# Patient Record
Sex: MALE | Race: WHITE | NOT HISPANIC OR LATINO
[De-identification: names, ages, dates, MRNs, and addresses within clinical notes are randomized per-mention and may not be internally consistent; named-entity substitution may affect disease eponyms.]

---

## 2017-03-03 ENCOUNTER — APPOINTMENT (OUTPATIENT)
Dept: INFUSION THERAPY | Facility: CLINIC | Age: 67
End: 2017-03-03

## 2017-03-03 ENCOUNTER — APPOINTMENT (OUTPATIENT)
Dept: HEMATOLOGY ONCOLOGY | Facility: CLINIC | Age: 67
End: 2017-03-03

## 2017-03-03 VITALS
TEMPERATURE: 96 F | HEART RATE: 91 BPM | WEIGHT: 210 LBS | HEIGHT: 70 IN | RESPIRATION RATE: 12 BRPM | SYSTOLIC BLOOD PRESSURE: 115 MMHG | BODY MASS INDEX: 30.06 KG/M2 | DIASTOLIC BLOOD PRESSURE: 60 MMHG

## 2017-03-03 DIAGNOSIS — Z85.528 PERSONAL HISTORY OF OTHER MALIGNANT NEOPLASM OF KIDNEY: ICD-10-CM

## 2017-03-03 LAB
BASOPHILS # BLD: 0.03 TH/MM3
BASOPHILS NFR BLD: 0.5 %
EOSINOPHIL # BLD: 0.07 TH/MM3
EOSINOPHIL NFR BLD: 1.2 %
ERYTHROCYTE [DISTWIDTH] IN BLOOD BY AUTOMATED COUNT: 20.2 %
FERRITIN SERPL-MCNC: 10 NG/ML
GRANULOCYTES # BLD: 4.1 TH/MM3
GRANULOCYTES NFR BLD: 73 %
HCT VFR BLD AUTO: 27.9 %
HGB BLD-MCNC: 7.6 G/DL
IMM GRANULOCYTES # BLD: 0.02 TH/MM3
IMM GRANULOCYTES NFR BLD: 0.4 %
IRON SERPL-MCNC: 14 UG/DL
LYMPHOCYTES # BLD: 0.89 TH/MM3
LYMPHOCYTES NFR BLD: 15.8 %
MCH RBC QN AUTO: 21.1 PG
MCHC RBC AUTO-ENTMCNC: 27.2 G/DL
MCV RBC AUTO: 77.5 FL
MONOCYTES # BLD: 0.51 TH/MM3
MONOCYTES NFR BLD: 9.1 %
PLATELET # BLD: 166 TH/MM3
PMV BLD AUTO: 8.9 FL
RBC # BLD AUTO: 3.6 MIL/MM3
TIBC SERPL-MCNC: 481 UG/DL
WBC # BLD: 5.62 TH/MM3

## 2017-03-10 ENCOUNTER — APPOINTMENT (OUTPATIENT)
Dept: INFUSION THERAPY | Facility: CLINIC | Age: 67
End: 2017-03-10

## 2017-03-10 VITALS
SYSTOLIC BLOOD PRESSURE: 104 MMHG | HEART RATE: 85 BPM | DIASTOLIC BLOOD PRESSURE: 58 MMHG | TEMPERATURE: 97.7 F | RESPIRATION RATE: 16 BRPM

## 2017-03-10 LAB
BASOPHILS # BLD: 0.06 TH/MM3
BASOPHILS NFR BLD: 0.9 %
EOSINOPHIL # BLD: 0.09 TH/MM3
EOSINOPHIL NFR BLD: 1.3 %
ERYTHROCYTE [DISTWIDTH] IN BLOOD BY AUTOMATED COUNT: 24.9 %
GRANULOCYTES # BLD: 4.54 TH/MM3
GRANULOCYTES NFR BLD: 66.7 %
HCT VFR BLD AUTO: 31.1 %
HGB BLD-MCNC: 9.1 G/DL
IMM GRANULOCYTES # BLD: 0.1 TH/MM3
IMM GRANULOCYTES NFR BLD: 1.5 %
LYMPHOCYTES # BLD: 1.5 TH/MM3
LYMPHOCYTES NFR BLD: 22.1 %
MCH RBC QN AUTO: 23.6 PG
MCHC RBC AUTO-ENTMCNC: 29.3 G/DL
MCV RBC AUTO: 80.6 FL
MONOCYTES # BLD: 0.51 TH/MM3
MONOCYTES NFR BLD: 7.5 %
PLATELET # BLD: 130 TH/MM3
PMV BLD AUTO: 9.4 FL
RBC # BLD AUTO: 3.86 MIL/MM3
WBC # BLD: 6.8 TH/MM3

## 2017-03-17 ENCOUNTER — APPOINTMENT (OUTPATIENT)
Dept: INFUSION THERAPY | Facility: CLINIC | Age: 67
End: 2017-03-17

## 2017-03-18 LAB
BASOPHILS # BLD: 0.04 TH/MM3
BASOPHILS NFR BLD: 0.8 %
EOSINOPHIL # BLD: 0.09 TH/MM3
EOSINOPHIL NFR BLD: 1.7 %
ERYTHROCYTE [DISTWIDTH] IN BLOOD BY AUTOMATED COUNT: 28.8 %
GRANULOCYTES # BLD: 2.94 TH/MM3
GRANULOCYTES NFR BLD: 56.5 %
HCT VFR BLD AUTO: 34.2 %
HGB BLD-MCNC: 10 G/DL
IMM GRANULOCYTES # BLD: 0.02 TH/MM3
IMM GRANULOCYTES NFR BLD: 0.4 %
LYMPHOCYTES # BLD: 1.57 TH/MM3
LYMPHOCYTES NFR BLD: 30.2 %
MCH RBC QN AUTO: 24.6 PG
MCHC RBC AUTO-ENTMCNC: 29.2 G/DL
MCV RBC AUTO: 84.2 FL
MONOCYTES # BLD: 0.54 TH/MM3
MONOCYTES NFR BLD: 10.4 %
PLATELET # BLD: 135 TH/MM3
RBC # BLD AUTO: 4.06 MIL/MM3
WBC # BLD: 5.2 TH/MM3

## 2017-03-24 ENCOUNTER — APPOINTMENT (OUTPATIENT)
Dept: INFUSION THERAPY | Facility: CLINIC | Age: 67
End: 2017-03-24

## 2017-03-24 LAB
BASOPHILS # BLD: 0.04 TH/MM3
BASOPHILS NFR BLD: 0.8 %
EOSINOPHIL # BLD: 0.08 TH/MM3
EOSINOPHIL NFR BLD: 1.7 %
ERYTHROCYTE [DISTWIDTH] IN BLOOD BY AUTOMATED COUNT: 27.1 %
GRANULOCYTES # BLD: 2.97 TH/MM3
GRANULOCYTES NFR BLD: 62.5 %
HCT VFR BLD AUTO: 35.4 %
HGB BLD-MCNC: 10.6 G/DL
IMM GRANULOCYTES # BLD: 0.01 TH/MM3
IMM GRANULOCYTES NFR BLD: 0.2 %
LYMPHOCYTES # BLD: 1.13 TH/MM3
LYMPHOCYTES NFR BLD: 23.7 %
MCH RBC QN AUTO: 25.1 PG
MCHC RBC AUTO-ENTMCNC: 29.9 G/DL
MCV RBC AUTO: 83.7 FL
MONOCYTES # BLD: 0.53 TH/MM3
MONOCYTES NFR BLD: 11.1 %
PLATELET # BLD: 179 TH/MM3
PMV BLD AUTO: 10 FL
RBC # BLD AUTO: 4.23 MIL/MM3
WBC # BLD: 4.76 TH/MM3

## 2017-03-31 ENCOUNTER — APPOINTMENT (OUTPATIENT)
Dept: INFUSION THERAPY | Facility: CLINIC | Age: 67
End: 2017-03-31

## 2017-03-31 ENCOUNTER — APPOINTMENT (OUTPATIENT)
Dept: HEMATOLOGY ONCOLOGY | Facility: CLINIC | Age: 67
End: 2017-03-31

## 2017-03-31 VITALS
HEIGHT: 70 IN | DIASTOLIC BLOOD PRESSURE: 58 MMHG | WEIGHT: 210 LBS | HEART RATE: 91 BPM | SYSTOLIC BLOOD PRESSURE: 94 MMHG | BODY MASS INDEX: 30.06 KG/M2 | RESPIRATION RATE: 12 BRPM | TEMPERATURE: 97.2 F

## 2017-03-31 LAB
BASOPHILS # BLD: 0.05 TH/MM3
BASOPHILS NFR BLD: 1 %
EOSINOPHIL # BLD: 0.18 TH/MM3
EOSINOPHIL NFR BLD: 3.5 %
ERYTHROCYTE [DISTWIDTH] IN BLOOD BY AUTOMATED COUNT: 26.1 %
GRANULOCYTES # BLD: 3.24 TH/MM3
GRANULOCYTES NFR BLD: 62.4 %
HCT VFR BLD AUTO: 33.6 %
HGB BLD-MCNC: 10 G/DL
IMM GRANULOCYTES # BLD: 0.02 TH/MM3
IMM GRANULOCYTES NFR BLD: 0.4 %
LYMPHOCYTES # BLD: 1.18 TH/MM3
LYMPHOCYTES NFR BLD: 22.7 %
MCH RBC QN AUTO: 24.9 PG
MCHC RBC AUTO-ENTMCNC: 29.8 G/DL
MCV RBC AUTO: 83.8 FL
MONOCYTES # BLD: 0.52 TH/MM3
MONOCYTES NFR BLD: 10 %
PLATELET # BLD: 164 TH/MM3
PMV BLD AUTO: 10.2 FL
RBC # BLD AUTO: 4.01 MIL/MM3
WBC # BLD: 5.19 TH/MM3

## 2017-04-07 ENCOUNTER — RESULT REVIEW (OUTPATIENT)
Age: 67
End: 2017-04-07

## 2017-04-07 ENCOUNTER — APPOINTMENT (OUTPATIENT)
Dept: INFUSION THERAPY | Facility: CLINIC | Age: 67
End: 2017-04-07

## 2017-04-14 ENCOUNTER — APPOINTMENT (OUTPATIENT)
Dept: INFUSION THERAPY | Facility: CLINIC | Age: 67
End: 2017-04-14

## 2017-04-14 LAB
BASOPHILS # BLD: 0.04 TH/MM3
BASOPHILS NFR BLD: 0.8 %
EOSINOPHIL # BLD: 0.23 TH/MM3
EOSINOPHIL NFR BLD: 4.3 %
ERYTHROCYTE [DISTWIDTH] IN BLOOD BY AUTOMATED COUNT: 23.3 %
GRANULOCYTES # BLD: 3.5 TH/MM3
GRANULOCYTES NFR BLD: 65.6 %
HCT VFR BLD AUTO: 30.1 %
HGB BLD-MCNC: 9.2 G/DL
IMM GRANULOCYTES # BLD: 0.02 TH/MM3
IMM GRANULOCYTES NFR BLD: 0.4 %
LYMPHOCYTES # BLD: 0.95 TH/MM3
LYMPHOCYTES NFR BLD: 17.8 %
MCH RBC QN AUTO: 25.4 PG
MCHC RBC AUTO-ENTMCNC: 30.6 G/DL
MCV RBC AUTO: 83.1 FL
MONOCYTES # BLD: 0.59 TH/MM3
MONOCYTES NFR BLD: 11.1 %
PLATELET # BLD: 170 TH/MM3
PMV BLD AUTO: 9.5 FL
RBC # BLD AUTO: 3.62 MIL/MM3
WBC # BLD: 5.33 TH/MM3

## 2017-04-21 ENCOUNTER — APPOINTMENT (OUTPATIENT)
Dept: INFUSION THERAPY | Facility: CLINIC | Age: 67
End: 2017-04-21

## 2017-04-21 LAB
BASOPHILS # BLD: 0.09 TH/MM3
BASOPHILS NFR BLD: 1.7 %
EOSINOPHIL # BLD: 0.19 TH/MM3
EOSINOPHIL NFR BLD: 3.5 %
ERYTHROCYTE [DISTWIDTH] IN BLOOD BY AUTOMATED COUNT: 22.5 %
GRANULOCYTES # BLD: 3.56 TH/MM3
GRANULOCYTES NFR BLD: 65.4 %
HCT VFR BLD AUTO: 30.5 %
HGB BLD-MCNC: 9.3 G/DL
IMM GRANULOCYTES # BLD: 0.14 TH/MM3
IMM GRANULOCYTES NFR BLD: 2.6 %
LYMPHOCYTES # BLD: 0.99 TH/MM3
LYMPHOCYTES NFR BLD: 18.2 %
MCH RBC QN AUTO: 25.1 PG
MCHC RBC AUTO-ENTMCNC: 30.5 G/DL
MCV RBC AUTO: 82.2 FL
MONOCYTES # BLD: 0.47 TH/MM3
MONOCYTES NFR BLD: 8.6 %
PLATELET # BLD: 213 TH/MM3
PMV BLD AUTO: 10 FL
RBC # BLD AUTO: 3.71 MIL/MM3
WBC # BLD: 5.44 TH/MM3

## 2017-04-26 ENCOUNTER — APPOINTMENT (OUTPATIENT)
Dept: HEMATOLOGY ONCOLOGY | Facility: CLINIC | Age: 67
End: 2017-04-26

## 2017-04-26 ENCOUNTER — APPOINTMENT (OUTPATIENT)
Dept: INFUSION THERAPY | Facility: CLINIC | Age: 67
End: 2017-04-26

## 2017-04-26 ENCOUNTER — OUTPATIENT (OUTPATIENT)
Dept: OUTPATIENT SERVICES | Facility: HOSPITAL | Age: 67
LOS: 1 days | Discharge: HOME | End: 2017-04-26

## 2017-04-26 VITALS
WEIGHT: 214 LBS | RESPIRATION RATE: 12 BRPM | BODY MASS INDEX: 30.64 KG/M2 | TEMPERATURE: 95.9 F | HEART RATE: 91 BPM | SYSTOLIC BLOOD PRESSURE: 104 MMHG | DIASTOLIC BLOOD PRESSURE: 58 MMHG | HEIGHT: 70 IN

## 2017-04-26 LAB
BASOPHILS # BLD: 0.03 TH/MM3
BASOPHILS NFR BLD: 0.7 %
EOSINOPHIL # BLD: 0.15 TH/MM3
EOSINOPHIL NFR BLD: 3.3 %
ERYTHROCYTE [DISTWIDTH] IN BLOOD BY AUTOMATED COUNT: 21.8 %
GRANULOCYTES # BLD: 3.12 TH/MM3
GRANULOCYTES NFR BLD: 67.7 %
HCT VFR BLD AUTO: 29.3 %
HGB BLD-MCNC: 8.9 G/DL
IMM GRANULOCYTES # BLD: 0.01 TH/MM3
IMM GRANULOCYTES NFR BLD: 0.2 %
LYMPHOCYTES # BLD: 0.9 TH/MM3
LYMPHOCYTES NFR BLD: 19.6 %
MCH RBC QN AUTO: 25 PG
MCHC RBC AUTO-ENTMCNC: 30.4 G/DL
MCV RBC AUTO: 82.3 FL
MONOCYTES # BLD: 0.39 TH/MM3
MONOCYTES NFR BLD: 8.5 %
PLATELET # BLD: 209 TH/MM3
PMV BLD AUTO: 9.1 FL
RBC # BLD AUTO: 3.56 MIL/MM3
WBC # BLD: 4.6 TH/MM3

## 2017-04-28 LAB
FERRITIN SERPL-MCNC: 20 NG/ML
PERCENT SATURATION (NORTH): 4.4 %
TIBC SERPL-MCNC: 452 UG/DL

## 2017-05-03 ENCOUNTER — APPOINTMENT (OUTPATIENT)
Dept: INFUSION THERAPY | Facility: CLINIC | Age: 67
End: 2017-05-03

## 2017-05-04 LAB
ALBUMIN SERPL-MCNC: 3.5 G/DL
ALBUMIN/GLOB SERPL: 1.21
ALP SERPL-CCNC: 67 IU/L
ALT SERPL-CCNC: 12 IU/L
ANION GAP SERPL CALC-SCNC: 9 MEQ/L
AST SERPL-CCNC: 17 IU/L
BASOPHILS # BLD: 0.02 TH/MM3
BASOPHILS NFR BLD: 0.4 %
BILIRUB SERPL-MCNC: 1.1 MG/DL
BUN SERPL-MCNC: 25 MG/DL
BUN/CREAT SERPL: 17 %
CALCIUM SERPL-MCNC: 9.1 MG/DL
CHLORIDE SERPL-SCNC: 107 MEQ/L
CO2 SERPL-SCNC: 23 MEQ/L
CREAT SERPL-MCNC: 1.47 MG/DL
EOSINOPHIL # BLD: 0.18 TH/MM3
EOSINOPHIL NFR BLD: 3.3 %
ERYTHROCYTE [DISTWIDTH] IN BLOOD BY AUTOMATED COUNT: 21.6 %
GFR SERPL CREATININE-BSD FRML MDRD: 48
GLUCOSE SERPL-MCNC: 96 MG/DL
GRANULOCYTES # BLD: 3.81 TH/MM3
GRANULOCYTES NFR BLD: 68.9 %
HCT VFR BLD AUTO: 30.4 %
HGB BLD-MCNC: 9.1 G/DL
IMM GRANULOCYTES # BLD: 0.01 TH/MM3
IMM GRANULOCYTES NFR BLD: 0.2 %
LYMPHOCYTES # BLD: 1.03 TH/MM3
LYMPHOCYTES NFR BLD: 18.7 %
MCH RBC QN AUTO: 24.2 PG
MCHC RBC AUTO-ENTMCNC: 29.9 G/DL
MCV RBC AUTO: 80.9 FL
MONOCYTES # BLD: 0.47 TH/MM3
MONOCYTES NFR BLD: 8.5 %
PLATELET # BLD: 180 TH/MM3
PMV BLD AUTO: 10.9 FL
POTASSIUM SERPL-SCNC: 4.8 MMOL/L
PROT SERPL-MCNC: 6.4 G/DL
RBC # BLD AUTO: 3.76 MIL/MM3
SODIUM SERPL-SCNC: 139 MEQ/L
WBC # BLD: 5.52 TH/MM3

## 2017-05-05 ENCOUNTER — APPOINTMENT (OUTPATIENT)
Dept: INFUSION THERAPY | Facility: CLINIC | Age: 67
End: 2017-05-05

## 2017-05-10 ENCOUNTER — APPOINTMENT (OUTPATIENT)
Dept: INFUSION THERAPY | Facility: CLINIC | Age: 67
End: 2017-05-10

## 2017-05-12 LAB
BASOPHILS # BLD: 0.08 TH/MM3
BASOPHILS NFR BLD: 1.6 %
EOSINOPHIL # BLD: 0.13 TH/MM3
EOSINOPHIL NFR BLD: 2.6 %
ERYTHROCYTE [DISTWIDTH] IN BLOOD BY AUTOMATED COUNT: 24.7 %
GRANULOCYTES # BLD: 3.3 TH/MM3
GRANULOCYTES NFR BLD: 65.3 %
HCT VFR BLD AUTO: 32.8 %
HGB BLD-MCNC: 9.8 G/DL
IMM GRANULOCYTES # BLD: 0.11 TH/MM3
IMM GRANULOCYTES NFR BLD: 2.2 %
LYMPHOCYTES # BLD: 1.08 TH/MM3
LYMPHOCYTES NFR BLD: 21.4 %
MCH RBC QN AUTO: 25.4 PG
MCHC RBC AUTO-ENTMCNC: 29.9 G/DL
MCV RBC AUTO: 85 FL
MONOCYTES # BLD: 0.35 TH/MM3
MONOCYTES NFR BLD: 6.9 %
PLATELET # BLD: 160 TH/MM3
PMV BLD AUTO: 11 FL
RBC # BLD AUTO: 3.86 MIL/MM3
WBC # BLD: 5.05 TH/MM3

## 2017-05-17 ENCOUNTER — APPOINTMENT (OUTPATIENT)
Dept: INFUSION THERAPY | Facility: CLINIC | Age: 67
End: 2017-05-17

## 2017-05-17 LAB
BASOPHILS # BLD: 0.04 TH/MM3
BASOPHILS NFR BLD: 0.8 %
EOSINOPHIL # BLD: 0.14 TH/MM3
EOSINOPHIL NFR BLD: 2.8 %
ERYTHROCYTE [DISTWIDTH] IN BLOOD BY AUTOMATED COUNT: 26.5 %
GRANULOCYTES # BLD: 3.2 TH/MM3
GRANULOCYTES NFR BLD: 64.9 %
HCT VFR BLD AUTO: 36.9 %
HGB BLD-MCNC: 10.9 G/DL
IMM GRANULOCYTES # BLD: 0.04 TH/MM3
IMM GRANULOCYTES NFR BLD: 0.8 %
LYMPHOCYTES # BLD: 1.24 TH/MM3
LYMPHOCYTES NFR BLD: 25.2 %
MCH RBC QN AUTO: 26.2 PG
MCHC RBC AUTO-ENTMCNC: 29.5 G/DL
MCV RBC AUTO: 88.7 FL
MONOCYTES # BLD: 0.27 TH/MM3
MONOCYTES NFR BLD: 5.5 %
PLATELET # BLD: 147 TH/MM3
RBC # BLD AUTO: 4.16 MIL/MM3
WBC # BLD: 4.93 TH/MM3

## 2017-05-24 ENCOUNTER — APPOINTMENT (OUTPATIENT)
Dept: INFUSION THERAPY | Facility: CLINIC | Age: 67
End: 2017-05-24

## 2017-05-25 LAB
BASOPHILS # BLD: 0.07 TH/MM3
BASOPHILS NFR BLD: 1.4 %
EOSINOPHIL # BLD: 0.15 TH/MM3
EOSINOPHIL NFR BLD: 2.9 %
ERYTHROCYTE [DISTWIDTH] IN BLOOD BY AUTOMATED COUNT: 25 %
GRANULOCYTES # BLD: 3.39 TH/MM3
GRANULOCYTES NFR BLD: 65.8 %
HCT VFR BLD AUTO: 37.8 %
HGB BLD-MCNC: 11.5 G/DL
IMM GRANULOCYTES # BLD: 0.03 TH/MM3
IMM GRANULOCYTES NFR BLD: 0.6 %
LYMPHOCYTES # BLD: 1.13 TH/MM3
LYMPHOCYTES NFR BLD: 21.9 %
MCH RBC QN AUTO: 26.9 PG
MCHC RBC AUTO-ENTMCNC: 30.4 G/DL
MCV RBC AUTO: 88.3 FL
MONOCYTES # BLD: 0.38 TH/MM3
MONOCYTES NFR BLD: 7.4 %
PLATELET # BLD: 170 TH/MM3
PMV BLD AUTO: 11.6 FL
RBC # BLD AUTO: 4.28 MIL/MM3
WBC # BLD: 5.15 TH/MM3

## 2017-05-31 ENCOUNTER — APPOINTMENT (OUTPATIENT)
Dept: INFUSION THERAPY | Facility: CLINIC | Age: 67
End: 2017-05-31

## 2017-06-01 LAB
BASOPHILS # BLD: 0.03 TH/MM3
BASOPHILS NFR BLD: 0.7 %
EOSINOPHIL # BLD: 0.13 TH/MM3
EOSINOPHIL NFR BLD: 3.1 %
ERYTHROCYTE [DISTWIDTH] IN BLOOD BY AUTOMATED COUNT: 23.6 %
FERRITIN SERPL-MCNC: 49 NG/ML
GRANULOCYTES # BLD: 2.88 TH/MM3
GRANULOCYTES NFR BLD: 68.9 %
HCT VFR BLD AUTO: 35.9 %
HGB BLD-MCNC: 10.9 G/DL
IMM GRANULOCYTES # BLD: 0.01 TH/MM3
IMM GRANULOCYTES NFR BLD: 0.2 %
IRON SERPL-MCNC: 30 UG/DL
LYMPHOCYTES # BLD: 0.78 TH/MM3
LYMPHOCYTES NFR BLD: 18.7 %
MCH RBC QN AUTO: 27.2 PG
MCHC RBC AUTO-ENTMCNC: 30.4 G/DL
MCV RBC AUTO: 89.5 FL
MONOCYTES # BLD: 0.35 TH/MM3
MONOCYTES NFR BLD: 8.4 %
PLATELET # BLD: 146 TH/MM3
PMV BLD AUTO: 10.2 FL
RBC # BLD AUTO: 4.01 MIL/MM3
TIBC SERPL-MCNC: 442 UG/DL
WBC # BLD: 4.18 TH/MM3

## 2017-06-07 ENCOUNTER — APPOINTMENT (OUTPATIENT)
Dept: HEMATOLOGY ONCOLOGY | Facility: CLINIC | Age: 67
End: 2017-06-07

## 2017-06-07 ENCOUNTER — APPOINTMENT (OUTPATIENT)
Dept: INFUSION THERAPY | Facility: CLINIC | Age: 67
End: 2017-06-07

## 2017-06-07 VITALS
SYSTOLIC BLOOD PRESSURE: 94 MMHG | BODY MASS INDEX: 31.07 KG/M2 | RESPIRATION RATE: 12 BRPM | HEIGHT: 70 IN | DIASTOLIC BLOOD PRESSURE: 68 MMHG | WEIGHT: 217 LBS | HEART RATE: 93 BPM | TEMPERATURE: 96.6 F

## 2017-06-07 RX ORDER — POLYVINYL ALCOHOL 14 MG/ML
1.4 SOLUTION/ DROPS OPHTHALMIC
Qty: 15 | Refills: 0 | Status: COMPLETED | COMMUNITY
Start: 2016-10-18

## 2017-06-07 RX ORDER — RANITIDINE 150 MG/1
150 TABLET ORAL
Refills: 0 | Status: COMPLETED | COMMUNITY

## 2017-06-07 RX ORDER — LORATADINE 10 MG/1
10 TABLET ORAL
Qty: 30 | Refills: 0 | Status: COMPLETED | COMMUNITY
Start: 2017-02-16

## 2017-06-07 RX ORDER — DIGOXIN 125 UG/1
125 TABLET ORAL
Refills: 0 | Status: COMPLETED | COMMUNITY

## 2017-06-07 RX ORDER — CHLORHEXIDINE GLUCONATE 4 %
325 (65 FE) LIQUID (ML) TOPICAL
Qty: 30 | Refills: 0 | Status: COMPLETED | COMMUNITY
Start: 2017-02-16

## 2017-06-08 LAB
BASOPHILS # BLD: 0.05 TH/MM3
BASOPHILS NFR BLD: 0.9 %
EOSINOPHIL # BLD: 0.17 TH/MM3
EOSINOPHIL NFR BLD: 3.1 %
ERYTHROCYTE [DISTWIDTH] IN BLOOD BY AUTOMATED COUNT: 22.2 %
GRANULOCYTES # BLD: 3.75 TH/MM3
GRANULOCYTES NFR BLD: 67.8 %
HCT VFR BLD AUTO: 36.5 %
HGB BLD-MCNC: 11.4 G/DL
IMM GRANULOCYTES # BLD: 0.05 TH/MM3
IMM GRANULOCYTES NFR BLD: 0.9 %
LYMPHOCYTES # BLD: 0.96 TH/MM3
LYMPHOCYTES NFR BLD: 17.4 %
MCH RBC QN AUTO: 27.1 PG
MCHC RBC AUTO-ENTMCNC: 31.2 G/DL
MCV RBC AUTO: 86.9 FL
MONOCYTES # BLD: 0.55 TH/MM3
MONOCYTES NFR BLD: 9.9 %
PLATELET # BLD: 163 TH/MM3
PMV BLD AUTO: 10.7 FL
RBC # BLD AUTO: 4.2 MIL/MM3
WBC # BLD: 5.53 TH/MM3

## 2017-06-14 ENCOUNTER — APPOINTMENT (OUTPATIENT)
Dept: INFUSION THERAPY | Facility: CLINIC | Age: 67
End: 2017-06-14

## 2017-06-16 LAB
BASOPHILS # BLD: 0.04 TH/MM3
BASOPHILS NFR BLD: 0.7 %
EOSINOPHIL # BLD: 0.15 TH/MM3
EOSINOPHIL NFR BLD: 2.7 %
ERYTHROCYTE [DISTWIDTH] IN BLOOD BY AUTOMATED COUNT: 23 %
GRANULOCYTES # BLD: 3.87 TH/MM3
GRANULOCYTES NFR BLD: 69.2 %
HCT VFR BLD AUTO: 39 %
HGB BLD-MCNC: 12.3 G/DL
IMM GRANULOCYTES # BLD: 0.01 TH/MM3
IMM GRANULOCYTES NFR BLD: 0.2 %
LYMPHOCYTES # BLD: 1.07 TH/MM3
LYMPHOCYTES NFR BLD: 19.1 %
MCH RBC QN AUTO: 27.8 PG
MCHC RBC AUTO-ENTMCNC: 31.5 G/DL
MCV RBC AUTO: 88 FL
MONOCYTES # BLD: 0.45 TH/MM3
MONOCYTES NFR BLD: 8.1 %
PLATELET # BLD: 123 TH/MM3
RBC # BLD AUTO: 4.43 MIL/MM3
WBC # BLD: 5.59 TH/MM3

## 2017-06-19 ENCOUNTER — OUTPATIENT (OUTPATIENT)
Dept: OUTPATIENT SERVICES | Facility: HOSPITAL | Age: 67
LOS: 1 days | Discharge: HOME | End: 2017-06-19

## 2017-06-19 DIAGNOSIS — I10 ESSENTIAL (PRIMARY) HYPERTENSION: ICD-10-CM

## 2017-06-19 DIAGNOSIS — I95.2 HYPOTENSION DUE TO DRUGS: ICD-10-CM

## 2017-06-19 DIAGNOSIS — J96.00 ACUTE RESPIRATORY FAILURE, UNSPECIFIED WHETHER WITH HYPOXIA OR HYPERCAPNIA: ICD-10-CM

## 2017-06-19 DIAGNOSIS — A41.9 SEPSIS, UNSPECIFIED ORGANISM: ICD-10-CM

## 2017-06-19 DIAGNOSIS — I50.23 ACUTE ON CHRONIC SYSTOLIC (CONGESTIVE) HEART FAILURE: ICD-10-CM

## 2017-06-19 DIAGNOSIS — D64.9 ANEMIA, UNSPECIFIED: ICD-10-CM

## 2017-06-19 DIAGNOSIS — I42.9 CARDIOMYOPATHY, UNSPECIFIED: ICD-10-CM

## 2017-06-19 DIAGNOSIS — N10 ACUTE PYELONEPHRITIS: ICD-10-CM

## 2017-06-19 DIAGNOSIS — I48.91 UNSPECIFIED ATRIAL FIBRILLATION: ICD-10-CM

## 2017-06-19 DIAGNOSIS — N20.0 CALCULUS OF KIDNEY: ICD-10-CM

## 2017-06-19 DIAGNOSIS — R55 SYNCOPE AND COLLAPSE: ICD-10-CM

## 2017-06-19 DIAGNOSIS — I78.0 HEREDITARY HEMORRHAGIC TELANGIECTASIA: ICD-10-CM

## 2017-06-19 DIAGNOSIS — M10.9 GOUT, UNSPECIFIED: ICD-10-CM

## 2017-06-21 ENCOUNTER — APPOINTMENT (OUTPATIENT)
Dept: INFUSION THERAPY | Facility: CLINIC | Age: 67
End: 2017-06-21

## 2017-06-21 ENCOUNTER — APPOINTMENT (OUTPATIENT)
Dept: CARDIOLOGY | Facility: CLINIC | Age: 67
End: 2017-06-21

## 2017-06-21 VITALS — HEART RATE: 92 BPM | DIASTOLIC BLOOD PRESSURE: 61 MMHG | SYSTOLIC BLOOD PRESSURE: 94 MMHG | OXYGEN SATURATION: 97 %

## 2017-06-22 LAB
BASOPHILS # BLD: 0.06 TH/MM3
BASOPHILS NFR BLD: 1.2 %
EOSINOPHIL # BLD: 0.18 TH/MM3
EOSINOPHIL NFR BLD: 3.5 %
ERYTHROCYTE [DISTWIDTH] IN BLOOD BY AUTOMATED COUNT: 22.5 %
GRANULOCYTES # BLD: 3.27 TH/MM3
GRANULOCYTES NFR BLD: 64.2 %
HCT VFR BLD AUTO: 39.7 %
HGB BLD-MCNC: 12.6 G/DL
IMM GRANULOCYTES # BLD: 0.02 TH/MM3
IMM GRANULOCYTES NFR BLD: 0.4 %
LYMPHOCYTES # BLD: 1.16 TH/MM3
LYMPHOCYTES NFR BLD: 22.7 %
MCH RBC QN AUTO: 27.9 PG
MCHC RBC AUTO-ENTMCNC: 21.7 G/DL
MCV RBC AUTO: 88 FL
MONOCYTES # BLD: 0.41 TH/MM3
MONOCYTES NFR BLD: 8 %
PLATELET # BLD: 142 TH/MM3
RBC # BLD AUTO: 4.51 MIL/MM3
WBC # BLD: 5.1 TH/MM3

## 2017-06-26 ENCOUNTER — OUTPATIENT (OUTPATIENT)
Dept: OUTPATIENT SERVICES | Facility: HOSPITAL | Age: 67
LOS: 1 days | Discharge: HOME | End: 2017-06-26

## 2017-06-26 DIAGNOSIS — I95.2 HYPOTENSION DUE TO DRUGS: ICD-10-CM

## 2017-06-26 DIAGNOSIS — M10.9 GOUT, UNSPECIFIED: ICD-10-CM

## 2017-06-26 DIAGNOSIS — I78.0 HEREDITARY HEMORRHAGIC TELANGIECTASIA: ICD-10-CM

## 2017-06-26 DIAGNOSIS — J96.00 ACUTE RESPIRATORY FAILURE, UNSPECIFIED WHETHER WITH HYPOXIA OR HYPERCAPNIA: ICD-10-CM

## 2017-06-26 DIAGNOSIS — A41.9 SEPSIS, UNSPECIFIED ORGANISM: ICD-10-CM

## 2017-06-26 DIAGNOSIS — I42.9 CARDIOMYOPATHY, UNSPECIFIED: ICD-10-CM

## 2017-06-26 DIAGNOSIS — I10 ESSENTIAL (PRIMARY) HYPERTENSION: ICD-10-CM

## 2017-06-26 DIAGNOSIS — R55 SYNCOPE AND COLLAPSE: ICD-10-CM

## 2017-06-26 DIAGNOSIS — N20.0 CALCULUS OF KIDNEY: ICD-10-CM

## 2017-06-26 DIAGNOSIS — I48.91 UNSPECIFIED ATRIAL FIBRILLATION: ICD-10-CM

## 2017-06-26 DIAGNOSIS — I50.23 ACUTE ON CHRONIC SYSTOLIC (CONGESTIVE) HEART FAILURE: ICD-10-CM

## 2017-06-26 DIAGNOSIS — N10 ACUTE PYELONEPHRITIS: ICD-10-CM

## 2017-06-26 DIAGNOSIS — D64.9 ANEMIA, UNSPECIFIED: ICD-10-CM

## 2017-06-28 ENCOUNTER — APPOINTMENT (OUTPATIENT)
Dept: INFUSION THERAPY | Facility: CLINIC | Age: 67
End: 2017-06-28

## 2017-06-28 DIAGNOSIS — I48.91 UNSPECIFIED ATRIAL FIBRILLATION: ICD-10-CM

## 2017-07-03 ENCOUNTER — OUTPATIENT (OUTPATIENT)
Dept: OUTPATIENT SERVICES | Facility: HOSPITAL | Age: 67
LOS: 1 days | Discharge: HOME | End: 2017-07-03

## 2017-07-03 DIAGNOSIS — D64.9 ANEMIA, UNSPECIFIED: ICD-10-CM

## 2017-07-03 DIAGNOSIS — J96.00 ACUTE RESPIRATORY FAILURE, UNSPECIFIED WHETHER WITH HYPOXIA OR HYPERCAPNIA: ICD-10-CM

## 2017-07-03 DIAGNOSIS — M10.9 GOUT, UNSPECIFIED: ICD-10-CM

## 2017-07-03 DIAGNOSIS — I50.23 ACUTE ON CHRONIC SYSTOLIC (CONGESTIVE) HEART FAILURE: ICD-10-CM

## 2017-07-03 DIAGNOSIS — N10 ACUTE PYELONEPHRITIS: ICD-10-CM

## 2017-07-03 DIAGNOSIS — R55 SYNCOPE AND COLLAPSE: ICD-10-CM

## 2017-07-03 DIAGNOSIS — I42.9 CARDIOMYOPATHY, UNSPECIFIED: ICD-10-CM

## 2017-07-03 DIAGNOSIS — N20.0 CALCULUS OF KIDNEY: ICD-10-CM

## 2017-07-03 DIAGNOSIS — I48.91 UNSPECIFIED ATRIAL FIBRILLATION: ICD-10-CM

## 2017-07-03 DIAGNOSIS — A41.9 SEPSIS, UNSPECIFIED ORGANISM: ICD-10-CM

## 2017-07-03 DIAGNOSIS — I95.2 HYPOTENSION DUE TO DRUGS: ICD-10-CM

## 2017-07-03 DIAGNOSIS — I10 ESSENTIAL (PRIMARY) HYPERTENSION: ICD-10-CM

## 2017-07-03 DIAGNOSIS — I78.0 HEREDITARY HEMORRHAGIC TELANGIECTASIA: ICD-10-CM

## 2017-07-05 ENCOUNTER — APPOINTMENT (OUTPATIENT)
Dept: INFUSION THERAPY | Facility: CLINIC | Age: 67
End: 2017-07-05

## 2017-07-10 ENCOUNTER — OUTPATIENT (OUTPATIENT)
Dept: OUTPATIENT SERVICES | Facility: HOSPITAL | Age: 67
LOS: 1 days | Discharge: HOME | End: 2017-07-10

## 2017-07-10 DIAGNOSIS — I10 ESSENTIAL (PRIMARY) HYPERTENSION: ICD-10-CM

## 2017-07-10 DIAGNOSIS — I78.0 HEREDITARY HEMORRHAGIC TELANGIECTASIA: ICD-10-CM

## 2017-07-10 DIAGNOSIS — I42.9 CARDIOMYOPATHY, UNSPECIFIED: ICD-10-CM

## 2017-07-10 DIAGNOSIS — M10.9 GOUT, UNSPECIFIED: ICD-10-CM

## 2017-07-10 DIAGNOSIS — J96.00 ACUTE RESPIRATORY FAILURE, UNSPECIFIED WHETHER WITH HYPOXIA OR HYPERCAPNIA: ICD-10-CM

## 2017-07-10 DIAGNOSIS — I50.23 ACUTE ON CHRONIC SYSTOLIC (CONGESTIVE) HEART FAILURE: ICD-10-CM

## 2017-07-10 DIAGNOSIS — I95.2 HYPOTENSION DUE TO DRUGS: ICD-10-CM

## 2017-07-10 DIAGNOSIS — I48.91 UNSPECIFIED ATRIAL FIBRILLATION: ICD-10-CM

## 2017-07-10 DIAGNOSIS — R55 SYNCOPE AND COLLAPSE: ICD-10-CM

## 2017-07-10 DIAGNOSIS — N10 ACUTE PYELONEPHRITIS: ICD-10-CM

## 2017-07-10 DIAGNOSIS — D64.9 ANEMIA, UNSPECIFIED: ICD-10-CM

## 2017-07-10 DIAGNOSIS — A41.9 SEPSIS, UNSPECIFIED ORGANISM: ICD-10-CM

## 2017-07-10 DIAGNOSIS — N20.0 CALCULUS OF KIDNEY: ICD-10-CM

## 2017-07-12 ENCOUNTER — APPOINTMENT (OUTPATIENT)
Dept: INFUSION THERAPY | Facility: CLINIC | Age: 67
End: 2017-07-12

## 2017-07-12 LAB
BASOPHILS # BLD: 0.04 TH/MM3
BASOPHILS # BLD: 0.06 TH/MM3
BASOPHILS NFR BLD: 0.8 %
BASOPHILS NFR BLD: 1.1 %
EOSINOPHIL # BLD: 0.19 TH/MM3
EOSINOPHIL # BLD: 0.2 TH/MM3
EOSINOPHIL NFR BLD: 3.6 %
EOSINOPHIL NFR BLD: 3.8 %
ERYTHROCYTE [DISTWIDTH] IN BLOOD BY AUTOMATED COUNT: 21.6 %
ERYTHROCYTE [DISTWIDTH] IN BLOOD BY AUTOMATED COUNT: 22.4 %
GRANULOCYTES # BLD: 3.43 TH/MM3
GRANULOCYTES # BLD: 3.64 TH/MM3
GRANULOCYTES NFR BLD: 65.1 %
GRANULOCYTES NFR BLD: 69.2 %
HCT VFR BLD AUTO: 40.2 %
HCT VFR BLD AUTO: 40.8 %
HGB BLD-MCNC: 12.8 G/DL
HGB BLD-MCNC: 13.1 G/DL
IMM GRANULOCYTES # BLD: 0.06 TH/MM3
IMM GRANULOCYTES # BLD: 0.09 TH/MM3
IMM GRANULOCYTES NFR BLD: 1.1 %
IMM GRANULOCYTES NFR BLD: 1.8 %
LYMPHOCYTES # BLD: 0.9 TH/MM3
LYMPHOCYTES # BLD: 1.07 TH/MM3
LYMPHOCYTES NFR BLD: 18.1 %
LYMPHOCYTES NFR BLD: 19.2 %
MCH RBC QN AUTO: 28.3 PG
MCH RBC QN AUTO: 28.4 PG
MCHC RBC AUTO-ENTMCNC: 31.8 G/DL
MCHC RBC AUTO-ENTMCNC: 32.1 G/DL
MCV RBC AUTO: 88.1 FL
MCV RBC AUTO: 89.3 FL
MONOCYTES # BLD: 0.31 TH/MM3
MONOCYTES # BLD: 0.55 TH/MM3
MONOCYTES NFR BLD: 6.3 %
MONOCYTES NFR BLD: 9.9 %
PLATELET # BLD: 135 TH/MM3
PLATELET # BLD: 139 TH/MM3
PMV BLD AUTO: 10.5 FL
RBC # BLD AUTO: 4.5 MIL/MM3
RBC # BLD AUTO: 4.63 MIL/MM3
WBC # BLD: 4.96 TH/MM3
WBC # BLD: 5.58 TH/MM3

## 2017-07-17 ENCOUNTER — OUTPATIENT (OUTPATIENT)
Dept: OUTPATIENT SERVICES | Facility: HOSPITAL | Age: 67
LOS: 1 days | Discharge: HOME | End: 2017-07-17

## 2017-07-17 DIAGNOSIS — I48.91 UNSPECIFIED ATRIAL FIBRILLATION: ICD-10-CM

## 2017-07-17 DIAGNOSIS — I50.23 ACUTE ON CHRONIC SYSTOLIC (CONGESTIVE) HEART FAILURE: ICD-10-CM

## 2017-07-17 DIAGNOSIS — R55 SYNCOPE AND COLLAPSE: ICD-10-CM

## 2017-07-17 DIAGNOSIS — I78.0 HEREDITARY HEMORRHAGIC TELANGIECTASIA: ICD-10-CM

## 2017-07-17 DIAGNOSIS — N10 ACUTE PYELONEPHRITIS: ICD-10-CM

## 2017-07-17 DIAGNOSIS — M10.9 GOUT, UNSPECIFIED: ICD-10-CM

## 2017-07-17 DIAGNOSIS — I10 ESSENTIAL (PRIMARY) HYPERTENSION: ICD-10-CM

## 2017-07-17 DIAGNOSIS — N20.0 CALCULUS OF KIDNEY: ICD-10-CM

## 2017-07-17 DIAGNOSIS — A41.9 SEPSIS, UNSPECIFIED ORGANISM: ICD-10-CM

## 2017-07-17 DIAGNOSIS — D64.9 ANEMIA, UNSPECIFIED: ICD-10-CM

## 2017-07-17 DIAGNOSIS — J96.00 ACUTE RESPIRATORY FAILURE, UNSPECIFIED WHETHER WITH HYPOXIA OR HYPERCAPNIA: ICD-10-CM

## 2017-07-17 DIAGNOSIS — I42.9 CARDIOMYOPATHY, UNSPECIFIED: ICD-10-CM

## 2017-07-17 DIAGNOSIS — I95.2 HYPOTENSION DUE TO DRUGS: ICD-10-CM

## 2017-07-19 ENCOUNTER — APPOINTMENT (OUTPATIENT)
Dept: INFUSION THERAPY | Facility: CLINIC | Age: 67
End: 2017-07-19

## 2017-07-19 VITALS
RESPIRATION RATE: 14 BRPM | TEMPERATURE: 97.9 F | HEART RATE: 89 BPM | DIASTOLIC BLOOD PRESSURE: 63 MMHG | SYSTOLIC BLOOD PRESSURE: 100 MMHG

## 2017-07-24 ENCOUNTER — OUTPATIENT (OUTPATIENT)
Dept: OUTPATIENT SERVICES | Facility: HOSPITAL | Age: 67
LOS: 1 days | Discharge: HOME | End: 2017-07-24

## 2017-07-24 DIAGNOSIS — I10 ESSENTIAL (PRIMARY) HYPERTENSION: ICD-10-CM

## 2017-07-24 DIAGNOSIS — R55 SYNCOPE AND COLLAPSE: ICD-10-CM

## 2017-07-24 DIAGNOSIS — I48.91 UNSPECIFIED ATRIAL FIBRILLATION: ICD-10-CM

## 2017-07-24 DIAGNOSIS — A41.9 SEPSIS, UNSPECIFIED ORGANISM: ICD-10-CM

## 2017-07-24 DIAGNOSIS — I42.9 CARDIOMYOPATHY, UNSPECIFIED: ICD-10-CM

## 2017-07-24 DIAGNOSIS — I95.2 HYPOTENSION DUE TO DRUGS: ICD-10-CM

## 2017-07-24 DIAGNOSIS — I50.23 ACUTE ON CHRONIC SYSTOLIC (CONGESTIVE) HEART FAILURE: ICD-10-CM

## 2017-07-24 DIAGNOSIS — D64.9 ANEMIA, UNSPECIFIED: ICD-10-CM

## 2017-07-24 DIAGNOSIS — I78.0 HEREDITARY HEMORRHAGIC TELANGIECTASIA: ICD-10-CM

## 2017-07-24 DIAGNOSIS — M10.9 GOUT, UNSPECIFIED: ICD-10-CM

## 2017-07-24 DIAGNOSIS — N20.0 CALCULUS OF KIDNEY: ICD-10-CM

## 2017-07-24 DIAGNOSIS — N10 ACUTE PYELONEPHRITIS: ICD-10-CM

## 2017-07-24 DIAGNOSIS — J96.00 ACUTE RESPIRATORY FAILURE, UNSPECIFIED WHETHER WITH HYPOXIA OR HYPERCAPNIA: ICD-10-CM

## 2017-07-25 LAB
BASOPHILS # BLD: 0.03 TH/MM3
BASOPHILS NFR BLD: 0.5 %
EOSINOPHIL # BLD: 0.07 TH/MM3
EOSINOPHIL NFR BLD: 1.2 %
ERYTHROCYTE [DISTWIDTH] IN BLOOD BY AUTOMATED COUNT: 20.3 %
GRANULOCYTES # BLD: 4.1 TH/MM3
GRANULOCYTES NFR BLD: 72.2 %
HCT VFR BLD AUTO: 41.6 %
HGB BLD-MCNC: 13.3 G/DL
IMM GRANULOCYTES # BLD: 0.03 TH/MM3
IMM GRANULOCYTES NFR BLD: 0.5 %
LYMPHOCYTES # BLD: 0.94 TH/MM3
LYMPHOCYTES NFR BLD: 16.5 %
MCH RBC QN AUTO: 28.4 PG
MCHC RBC AUTO-ENTMCNC: 32 G/DL
MCV RBC AUTO: 88.7 FL
MONOCYTES # BLD: 0.52 TH/MM3
MONOCYTES NFR BLD: 9.1 %
PLATELET # BLD: 120 TH/MM3
RBC # BLD AUTO: 4.69 MIL/MM3
WBC # BLD: 5.69 TH/MM3

## 2017-07-26 ENCOUNTER — OUTPATIENT (OUTPATIENT)
Dept: OUTPATIENT SERVICES | Facility: HOSPITAL | Age: 67
LOS: 1 days | Discharge: HOME | End: 2017-07-26

## 2017-07-26 ENCOUNTER — APPOINTMENT (OUTPATIENT)
Dept: INFUSION THERAPY | Facility: CLINIC | Age: 67
End: 2017-07-26

## 2017-07-26 DIAGNOSIS — I78.0 HEREDITARY HEMORRHAGIC TELANGIECTASIA: ICD-10-CM

## 2017-07-26 DIAGNOSIS — D50.0 IRON DEFICIENCY ANEMIA SECONDARY TO BLOOD LOSS (CHRONIC): ICD-10-CM

## 2017-07-26 DIAGNOSIS — J96.00 ACUTE RESPIRATORY FAILURE, UNSPECIFIED WHETHER WITH HYPOXIA OR HYPERCAPNIA: ICD-10-CM

## 2017-07-26 DIAGNOSIS — I48.91 UNSPECIFIED ATRIAL FIBRILLATION: ICD-10-CM

## 2017-07-26 DIAGNOSIS — N18.9 CHRONIC KIDNEY DISEASE, UNSPECIFIED: ICD-10-CM

## 2017-07-26 DIAGNOSIS — N20.0 CALCULUS OF KIDNEY: ICD-10-CM

## 2017-07-26 DIAGNOSIS — M10.9 GOUT, UNSPECIFIED: ICD-10-CM

## 2017-07-26 DIAGNOSIS — N10 ACUTE PYELONEPHRITIS: ICD-10-CM

## 2017-07-26 DIAGNOSIS — D64.9 ANEMIA, UNSPECIFIED: ICD-10-CM

## 2017-07-26 DIAGNOSIS — A41.9 SEPSIS, UNSPECIFIED ORGANISM: ICD-10-CM

## 2017-07-26 DIAGNOSIS — I50.23 ACUTE ON CHRONIC SYSTOLIC (CONGESTIVE) HEART FAILURE: ICD-10-CM

## 2017-07-26 DIAGNOSIS — D63.8 ANEMIA IN OTHER CHRONIC DISEASES CLASSIFIED ELSEWHERE: ICD-10-CM

## 2017-07-26 DIAGNOSIS — I10 ESSENTIAL (PRIMARY) HYPERTENSION: ICD-10-CM

## 2017-07-26 DIAGNOSIS — I42.9 CARDIOMYOPATHY, UNSPECIFIED: ICD-10-CM

## 2017-07-26 DIAGNOSIS — I95.2 HYPOTENSION DUE TO DRUGS: ICD-10-CM

## 2017-07-26 DIAGNOSIS — R55 SYNCOPE AND COLLAPSE: ICD-10-CM

## 2017-07-27 LAB
BASOPHILS # BLD: 0.04 TH/MM3
BASOPHILS NFR BLD: 0.7 %
EOSINOPHIL # BLD: 0.1 TH/MM3
EOSINOPHIL NFR BLD: 1.8 %
ERYTHROCYTE [DISTWIDTH] IN BLOOD BY AUTOMATED COUNT: 19.4 %
GRANULOCYTES # BLD: 3.63 TH/MM3
GRANULOCYTES NFR BLD: 65.9 %
HCT VFR BLD AUTO: 42.7 %
HGB BLD-MCNC: 13.8 G/DL
IMM GRANULOCYTES # BLD: 0.06 TH/MM3
IMM GRANULOCYTES NFR BLD: 1.1 %
LYMPHOCYTES # BLD: 1.23 TH/MM3
LYMPHOCYTES NFR BLD: 22.3 %
MCH RBC QN AUTO: 28.1 PG
MCHC RBC AUTO-ENTMCNC: 32.3 G/DL
MCV RBC AUTO: 87 FL
MONOCYTES # BLD: 0.45 TH/MM3
MONOCYTES NFR BLD: 8.2 %
PLATELET # BLD: 143 TH/MM3
RBC # BLD AUTO: 4.91 MIL/MM3
WBC # BLD: 5.51 TH/MM3

## 2017-07-31 ENCOUNTER — OUTPATIENT (OUTPATIENT)
Dept: OUTPATIENT SERVICES | Facility: HOSPITAL | Age: 67
LOS: 1 days | Discharge: HOME | End: 2017-07-31

## 2017-07-31 DIAGNOSIS — I48.91 UNSPECIFIED ATRIAL FIBRILLATION: ICD-10-CM

## 2017-07-31 DIAGNOSIS — I78.0 HEREDITARY HEMORRHAGIC TELANGIECTASIA: ICD-10-CM

## 2017-07-31 DIAGNOSIS — R55 SYNCOPE AND COLLAPSE: ICD-10-CM

## 2017-07-31 DIAGNOSIS — J96.00 ACUTE RESPIRATORY FAILURE, UNSPECIFIED WHETHER WITH HYPOXIA OR HYPERCAPNIA: ICD-10-CM

## 2017-07-31 DIAGNOSIS — N10 ACUTE PYELONEPHRITIS: ICD-10-CM

## 2017-07-31 DIAGNOSIS — A41.9 SEPSIS, UNSPECIFIED ORGANISM: ICD-10-CM

## 2017-07-31 DIAGNOSIS — I10 ESSENTIAL (PRIMARY) HYPERTENSION: ICD-10-CM

## 2017-07-31 DIAGNOSIS — I95.2 HYPOTENSION DUE TO DRUGS: ICD-10-CM

## 2017-07-31 DIAGNOSIS — M10.9 GOUT, UNSPECIFIED: ICD-10-CM

## 2017-07-31 DIAGNOSIS — I50.23 ACUTE ON CHRONIC SYSTOLIC (CONGESTIVE) HEART FAILURE: ICD-10-CM

## 2017-07-31 DIAGNOSIS — I42.9 CARDIOMYOPATHY, UNSPECIFIED: ICD-10-CM

## 2017-07-31 DIAGNOSIS — N20.0 CALCULUS OF KIDNEY: ICD-10-CM

## 2017-07-31 DIAGNOSIS — D64.9 ANEMIA, UNSPECIFIED: ICD-10-CM

## 2017-08-02 ENCOUNTER — APPOINTMENT (OUTPATIENT)
Dept: INFUSION THERAPY | Facility: CLINIC | Age: 67
End: 2017-08-02

## 2017-08-02 ENCOUNTER — APPOINTMENT (OUTPATIENT)
Dept: HEMATOLOGY ONCOLOGY | Facility: CLINIC | Age: 67
End: 2017-08-02

## 2017-08-02 VITALS
SYSTOLIC BLOOD PRESSURE: 82 MMHG | HEART RATE: 89 BPM | WEIGHT: 210 LBS | BODY MASS INDEX: 30.06 KG/M2 | RESPIRATION RATE: 14 BRPM | DIASTOLIC BLOOD PRESSURE: 58 MMHG | HEIGHT: 70 IN | TEMPERATURE: 96.1 F

## 2017-08-02 LAB
BASOPHILS # BLD: 0.05 TH/MM3
BASOPHILS NFR BLD: 1 %
EOSINOPHIL # BLD: 0.09 TH/MM3
EOSINOPHIL NFR BLD: 1.9 %
ERYTHROCYTE [DISTWIDTH] IN BLOOD BY AUTOMATED COUNT: 18.6 %
GRANULOCYTES # BLD: 3.07 TH/MM3
GRANULOCYTES NFR BLD: 63.9 %
HCT VFR BLD AUTO: 41 %
HGB BLD-MCNC: 13.5 G/DL
IMM GRANULOCYTES # BLD: 0.09 TH/MM3
IMM GRANULOCYTES NFR BLD: 1.9 %
LYMPHOCYTES # BLD: 1.18 TH/MM3
LYMPHOCYTES NFR BLD: 24.6 %
MCH RBC QN AUTO: 28.4 PG
MCHC RBC AUTO-ENTMCNC: 32.9 G/DL
MCV RBC AUTO: 86.1 FL
MONOCYTES # BLD: 0.32 TH/MM3
MONOCYTES NFR BLD: 6.7 %
PLATELET # BLD: 117 TH/MM3
PMV BLD AUTO: 10.2 FL
RBC # BLD AUTO: 4.76 MIL/MM3
WBC # BLD: 4.8 TH/MM3

## 2017-08-03 LAB
FERRITIN SERPL-MCNC: 34 NG/ML
IRON SERPL-MCNC: 34 UG/DL
TIBC SERPL-MCNC: 425 UG/DL

## 2017-08-07 ENCOUNTER — OUTPATIENT (OUTPATIENT)
Dept: OUTPATIENT SERVICES | Facility: HOSPITAL | Age: 67
LOS: 1 days | Discharge: HOME | End: 2017-08-07

## 2017-08-07 DIAGNOSIS — N20.0 CALCULUS OF KIDNEY: ICD-10-CM

## 2017-08-07 DIAGNOSIS — N10 ACUTE PYELONEPHRITIS: ICD-10-CM

## 2017-08-07 DIAGNOSIS — J96.00 ACUTE RESPIRATORY FAILURE, UNSPECIFIED WHETHER WITH HYPOXIA OR HYPERCAPNIA: ICD-10-CM

## 2017-08-07 DIAGNOSIS — R55 SYNCOPE AND COLLAPSE: ICD-10-CM

## 2017-08-07 DIAGNOSIS — I10 ESSENTIAL (PRIMARY) HYPERTENSION: ICD-10-CM

## 2017-08-07 DIAGNOSIS — D64.9 ANEMIA, UNSPECIFIED: ICD-10-CM

## 2017-08-07 DIAGNOSIS — A41.9 SEPSIS, UNSPECIFIED ORGANISM: ICD-10-CM

## 2017-08-07 DIAGNOSIS — I48.91 UNSPECIFIED ATRIAL FIBRILLATION: ICD-10-CM

## 2017-08-07 DIAGNOSIS — I78.0 HEREDITARY HEMORRHAGIC TELANGIECTASIA: ICD-10-CM

## 2017-08-07 DIAGNOSIS — I50.23 ACUTE ON CHRONIC SYSTOLIC (CONGESTIVE) HEART FAILURE: ICD-10-CM

## 2017-08-07 DIAGNOSIS — I42.9 CARDIOMYOPATHY, UNSPECIFIED: ICD-10-CM

## 2017-08-07 DIAGNOSIS — I95.2 HYPOTENSION DUE TO DRUGS: ICD-10-CM

## 2017-08-07 DIAGNOSIS — M10.9 GOUT, UNSPECIFIED: ICD-10-CM

## 2017-08-09 DIAGNOSIS — D63.8 ANEMIA IN OTHER CHRONIC DISEASES CLASSIFIED ELSEWHERE: ICD-10-CM

## 2017-08-09 DIAGNOSIS — I48.91 UNSPECIFIED ATRIAL FIBRILLATION: ICD-10-CM

## 2017-08-09 DIAGNOSIS — N18.9 CHRONIC KIDNEY DISEASE, UNSPECIFIED: ICD-10-CM

## 2017-08-14 ENCOUNTER — OUTPATIENT (OUTPATIENT)
Dept: OUTPATIENT SERVICES | Facility: HOSPITAL | Age: 67
LOS: 1 days | Discharge: HOME | End: 2017-08-14

## 2017-08-14 DIAGNOSIS — R55 SYNCOPE AND COLLAPSE: ICD-10-CM

## 2017-08-14 DIAGNOSIS — A41.9 SEPSIS, UNSPECIFIED ORGANISM: ICD-10-CM

## 2017-08-14 DIAGNOSIS — I78.0 HEREDITARY HEMORRHAGIC TELANGIECTASIA: ICD-10-CM

## 2017-08-14 DIAGNOSIS — I42.9 CARDIOMYOPATHY, UNSPECIFIED: ICD-10-CM

## 2017-08-14 DIAGNOSIS — M10.9 GOUT, UNSPECIFIED: ICD-10-CM

## 2017-08-14 DIAGNOSIS — I48.91 UNSPECIFIED ATRIAL FIBRILLATION: ICD-10-CM

## 2017-08-14 DIAGNOSIS — N10 ACUTE PYELONEPHRITIS: ICD-10-CM

## 2017-08-14 DIAGNOSIS — N20.0 CALCULUS OF KIDNEY: ICD-10-CM

## 2017-08-14 DIAGNOSIS — J96.00 ACUTE RESPIRATORY FAILURE, UNSPECIFIED WHETHER WITH HYPOXIA OR HYPERCAPNIA: ICD-10-CM

## 2017-08-14 DIAGNOSIS — I95.2 HYPOTENSION DUE TO DRUGS: ICD-10-CM

## 2017-08-14 DIAGNOSIS — I50.23 ACUTE ON CHRONIC SYSTOLIC (CONGESTIVE) HEART FAILURE: ICD-10-CM

## 2017-08-14 DIAGNOSIS — I10 ESSENTIAL (PRIMARY) HYPERTENSION: ICD-10-CM

## 2017-08-14 DIAGNOSIS — D64.9 ANEMIA, UNSPECIFIED: ICD-10-CM

## 2017-08-21 ENCOUNTER — OUTPATIENT (OUTPATIENT)
Dept: OUTPATIENT SERVICES | Facility: HOSPITAL | Age: 67
LOS: 1 days | Discharge: HOME | End: 2017-08-21

## 2017-08-21 DIAGNOSIS — I10 ESSENTIAL (PRIMARY) HYPERTENSION: ICD-10-CM

## 2017-08-21 DIAGNOSIS — N20.0 CALCULUS OF KIDNEY: ICD-10-CM

## 2017-08-21 DIAGNOSIS — I42.9 CARDIOMYOPATHY, UNSPECIFIED: ICD-10-CM

## 2017-08-21 DIAGNOSIS — D64.9 ANEMIA, UNSPECIFIED: ICD-10-CM

## 2017-08-21 DIAGNOSIS — I50.23 ACUTE ON CHRONIC SYSTOLIC (CONGESTIVE) HEART FAILURE: ICD-10-CM

## 2017-08-21 DIAGNOSIS — J96.00 ACUTE RESPIRATORY FAILURE, UNSPECIFIED WHETHER WITH HYPOXIA OR HYPERCAPNIA: ICD-10-CM

## 2017-08-21 DIAGNOSIS — A41.9 SEPSIS, UNSPECIFIED ORGANISM: ICD-10-CM

## 2017-08-21 DIAGNOSIS — I48.91 UNSPECIFIED ATRIAL FIBRILLATION: ICD-10-CM

## 2017-08-21 DIAGNOSIS — M10.9 GOUT, UNSPECIFIED: ICD-10-CM

## 2017-08-21 DIAGNOSIS — I78.0 HEREDITARY HEMORRHAGIC TELANGIECTASIA: ICD-10-CM

## 2017-08-21 DIAGNOSIS — R55 SYNCOPE AND COLLAPSE: ICD-10-CM

## 2017-08-21 DIAGNOSIS — N10 ACUTE PYELONEPHRITIS: ICD-10-CM

## 2017-08-21 DIAGNOSIS — I95.2 HYPOTENSION DUE TO DRUGS: ICD-10-CM

## 2017-08-28 ENCOUNTER — OUTPATIENT (OUTPATIENT)
Dept: OUTPATIENT SERVICES | Facility: HOSPITAL | Age: 67
LOS: 1 days | Discharge: HOME | End: 2017-08-28

## 2017-08-28 DIAGNOSIS — I48.91 UNSPECIFIED ATRIAL FIBRILLATION: ICD-10-CM

## 2017-08-28 DIAGNOSIS — R55 SYNCOPE AND COLLAPSE: ICD-10-CM

## 2017-08-28 DIAGNOSIS — I10 ESSENTIAL (PRIMARY) HYPERTENSION: ICD-10-CM

## 2017-08-28 DIAGNOSIS — N10 ACUTE PYELONEPHRITIS: ICD-10-CM

## 2017-08-28 DIAGNOSIS — N20.0 CALCULUS OF KIDNEY: ICD-10-CM

## 2017-08-28 DIAGNOSIS — I78.0 HEREDITARY HEMORRHAGIC TELANGIECTASIA: ICD-10-CM

## 2017-08-28 DIAGNOSIS — I95.2 HYPOTENSION DUE TO DRUGS: ICD-10-CM

## 2017-08-28 DIAGNOSIS — D64.9 ANEMIA, UNSPECIFIED: ICD-10-CM

## 2017-08-28 DIAGNOSIS — J96.00 ACUTE RESPIRATORY FAILURE, UNSPECIFIED WHETHER WITH HYPOXIA OR HYPERCAPNIA: ICD-10-CM

## 2017-08-28 DIAGNOSIS — I50.23 ACUTE ON CHRONIC SYSTOLIC (CONGESTIVE) HEART FAILURE: ICD-10-CM

## 2017-08-28 DIAGNOSIS — M10.9 GOUT, UNSPECIFIED: ICD-10-CM

## 2017-08-28 DIAGNOSIS — A41.9 SEPSIS, UNSPECIFIED ORGANISM: ICD-10-CM

## 2017-08-28 DIAGNOSIS — I42.9 CARDIOMYOPATHY, UNSPECIFIED: ICD-10-CM

## 2017-09-05 ENCOUNTER — OUTPATIENT (OUTPATIENT)
Dept: OUTPATIENT SERVICES | Facility: HOSPITAL | Age: 67
LOS: 1 days | Discharge: HOME | End: 2017-09-05

## 2017-09-05 DIAGNOSIS — N20.0 CALCULUS OF KIDNEY: ICD-10-CM

## 2017-09-05 DIAGNOSIS — I10 ESSENTIAL (PRIMARY) HYPERTENSION: ICD-10-CM

## 2017-09-05 DIAGNOSIS — I42.9 CARDIOMYOPATHY, UNSPECIFIED: ICD-10-CM

## 2017-09-05 DIAGNOSIS — I50.23 ACUTE ON CHRONIC SYSTOLIC (CONGESTIVE) HEART FAILURE: ICD-10-CM

## 2017-09-05 DIAGNOSIS — J96.00 ACUTE RESPIRATORY FAILURE, UNSPECIFIED WHETHER WITH HYPOXIA OR HYPERCAPNIA: ICD-10-CM

## 2017-09-05 DIAGNOSIS — N10 ACUTE PYELONEPHRITIS: ICD-10-CM

## 2017-09-05 DIAGNOSIS — I95.2 HYPOTENSION DUE TO DRUGS: ICD-10-CM

## 2017-09-05 DIAGNOSIS — A41.9 SEPSIS, UNSPECIFIED ORGANISM: ICD-10-CM

## 2017-09-05 DIAGNOSIS — R55 SYNCOPE AND COLLAPSE: ICD-10-CM

## 2017-09-05 DIAGNOSIS — D64.9 ANEMIA, UNSPECIFIED: ICD-10-CM

## 2017-09-05 DIAGNOSIS — I48.91 UNSPECIFIED ATRIAL FIBRILLATION: ICD-10-CM

## 2017-09-05 DIAGNOSIS — M10.9 GOUT, UNSPECIFIED: ICD-10-CM

## 2017-09-05 DIAGNOSIS — I78.0 HEREDITARY HEMORRHAGIC TELANGIECTASIA: ICD-10-CM

## 2017-09-05 DIAGNOSIS — Z02.9 ENCOUNTER FOR ADMINISTRATIVE EXAMINATIONS, UNSPECIFIED: ICD-10-CM

## 2017-09-06 ENCOUNTER — OUTPATIENT (OUTPATIENT)
Dept: OUTPATIENT SERVICES | Facility: HOSPITAL | Age: 67
LOS: 1 days | Discharge: HOME | End: 2017-09-06

## 2017-09-06 ENCOUNTER — APPOINTMENT (OUTPATIENT)
Dept: HEMATOLOGY ONCOLOGY | Facility: CLINIC | Age: 67
End: 2017-09-06

## 2017-09-06 DIAGNOSIS — N10 ACUTE PYELONEPHRITIS: ICD-10-CM

## 2017-09-06 DIAGNOSIS — R55 SYNCOPE AND COLLAPSE: ICD-10-CM

## 2017-09-06 DIAGNOSIS — I78.0 HEREDITARY HEMORRHAGIC TELANGIECTASIA: ICD-10-CM

## 2017-09-06 DIAGNOSIS — I48.91 UNSPECIFIED ATRIAL FIBRILLATION: ICD-10-CM

## 2017-09-06 DIAGNOSIS — J96.00 ACUTE RESPIRATORY FAILURE, UNSPECIFIED WHETHER WITH HYPOXIA OR HYPERCAPNIA: ICD-10-CM

## 2017-09-06 DIAGNOSIS — N20.0 CALCULUS OF KIDNEY: ICD-10-CM

## 2017-09-06 DIAGNOSIS — I10 ESSENTIAL (PRIMARY) HYPERTENSION: ICD-10-CM

## 2017-09-06 DIAGNOSIS — I95.2 HYPOTENSION DUE TO DRUGS: ICD-10-CM

## 2017-09-06 DIAGNOSIS — A41.9 SEPSIS, UNSPECIFIED ORGANISM: ICD-10-CM

## 2017-09-06 DIAGNOSIS — D64.9 ANEMIA, UNSPECIFIED: ICD-10-CM

## 2017-09-06 DIAGNOSIS — M10.9 GOUT, UNSPECIFIED: ICD-10-CM

## 2017-09-06 DIAGNOSIS — I50.23 ACUTE ON CHRONIC SYSTOLIC (CONGESTIVE) HEART FAILURE: ICD-10-CM

## 2017-09-06 DIAGNOSIS — I42.9 CARDIOMYOPATHY, UNSPECIFIED: ICD-10-CM

## 2017-09-07 LAB
BASOPHILS # BLD: 0.02 TH/MM3
BASOPHILS NFR BLD: 0.4 %
EOSINOPHIL # BLD: 0.14 TH/MM3
EOSINOPHIL NFR BLD: 2.8 %
ERYTHROCYTE [DISTWIDTH] IN BLOOD BY AUTOMATED COUNT: 18.4 %
FERRITIN SERPL-MCNC: 40 NG/ML
GRANULOCYTES # BLD: 3.39 TH/MM3
GRANULOCYTES NFR BLD: 66.6 %
HCT VFR BLD AUTO: 39.8 %
HGB BLD-MCNC: 12.8 G/DL
IMM GRANULOCYTES # BLD: 0.02 TH/MM3
IMM GRANULOCYTES NFR BLD: 0.4 %
IRON SERPL-MCNC: 54 UG/DL
LYMPHOCYTES # BLD: 1.06 TH/MM3
LYMPHOCYTES NFR BLD: 20.9 %
MCH RBC QN AUTO: 28.3 PG
MCHC RBC AUTO-ENTMCNC: 32.2 G/DL
MCV RBC AUTO: 88.1 FL
MONOCYTES # BLD: 0.45 TH/MM3
MONOCYTES NFR BLD: 8.9 %
PLATELET # BLD: 143 TH/MM3
PMV BLD AUTO: 11 FL
RBC # BLD AUTO: 4.52 MIL/MM3
TIBC SERPL-MCNC: 477 UG/DL
WBC # BLD: 5.08 TH/MM3

## 2017-09-11 ENCOUNTER — OUTPATIENT (OUTPATIENT)
Dept: OUTPATIENT SERVICES | Facility: HOSPITAL | Age: 67
LOS: 1 days | Discharge: HOME | End: 2017-09-11

## 2017-09-11 DIAGNOSIS — I50.23 ACUTE ON CHRONIC SYSTOLIC (CONGESTIVE) HEART FAILURE: ICD-10-CM

## 2017-09-11 DIAGNOSIS — A41.9 SEPSIS, UNSPECIFIED ORGANISM: ICD-10-CM

## 2017-09-11 DIAGNOSIS — J96.00 ACUTE RESPIRATORY FAILURE, UNSPECIFIED WHETHER WITH HYPOXIA OR HYPERCAPNIA: ICD-10-CM

## 2017-09-11 DIAGNOSIS — R55 SYNCOPE AND COLLAPSE: ICD-10-CM

## 2017-09-11 DIAGNOSIS — I48.91 UNSPECIFIED ATRIAL FIBRILLATION: ICD-10-CM

## 2017-09-11 DIAGNOSIS — I10 ESSENTIAL (PRIMARY) HYPERTENSION: ICD-10-CM

## 2017-09-11 DIAGNOSIS — I78.0 HEREDITARY HEMORRHAGIC TELANGIECTASIA: ICD-10-CM

## 2017-09-11 DIAGNOSIS — D64.9 ANEMIA, UNSPECIFIED: ICD-10-CM

## 2017-09-11 DIAGNOSIS — I42.9 CARDIOMYOPATHY, UNSPECIFIED: ICD-10-CM

## 2017-09-11 DIAGNOSIS — N20.0 CALCULUS OF KIDNEY: ICD-10-CM

## 2017-09-11 DIAGNOSIS — I95.2 HYPOTENSION DUE TO DRUGS: ICD-10-CM

## 2017-09-11 DIAGNOSIS — N10 ACUTE PYELONEPHRITIS: ICD-10-CM

## 2017-09-11 DIAGNOSIS — M10.9 GOUT, UNSPECIFIED: ICD-10-CM

## 2017-09-18 ENCOUNTER — OUTPATIENT (OUTPATIENT)
Dept: OUTPATIENT SERVICES | Facility: HOSPITAL | Age: 67
LOS: 1 days | Discharge: HOME | End: 2017-09-18

## 2017-09-18 DIAGNOSIS — A41.9 SEPSIS, UNSPECIFIED ORGANISM: ICD-10-CM

## 2017-09-18 DIAGNOSIS — D64.9 ANEMIA, UNSPECIFIED: ICD-10-CM

## 2017-09-18 DIAGNOSIS — N10 ACUTE PYELONEPHRITIS: ICD-10-CM

## 2017-09-18 DIAGNOSIS — I48.91 UNSPECIFIED ATRIAL FIBRILLATION: ICD-10-CM

## 2017-09-18 DIAGNOSIS — I42.9 CARDIOMYOPATHY, UNSPECIFIED: ICD-10-CM

## 2017-09-18 DIAGNOSIS — I50.23 ACUTE ON CHRONIC SYSTOLIC (CONGESTIVE) HEART FAILURE: ICD-10-CM

## 2017-09-18 DIAGNOSIS — I78.0 HEREDITARY HEMORRHAGIC TELANGIECTASIA: ICD-10-CM

## 2017-09-18 DIAGNOSIS — R55 SYNCOPE AND COLLAPSE: ICD-10-CM

## 2017-09-18 DIAGNOSIS — J96.00 ACUTE RESPIRATORY FAILURE, UNSPECIFIED WHETHER WITH HYPOXIA OR HYPERCAPNIA: ICD-10-CM

## 2017-09-18 DIAGNOSIS — N20.0 CALCULUS OF KIDNEY: ICD-10-CM

## 2017-09-18 DIAGNOSIS — I95.2 HYPOTENSION DUE TO DRUGS: ICD-10-CM

## 2017-09-18 DIAGNOSIS — I10 ESSENTIAL (PRIMARY) HYPERTENSION: ICD-10-CM

## 2017-09-18 DIAGNOSIS — M10.9 GOUT, UNSPECIFIED: ICD-10-CM

## 2017-09-25 ENCOUNTER — OUTPATIENT (OUTPATIENT)
Dept: OUTPATIENT SERVICES | Facility: HOSPITAL | Age: 67
LOS: 1 days | Discharge: HOME | End: 2017-09-25

## 2017-09-25 DIAGNOSIS — I50.23 ACUTE ON CHRONIC SYSTOLIC (CONGESTIVE) HEART FAILURE: ICD-10-CM

## 2017-09-25 DIAGNOSIS — I78.0 HEREDITARY HEMORRHAGIC TELANGIECTASIA: ICD-10-CM

## 2017-09-25 DIAGNOSIS — A41.9 SEPSIS, UNSPECIFIED ORGANISM: ICD-10-CM

## 2017-09-25 DIAGNOSIS — N20.0 CALCULUS OF KIDNEY: ICD-10-CM

## 2017-09-25 DIAGNOSIS — I48.91 UNSPECIFIED ATRIAL FIBRILLATION: ICD-10-CM

## 2017-09-25 DIAGNOSIS — J96.00 ACUTE RESPIRATORY FAILURE, UNSPECIFIED WHETHER WITH HYPOXIA OR HYPERCAPNIA: ICD-10-CM

## 2017-09-25 DIAGNOSIS — I42.9 CARDIOMYOPATHY, UNSPECIFIED: ICD-10-CM

## 2017-09-25 DIAGNOSIS — I95.2 HYPOTENSION DUE TO DRUGS: ICD-10-CM

## 2017-09-25 DIAGNOSIS — M10.9 GOUT, UNSPECIFIED: ICD-10-CM

## 2017-09-25 DIAGNOSIS — R55 SYNCOPE AND COLLAPSE: ICD-10-CM

## 2017-09-25 DIAGNOSIS — I10 ESSENTIAL (PRIMARY) HYPERTENSION: ICD-10-CM

## 2017-09-25 DIAGNOSIS — D64.9 ANEMIA, UNSPECIFIED: ICD-10-CM

## 2017-09-25 DIAGNOSIS — N10 ACUTE PYELONEPHRITIS: ICD-10-CM

## 2017-09-29 ENCOUNTER — APPOINTMENT (OUTPATIENT)
Dept: CARDIOLOGY | Facility: CLINIC | Age: 67
End: 2017-09-29

## 2017-09-29 VITALS
HEART RATE: 94 BPM | DIASTOLIC BLOOD PRESSURE: 65 MMHG | BODY MASS INDEX: 30.56 KG/M2 | OXYGEN SATURATION: 95 % | WEIGHT: 213 LBS | SYSTOLIC BLOOD PRESSURE: 98 MMHG

## 2017-09-29 VITALS — SYSTOLIC BLOOD PRESSURE: 100 MMHG | HEART RATE: 87 BPM | DIASTOLIC BLOOD PRESSURE: 70 MMHG | OXYGEN SATURATION: 95 %

## 2017-10-02 ENCOUNTER — OUTPATIENT (OUTPATIENT)
Dept: OUTPATIENT SERVICES | Facility: HOSPITAL | Age: 67
LOS: 1 days | Discharge: HOME | End: 2017-10-02

## 2017-10-02 DIAGNOSIS — N20.0 CALCULUS OF KIDNEY: ICD-10-CM

## 2017-10-02 DIAGNOSIS — D64.9 ANEMIA, UNSPECIFIED: ICD-10-CM

## 2017-10-02 DIAGNOSIS — R55 SYNCOPE AND COLLAPSE: ICD-10-CM

## 2017-10-02 DIAGNOSIS — M10.9 GOUT, UNSPECIFIED: ICD-10-CM

## 2017-10-02 DIAGNOSIS — N10 ACUTE PYELONEPHRITIS: ICD-10-CM

## 2017-10-02 DIAGNOSIS — I10 ESSENTIAL (PRIMARY) HYPERTENSION: ICD-10-CM

## 2017-10-02 DIAGNOSIS — J96.00 ACUTE RESPIRATORY FAILURE, UNSPECIFIED WHETHER WITH HYPOXIA OR HYPERCAPNIA: ICD-10-CM

## 2017-10-02 DIAGNOSIS — I78.0 HEREDITARY HEMORRHAGIC TELANGIECTASIA: ICD-10-CM

## 2017-10-02 DIAGNOSIS — I48.91 UNSPECIFIED ATRIAL FIBRILLATION: ICD-10-CM

## 2017-10-02 DIAGNOSIS — I42.9 CARDIOMYOPATHY, UNSPECIFIED: ICD-10-CM

## 2017-10-02 DIAGNOSIS — I50.23 ACUTE ON CHRONIC SYSTOLIC (CONGESTIVE) HEART FAILURE: ICD-10-CM

## 2017-10-02 DIAGNOSIS — A41.9 SEPSIS, UNSPECIFIED ORGANISM: ICD-10-CM

## 2017-10-02 DIAGNOSIS — I95.2 HYPOTENSION DUE TO DRUGS: ICD-10-CM

## 2017-10-03 ENCOUNTER — OUTPATIENT (OUTPATIENT)
Dept: OUTPATIENT SERVICES | Facility: HOSPITAL | Age: 67
LOS: 1 days | Discharge: HOME | End: 2017-10-03

## 2017-10-03 DIAGNOSIS — I78.0 HEREDITARY HEMORRHAGIC TELANGIECTASIA: ICD-10-CM

## 2017-10-03 DIAGNOSIS — I42.9 CARDIOMYOPATHY, UNSPECIFIED: ICD-10-CM

## 2017-10-03 DIAGNOSIS — I50.23 ACUTE ON CHRONIC SYSTOLIC (CONGESTIVE) HEART FAILURE: ICD-10-CM

## 2017-10-03 DIAGNOSIS — A41.9 SEPSIS, UNSPECIFIED ORGANISM: ICD-10-CM

## 2017-10-03 DIAGNOSIS — I48.91 UNSPECIFIED ATRIAL FIBRILLATION: ICD-10-CM

## 2017-10-03 DIAGNOSIS — R55 SYNCOPE AND COLLAPSE: ICD-10-CM

## 2017-10-03 DIAGNOSIS — I10 ESSENTIAL (PRIMARY) HYPERTENSION: ICD-10-CM

## 2017-10-03 DIAGNOSIS — D64.9 ANEMIA, UNSPECIFIED: ICD-10-CM

## 2017-10-03 DIAGNOSIS — M10.9 GOUT, UNSPECIFIED: ICD-10-CM

## 2017-10-03 DIAGNOSIS — N10 ACUTE PYELONEPHRITIS: ICD-10-CM

## 2017-10-03 DIAGNOSIS — I95.2 HYPOTENSION DUE TO DRUGS: ICD-10-CM

## 2017-10-03 DIAGNOSIS — J96.00 ACUTE RESPIRATORY FAILURE, UNSPECIFIED WHETHER WITH HYPOXIA OR HYPERCAPNIA: ICD-10-CM

## 2017-10-03 DIAGNOSIS — N20.0 CALCULUS OF KIDNEY: ICD-10-CM

## 2017-10-04 ENCOUNTER — APPOINTMENT (OUTPATIENT)
Dept: HEMATOLOGY ONCOLOGY | Facility: CLINIC | Age: 67
End: 2017-10-04

## 2017-10-04 ENCOUNTER — OUTPATIENT (OUTPATIENT)
Dept: OUTPATIENT SERVICES | Facility: HOSPITAL | Age: 67
LOS: 1 days | Discharge: HOME | End: 2017-10-04

## 2017-10-04 ENCOUNTER — APPOINTMENT (OUTPATIENT)
Dept: HEMATOLOGY ONCOLOGY | Facility: CLINIC | Age: 67
End: 2017-10-04
Payer: MEDICARE

## 2017-10-04 VITALS
HEART RATE: 93 BPM | WEIGHT: 215 LBS | HEIGHT: 70 IN | TEMPERATURE: 96.3 F | SYSTOLIC BLOOD PRESSURE: 93 MMHG | RESPIRATION RATE: 14 BRPM | BODY MASS INDEX: 30.78 KG/M2 | DIASTOLIC BLOOD PRESSURE: 53 MMHG

## 2017-10-04 VITALS
WEIGHT: 215 LBS | TEMPERATURE: 96.7 F | DIASTOLIC BLOOD PRESSURE: 69 MMHG | HEART RATE: 89 BPM | RESPIRATION RATE: 14 BRPM | BODY MASS INDEX: 30.78 KG/M2 | HEIGHT: 70 IN | SYSTOLIC BLOOD PRESSURE: 97 MMHG

## 2017-10-04 DIAGNOSIS — I95.2 HYPOTENSION DUE TO DRUGS: ICD-10-CM

## 2017-10-04 DIAGNOSIS — I50.23 ACUTE ON CHRONIC SYSTOLIC (CONGESTIVE) HEART FAILURE: ICD-10-CM

## 2017-10-04 DIAGNOSIS — N10 ACUTE PYELONEPHRITIS: ICD-10-CM

## 2017-10-04 DIAGNOSIS — D64.9 ANEMIA, UNSPECIFIED: ICD-10-CM

## 2017-10-04 DIAGNOSIS — I78.0 HEREDITARY HEMORRHAGIC TELANGIECTASIA: ICD-10-CM

## 2017-10-04 DIAGNOSIS — I48.91 UNSPECIFIED ATRIAL FIBRILLATION: ICD-10-CM

## 2017-10-04 DIAGNOSIS — R55 SYNCOPE AND COLLAPSE: ICD-10-CM

## 2017-10-04 DIAGNOSIS — A41.9 SEPSIS, UNSPECIFIED ORGANISM: ICD-10-CM

## 2017-10-04 DIAGNOSIS — M10.9 GOUT, UNSPECIFIED: ICD-10-CM

## 2017-10-04 DIAGNOSIS — I42.9 CARDIOMYOPATHY, UNSPECIFIED: ICD-10-CM

## 2017-10-04 DIAGNOSIS — J96.00 ACUTE RESPIRATORY FAILURE, UNSPECIFIED WHETHER WITH HYPOXIA OR HYPERCAPNIA: ICD-10-CM

## 2017-10-04 DIAGNOSIS — N20.0 CALCULUS OF KIDNEY: ICD-10-CM

## 2017-10-04 DIAGNOSIS — I10 ESSENTIAL (PRIMARY) HYPERTENSION: ICD-10-CM

## 2017-10-04 PROCEDURE — 99214 OFFICE O/P EST MOD 30 MIN: CPT

## 2017-10-04 PROCEDURE — 99202 OFFICE O/P NEW SF 15 MIN: CPT

## 2017-10-05 DIAGNOSIS — Z98.890 OTHER SPECIFIED POSTPROCEDURAL STATES: ICD-10-CM

## 2017-10-05 LAB
BASOPHILS # BLD: 0.03 TH/MM3
BASOPHILS NFR BLD: 0.6 %
EOSINOPHIL # BLD: 0.16 TH/MM3
EOSINOPHIL NFR BLD: 3.3 %
ERYTHROCYTE [DISTWIDTH] IN BLOOD BY AUTOMATED COUNT: 17.6 %
GRANULOCYTES # BLD: 3.42 TH/MM3
GRANULOCYTES NFR BLD: 69.5 %
HCT VFR BLD AUTO: 36.3 %
HGB BLD-MCNC: 11.5 G/DL
IMM GRANULOCYTES # BLD: 0 TH/MM3
IMM GRANULOCYTES NFR BLD: 0 %
LYMPHOCYTES # BLD: 0.98 TH/MM3
LYMPHOCYTES NFR BLD: 19.9 %
MCH RBC QN AUTO: 28.1 PG
MCHC RBC AUTO-ENTMCNC: 31.7 G/DL
MCV RBC AUTO: 88.8 FL
MONOCYTES # BLD: 0.33 TH/MM3
MONOCYTES NFR BLD: 6.7 %
PLATELET # BLD: 188 TH/MM3
PMV BLD AUTO: 10.9 FL
RBC # BLD AUTO: 4.09 MIL/MM3
TIBC SERPL-MCNC: 485 UG/DL
WBC # BLD: 4.92 TH/MM3

## 2017-10-06 LAB — FERRITIN SERPL-MCNC: 39 NG/ML

## 2017-10-09 ENCOUNTER — OUTPATIENT (OUTPATIENT)
Dept: OUTPATIENT SERVICES | Facility: HOSPITAL | Age: 67
LOS: 1 days | Discharge: HOME | End: 2017-10-09

## 2017-10-09 DIAGNOSIS — N10 ACUTE PYELONEPHRITIS: ICD-10-CM

## 2017-10-09 DIAGNOSIS — I48.91 UNSPECIFIED ATRIAL FIBRILLATION: ICD-10-CM

## 2017-10-09 DIAGNOSIS — R55 SYNCOPE AND COLLAPSE: ICD-10-CM

## 2017-10-09 DIAGNOSIS — M10.9 GOUT, UNSPECIFIED: ICD-10-CM

## 2017-10-09 DIAGNOSIS — A41.9 SEPSIS, UNSPECIFIED ORGANISM: ICD-10-CM

## 2017-10-09 DIAGNOSIS — I95.2 HYPOTENSION DUE TO DRUGS: ICD-10-CM

## 2017-10-09 DIAGNOSIS — I50.23 ACUTE ON CHRONIC SYSTOLIC (CONGESTIVE) HEART FAILURE: ICD-10-CM

## 2017-10-09 DIAGNOSIS — I10 ESSENTIAL (PRIMARY) HYPERTENSION: ICD-10-CM

## 2017-10-09 DIAGNOSIS — I78.0 HEREDITARY HEMORRHAGIC TELANGIECTASIA: ICD-10-CM

## 2017-10-09 DIAGNOSIS — J96.00 ACUTE RESPIRATORY FAILURE, UNSPECIFIED WHETHER WITH HYPOXIA OR HYPERCAPNIA: ICD-10-CM

## 2017-10-09 DIAGNOSIS — D64.9 ANEMIA, UNSPECIFIED: ICD-10-CM

## 2017-10-09 DIAGNOSIS — I42.9 CARDIOMYOPATHY, UNSPECIFIED: ICD-10-CM

## 2017-10-09 DIAGNOSIS — N20.0 CALCULUS OF KIDNEY: ICD-10-CM

## 2017-10-11 ENCOUNTER — APPOINTMENT (OUTPATIENT)
Dept: CARDIOLOGY | Facility: CLINIC | Age: 67
End: 2017-10-11

## 2017-10-11 VITALS
WEIGHT: 217 LBS | BODY MASS INDEX: 31.14 KG/M2 | HEART RATE: 95 BPM | SYSTOLIC BLOOD PRESSURE: 94 MMHG | DIASTOLIC BLOOD PRESSURE: 65 MMHG | OXYGEN SATURATION: 97 %

## 2017-10-16 ENCOUNTER — OUTPATIENT (OUTPATIENT)
Dept: OUTPATIENT SERVICES | Facility: HOSPITAL | Age: 67
LOS: 1 days | Discharge: HOME | End: 2017-10-16

## 2017-10-16 DIAGNOSIS — J96.00 ACUTE RESPIRATORY FAILURE, UNSPECIFIED WHETHER WITH HYPOXIA OR HYPERCAPNIA: ICD-10-CM

## 2017-10-16 DIAGNOSIS — M10.9 GOUT, UNSPECIFIED: ICD-10-CM

## 2017-10-16 DIAGNOSIS — I42.9 CARDIOMYOPATHY, UNSPECIFIED: ICD-10-CM

## 2017-10-16 DIAGNOSIS — I48.91 UNSPECIFIED ATRIAL FIBRILLATION: ICD-10-CM

## 2017-10-16 DIAGNOSIS — I78.0 HEREDITARY HEMORRHAGIC TELANGIECTASIA: ICD-10-CM

## 2017-10-16 DIAGNOSIS — I10 ESSENTIAL (PRIMARY) HYPERTENSION: ICD-10-CM

## 2017-10-16 DIAGNOSIS — N20.0 CALCULUS OF KIDNEY: ICD-10-CM

## 2017-10-16 DIAGNOSIS — R55 SYNCOPE AND COLLAPSE: ICD-10-CM

## 2017-10-16 DIAGNOSIS — I50.23 ACUTE ON CHRONIC SYSTOLIC (CONGESTIVE) HEART FAILURE: ICD-10-CM

## 2017-10-16 DIAGNOSIS — N10 ACUTE PYELONEPHRITIS: ICD-10-CM

## 2017-10-16 DIAGNOSIS — A41.9 SEPSIS, UNSPECIFIED ORGANISM: ICD-10-CM

## 2017-10-16 DIAGNOSIS — D64.9 ANEMIA, UNSPECIFIED: ICD-10-CM

## 2017-10-16 DIAGNOSIS — I95.2 HYPOTENSION DUE TO DRUGS: ICD-10-CM

## 2017-10-23 ENCOUNTER — OUTPATIENT (OUTPATIENT)
Dept: OUTPATIENT SERVICES | Facility: HOSPITAL | Age: 67
LOS: 1 days | Discharge: HOME | End: 2017-10-23

## 2017-10-23 DIAGNOSIS — D64.9 ANEMIA, UNSPECIFIED: ICD-10-CM

## 2017-10-23 DIAGNOSIS — I50.23 ACUTE ON CHRONIC SYSTOLIC (CONGESTIVE) HEART FAILURE: ICD-10-CM

## 2017-10-23 DIAGNOSIS — I48.91 UNSPECIFIED ATRIAL FIBRILLATION: ICD-10-CM

## 2017-10-23 DIAGNOSIS — N20.0 CALCULUS OF KIDNEY: ICD-10-CM

## 2017-10-23 DIAGNOSIS — N10 ACUTE PYELONEPHRITIS: ICD-10-CM

## 2017-10-23 DIAGNOSIS — I10 ESSENTIAL (PRIMARY) HYPERTENSION: ICD-10-CM

## 2017-10-23 DIAGNOSIS — I78.0 HEREDITARY HEMORRHAGIC TELANGIECTASIA: ICD-10-CM

## 2017-10-23 DIAGNOSIS — A41.9 SEPSIS, UNSPECIFIED ORGANISM: ICD-10-CM

## 2017-10-23 DIAGNOSIS — J96.00 ACUTE RESPIRATORY FAILURE, UNSPECIFIED WHETHER WITH HYPOXIA OR HYPERCAPNIA: ICD-10-CM

## 2017-10-23 DIAGNOSIS — I42.9 CARDIOMYOPATHY, UNSPECIFIED: ICD-10-CM

## 2017-10-23 DIAGNOSIS — R55 SYNCOPE AND COLLAPSE: ICD-10-CM

## 2017-10-23 DIAGNOSIS — I95.2 HYPOTENSION DUE TO DRUGS: ICD-10-CM

## 2017-10-23 DIAGNOSIS — M10.9 GOUT, UNSPECIFIED: ICD-10-CM

## 2017-10-30 ENCOUNTER — OUTPATIENT (OUTPATIENT)
Dept: OUTPATIENT SERVICES | Facility: HOSPITAL | Age: 67
LOS: 1 days | Discharge: HOME | End: 2017-10-30

## 2017-10-30 DIAGNOSIS — I78.0 HEREDITARY HEMORRHAGIC TELANGIECTASIA: ICD-10-CM

## 2017-10-30 DIAGNOSIS — I50.23 ACUTE ON CHRONIC SYSTOLIC (CONGESTIVE) HEART FAILURE: ICD-10-CM

## 2017-10-30 DIAGNOSIS — D64.9 ANEMIA, UNSPECIFIED: ICD-10-CM

## 2017-10-30 DIAGNOSIS — N10 ACUTE PYELONEPHRITIS: ICD-10-CM

## 2017-10-30 DIAGNOSIS — I48.91 UNSPECIFIED ATRIAL FIBRILLATION: ICD-10-CM

## 2017-10-30 DIAGNOSIS — I95.2 HYPOTENSION DUE TO DRUGS: ICD-10-CM

## 2017-10-30 DIAGNOSIS — I42.9 CARDIOMYOPATHY, UNSPECIFIED: ICD-10-CM

## 2017-10-30 DIAGNOSIS — A41.9 SEPSIS, UNSPECIFIED ORGANISM: ICD-10-CM

## 2017-10-30 DIAGNOSIS — R55 SYNCOPE AND COLLAPSE: ICD-10-CM

## 2017-10-30 DIAGNOSIS — N20.0 CALCULUS OF KIDNEY: ICD-10-CM

## 2017-10-30 DIAGNOSIS — M10.9 GOUT, UNSPECIFIED: ICD-10-CM

## 2017-10-30 DIAGNOSIS — I10 ESSENTIAL (PRIMARY) HYPERTENSION: ICD-10-CM

## 2017-10-30 DIAGNOSIS — J96.00 ACUTE RESPIRATORY FAILURE, UNSPECIFIED WHETHER WITH HYPOXIA OR HYPERCAPNIA: ICD-10-CM

## 2017-10-30 DIAGNOSIS — I47.9 PAROXYSMAL TACHYCARDIA, UNSPECIFIED: ICD-10-CM

## 2017-11-03 ENCOUNTER — APPOINTMENT (OUTPATIENT)
Dept: UROLOGY | Facility: CLINIC | Age: 67
End: 2017-11-03
Payer: MEDICARE

## 2017-11-03 ENCOUNTER — APPOINTMENT (OUTPATIENT)
Dept: CARDIOLOGY | Facility: CLINIC | Age: 67
End: 2017-11-03

## 2017-11-03 VITALS — SYSTOLIC BLOOD PRESSURE: 96 MMHG | DIASTOLIC BLOOD PRESSURE: 60 MMHG

## 2017-11-03 VITALS
HEIGHT: 70 IN | HEART RATE: 94 BPM | DIASTOLIC BLOOD PRESSURE: 57 MMHG | SYSTOLIC BLOOD PRESSURE: 81 MMHG | BODY MASS INDEX: 31.07 KG/M2 | WEIGHT: 217 LBS

## 2017-11-03 VITALS — BODY MASS INDEX: 31.14 KG/M2 | WEIGHT: 217 LBS

## 2017-11-03 DIAGNOSIS — N20.0 CALCULUS OF KIDNEY: ICD-10-CM

## 2017-11-03 PROCEDURE — 99213 OFFICE O/P EST LOW 20 MIN: CPT

## 2017-11-06 ENCOUNTER — OUTPATIENT (OUTPATIENT)
Dept: OUTPATIENT SERVICES | Facility: HOSPITAL | Age: 67
LOS: 1 days | Discharge: HOME | End: 2017-11-06

## 2017-11-06 DIAGNOSIS — I50.23 ACUTE ON CHRONIC SYSTOLIC (CONGESTIVE) HEART FAILURE: ICD-10-CM

## 2017-11-06 DIAGNOSIS — I10 ESSENTIAL (PRIMARY) HYPERTENSION: ICD-10-CM

## 2017-11-06 DIAGNOSIS — I78.0 HEREDITARY HEMORRHAGIC TELANGIECTASIA: ICD-10-CM

## 2017-11-06 DIAGNOSIS — I48.91 UNSPECIFIED ATRIAL FIBRILLATION: ICD-10-CM

## 2017-11-06 DIAGNOSIS — J96.00 ACUTE RESPIRATORY FAILURE, UNSPECIFIED WHETHER WITH HYPOXIA OR HYPERCAPNIA: ICD-10-CM

## 2017-11-06 DIAGNOSIS — M10.9 GOUT, UNSPECIFIED: ICD-10-CM

## 2017-11-06 DIAGNOSIS — R55 SYNCOPE AND COLLAPSE: ICD-10-CM

## 2017-11-06 DIAGNOSIS — I42.9 CARDIOMYOPATHY, UNSPECIFIED: ICD-10-CM

## 2017-11-06 DIAGNOSIS — A41.9 SEPSIS, UNSPECIFIED ORGANISM: ICD-10-CM

## 2017-11-06 DIAGNOSIS — D64.9 ANEMIA, UNSPECIFIED: ICD-10-CM

## 2017-11-06 DIAGNOSIS — N10 ACUTE PYELONEPHRITIS: ICD-10-CM

## 2017-11-06 DIAGNOSIS — N20.0 CALCULUS OF KIDNEY: ICD-10-CM

## 2017-11-06 DIAGNOSIS — I95.2 HYPOTENSION DUE TO DRUGS: ICD-10-CM

## 2017-11-08 ENCOUNTER — APPOINTMENT (OUTPATIENT)
Dept: HEMATOLOGY ONCOLOGY | Facility: CLINIC | Age: 67
End: 2017-11-08

## 2017-11-08 ENCOUNTER — OUTPATIENT (OUTPATIENT)
Dept: OUTPATIENT SERVICES | Facility: HOSPITAL | Age: 67
LOS: 1 days | Discharge: HOME | End: 2017-11-08

## 2017-11-08 DIAGNOSIS — J96.00 ACUTE RESPIRATORY FAILURE, UNSPECIFIED WHETHER WITH HYPOXIA OR HYPERCAPNIA: ICD-10-CM

## 2017-11-08 DIAGNOSIS — I50.23 ACUTE ON CHRONIC SYSTOLIC (CONGESTIVE) HEART FAILURE: ICD-10-CM

## 2017-11-08 DIAGNOSIS — I48.91 UNSPECIFIED ATRIAL FIBRILLATION: ICD-10-CM

## 2017-11-08 DIAGNOSIS — I78.0 HEREDITARY HEMORRHAGIC TELANGIECTASIA: ICD-10-CM

## 2017-11-08 DIAGNOSIS — I10 ESSENTIAL (PRIMARY) HYPERTENSION: ICD-10-CM

## 2017-11-08 DIAGNOSIS — N18.9 CHRONIC KIDNEY DISEASE, UNSPECIFIED: ICD-10-CM

## 2017-11-08 DIAGNOSIS — D50.0 IRON DEFICIENCY ANEMIA SECONDARY TO BLOOD LOSS (CHRONIC): ICD-10-CM

## 2017-11-08 DIAGNOSIS — N20.0 CALCULUS OF KIDNEY: ICD-10-CM

## 2017-11-08 DIAGNOSIS — A41.9 SEPSIS, UNSPECIFIED ORGANISM: ICD-10-CM

## 2017-11-08 DIAGNOSIS — I95.2 HYPOTENSION DUE TO DRUGS: ICD-10-CM

## 2017-11-08 DIAGNOSIS — I42.9 CARDIOMYOPATHY, UNSPECIFIED: ICD-10-CM

## 2017-11-08 DIAGNOSIS — N10 ACUTE PYELONEPHRITIS: ICD-10-CM

## 2017-11-08 DIAGNOSIS — R55 SYNCOPE AND COLLAPSE: ICD-10-CM

## 2017-11-08 DIAGNOSIS — D64.9 ANEMIA, UNSPECIFIED: ICD-10-CM

## 2017-11-08 DIAGNOSIS — M10.9 GOUT, UNSPECIFIED: ICD-10-CM

## 2017-11-10 DIAGNOSIS — N20.0 CALCULUS OF KIDNEY: ICD-10-CM

## 2017-11-10 DIAGNOSIS — Z02.9 ENCOUNTER FOR ADMINISTRATIVE EXAMINATIONS, UNSPECIFIED: ICD-10-CM

## 2017-11-13 ENCOUNTER — OUTPATIENT (OUTPATIENT)
Dept: OUTPATIENT SERVICES | Facility: HOSPITAL | Age: 67
LOS: 1 days | Discharge: HOME | End: 2017-11-13

## 2017-11-13 DIAGNOSIS — N20.0 CALCULUS OF KIDNEY: ICD-10-CM

## 2017-11-13 DIAGNOSIS — N10 ACUTE PYELONEPHRITIS: ICD-10-CM

## 2017-11-13 DIAGNOSIS — I50.23 ACUTE ON CHRONIC SYSTOLIC (CONGESTIVE) HEART FAILURE: ICD-10-CM

## 2017-11-13 DIAGNOSIS — J96.00 ACUTE RESPIRATORY FAILURE, UNSPECIFIED WHETHER WITH HYPOXIA OR HYPERCAPNIA: ICD-10-CM

## 2017-11-13 DIAGNOSIS — M10.9 GOUT, UNSPECIFIED: ICD-10-CM

## 2017-11-13 DIAGNOSIS — R55 SYNCOPE AND COLLAPSE: ICD-10-CM

## 2017-11-13 DIAGNOSIS — I10 ESSENTIAL (PRIMARY) HYPERTENSION: ICD-10-CM

## 2017-11-13 DIAGNOSIS — I48.91 UNSPECIFIED ATRIAL FIBRILLATION: ICD-10-CM

## 2017-11-13 DIAGNOSIS — I42.9 CARDIOMYOPATHY, UNSPECIFIED: ICD-10-CM

## 2017-11-13 DIAGNOSIS — A41.9 SEPSIS, UNSPECIFIED ORGANISM: ICD-10-CM

## 2017-11-13 DIAGNOSIS — I78.0 HEREDITARY HEMORRHAGIC TELANGIECTASIA: ICD-10-CM

## 2017-11-13 DIAGNOSIS — D64.9 ANEMIA, UNSPECIFIED: ICD-10-CM

## 2017-11-13 DIAGNOSIS — I95.2 HYPOTENSION DUE TO DRUGS: ICD-10-CM

## 2017-11-13 LAB
BASOPHILS # BLD: 0.03 TH/MM3
BASOPHILS NFR BLD: 0.7 %
EOSINOPHIL # BLD: 0.13 TH/MM3
EOSINOPHIL NFR BLD: 3 %
ERYTHROCYTE [DISTWIDTH] IN BLOOD BY AUTOMATED COUNT: 16.5 %
FERRITIN SERPL-MCNC: 20 NG/ML
GRANULOCYTES # BLD: 2.92 TH/MM3
GRANULOCYTES NFR BLD: 67.3 %
HCT VFR BLD AUTO: 33.6 %
HGB BLD-MCNC: 10.3 G/DL
IMM GRANULOCYTES # BLD: 0.01 TH/MM3
IMM GRANULOCYTES NFR BLD: 0.2 %
IRON SERPL-MCNC: 31 UG/DL
LYMPHOCYTES # BLD: 0.88 TH/MM3
LYMPHOCYTES NFR BLD: 20.3 %
MCH RBC QN AUTO: 26.9 PG
MCHC RBC AUTO-ENTMCNC: 30.7 G/DL
MCV RBC AUTO: 87.7 FL
MONOCYTES # BLD: 0.37 TH/MM3
MONOCYTES NFR BLD: 8.5 %
PLATELET # BLD: 156 TH/MM3
PMV BLD AUTO: 9.3 FL
RBC # BLD AUTO: 3.83 MIL/MM3
TIBC SERPL-MCNC: 509 UG/DL
WBC # BLD: 4.34 TH/MM3

## 2017-11-14 DIAGNOSIS — Z02.9 ENCOUNTER FOR ADMINISTRATIVE EXAMINATIONS, UNSPECIFIED: ICD-10-CM

## 2017-11-14 DIAGNOSIS — I48.91 UNSPECIFIED ATRIAL FIBRILLATION: ICD-10-CM

## 2017-11-20 ENCOUNTER — OUTPATIENT (OUTPATIENT)
Dept: OUTPATIENT SERVICES | Facility: HOSPITAL | Age: 67
LOS: 1 days | Discharge: HOME | End: 2017-11-20

## 2017-11-20 DIAGNOSIS — A41.9 SEPSIS, UNSPECIFIED ORGANISM: ICD-10-CM

## 2017-11-20 DIAGNOSIS — N10 ACUTE PYELONEPHRITIS: ICD-10-CM

## 2017-11-20 DIAGNOSIS — I48.91 UNSPECIFIED ATRIAL FIBRILLATION: ICD-10-CM

## 2017-11-20 DIAGNOSIS — I10 ESSENTIAL (PRIMARY) HYPERTENSION: ICD-10-CM

## 2017-11-20 DIAGNOSIS — I78.0 HEREDITARY HEMORRHAGIC TELANGIECTASIA: ICD-10-CM

## 2017-11-20 DIAGNOSIS — I50.23 ACUTE ON CHRONIC SYSTOLIC (CONGESTIVE) HEART FAILURE: ICD-10-CM

## 2017-11-20 DIAGNOSIS — N20.0 CALCULUS OF KIDNEY: ICD-10-CM

## 2017-11-20 DIAGNOSIS — J96.00 ACUTE RESPIRATORY FAILURE, UNSPECIFIED WHETHER WITH HYPOXIA OR HYPERCAPNIA: ICD-10-CM

## 2017-11-20 DIAGNOSIS — D64.9 ANEMIA, UNSPECIFIED: ICD-10-CM

## 2017-11-20 DIAGNOSIS — I95.2 HYPOTENSION DUE TO DRUGS: ICD-10-CM

## 2017-11-20 DIAGNOSIS — R55 SYNCOPE AND COLLAPSE: ICD-10-CM

## 2017-11-20 DIAGNOSIS — I42.9 CARDIOMYOPATHY, UNSPECIFIED: ICD-10-CM

## 2017-11-20 DIAGNOSIS — M10.9 GOUT, UNSPECIFIED: ICD-10-CM

## 2017-11-22 ENCOUNTER — OUTPATIENT (OUTPATIENT)
Dept: OUTPATIENT SERVICES | Facility: HOSPITAL | Age: 67
LOS: 1 days | Discharge: HOME | End: 2017-11-22

## 2017-11-22 DIAGNOSIS — J96.00 ACUTE RESPIRATORY FAILURE, UNSPECIFIED WHETHER WITH HYPOXIA OR HYPERCAPNIA: ICD-10-CM

## 2017-11-22 DIAGNOSIS — N10 ACUTE PYELONEPHRITIS: ICD-10-CM

## 2017-11-22 DIAGNOSIS — I50.23 ACUTE ON CHRONIC SYSTOLIC (CONGESTIVE) HEART FAILURE: ICD-10-CM

## 2017-11-22 DIAGNOSIS — I78.0 HEREDITARY HEMORRHAGIC TELANGIECTASIA: ICD-10-CM

## 2017-11-22 DIAGNOSIS — I10 ESSENTIAL (PRIMARY) HYPERTENSION: ICD-10-CM

## 2017-11-22 DIAGNOSIS — Z01.818 ENCOUNTER FOR OTHER PREPROCEDURAL EXAMINATION: ICD-10-CM

## 2017-11-22 DIAGNOSIS — R55 SYNCOPE AND COLLAPSE: ICD-10-CM

## 2017-11-22 DIAGNOSIS — A41.9 SEPSIS, UNSPECIFIED ORGANISM: ICD-10-CM

## 2017-11-22 DIAGNOSIS — M10.9 GOUT, UNSPECIFIED: ICD-10-CM

## 2017-11-22 DIAGNOSIS — I95.2 HYPOTENSION DUE TO DRUGS: ICD-10-CM

## 2017-11-22 DIAGNOSIS — T19.1XXA FOREIGN BODY IN BLADDER, INITIAL ENCOUNTER: ICD-10-CM

## 2017-11-22 DIAGNOSIS — I42.9 CARDIOMYOPATHY, UNSPECIFIED: ICD-10-CM

## 2017-11-22 DIAGNOSIS — D64.9 ANEMIA, UNSPECIFIED: ICD-10-CM

## 2017-11-22 DIAGNOSIS — I48.91 UNSPECIFIED ATRIAL FIBRILLATION: ICD-10-CM

## 2017-11-22 DIAGNOSIS — N20.0 CALCULUS OF KIDNEY: ICD-10-CM

## 2017-11-24 DIAGNOSIS — Z87.891 PERSONAL HISTORY OF NICOTINE DEPENDENCE: ICD-10-CM

## 2017-11-24 DIAGNOSIS — I73.9 PERIPHERAL VASCULAR DISEASE, UNSPECIFIED: ICD-10-CM

## 2017-11-24 DIAGNOSIS — Z95.5 PRESENCE OF CORONARY ANGIOPLASTY IMPLANT AND GRAFT: ICD-10-CM

## 2017-11-24 DIAGNOSIS — N20.0 CALCULUS OF KIDNEY: ICD-10-CM

## 2017-11-24 DIAGNOSIS — R06.02 SHORTNESS OF BREATH: ICD-10-CM

## 2017-11-24 DIAGNOSIS — I49.9 CARDIAC ARRHYTHMIA, UNSPECIFIED: ICD-10-CM

## 2017-11-24 DIAGNOSIS — B30.0 KERATOCONJUNCTIVITIS DUE TO ADENOVIRUS: ICD-10-CM

## 2017-11-24 DIAGNOSIS — Z45.9 ENCOUNTER FOR ADJUSTMENT AND MANAGEMENT OF UNSPECIFIED IMPLANTED DEVICE: ICD-10-CM

## 2017-11-27 ENCOUNTER — OUTPATIENT (OUTPATIENT)
Dept: OUTPATIENT SERVICES | Facility: HOSPITAL | Age: 67
LOS: 1 days | Discharge: HOME | End: 2017-11-27

## 2017-11-27 DIAGNOSIS — J96.00 ACUTE RESPIRATORY FAILURE, UNSPECIFIED WHETHER WITH HYPOXIA OR HYPERCAPNIA: ICD-10-CM

## 2017-11-27 DIAGNOSIS — R55 SYNCOPE AND COLLAPSE: ICD-10-CM

## 2017-11-27 DIAGNOSIS — I48.91 UNSPECIFIED ATRIAL FIBRILLATION: ICD-10-CM

## 2017-11-27 DIAGNOSIS — A41.9 SEPSIS, UNSPECIFIED ORGANISM: ICD-10-CM

## 2017-11-27 DIAGNOSIS — I78.0 HEREDITARY HEMORRHAGIC TELANGIECTASIA: ICD-10-CM

## 2017-11-27 DIAGNOSIS — N10 ACUTE PYELONEPHRITIS: ICD-10-CM

## 2017-11-27 DIAGNOSIS — I42.9 CARDIOMYOPATHY, UNSPECIFIED: ICD-10-CM

## 2017-11-27 DIAGNOSIS — D64.9 ANEMIA, UNSPECIFIED: ICD-10-CM

## 2017-11-27 DIAGNOSIS — I50.23 ACUTE ON CHRONIC SYSTOLIC (CONGESTIVE) HEART FAILURE: ICD-10-CM

## 2017-11-27 DIAGNOSIS — N20.0 CALCULUS OF KIDNEY: ICD-10-CM

## 2017-11-27 DIAGNOSIS — I10 ESSENTIAL (PRIMARY) HYPERTENSION: ICD-10-CM

## 2017-11-27 DIAGNOSIS — M10.9 GOUT, UNSPECIFIED: ICD-10-CM

## 2017-11-27 DIAGNOSIS — I95.2 HYPOTENSION DUE TO DRUGS: ICD-10-CM

## 2017-12-04 ENCOUNTER — OUTPATIENT (OUTPATIENT)
Dept: OUTPATIENT SERVICES | Facility: HOSPITAL | Age: 67
LOS: 1 days | Discharge: HOME | End: 2017-12-04

## 2017-12-04 DIAGNOSIS — N10 ACUTE PYELONEPHRITIS: ICD-10-CM

## 2017-12-04 DIAGNOSIS — I95.2 HYPOTENSION DUE TO DRUGS: ICD-10-CM

## 2017-12-04 DIAGNOSIS — N20.0 CALCULUS OF KIDNEY: ICD-10-CM

## 2017-12-04 DIAGNOSIS — I10 ESSENTIAL (PRIMARY) HYPERTENSION: ICD-10-CM

## 2017-12-04 DIAGNOSIS — I48.91 UNSPECIFIED ATRIAL FIBRILLATION: ICD-10-CM

## 2017-12-04 DIAGNOSIS — I50.23 ACUTE ON CHRONIC SYSTOLIC (CONGESTIVE) HEART FAILURE: ICD-10-CM

## 2017-12-04 DIAGNOSIS — A41.9 SEPSIS, UNSPECIFIED ORGANISM: ICD-10-CM

## 2017-12-04 DIAGNOSIS — I78.0 HEREDITARY HEMORRHAGIC TELANGIECTASIA: ICD-10-CM

## 2017-12-04 DIAGNOSIS — D64.9 ANEMIA, UNSPECIFIED: ICD-10-CM

## 2017-12-04 DIAGNOSIS — J96.00 ACUTE RESPIRATORY FAILURE, UNSPECIFIED WHETHER WITH HYPOXIA OR HYPERCAPNIA: ICD-10-CM

## 2017-12-04 DIAGNOSIS — I42.9 CARDIOMYOPATHY, UNSPECIFIED: ICD-10-CM

## 2017-12-04 DIAGNOSIS — M10.9 GOUT, UNSPECIFIED: ICD-10-CM

## 2017-12-04 DIAGNOSIS — R55 SYNCOPE AND COLLAPSE: ICD-10-CM

## 2017-12-06 ENCOUNTER — APPOINTMENT (OUTPATIENT)
Dept: UROLOGY | Facility: HOSPITAL | Age: 67
End: 2017-12-06
Payer: MEDICARE

## 2017-12-06 ENCOUNTER — OUTPATIENT (OUTPATIENT)
Dept: OUTPATIENT SERVICES | Facility: HOSPITAL | Age: 67
LOS: 1 days | Discharge: HOME | End: 2017-12-06

## 2017-12-06 DIAGNOSIS — I95.2 HYPOTENSION DUE TO DRUGS: ICD-10-CM

## 2017-12-06 DIAGNOSIS — R55 SYNCOPE AND COLLAPSE: ICD-10-CM

## 2017-12-06 DIAGNOSIS — A41.9 SEPSIS, UNSPECIFIED ORGANISM: ICD-10-CM

## 2017-12-06 DIAGNOSIS — I42.9 CARDIOMYOPATHY, UNSPECIFIED: ICD-10-CM

## 2017-12-06 DIAGNOSIS — I48.91 UNSPECIFIED ATRIAL FIBRILLATION: ICD-10-CM

## 2017-12-06 DIAGNOSIS — N20.0 CALCULUS OF KIDNEY: ICD-10-CM

## 2017-12-06 DIAGNOSIS — D64.9 ANEMIA, UNSPECIFIED: ICD-10-CM

## 2017-12-06 DIAGNOSIS — I50.23 ACUTE ON CHRONIC SYSTOLIC (CONGESTIVE) HEART FAILURE: ICD-10-CM

## 2017-12-06 DIAGNOSIS — N10 ACUTE PYELONEPHRITIS: ICD-10-CM

## 2017-12-06 DIAGNOSIS — I78.0 HEREDITARY HEMORRHAGIC TELANGIECTASIA: ICD-10-CM

## 2017-12-06 DIAGNOSIS — J96.00 ACUTE RESPIRATORY FAILURE, UNSPECIFIED WHETHER WITH HYPOXIA OR HYPERCAPNIA: ICD-10-CM

## 2017-12-06 DIAGNOSIS — I10 ESSENTIAL (PRIMARY) HYPERTENSION: ICD-10-CM

## 2017-12-06 DIAGNOSIS — M10.9 GOUT, UNSPECIFIED: ICD-10-CM

## 2017-12-06 PROCEDURE — 52310 CYSTOSCOPY AND TREATMENT: CPT

## 2017-12-08 DIAGNOSIS — T83.192A OTHER MECHANICAL COMPLICATION OF INDWELLING URETERAL STENT, INITIAL ENCOUNTER: ICD-10-CM

## 2017-12-08 DIAGNOSIS — I10 ESSENTIAL (PRIMARY) HYPERTENSION: ICD-10-CM

## 2017-12-08 DIAGNOSIS — I48.91 UNSPECIFIED ATRIAL FIBRILLATION: ICD-10-CM
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
## 2017-12-08 DIAGNOSIS — Z95.810 PRESENCE OF AUTOMATIC (IMPLANTABLE) CARDIAC DEFIBRILLATOR: ICD-10-CM

## 2017-12-08 DIAGNOSIS — I50.9 HEART FAILURE, UNSPECIFIED: ICD-10-CM

## 2017-12-08 DIAGNOSIS — Z79.01 LONG TERM (CURRENT) USE OF ANTICOAGULANTS: ICD-10-CM

## 2017-12-11 ENCOUNTER — OUTPATIENT (OUTPATIENT)
Dept: OUTPATIENT SERVICES | Facility: HOSPITAL | Age: 67
LOS: 1 days | Discharge: HOME | End: 2017-12-11

## 2017-12-11 DIAGNOSIS — N10 ACUTE PYELONEPHRITIS: ICD-10-CM

## 2017-12-11 DIAGNOSIS — A41.9 SEPSIS, UNSPECIFIED ORGANISM: ICD-10-CM

## 2017-12-11 DIAGNOSIS — I50.23 ACUTE ON CHRONIC SYSTOLIC (CONGESTIVE) HEART FAILURE: ICD-10-CM

## 2017-12-11 DIAGNOSIS — M10.9 GOUT, UNSPECIFIED: ICD-10-CM

## 2017-12-11 DIAGNOSIS — I95.2 HYPOTENSION DUE TO DRUGS: ICD-10-CM

## 2017-12-11 DIAGNOSIS — I48.91 UNSPECIFIED ATRIAL FIBRILLATION: ICD-10-CM

## 2017-12-11 DIAGNOSIS — I42.9 CARDIOMYOPATHY, UNSPECIFIED: ICD-10-CM

## 2017-12-11 DIAGNOSIS — D64.9 ANEMIA, UNSPECIFIED: ICD-10-CM

## 2017-12-11 DIAGNOSIS — J96.00 ACUTE RESPIRATORY FAILURE, UNSPECIFIED WHETHER WITH HYPOXIA OR HYPERCAPNIA: ICD-10-CM

## 2017-12-11 DIAGNOSIS — R55 SYNCOPE AND COLLAPSE: ICD-10-CM

## 2017-12-11 DIAGNOSIS — I78.0 HEREDITARY HEMORRHAGIC TELANGIECTASIA: ICD-10-CM

## 2017-12-11 DIAGNOSIS — I10 ESSENTIAL (PRIMARY) HYPERTENSION: ICD-10-CM

## 2017-12-11 DIAGNOSIS — N20.0 CALCULUS OF KIDNEY: ICD-10-CM

## 2017-12-13 ENCOUNTER — APPOINTMENT (OUTPATIENT)
Dept: HEMATOLOGY ONCOLOGY | Facility: CLINIC | Age: 67
End: 2017-12-13

## 2017-12-13 VITALS
WEIGHT: 224 LBS | DIASTOLIC BLOOD PRESSURE: 60 MMHG | TEMPERATURE: 97.2 F | SYSTOLIC BLOOD PRESSURE: 83 MMHG | BODY MASS INDEX: 32.07 KG/M2 | HEIGHT: 70 IN | RESPIRATION RATE: 14 BRPM | HEART RATE: 97 BPM

## 2017-12-14 ENCOUNTER — APPOINTMENT (OUTPATIENT)
Dept: INFUSION THERAPY | Facility: CLINIC | Age: 67
End: 2017-12-14

## 2017-12-14 LAB
BASOPHILS # BLD: 0.03 TH/MM3
BASOPHILS NFR BLD: 0.5 %
EOSINOPHIL # BLD: 0.1 TH/MM3
EOSINOPHIL NFR BLD: 1.5 %
ERYTHROCYTE [DISTWIDTH] IN BLOOD BY AUTOMATED COUNT: 17.2 %
FERRITIN SERPL-MCNC: 20 NG/ML
GRANULOCYTES # BLD: 5.05 TH/MM3
GRANULOCYTES NFR BLD: 77 %
HCT VFR BLD AUTO: 31.6 %
HGB BLD-MCNC: 9.6 G/DL
IMM GRANULOCYTES # BLD: 0.01 TH/MM3
IMM GRANULOCYTES NFR BLD: 0.2 %
IRON SERPL-MCNC: 26 UG/DL
LYMPHOCYTES # BLD: 0.77 TH/MM3
LYMPHOCYTES NFR BLD: 11.7 %
MCH RBC QN AUTO: 25.3 PG
MCHC RBC AUTO-ENTMCNC: 30.4 G/DL
MCV RBC AUTO: 83.4 FL
MONOCYTES # BLD: 0.6 TH/MM3
MONOCYTES NFR BLD: 9.1 %
PLATELET # BLD: 179 TH/MM3
PMV BLD AUTO: 9.5 FL
RBC # BLD AUTO: 3.79 MIL/MM3
TIBC SERPL-MCNC: 473 UG/DL
WBC # BLD: 6.56 TH/MM3

## 2017-12-18 ENCOUNTER — OUTPATIENT (OUTPATIENT)
Dept: OUTPATIENT SERVICES | Facility: HOSPITAL | Age: 67
LOS: 1 days | Discharge: HOME | End: 2017-12-18

## 2017-12-18 DIAGNOSIS — I95.2 HYPOTENSION DUE TO DRUGS: ICD-10-CM

## 2017-12-18 DIAGNOSIS — I78.0 HEREDITARY HEMORRHAGIC TELANGIECTASIA: ICD-10-CM

## 2017-12-18 DIAGNOSIS — I50.23 ACUTE ON CHRONIC SYSTOLIC (CONGESTIVE) HEART FAILURE: ICD-10-CM

## 2017-12-18 DIAGNOSIS — A41.9 SEPSIS, UNSPECIFIED ORGANISM: ICD-10-CM

## 2017-12-18 DIAGNOSIS — R55 SYNCOPE AND COLLAPSE: ICD-10-CM

## 2017-12-18 DIAGNOSIS — N20.0 CALCULUS OF KIDNEY: ICD-10-CM

## 2017-12-18 DIAGNOSIS — M10.9 GOUT, UNSPECIFIED: ICD-10-CM

## 2017-12-18 DIAGNOSIS — I48.91 UNSPECIFIED ATRIAL FIBRILLATION: ICD-10-CM

## 2017-12-18 DIAGNOSIS — I10 ESSENTIAL (PRIMARY) HYPERTENSION: ICD-10-CM

## 2017-12-18 DIAGNOSIS — N10 ACUTE PYELONEPHRITIS: ICD-10-CM

## 2017-12-18 DIAGNOSIS — J96.00 ACUTE RESPIRATORY FAILURE, UNSPECIFIED WHETHER WITH HYPOXIA OR HYPERCAPNIA: ICD-10-CM

## 2017-12-18 DIAGNOSIS — I42.9 CARDIOMYOPATHY, UNSPECIFIED: ICD-10-CM

## 2017-12-18 DIAGNOSIS — D64.9 ANEMIA, UNSPECIFIED: ICD-10-CM

## 2017-12-21 ENCOUNTER — APPOINTMENT (OUTPATIENT)
Dept: INFUSION THERAPY | Facility: CLINIC | Age: 67
End: 2017-12-21

## 2017-12-26 ENCOUNTER — OUTPATIENT (OUTPATIENT)
Dept: OUTPATIENT SERVICES | Facility: HOSPITAL | Age: 67
LOS: 1 days | Discharge: HOME | End: 2017-12-26

## 2017-12-26 DIAGNOSIS — I95.2 HYPOTENSION DUE TO DRUGS: ICD-10-CM

## 2017-12-26 DIAGNOSIS — I78.0 HEREDITARY HEMORRHAGIC TELANGIECTASIA: ICD-10-CM

## 2017-12-26 DIAGNOSIS — I10 ESSENTIAL (PRIMARY) HYPERTENSION: ICD-10-CM

## 2017-12-26 DIAGNOSIS — M10.9 GOUT, UNSPECIFIED: ICD-10-CM

## 2017-12-26 DIAGNOSIS — A41.9 SEPSIS, UNSPECIFIED ORGANISM: ICD-10-CM

## 2017-12-26 DIAGNOSIS — R55 SYNCOPE AND COLLAPSE: ICD-10-CM

## 2017-12-26 DIAGNOSIS — I48.91 UNSPECIFIED ATRIAL FIBRILLATION: ICD-10-CM

## 2017-12-26 DIAGNOSIS — N20.0 CALCULUS OF KIDNEY: ICD-10-CM

## 2017-12-26 DIAGNOSIS — D64.9 ANEMIA, UNSPECIFIED: ICD-10-CM

## 2017-12-26 DIAGNOSIS — N10 ACUTE PYELONEPHRITIS: ICD-10-CM

## 2017-12-26 DIAGNOSIS — J96.00 ACUTE RESPIRATORY FAILURE, UNSPECIFIED WHETHER WITH HYPOXIA OR HYPERCAPNIA: ICD-10-CM

## 2017-12-26 DIAGNOSIS — I50.23 ACUTE ON CHRONIC SYSTOLIC (CONGESTIVE) HEART FAILURE: ICD-10-CM

## 2017-12-26 DIAGNOSIS — I42.9 CARDIOMYOPATHY, UNSPECIFIED: ICD-10-CM

## 2018-01-02 ENCOUNTER — APPOINTMENT (OUTPATIENT)
Dept: UROLOGY | Facility: CLINIC | Age: 68
End: 2018-01-02

## 2018-01-03 ENCOUNTER — OUTPATIENT (OUTPATIENT)
Dept: OUTPATIENT SERVICES | Facility: HOSPITAL | Age: 68
LOS: 1 days | Discharge: HOME | End: 2018-01-03

## 2018-01-03 DIAGNOSIS — I48.91 UNSPECIFIED ATRIAL FIBRILLATION: ICD-10-CM

## 2018-01-03 DIAGNOSIS — I95.2 HYPOTENSION DUE TO DRUGS: ICD-10-CM

## 2018-01-03 DIAGNOSIS — A41.9 SEPSIS, UNSPECIFIED ORGANISM: ICD-10-CM

## 2018-01-03 DIAGNOSIS — I78.0 HEREDITARY HEMORRHAGIC TELANGIECTASIA: ICD-10-CM

## 2018-01-03 DIAGNOSIS — I42.9 CARDIOMYOPATHY, UNSPECIFIED: ICD-10-CM

## 2018-01-03 DIAGNOSIS — I10 ESSENTIAL (PRIMARY) HYPERTENSION: ICD-10-CM

## 2018-01-03 DIAGNOSIS — N20.0 CALCULUS OF KIDNEY: ICD-10-CM

## 2018-01-03 DIAGNOSIS — J96.00 ACUTE RESPIRATORY FAILURE, UNSPECIFIED WHETHER WITH HYPOXIA OR HYPERCAPNIA: ICD-10-CM

## 2018-01-03 DIAGNOSIS — M10.9 GOUT, UNSPECIFIED: ICD-10-CM

## 2018-01-03 DIAGNOSIS — R55 SYNCOPE AND COLLAPSE: ICD-10-CM

## 2018-01-03 DIAGNOSIS — N10 ACUTE PYELONEPHRITIS: ICD-10-CM

## 2018-01-03 DIAGNOSIS — I50.23 ACUTE ON CHRONIC SYSTOLIC (CONGESTIVE) HEART FAILURE: ICD-10-CM

## 2018-01-03 DIAGNOSIS — D64.9 ANEMIA, UNSPECIFIED: ICD-10-CM

## 2018-01-08 ENCOUNTER — OUTPATIENT (OUTPATIENT)
Dept: OUTPATIENT SERVICES | Facility: HOSPITAL | Age: 68
LOS: 1 days | Discharge: HOME | End: 2018-01-08

## 2018-01-08 DIAGNOSIS — I10 ESSENTIAL (PRIMARY) HYPERTENSION: ICD-10-CM

## 2018-01-08 DIAGNOSIS — N20.0 CALCULUS OF KIDNEY: ICD-10-CM

## 2018-01-08 DIAGNOSIS — I48.91 UNSPECIFIED ATRIAL FIBRILLATION: ICD-10-CM

## 2018-01-08 DIAGNOSIS — I95.2 HYPOTENSION DUE TO DRUGS: ICD-10-CM

## 2018-01-08 DIAGNOSIS — I78.0 HEREDITARY HEMORRHAGIC TELANGIECTASIA: ICD-10-CM

## 2018-01-08 DIAGNOSIS — R55 SYNCOPE AND COLLAPSE: ICD-10-CM

## 2018-01-08 DIAGNOSIS — I50.23 ACUTE ON CHRONIC SYSTOLIC (CONGESTIVE) HEART FAILURE: ICD-10-CM

## 2018-01-08 DIAGNOSIS — M10.9 GOUT, UNSPECIFIED: ICD-10-CM

## 2018-01-08 DIAGNOSIS — D64.9 ANEMIA, UNSPECIFIED: ICD-10-CM

## 2018-01-08 DIAGNOSIS — J96.00 ACUTE RESPIRATORY FAILURE, UNSPECIFIED WHETHER WITH HYPOXIA OR HYPERCAPNIA: ICD-10-CM

## 2018-01-08 DIAGNOSIS — A41.9 SEPSIS, UNSPECIFIED ORGANISM: ICD-10-CM

## 2018-01-08 DIAGNOSIS — N10 ACUTE PYELONEPHRITIS: ICD-10-CM

## 2018-01-08 DIAGNOSIS — I42.9 CARDIOMYOPATHY, UNSPECIFIED: ICD-10-CM

## 2018-01-09 ENCOUNTER — APPOINTMENT (OUTPATIENT)
Dept: CARDIOLOGY | Facility: CLINIC | Age: 68
End: 2018-01-09

## 2018-01-09 VITALS
DIASTOLIC BLOOD PRESSURE: 64 MMHG | HEART RATE: 89 BPM | WEIGHT: 220 LBS | OXYGEN SATURATION: 94 % | BODY MASS INDEX: 31.57 KG/M2 | SYSTOLIC BLOOD PRESSURE: 94 MMHG

## 2018-01-09 DIAGNOSIS — D63.1 CHRONIC KIDNEY DISEASE, UNSPECIFIED: ICD-10-CM

## 2018-01-09 DIAGNOSIS — N18.9 CHRONIC KIDNEY DISEASE, UNSPECIFIED: ICD-10-CM

## 2018-01-09 RX ORDER — PANTOPRAZOLE 40 MG/1
40 TABLET, DELAYED RELEASE ORAL DAILY
Qty: 90 | Refills: 2 | Status: ACTIVE | COMMUNITY
Start: 2018-01-09 | End: 1900-01-01

## 2018-01-15 ENCOUNTER — OUTPATIENT (OUTPATIENT)
Dept: OUTPATIENT SERVICES | Facility: HOSPITAL | Age: 68
LOS: 1 days | Discharge: HOME | End: 2018-01-15

## 2018-01-15 DIAGNOSIS — N10 ACUTE PYELONEPHRITIS: ICD-10-CM

## 2018-01-15 DIAGNOSIS — I50.23 ACUTE ON CHRONIC SYSTOLIC (CONGESTIVE) HEART FAILURE: ICD-10-CM

## 2018-01-15 DIAGNOSIS — I42.9 CARDIOMYOPATHY, UNSPECIFIED: ICD-10-CM

## 2018-01-15 DIAGNOSIS — I48.91 UNSPECIFIED ATRIAL FIBRILLATION: ICD-10-CM

## 2018-01-15 DIAGNOSIS — Z02.9 ENCOUNTER FOR ADMINISTRATIVE EXAMINATIONS, UNSPECIFIED: ICD-10-CM

## 2018-01-15 DIAGNOSIS — N20.0 CALCULUS OF KIDNEY: ICD-10-CM

## 2018-01-15 DIAGNOSIS — M10.9 GOUT, UNSPECIFIED: ICD-10-CM

## 2018-01-15 DIAGNOSIS — A41.9 SEPSIS, UNSPECIFIED ORGANISM: ICD-10-CM

## 2018-01-15 DIAGNOSIS — I10 ESSENTIAL (PRIMARY) HYPERTENSION: ICD-10-CM

## 2018-01-15 DIAGNOSIS — I78.0 HEREDITARY HEMORRHAGIC TELANGIECTASIA: ICD-10-CM

## 2018-01-15 DIAGNOSIS — I95.2 HYPOTENSION DUE TO DRUGS: ICD-10-CM

## 2018-01-15 DIAGNOSIS — J96.00 ACUTE RESPIRATORY FAILURE, UNSPECIFIED WHETHER WITH HYPOXIA OR HYPERCAPNIA: ICD-10-CM

## 2018-01-15 DIAGNOSIS — D64.9 ANEMIA, UNSPECIFIED: ICD-10-CM

## 2018-01-15 DIAGNOSIS — R55 SYNCOPE AND COLLAPSE: ICD-10-CM

## 2018-01-22 ENCOUNTER — APPOINTMENT (OUTPATIENT)
Dept: HEMATOLOGY ONCOLOGY | Facility: CLINIC | Age: 68
End: 2018-01-22

## 2018-01-22 ENCOUNTER — OUTPATIENT (OUTPATIENT)
Dept: OUTPATIENT SERVICES | Facility: HOSPITAL | Age: 68
LOS: 1 days | Discharge: HOME | End: 2018-01-22

## 2018-01-22 DIAGNOSIS — I48.91 UNSPECIFIED ATRIAL FIBRILLATION: ICD-10-CM

## 2018-01-22 DIAGNOSIS — Z02.9 ENCOUNTER FOR ADMINISTRATIVE EXAMINATIONS, UNSPECIFIED: ICD-10-CM

## 2018-01-23 LAB
BASOPHILS # BLD: 0.02 TH/MM3
BASOPHILS NFR BLD: 0.4 %
EOSINOPHIL # BLD: 0.18 TH/MM3
EOSINOPHIL NFR BLD: 3.4 %
ERYTHROCYTE [DISTWIDTH] IN BLOOD BY AUTOMATED COUNT: 22.4 %
FERRITIN SERPL-MCNC: 48 NG/ML
GRANULOCYTES # BLD: 3.93 TH/MM3
GRANULOCYTES NFR BLD: 73.3 %
HCT VFR BLD AUTO: 36.9 %
HGB BLD-MCNC: 11.7 G/DL
IMM GRANULOCYTES # BLD: 0.01 TH/MM3
IMM GRANULOCYTES NFR BLD: 0.2 %
IRON SERPL-MCNC: 31 UG/DL
LYMPHOCYTES # BLD: 0.88 TH/MM3
LYMPHOCYTES NFR BLD: 16.4 %
MCH RBC QN AUTO: 27.9 PG
MCHC RBC AUTO-ENTMCNC: 31.7 G/DL
MCV RBC AUTO: 88.1 FL
MONOCYTES # BLD: 0.34 TH/MM3
MONOCYTES NFR BLD: 6.3 %
PLATELET # BLD: 147 TH/MM3
PMV BLD AUTO: 9.7 FL
RBC # BLD AUTO: 4.19 MIL/MM3
TIBC SERPL-MCNC: 422 UG/DL
WBC # BLD: 5.36 TH/MM3

## 2018-01-29 ENCOUNTER — OUTPATIENT (OUTPATIENT)
Dept: OUTPATIENT SERVICES | Facility: HOSPITAL | Age: 68
LOS: 1 days | Discharge: HOME | End: 2018-01-29

## 2018-01-29 DIAGNOSIS — I48.91 UNSPECIFIED ATRIAL FIBRILLATION: ICD-10-CM

## 2018-01-30 DIAGNOSIS — D64.9 ANEMIA, UNSPECIFIED: ICD-10-CM

## 2018-01-30 DIAGNOSIS — N20.0 CALCULUS OF KIDNEY: ICD-10-CM

## 2018-01-30 DIAGNOSIS — I78.0 HEREDITARY HEMORRHAGIC TELANGIECTASIA: ICD-10-CM

## 2018-01-30 DIAGNOSIS — I50.23 ACUTE ON CHRONIC SYSTOLIC (CONGESTIVE) HEART FAILURE: ICD-10-CM

## 2018-01-30 DIAGNOSIS — I48.91 UNSPECIFIED ATRIAL FIBRILLATION: ICD-10-CM

## 2018-01-30 DIAGNOSIS — A41.9 SEPSIS, UNSPECIFIED ORGANISM: ICD-10-CM

## 2018-01-30 DIAGNOSIS — M10.9 GOUT, UNSPECIFIED: ICD-10-CM

## 2018-01-30 DIAGNOSIS — I95.2 HYPOTENSION DUE TO DRUGS: ICD-10-CM

## 2018-01-30 DIAGNOSIS — J96.00 ACUTE RESPIRATORY FAILURE, UNSPECIFIED WHETHER WITH HYPOXIA OR HYPERCAPNIA: ICD-10-CM

## 2018-01-30 DIAGNOSIS — I10 ESSENTIAL (PRIMARY) HYPERTENSION: ICD-10-CM

## 2018-01-30 DIAGNOSIS — N10 ACUTE PYELONEPHRITIS: ICD-10-CM

## 2018-01-30 DIAGNOSIS — I42.9 CARDIOMYOPATHY, UNSPECIFIED: ICD-10-CM

## 2018-01-30 DIAGNOSIS — R55 SYNCOPE AND COLLAPSE: ICD-10-CM

## 2018-02-04 DIAGNOSIS — M10.9 GOUT, UNSPECIFIED: ICD-10-CM

## 2018-02-04 DIAGNOSIS — J96.00 ACUTE RESPIRATORY FAILURE, UNSPECIFIED WHETHER WITH HYPOXIA OR HYPERCAPNIA: ICD-10-CM

## 2018-02-04 DIAGNOSIS — I10 ESSENTIAL (PRIMARY) HYPERTENSION: ICD-10-CM

## 2018-02-04 DIAGNOSIS — I48.91 UNSPECIFIED ATRIAL FIBRILLATION: ICD-10-CM

## 2018-02-04 DIAGNOSIS — N20.0 CALCULUS OF KIDNEY: ICD-10-CM

## 2018-02-04 DIAGNOSIS — A41.9 SEPSIS, UNSPECIFIED ORGANISM: ICD-10-CM

## 2018-02-04 DIAGNOSIS — I95.2 HYPOTENSION DUE TO DRUGS: ICD-10-CM

## 2018-02-04 DIAGNOSIS — I42.9 CARDIOMYOPATHY, UNSPECIFIED: ICD-10-CM

## 2018-02-04 DIAGNOSIS — I78.0 HEREDITARY HEMORRHAGIC TELANGIECTASIA: ICD-10-CM

## 2018-02-04 DIAGNOSIS — D64.9 ANEMIA, UNSPECIFIED: ICD-10-CM

## 2018-02-04 DIAGNOSIS — I50.23 ACUTE ON CHRONIC SYSTOLIC (CONGESTIVE) HEART FAILURE: ICD-10-CM

## 2018-02-04 DIAGNOSIS — N10 ACUTE PYELONEPHRITIS: ICD-10-CM

## 2018-02-04 DIAGNOSIS — R55 SYNCOPE AND COLLAPSE: ICD-10-CM

## 2018-02-05 ENCOUNTER — OUTPATIENT (OUTPATIENT)
Dept: OUTPATIENT SERVICES | Facility: HOSPITAL | Age: 68
LOS: 1 days | Discharge: HOME | End: 2018-02-05

## 2018-02-05 DIAGNOSIS — I48.91 UNSPECIFIED ATRIAL FIBRILLATION: ICD-10-CM

## 2018-02-12 ENCOUNTER — OUTPATIENT (OUTPATIENT)
Dept: OUTPATIENT SERVICES | Facility: HOSPITAL | Age: 68
LOS: 1 days | Discharge: HOME | End: 2018-02-12

## 2018-02-19 ENCOUNTER — OUTPATIENT (OUTPATIENT)
Dept: OUTPATIENT SERVICES | Facility: HOSPITAL | Age: 68
LOS: 1 days | Discharge: HOME | End: 2018-02-19

## 2018-02-19 DIAGNOSIS — I48.91 UNSPECIFIED ATRIAL FIBRILLATION: ICD-10-CM

## 2018-02-21 ENCOUNTER — APPOINTMENT (OUTPATIENT)
Dept: HEMATOLOGY ONCOLOGY | Facility: CLINIC | Age: 68
End: 2018-02-21

## 2018-02-21 ENCOUNTER — LABORATORY RESULT (OUTPATIENT)
Age: 68
End: 2018-02-21

## 2018-02-21 LAB
HCT VFR BLD CALC: 37.9 %
HGB BLD-MCNC: 11.8 G/DL
MCHC RBC-ENTMCNC: 27.7 PG
MCHC RBC-ENTMCNC: 31.1 G/DL
MCV RBC AUTO: 89 FL
PLATELET # BLD AUTO: 175 K/UL
PMV BLD: 11.3 FL
RBC # BLD: 4.26 M/UL
RBC # FLD: 19.5 %
WBC # FLD AUTO: 5.62 K/UL

## 2018-02-26 ENCOUNTER — OUTPATIENT (OUTPATIENT)
Dept: OUTPATIENT SERVICES | Facility: HOSPITAL | Age: 68
LOS: 1 days | Discharge: HOME | End: 2018-02-26

## 2018-02-26 DIAGNOSIS — I48.91 UNSPECIFIED ATRIAL FIBRILLATION: ICD-10-CM

## 2018-02-28 ENCOUNTER — LABORATORY RESULT (OUTPATIENT)
Age: 68
End: 2018-02-28

## 2018-02-28 ENCOUNTER — APPOINTMENT (OUTPATIENT)
Dept: HEMATOLOGY ONCOLOGY | Facility: CLINIC | Age: 68
End: 2018-02-28

## 2018-02-28 VITALS
SYSTOLIC BLOOD PRESSURE: 102 MMHG | TEMPERATURE: 97.6 F | WEIGHT: 220 LBS | HEIGHT: 70 IN | BODY MASS INDEX: 31.5 KG/M2 | HEART RATE: 93 BPM | RESPIRATION RATE: 14 BRPM | DIASTOLIC BLOOD PRESSURE: 55 MMHG

## 2018-02-28 DIAGNOSIS — E61.1 IRON DEFICIENCY: ICD-10-CM

## 2018-02-28 LAB
HCT VFR BLD CALC: 38.2 %
HGB BLD-MCNC: 11.8 G/DL
MCHC RBC-ENTMCNC: 27.9 PG
MCHC RBC-ENTMCNC: 30.9 G/DL
MCV RBC AUTO: 90.3 FL
PLATELET # BLD AUTO: 146 K/UL
PMV BLD: 9 FL
RBC # BLD: 4.23 M/UL
RBC # FLD: 18.9 %
WBC # FLD AUTO: 5.13 K/UL

## 2018-02-28 NOTE — ASSESSMENT
[FreeTextEntry1] :  Anemia of chronic kidney disease and chronic disease plus iron deficiency from chronic nose bleeds due to HHT; status post  IV Feraheme  as needed. His hgB has been stable and is  now near normal. He has mild occasional thrombocytopenia which may be due to consumption from bleeds will monitor. He is on Coumadin for afib and is being considered for Watchman procedure to take him off the Coumadin given bleeding history.\par \par \par Plan:\par -CBC is stable will check iron studies will administer Feraheme  if needed . Will check CBC montly for now with iron studies if bleeds knows to let us know\par -to  see me in 3 months \par -he knows to stop Coumadin if he is bleeding and to go to ER if needed and to see us sooner if bleeding is severe.\par -considering Watchman procedure \par -would not use Tamoxifen or Tranexamic at this time acid since there is risk of thrombosis and since he is on Coumadin  for afib. He has not had any major bleeding if we get into trouble will consider. Topical therapy for epistaxis is preferred

## 2018-02-28 NOTE — REVIEW OF SYSTEMS
[Fever] : no fever [Fatigue] : fatigue [Recent Change In Weight] : ~T no recent weight change [Dysphagia] : no dysphagia [Nosebleeds] : no nosebleeds [Chest Pain] : no chest pain [Lower Ext Edema] : no lower extremity edema [Shortness Of Breath] : no shortness of breath [SOB on Exertion] : shortness of breath during exertion [Abdominal Pain] : no abdominal pain [Diarrhea] : no diarrhea [Joint Pain] : no joint pain [Skin Rash] : no skin rash [Easy Bleeding] : no tendency for easy bleeding [Easy Bruising] : no tendency for easy bruising [Negative] : Allergic/Immunologic [FreeTextEntry8] : hematuria has resolved

## 2018-02-28 NOTE — PHYSICAL EXAM
[Restricted in physically strenuous activity but ambulatory and able to carry out work of a light or sedentary nature] : Status 1- Restricted in physically strenuous activity but ambulatory and able to carry out work of a light or sedentary nature, e.g., light house work, office work [Obese] : obese [Normal] : affect appropriate [de-identified] : Walks with a cane [de-identified] : He has an AICD on left [de-identified] : Has telangiectatic on face

## 2018-02-28 NOTE — HISTORY OF PRESENT ILLNESS
[de-identified] : 66 year old male who follows here periodically for management and treatment of anemia. He is non compliant with visits.   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 He has recurrent epistaxis mainly, on and off at least two or three times a week and episodes of severity and duration vary between 20 minutes to an hour. He also has multiple other comorbidities including cardiomyopathy with arrhythmia, status post pacemaker AICD; and COPD, gout, hypertension and CKD. He requires weekly Procrit and on and off intravenous iron but he is not compliant since whenever his hemoglobin gets better he stops coming to the [facility]. [de-identified] : 4/26/17\par He presents with his wife to establish care Dr. Ira garcía. He states he had a major nose bleed a few days ago. He had IV iron in March. No other complaints. \par \par 6/7/17\par He is doing  well. No bleeding. His HgB today is 11.4 \par \par 8/2/17\par He is doing well. Only had   a mild nose bleed. hgB today was 13.5. No other complaints.\par \par 10/4/17\par He reports a nose bleed one week ago that resolved. His HgB from today is 11.5. Has weakness in legs. \par \par 12/14/17\par He is here for follow up.  He had his  urinary stent removed and had hematuria. He held his Coumadin. He is thinking about a watchman procedure. Feels about the same otherwise. His HgB has been falling and so has his iron stores.\par \par 2/28/18\par Here for follow up. No recent bleeding Hgb is over 11. He decided not to proceed with watchman. He is burping after he eats and is to follow up with GI.

## 2018-02-28 NOTE — REASON FOR VISIT
[Follow-Up Visit] : a follow-up visit for [Spouse] : spouse [FreeTextEntry2] : Iron Deficiency Anemia

## 2018-03-02 LAB
FERRITIN SERPL-MCNC: 33 NG/ML
IRON SATN MFR SERPL: 10 %
IRON SERPL-MCNC: 39 UG/DL
TIBC SERPL-MCNC: 405 UG/DL
TRANSFERRIN SERPL-MCNC: 349 MG/DL
UIBC SERPL-MCNC: 366 UG/DL

## 2018-03-05 ENCOUNTER — OUTPATIENT (OUTPATIENT)
Dept: OUTPATIENT SERVICES | Facility: HOSPITAL | Age: 68
LOS: 1 days | Discharge: HOME | End: 2018-03-05

## 2018-03-05 DIAGNOSIS — I48.91 UNSPECIFIED ATRIAL FIBRILLATION: ICD-10-CM

## 2018-03-12 ENCOUNTER — OUTPATIENT (OUTPATIENT)
Dept: OUTPATIENT SERVICES | Facility: HOSPITAL | Age: 68
LOS: 1 days | Discharge: HOME | End: 2018-03-12

## 2018-03-12 DIAGNOSIS — I48.91 UNSPECIFIED ATRIAL FIBRILLATION: ICD-10-CM

## 2018-03-19 ENCOUNTER — OUTPATIENT (OUTPATIENT)
Dept: OUTPATIENT SERVICES | Facility: HOSPITAL | Age: 68
LOS: 1 days | Discharge: HOME | End: 2018-03-19

## 2018-03-19 DIAGNOSIS — I48.91 UNSPECIFIED ATRIAL FIBRILLATION: ICD-10-CM

## 2018-03-26 ENCOUNTER — OUTPATIENT (OUTPATIENT)
Dept: OUTPATIENT SERVICES | Facility: HOSPITAL | Age: 68
LOS: 1 days | Discharge: HOME | End: 2018-03-26

## 2018-03-26 DIAGNOSIS — I48.91 UNSPECIFIED ATRIAL FIBRILLATION: ICD-10-CM

## 2018-03-28 ENCOUNTER — APPOINTMENT (OUTPATIENT)
Dept: HEMATOLOGY ONCOLOGY | Facility: CLINIC | Age: 68
End: 2018-03-28

## 2018-03-28 ENCOUNTER — OUTPATIENT (OUTPATIENT)
Dept: OUTPATIENT SERVICES | Facility: HOSPITAL | Age: 68
LOS: 1 days | Discharge: HOME | End: 2018-03-28

## 2018-03-28 ENCOUNTER — LABORATORY RESULT (OUTPATIENT)
Age: 68
End: 2018-03-28

## 2018-03-28 DIAGNOSIS — I48.91 UNSPECIFIED ATRIAL FIBRILLATION: ICD-10-CM

## 2018-03-28 DIAGNOSIS — I78.0 HEREDITARY HEMORRHAGIC TELANGIECTASIA: ICD-10-CM

## 2018-03-28 DIAGNOSIS — D50.0 IRON DEFICIENCY ANEMIA SECONDARY TO BLOOD LOSS (CHRONIC): ICD-10-CM

## 2018-03-28 LAB
HCT VFR BLD CALC: 31.6 %
HGB BLD-MCNC: 9.8 G/DL
MCHC RBC-ENTMCNC: 27 PG
MCHC RBC-ENTMCNC: 31 G/DL
MCV RBC AUTO: 87.1 FL
PLATELET # BLD AUTO: 158 K/UL
PMV BLD: 11 FL
RBC # BLD: 3.63 M/UL
RBC # FLD: 17.2 %
WBC # FLD AUTO: 5.14 K/UL

## 2018-03-29 LAB
FERRITIN SERPL-MCNC: 33 NG/ML
IRON SATN MFR SERPL: 9 %
IRON SERPL-MCNC: 37 UG/DL
TIBC SERPL-MCNC: 414 UG/DL
UIBC SERPL-MCNC: 377 UG/DL

## 2018-03-30 ENCOUNTER — APPOINTMENT (OUTPATIENT)
Dept: CARDIOLOGY | Facility: CLINIC | Age: 68
End: 2018-03-30

## 2018-03-30 VITALS
WEIGHT: 220 LBS | OXYGEN SATURATION: 94 % | DIASTOLIC BLOOD PRESSURE: 54 MMHG | SYSTOLIC BLOOD PRESSURE: 96 MMHG | HEART RATE: 77 BPM | BODY MASS INDEX: 31.5 KG/M2 | HEIGHT: 70 IN

## 2018-03-30 DIAGNOSIS — K21.9 GASTRO-ESOPHAGEAL REFLUX DISEASE W/OUT ESOPHAGITIS: ICD-10-CM

## 2018-04-03 ENCOUNTER — OUTPATIENT (OUTPATIENT)
Dept: OUTPATIENT SERVICES | Facility: HOSPITAL | Age: 68
LOS: 1 days | Discharge: HOME | End: 2018-04-03

## 2018-04-03 DIAGNOSIS — I48.91 UNSPECIFIED ATRIAL FIBRILLATION: ICD-10-CM

## 2018-04-04 ENCOUNTER — LABORATORY RESULT (OUTPATIENT)
Age: 68
End: 2018-04-04

## 2018-04-04 ENCOUNTER — APPOINTMENT (OUTPATIENT)
Dept: HEMATOLOGY ONCOLOGY | Facility: CLINIC | Age: 68
End: 2018-04-04

## 2018-04-04 ENCOUNTER — APPOINTMENT (OUTPATIENT)
Dept: INFUSION THERAPY | Facility: CLINIC | Age: 68
End: 2018-04-04

## 2018-04-04 LAB
HCT VFR BLD CALC: 31.2 %
HGB BLD-MCNC: 9.7 G/DL
MCHC RBC-ENTMCNC: 26.6 PG
MCHC RBC-ENTMCNC: 31.1 G/DL
MCV RBC AUTO: 85.7 FL
PLATELET # BLD AUTO: 152 K/UL
PMV BLD: 11 FL
RBC # BLD: 3.64 M/UL
RBC # FLD: 17 %
WBC # FLD AUTO: 5.5 K/UL

## 2018-04-04 RX ORDER — FERUMOXYTOL 510 MG/17ML
510 INJECTION INTRAVENOUS ONCE
Qty: 0 | Refills: 0 | Status: COMPLETED | OUTPATIENT
Start: 2018-04-04 | End: 2018-04-04

## 2018-04-04 RX ADMIN — FERUMOXYTOL 468 MILLIGRAM(S): 510 INJECTION INTRAVENOUS at 11:54

## 2018-04-09 ENCOUNTER — OUTPATIENT (OUTPATIENT)
Dept: OUTPATIENT SERVICES | Facility: HOSPITAL | Age: 68
LOS: 1 days | Discharge: HOME | End: 2018-04-09

## 2018-04-09 DIAGNOSIS — I48.91 UNSPECIFIED ATRIAL FIBRILLATION: ICD-10-CM

## 2018-04-11 ENCOUNTER — APPOINTMENT (OUTPATIENT)
Dept: INFUSION THERAPY | Facility: CLINIC | Age: 68
End: 2018-04-11

## 2018-04-11 RX ORDER — FERUMOXYTOL 510 MG/17ML
510 INJECTION INTRAVENOUS ONCE
Qty: 0 | Refills: 0 | Status: COMPLETED | OUTPATIENT
Start: 2018-04-11 | End: 2018-04-11

## 2018-04-11 RX ADMIN — FERUMOXYTOL 468 MILLIGRAM(S): 510 INJECTION INTRAVENOUS at 10:36

## 2018-04-16 ENCOUNTER — OUTPATIENT (OUTPATIENT)
Dept: OUTPATIENT SERVICES | Facility: HOSPITAL | Age: 68
LOS: 1 days | Discharge: HOME | End: 2018-04-16

## 2018-04-16 DIAGNOSIS — I48.91 UNSPECIFIED ATRIAL FIBRILLATION: ICD-10-CM

## 2018-04-23 ENCOUNTER — OUTPATIENT (OUTPATIENT)
Dept: OUTPATIENT SERVICES | Facility: HOSPITAL | Age: 68
LOS: 1 days | Discharge: HOME | End: 2018-04-23

## 2018-04-23 DIAGNOSIS — I48.91 UNSPECIFIED ATRIAL FIBRILLATION: ICD-10-CM

## 2018-04-25 ENCOUNTER — APPOINTMENT (OUTPATIENT)
Dept: HEMATOLOGY ONCOLOGY | Facility: CLINIC | Age: 68
End: 2018-04-25

## 2018-04-25 ENCOUNTER — LABORATORY RESULT (OUTPATIENT)
Age: 68
End: 2018-04-25

## 2018-04-25 LAB
HCT VFR BLD CALC: 37.6 %
HGB BLD-MCNC: 11.7 G/DL
MCHC RBC-ENTMCNC: 28.7 PG
MCHC RBC-ENTMCNC: 31.1 G/DL
MCV RBC AUTO: 92.4 FL
PLATELET # BLD AUTO: 148 K/UL
PMV BLD: 10.4 FL
RBC # BLD: 4.07 M/UL
RBC # FLD: 22.5 %
WBC # FLD AUTO: 4.83 K/UL

## 2018-04-30 ENCOUNTER — OUTPATIENT (OUTPATIENT)
Dept: OUTPATIENT SERVICES | Facility: HOSPITAL | Age: 68
LOS: 1 days | Discharge: HOME | End: 2018-04-30

## 2018-04-30 DIAGNOSIS — I48.91 UNSPECIFIED ATRIAL FIBRILLATION: ICD-10-CM

## 2018-05-07 ENCOUNTER — OUTPATIENT (OUTPATIENT)
Dept: OUTPATIENT SERVICES | Facility: HOSPITAL | Age: 68
LOS: 1 days | Discharge: HOME | End: 2018-05-07

## 2018-05-07 DIAGNOSIS — Z01.810 ENCOUNTER FOR PREPROCEDURAL CARDIOVASCULAR EXAMINATION: ICD-10-CM

## 2018-05-07 DIAGNOSIS — E78.00 PURE HYPERCHOLESTEROLEMIA, UNSPECIFIED: ICD-10-CM

## 2018-05-07 DIAGNOSIS — E03.9 HYPOTHYROIDISM, UNSPECIFIED: ICD-10-CM

## 2018-05-07 DIAGNOSIS — I25.10 ATHEROSCLEROTIC HEART DISEASE OF NATIVE CORONARY ARTERY WITHOUT ANGINA PECTORIS: ICD-10-CM

## 2018-05-07 DIAGNOSIS — I48.91 UNSPECIFIED ATRIAL FIBRILLATION: ICD-10-CM

## 2018-05-14 ENCOUNTER — OUTPATIENT (OUTPATIENT)
Dept: OUTPATIENT SERVICES | Facility: HOSPITAL | Age: 68
LOS: 1 days | Discharge: HOME | End: 2018-05-14

## 2018-05-14 DIAGNOSIS — I48.91 UNSPECIFIED ATRIAL FIBRILLATION: ICD-10-CM

## 2018-05-14 DIAGNOSIS — I25.10 ATHEROSCLEROTIC HEART DISEASE OF NATIVE CORONARY ARTERY WITHOUT ANGINA PECTORIS: ICD-10-CM

## 2018-05-21 ENCOUNTER — OUTPATIENT (OUTPATIENT)
Dept: OUTPATIENT SERVICES | Facility: HOSPITAL | Age: 68
LOS: 1 days | Discharge: HOME | End: 2018-05-21

## 2018-05-21 DIAGNOSIS — I25.10 ATHEROSCLEROTIC HEART DISEASE OF NATIVE CORONARY ARTERY WITHOUT ANGINA PECTORIS: ICD-10-CM

## 2018-05-21 DIAGNOSIS — I48.91 UNSPECIFIED ATRIAL FIBRILLATION: ICD-10-CM

## 2018-05-23 ENCOUNTER — LABORATORY RESULT (OUTPATIENT)
Age: 68
End: 2018-05-23

## 2018-05-23 ENCOUNTER — APPOINTMENT (OUTPATIENT)
Dept: HEMATOLOGY ONCOLOGY | Facility: CLINIC | Age: 68
End: 2018-05-23

## 2018-05-23 VITALS
DIASTOLIC BLOOD PRESSURE: 58 MMHG | WEIGHT: 233 LBS | SYSTOLIC BLOOD PRESSURE: 70 MMHG | HEIGHT: 70 IN | BODY MASS INDEX: 33.36 KG/M2 | TEMPERATURE: 96.5 F | RESPIRATION RATE: 14 BRPM

## 2018-05-23 LAB
HCT VFR BLD CALC: 36.4 %
HGB BLD-MCNC: 11.5 G/DL
MCHC RBC-ENTMCNC: 29 PG
MCHC RBC-ENTMCNC: 31.6 G/DL
MCV RBC AUTO: 91.7 FL
PLATELET # BLD AUTO: 139 K/UL
PMV BLD: 10.4 FL
RBC # BLD: 3.97 M/UL
RBC # FLD: 19.9 %
WBC # FLD AUTO: 4.92 K/UL

## 2018-05-24 NOTE — REVIEW OF SYSTEMS
[Fever] : no fever [Fatigue] : fatigue [Recent Change In Weight] : ~T no recent weight change [Dysphagia] : no dysphagia [Nosebleeds] : nosebleeds [Chest Pain] : no chest pain [Shortness Of Breath] : no shortness of breath [SOB on Exertion] : shortness of breath during exertion [Abdominal Pain] : no abdominal pain [Diarrhea] : no diarrhea [Joint Pain] : no joint pain [Skin Rash] : no skin rash [Easy Bleeding] : no tendency for easy bleeding [Easy Bruising] : no tendency for easy bruising [Negative] : Allergic/Immunologic [FreeTextEntry8] : hematuria has resolved

## 2018-05-24 NOTE — ASSESSMENT
[FreeTextEntry1] :  Anemia of chronic kidney disease and chronic disease plus iron deficiency from chronic nose bleeds due to HHT; status post  IV Feraheme  as needed. His hgB has been stable and is  now near normal. He has mild occasional thrombocytopenia which may be due to consumption from bleeds will monitor. He is on Coumadin for afib and is being considered for Watchman procedure to take him off the Coumadin given bleeding history.\par \par \par Plan:\par -CBC is stable will monitor  iron studies  and CBC will administer Feraheme  if needed . Will check CBC monthly for now with iron studies if bleeds knows to let us know\par -to  see me in 2- 3 months \par -he knows to stop Coumadin if he is bleeding and to go to ER if needed and to see us sooner if bleeding is severe.\par -considering Watchman procedure \par -would not use Tamoxifen or Tranexamic at this time acid since there is risk of thrombosis and since he is on Coumadin  for afib.  Topical therapy for epistaxis is preferred

## 2018-05-24 NOTE — PHYSICAL EXAM
[Restricted in physically strenuous activity but ambulatory and able to carry out work of a light or sedentary nature] : Status 1- Restricted in physically strenuous activity but ambulatory and able to carry out work of a light or sedentary nature, e.g., light house work, office work [Obese] : obese [Normal] : affect appropriate [de-identified] : Walks with a cane [de-identified] : He has an AICD on left [de-identified] : Has telangiectatic on face

## 2018-05-29 ENCOUNTER — OUTPATIENT (OUTPATIENT)
Dept: OUTPATIENT SERVICES | Facility: HOSPITAL | Age: 68
LOS: 1 days | Discharge: HOME | End: 2018-05-29

## 2018-05-29 DIAGNOSIS — I25.10 ATHEROSCLEROTIC HEART DISEASE OF NATIVE CORONARY ARTERY WITHOUT ANGINA PECTORIS: ICD-10-CM

## 2018-05-29 DIAGNOSIS — I48.91 UNSPECIFIED ATRIAL FIBRILLATION: ICD-10-CM

## 2018-05-30 ENCOUNTER — OUTPATIENT (OUTPATIENT)
Dept: OUTPATIENT SERVICES | Facility: HOSPITAL | Age: 68
LOS: 1 days | Discharge: HOME | End: 2018-05-30

## 2018-05-30 ENCOUNTER — APPOINTMENT (OUTPATIENT)
Dept: HEMATOLOGY ONCOLOGY | Facility: CLINIC | Age: 68
End: 2018-05-30

## 2018-05-30 ENCOUNTER — LABORATORY RESULT (OUTPATIENT)
Age: 68
End: 2018-05-30

## 2018-05-30 DIAGNOSIS — D50.0 IRON DEFICIENCY ANEMIA SECONDARY TO BLOOD LOSS (CHRONIC): ICD-10-CM

## 2018-05-31 LAB
HCT VFR BLD CALC: 36.5 %
HGB BLD-MCNC: 11.7 G/DL
IRON SATN MFR SERPL: 13 %
IRON SERPL-MCNC: 50 UG/DL
MCHC RBC-ENTMCNC: 29.5 PG
MCHC RBC-ENTMCNC: 32.1 G/DL
MCV RBC AUTO: 91.9 FL
PLATELET # BLD AUTO: 135 K/UL
PMV BLD: 10 FL
RBC # BLD: 3.97 M/UL
RBC # FLD: 19.4 %
TIBC SERPL-MCNC: 373 UG/DL
UIBC SERPL-MCNC: 323 UG/DL
WBC # FLD AUTO: 4.77 K/UL

## 2018-06-04 ENCOUNTER — OUTPATIENT (OUTPATIENT)
Dept: OUTPATIENT SERVICES | Facility: HOSPITAL | Age: 68
LOS: 1 days | Discharge: HOME | End: 2018-06-04

## 2018-06-04 DIAGNOSIS — I48.91 UNSPECIFIED ATRIAL FIBRILLATION: ICD-10-CM

## 2018-06-04 LAB — FERRITIN SERPL-MCNC: 45 NG/ML

## 2018-06-07 DIAGNOSIS — I78.0 HEREDITARY HEMORRHAGIC TELANGIECTASIA: ICD-10-CM

## 2018-06-11 ENCOUNTER — OUTPATIENT (OUTPATIENT)
Dept: OUTPATIENT SERVICES | Facility: HOSPITAL | Age: 68
LOS: 1 days | Discharge: HOME | End: 2018-06-11

## 2018-06-11 DIAGNOSIS — I48.91 UNSPECIFIED ATRIAL FIBRILLATION: ICD-10-CM

## 2018-06-18 ENCOUNTER — OUTPATIENT (OUTPATIENT)
Dept: OUTPATIENT SERVICES | Facility: HOSPITAL | Age: 68
LOS: 1 days | Discharge: HOME | End: 2018-06-18

## 2018-06-25 ENCOUNTER — OUTPATIENT (OUTPATIENT)
Dept: OUTPATIENT SERVICES | Facility: HOSPITAL | Age: 68
LOS: 1 days | Discharge: HOME | End: 2018-06-25

## 2018-06-25 DIAGNOSIS — R79.89 OTHER SPECIFIED ABNORMAL FINDINGS OF BLOOD CHEMISTRY: ICD-10-CM

## 2018-07-02 ENCOUNTER — OUTPATIENT (OUTPATIENT)
Dept: OUTPATIENT SERVICES | Facility: HOSPITAL | Age: 68
LOS: 1 days | Discharge: HOME | End: 2018-07-02

## 2018-07-02 DIAGNOSIS — R79.89 OTHER SPECIFIED ABNORMAL FINDINGS OF BLOOD CHEMISTRY: ICD-10-CM

## 2018-07-06 ENCOUNTER — APPOINTMENT (OUTPATIENT)
Dept: CARDIOLOGY | Facility: CLINIC | Age: 68
End: 2018-07-06

## 2018-07-06 VITALS
SYSTOLIC BLOOD PRESSURE: 90 MMHG | WEIGHT: 221 LBS | DIASTOLIC BLOOD PRESSURE: 60 MMHG | OXYGEN SATURATION: 95 % | HEART RATE: 90 BPM | BODY MASS INDEX: 31.64 KG/M2 | HEIGHT: 70 IN

## 2018-07-06 DIAGNOSIS — I42.0 DILATED CARDIOMYOPATHY: ICD-10-CM

## 2018-07-06 RX ORDER — PANCRELIPASE 120000; 24000; 76000 [USP'U]/1; [USP'U]/1; [USP'U]/1
24000-76000 CAPSULE, DELAYED RELEASE PELLETS ORAL
Qty: 250 | Refills: 0 | Status: ACTIVE | COMMUNITY
Start: 2018-03-01

## 2018-07-09 ENCOUNTER — OUTPATIENT (OUTPATIENT)
Dept: OUTPATIENT SERVICES | Facility: HOSPITAL | Age: 68
LOS: 1 days | Discharge: HOME | End: 2018-07-09

## 2018-07-09 DIAGNOSIS — I48.91 UNSPECIFIED ATRIAL FIBRILLATION: ICD-10-CM

## 2018-07-16 ENCOUNTER — OUTPATIENT (OUTPATIENT)
Dept: OUTPATIENT SERVICES | Facility: HOSPITAL | Age: 68
LOS: 1 days | Discharge: HOME | End: 2018-07-16

## 2018-07-16 DIAGNOSIS — I48.91 UNSPECIFIED ATRIAL FIBRILLATION: ICD-10-CM

## 2018-07-18 ENCOUNTER — LABORATORY RESULT (OUTPATIENT)
Age: 68
End: 2018-07-18

## 2018-07-18 ENCOUNTER — APPOINTMENT (OUTPATIENT)
Dept: HEMATOLOGY ONCOLOGY | Facility: CLINIC | Age: 68
End: 2018-07-18

## 2018-07-18 VITALS
TEMPERATURE: 96.2 F | HEART RATE: 87 BPM | WEIGHT: 216 LBS | DIASTOLIC BLOOD PRESSURE: 53 MMHG | BODY MASS INDEX: 30.92 KG/M2 | HEIGHT: 70 IN | SYSTOLIC BLOOD PRESSURE: 85 MMHG | RESPIRATION RATE: 14 BRPM

## 2018-07-18 LAB
HCT VFR BLD CALC: 37.6 %
HGB BLD-MCNC: 11.9 G/DL
MCHC RBC-ENTMCNC: 27.9 PG
MCHC RBC-ENTMCNC: 31.6 G/DL
MCV RBC AUTO: 88.3 FL
PLATELET # BLD AUTO: 186 K/UL
PMV BLD: 10.9 FL
RBC # BLD: 4.26 M/UL
RBC # FLD: 16.7 %
WBC # FLD AUTO: 5.68 K/UL

## 2018-07-18 NOTE — HISTORY OF PRESENT ILLNESS
[de-identified] : 66 year old male who follows here periodically for management and treatment of anemia. He is non compliant with visits.   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 He has recurrent epistaxis mainly, on and off at least two or three times a week and episodes of severity and duration vary between 20 minutes to an hour. He also has multiple other comorbidities including cardiomyopathy with arrhythmia, status post pacemaker AICD; and COPD, gout, hypertension and CKD. He requires weekly Procrit and on and off intravenous iron but he is not compliant since whenever his hemoglobin gets better he stops coming to the [facility]. [de-identified] : 4/26/17\par He presents with his wife to establish care Dr. Ira garcía. He states he had a major nose bleed a few days ago. He had IV iron in March. No other complaints. \par \par 6/7/17\par He is doing  well. No bleeding. His HgB today is 11.4 \par \par 8/2/17\par He is doing well. Only had   a mild nose bleed. hgB today was 13.5. No other complaints.\par \par 10/4/17\par He reports a nose bleed one week ago that resolved. His HgB from today is 11.5. Has weakness in legs. \par \par 12/14/17\par He is here for follow up.  He had his  urinary stent removed and had hematuria. He held his Coumadin. He is thinking about a watchman procedure. Feels about the same otherwise. His HgB has been falling and so has his iron stores.\par \par 2/28/18\par Here for follow up. No recent bleeding Hgb is over 11. He decided not to proceed with watchman. He is burping after he eats and is to follow up with GI.\par \par 5/23/18\par He is here for followup with his wife. He reports a nosebleed yesterday. His hemoglobin has been stable responded well to IV iron and CBC from today shows a hemoglobin of 11.4.\par \par 7/18/18\par Here for follow up. Had heavy epistaxis last 2 days this resolved. On Coumadin. feels a bit week. CBC from today is unchanged from last visit.

## 2018-07-18 NOTE — ASSESSMENT
[FreeTextEntry1] :  Anemia of chronic kidney disease and chronic disease plus iron deficiency from chronic nose bleeds due to HHT; status post  IV Feraheme  as needed. His hgB has been stable and is  now near normal. He has mild occasional thrombocytopenia which may be due to consumption from bleeds will monitor. He is on Coumadin for afib and is being considered for Watchman procedure to take him off the Coumadin given bleeding history, Watchman is on hold for now.\par \par \par Plan:\par -CBC is stable will monitor  iron studies  and CBC will administer Feraheme  if needed . Will check CBC in 2 weeks\par -to  see me in one month\par -he knows to stop Coumadin if he is bleeding and to go to ER if needed and to see us sooner if bleeding is severe.\par -would not use Tamoxifen or Tranexamic at this time acid since there is risk of thrombosis and since he is on Coumadin  for afib.  Topical therapy for epistaxis is preferred and will try afrin, another option is Avastin but that also has risk, if nose bleeds become difficult to manage will consider tranexamic acid first

## 2018-07-18 NOTE — PHYSICAL EXAM
[Restricted in physically strenuous activity but ambulatory and able to carry out work of a light or sedentary nature] : Status 1- Restricted in physically strenuous activity but ambulatory and able to carry out work of a light or sedentary nature, e.g., light house work, office work [Obese] : obese [Ulcers] : no ulcers [Normal] : affect appropriate [de-identified] : Walks with a cane [de-identified] : He has an AICD on left [de-identified] : Has telangiectatic on face

## 2018-07-19 LAB
FERRITIN SERPL-MCNC: 31 NG/ML
IRON SATN MFR SERPL: 12 %
IRON SERPL-MCNC: 50 UG/DL
TIBC SERPL-MCNC: 434 UG/DL
UIBC SERPL-MCNC: 384 UG/DL

## 2018-07-23 ENCOUNTER — OUTPATIENT (OUTPATIENT)
Dept: OUTPATIENT SERVICES | Facility: HOSPITAL | Age: 68
LOS: 1 days | Discharge: HOME | End: 2018-07-23

## 2018-07-23 DIAGNOSIS — I48.91 UNSPECIFIED ATRIAL FIBRILLATION: ICD-10-CM

## 2018-07-25 ENCOUNTER — APPOINTMENT (OUTPATIENT)
Dept: INFUSION THERAPY | Facility: CLINIC | Age: 68
End: 2018-07-25

## 2018-07-25 RX ORDER — FERUMOXYTOL 510 MG/17ML
510 INJECTION INTRAVENOUS ONCE
Qty: 0 | Refills: 0 | Status: COMPLETED | OUTPATIENT
Start: 2018-07-25 | End: 2018-07-25

## 2018-07-25 RX ADMIN — FERUMOXYTOL 468 MILLIGRAM(S): 510 INJECTION INTRAVENOUS at 10:00

## 2018-07-30 ENCOUNTER — OUTPATIENT (OUTPATIENT)
Dept: OUTPATIENT SERVICES | Facility: HOSPITAL | Age: 68
LOS: 1 days | Discharge: HOME | End: 2018-07-30

## 2018-07-30 DIAGNOSIS — I48.91 UNSPECIFIED ATRIAL FIBRILLATION: ICD-10-CM

## 2018-08-01 ENCOUNTER — APPOINTMENT (OUTPATIENT)
Dept: INFUSION THERAPY | Facility: CLINIC | Age: 68
End: 2018-08-01

## 2018-08-01 ENCOUNTER — APPOINTMENT (OUTPATIENT)
Dept: HEMATOLOGY ONCOLOGY | Facility: CLINIC | Age: 68
End: 2018-08-01

## 2018-08-01 ENCOUNTER — LABORATORY RESULT (OUTPATIENT)
Age: 68
End: 2018-08-01

## 2018-08-01 RX ORDER — FERUMOXYTOL 510 MG/17ML
510 INJECTION INTRAVENOUS ONCE
Qty: 0 | Refills: 0 | Status: COMPLETED | OUTPATIENT
Start: 2018-08-01 | End: 2018-08-01

## 2018-08-01 RX ORDER — ERYTHROPOIETIN 10000 [IU]/ML
20000 INJECTION, SOLUTION INTRAVENOUS; SUBCUTANEOUS ONCE
Qty: 0 | Refills: 0 | Status: COMPLETED | OUTPATIENT
Start: 2018-08-01 | End: 2018-08-01

## 2018-08-01 RX ADMIN — FERUMOXYTOL 468 MILLIGRAM(S): 510 INJECTION INTRAVENOUS at 10:50

## 2018-08-01 RX ADMIN — ERYTHROPOIETIN 20000 UNIT(S): 10000 INJECTION, SOLUTION INTRAVENOUS; SUBCUTANEOUS at 10:50

## 2018-08-06 ENCOUNTER — OUTPATIENT (OUTPATIENT)
Dept: OUTPATIENT SERVICES | Facility: HOSPITAL | Age: 68
LOS: 1 days | Discharge: HOME | End: 2018-08-06

## 2018-08-06 DIAGNOSIS — I48.91 UNSPECIFIED ATRIAL FIBRILLATION: ICD-10-CM

## 2018-08-07 LAB
HCT VFR BLD CALC: 34 %
HGB BLD-MCNC: 10.6 G/DL
MCHC RBC-ENTMCNC: 28.2 PG
MCHC RBC-ENTMCNC: 31.2 G/DL
MCV RBC AUTO: 90.4 FL
PLATELET # BLD AUTO: 134 K/UL
PMV BLD: 10.5 FL
RBC # BLD: 3.76 M/UL
RBC # FLD: 18.6 %
WBC # FLD AUTO: 5.81 K/UL

## 2018-08-08 ENCOUNTER — APPOINTMENT (OUTPATIENT)
Dept: INFUSION THERAPY | Facility: CLINIC | Age: 68
End: 2018-08-08

## 2018-08-08 ENCOUNTER — LABORATORY RESULT (OUTPATIENT)
Age: 68
End: 2018-08-08

## 2018-08-10 LAB
HCT VFR BLD CALC: 35.4 %
HGB BLD-MCNC: 11 G/DL
MCHC RBC-ENTMCNC: 29.1 PG
MCHC RBC-ENTMCNC: 31.1 G/DL
MCV RBC AUTO: 93.7 FL
PLATELET # BLD AUTO: 123 K/UL
PMV BLD: 12.1 FL
RBC # BLD: 3.78 M/UL
RBC # FLD: 21.6 %
WBC # FLD AUTO: 4.48 K/UL

## 2018-08-13 ENCOUNTER — OUTPATIENT (OUTPATIENT)
Dept: OUTPATIENT SERVICES | Facility: HOSPITAL | Age: 68
LOS: 1 days | Discharge: HOME | End: 2018-08-13

## 2018-08-13 DIAGNOSIS — I48.91 UNSPECIFIED ATRIAL FIBRILLATION: ICD-10-CM

## 2018-08-15 ENCOUNTER — APPOINTMENT (OUTPATIENT)
Dept: HEMATOLOGY ONCOLOGY | Facility: CLINIC | Age: 68
End: 2018-08-15

## 2018-08-20 ENCOUNTER — OUTPATIENT (OUTPATIENT)
Dept: OUTPATIENT SERVICES | Facility: HOSPITAL | Age: 68
LOS: 1 days | Discharge: HOME | End: 2018-08-20

## 2018-08-20 DIAGNOSIS — I48.91 UNSPECIFIED ATRIAL FIBRILLATION: ICD-10-CM

## 2018-08-22 ENCOUNTER — APPOINTMENT (OUTPATIENT)
Dept: INFUSION THERAPY | Facility: CLINIC | Age: 68
End: 2018-08-22

## 2018-08-22 ENCOUNTER — APPOINTMENT (OUTPATIENT)
Dept: HEMATOLOGY ONCOLOGY | Facility: CLINIC | Age: 68
End: 2018-08-22

## 2018-08-22 ENCOUNTER — LABORATORY RESULT (OUTPATIENT)
Age: 68
End: 2018-08-22

## 2018-08-22 VITALS
HEART RATE: 89 BPM | DIASTOLIC BLOOD PRESSURE: 55 MMHG | TEMPERATURE: 96.1 F | BODY MASS INDEX: 31.78 KG/M2 | HEIGHT: 70 IN | RESPIRATION RATE: 16 BRPM | SYSTOLIC BLOOD PRESSURE: 96 MMHG | WEIGHT: 222 LBS

## 2018-08-23 LAB
HCT VFR BLD CALC: 36.4 %
HGB BLD-MCNC: 11.7 G/DL
MCHC RBC-ENTMCNC: 30.3 PG
MCHC RBC-ENTMCNC: 32.1 G/DL
MCV RBC AUTO: 94.3 FL
PLATELET # BLD AUTO: 139 K/UL
PMV BLD: 10.6 FL
RBC # BLD: 3.86 M/UL
RBC # FLD: 21.5 %
WBC # FLD AUTO: 5.2 K/UL

## 2018-08-23 NOTE — HISTORY OF PRESENT ILLNESS
[de-identified] : 66 year old male who follows here periodically for management and treatment of anemia. He is non compliant with visits.   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 He has recurrent epistaxis mainly, on and off at least two or three times a week and episodes of severity and duration vary between 20 minutes to an hour. He also has multiple other comorbidities including cardiomyopathy with arrhythmia, status post pacemaker AICD; and COPD, gout, hypertension and CKD. He requires weekly Procrit and on and off intravenous iron but he is not compliant since whenever his hemoglobin gets better he stops coming to the [facility]. [de-identified] : 4/26/17\par He presents with his wife to establish care Dr. Ira garcía. He states he had a major nose bleed a few days ago. He had IV iron in March. No other complaints. \par \par 6/7/17\par He is doing  well. No bleeding. His HgB today is 11.4 \par \par 8/2/17\par He is doing well. Only had   a mild nose bleed. hgB today was 13.5. No other complaints.\par \par 10/4/17\par He reports a nose bleed one week ago that resolved. His HgB from today is 11.5. Has weakness in legs. \par \par 12/14/17\par He is here for follow up.  He had his  urinary stent removed and had hematuria. He held his Coumadin. He is thinking about a watchman procedure. Feels about the same otherwise. His HgB has been falling and so has his iron stores.\par \par 2/28/18\par Here for follow up. No recent bleeding Hgb is over 11. He decided not to proceed with watchman. He is burping after he eats and is to follow up with GI.\par \par 5/23/18\par He is here for followup with his wife. He reports a nosebleed yesterday. His hemoglobin has been stable responded well to IV iron and CBC from today shows a hemoglobin of 11.4.\par \par 7/18/18\par Here for follow up. Had heavy epistaxis last 2 days this resolved. On Coumadin. feels a bit week. CBC from today is unchanged from last visit.\par \par 8/22/18\par He is here for follow up. Has epistaxis. He had IV Feraheme. His Hgb is greater than 11 gm/dl continently. Taking Coumadin.

## 2018-08-23 NOTE — ASSESSMENT
[FreeTextEntry1] :  Anemia of chronic kidney disease and chronic disease plus iron deficiency from chronic nose bleeds due to HHT; status post  IV Feraheme  as needed. His hgB has been stable and is  now near normal. He has mild occasional thrombocytopenia which may be due to consumption from bleeds will monitor. He is on Coumadin for afib and is being considered for Watchman procedure to take him off the Coumadin given bleeding history, Watchman is on hold for now.\par \par \par Plan:\par -CBC is stable will monitor  iron studies  and CBC will administer Feraheme  if needed . Will Monitor  CBC every 3 weeks for now more often if has severe bleeding. No Procrit at this point given hgb is over 11 gm/dl \par -he knows to stop Coumadin if he is bleeding and to go to ER if needed and to see us sooner if bleeding is severe.\par -would not use Tamoxifen or Tranexamic at this time acid since there is risk of thrombosis and since he is on Coumadin  for afib.  Topical therapy for epistaxis is preferred and will try afrin, another option is Avastin but that also has risk, if nose bleeds become difficult to manage will consider tranexamic acid first

## 2018-08-23 NOTE — PHYSICAL EXAM
[Restricted in physically strenuous activity but ambulatory and able to carry out work of a light or sedentary nature] : Status 1- Restricted in physically strenuous activity but ambulatory and able to carry out work of a light or sedentary nature, e.g., light house work, office work [Obese] : obese [Ulcers] : no ulcers [Normal] : affect appropriate [de-identified] : Walks with a cane [de-identified] : He has an AICD on left [de-identified] : Has telangiectatic on face

## 2018-08-27 ENCOUNTER — OUTPATIENT (OUTPATIENT)
Dept: OUTPATIENT SERVICES | Facility: HOSPITAL | Age: 68
LOS: 1 days | Discharge: HOME | End: 2018-08-27

## 2018-08-27 DIAGNOSIS — I48.91 UNSPECIFIED ATRIAL FIBRILLATION: ICD-10-CM

## 2018-08-30 ENCOUNTER — RX RENEWAL (OUTPATIENT)
Age: 68
End: 2018-08-30

## 2018-09-04 ENCOUNTER — OUTPATIENT (OUTPATIENT)
Dept: OUTPATIENT SERVICES | Facility: HOSPITAL | Age: 68
LOS: 1 days | Discharge: HOME | End: 2018-09-04

## 2018-09-04 DIAGNOSIS — I48.91 UNSPECIFIED ATRIAL FIBRILLATION: ICD-10-CM

## 2018-09-05 ENCOUNTER — APPOINTMENT (OUTPATIENT)
Dept: INFUSION THERAPY | Facility: CLINIC | Age: 68
End: 2018-09-05

## 2018-09-05 ENCOUNTER — LABORATORY RESULT (OUTPATIENT)
Age: 68
End: 2018-09-05

## 2018-09-05 ENCOUNTER — APPOINTMENT (OUTPATIENT)
Dept: HEMATOLOGY ONCOLOGY | Facility: CLINIC | Age: 68
End: 2018-09-05

## 2018-09-05 RX ORDER — ERYTHROPOIETIN 10000 [IU]/ML
20000 INJECTION, SOLUTION INTRAVENOUS; SUBCUTANEOUS ONCE
Qty: 0 | Refills: 0 | Status: DISCONTINUED | OUTPATIENT
Start: 2018-09-05 | End: 2018-09-05

## 2018-09-06 LAB
FERRITIN SERPL-MCNC: 49 NG/ML
HCT VFR BLD CALC: 35.3 %
HGB BLD-MCNC: 11.2 G/DL
IRON SATN MFR SERPL: 15 %
IRON SERPL-MCNC: 54 UG/DL
MCHC RBC-ENTMCNC: 30 PG
MCHC RBC-ENTMCNC: 31.7 G/DL
MCV RBC AUTO: 94.6 FL
PLATELET # BLD AUTO: 148 K/UL
PMV BLD: 11.3 FL
RBC # BLD: 3.73 M/UL
RBC # FLD: 19.5 %
TIBC SERPL-MCNC: 371 UG/DL
UIBC SERPL-MCNC: 317 UG/DL
WBC # FLD AUTO: 5.26 K/UL

## 2018-09-10 ENCOUNTER — OUTPATIENT (OUTPATIENT)
Dept: OUTPATIENT SERVICES | Facility: HOSPITAL | Age: 68
LOS: 1 days | Discharge: HOME | End: 2018-09-10

## 2018-09-10 DIAGNOSIS — I48.91 UNSPECIFIED ATRIAL FIBRILLATION: ICD-10-CM

## 2018-09-19 ENCOUNTER — LABORATORY RESULT (OUTPATIENT)
Age: 68
End: 2018-09-19

## 2018-09-19 ENCOUNTER — APPOINTMENT (OUTPATIENT)
Dept: INFUSION THERAPY | Facility: CLINIC | Age: 68
End: 2018-09-19

## 2018-09-19 ENCOUNTER — OUTPATIENT (OUTPATIENT)
Dept: OUTPATIENT SERVICES | Facility: HOSPITAL | Age: 68
LOS: 1 days | Discharge: HOME | End: 2018-09-19

## 2018-09-19 ENCOUNTER — APPOINTMENT (OUTPATIENT)
Dept: HEMATOLOGY ONCOLOGY | Facility: CLINIC | Age: 68
End: 2018-09-19

## 2018-09-19 DIAGNOSIS — D50.9 IRON DEFICIENCY ANEMIA, UNSPECIFIED: ICD-10-CM

## 2018-09-19 DIAGNOSIS — E61.1 IRON DEFICIENCY: ICD-10-CM

## 2018-09-19 DIAGNOSIS — I48.91 UNSPECIFIED ATRIAL FIBRILLATION: ICD-10-CM

## 2018-09-19 DIAGNOSIS — R79.89 OTHER SPECIFIED ABNORMAL FINDINGS OF BLOOD CHEMISTRY: ICD-10-CM

## 2018-09-19 LAB
HCT VFR BLD CALC: 31.8 %
HGB BLD-MCNC: 9.8 G/DL
IRON SATN MFR SERPL: 7 %
IRON SERPL-MCNC: 27 UG/DL
MCHC RBC-ENTMCNC: 29.2 PG
MCHC RBC-ENTMCNC: 30.8 G/DL
MCV RBC AUTO: 94.6 FL
PLATELET # BLD AUTO: 144 K/UL
PMV BLD: 10.6 FL
RBC # BLD: 3.36 M/UL
RBC # FLD: 18.6 %
TIBC SERPL-MCNC: 405 UG/DL
UIBC SERPL-MCNC: 378 UG/DL
WBC # FLD AUTO: 4.57 K/UL

## 2018-09-19 RX ORDER — ERYTHROPOIETIN 10000 [IU]/ML
20000 INJECTION, SOLUTION INTRAVENOUS; SUBCUTANEOUS ONCE
Qty: 0 | Refills: 0 | Status: COMPLETED | OUTPATIENT
Start: 2018-09-19 | End: 2018-09-19

## 2018-09-19 RX ADMIN — ERYTHROPOIETIN 20000 UNIT(S): 10000 INJECTION, SOLUTION INTRAVENOUS; SUBCUTANEOUS at 10:13

## 2018-09-20 LAB — FERRITIN SERPL-MCNC: 36 NG/ML

## 2018-09-21 ENCOUNTER — APPOINTMENT (OUTPATIENT)
Dept: INFUSION THERAPY | Facility: CLINIC | Age: 68
End: 2018-09-21

## 2018-09-21 VITALS
DIASTOLIC BLOOD PRESSURE: 52 MMHG | SYSTOLIC BLOOD PRESSURE: 91 MMHG | RESPIRATION RATE: 18 BRPM | TEMPERATURE: 98.4 F | HEART RATE: 86 BPM

## 2018-09-21 RX ORDER — FERUMOXYTOL 510 MG/17ML
510 INJECTION INTRAVENOUS ONCE
Qty: 0 | Refills: 0 | Status: COMPLETED | OUTPATIENT
Start: 2018-09-21 | End: 2018-09-21

## 2018-09-21 RX ADMIN — FERUMOXYTOL 468 MILLIGRAM(S): 510 INJECTION INTRAVENOUS at 09:32

## 2018-09-24 ENCOUNTER — OUTPATIENT (OUTPATIENT)
Dept: OUTPATIENT SERVICES | Facility: HOSPITAL | Age: 68
LOS: 1 days | Discharge: HOME | End: 2018-09-24

## 2018-09-24 DIAGNOSIS — I48.91 UNSPECIFIED ATRIAL FIBRILLATION: ICD-10-CM

## 2018-09-28 ENCOUNTER — APPOINTMENT (OUTPATIENT)
Dept: INFUSION THERAPY | Facility: CLINIC | Age: 68
End: 2018-09-28

## 2018-09-28 RX ORDER — FERUMOXYTOL 510 MG/17ML
510 INJECTION INTRAVENOUS ONCE
Qty: 0 | Refills: 0 | Status: COMPLETED | OUTPATIENT
Start: 2018-09-28 | End: 2018-09-28

## 2018-09-28 RX ADMIN — FERUMOXYTOL 468 MILLIGRAM(S): 510 INJECTION INTRAVENOUS at 12:28

## 2018-10-01 ENCOUNTER — OUTPATIENT (OUTPATIENT)
Dept: OUTPATIENT SERVICES | Facility: HOSPITAL | Age: 68
LOS: 1 days | Discharge: HOME | End: 2018-10-01

## 2018-10-01 DIAGNOSIS — I48.91 UNSPECIFIED ATRIAL FIBRILLATION: ICD-10-CM

## 2018-10-03 ENCOUNTER — LABORATORY RESULT (OUTPATIENT)
Age: 68
End: 2018-10-03

## 2018-10-03 ENCOUNTER — APPOINTMENT (OUTPATIENT)
Dept: HEMATOLOGY ONCOLOGY | Facility: CLINIC | Age: 68
End: 2018-10-03

## 2018-10-04 LAB
FERRITIN SERPL-MCNC: 160 NG/ML
HCT VFR BLD CALC: 38.5 %
HGB BLD-MCNC: 11.8 G/DL
IRON SATN MFR SERPL: 28 %
IRON SERPL-MCNC: 111 UG/DL
MCHC RBC-ENTMCNC: 29.6 PG
MCHC RBC-ENTMCNC: 30.6 G/DL
MCV RBC AUTO: 96.5 FL
PLATELET # BLD AUTO: 106 K/UL
PMV BLD: 9.9 FL
RBC # BLD: 3.99 M/UL
RBC # FLD: 21.2 %
TIBC SERPL-MCNC: 391 UG/DL
UIBC SERPL-MCNC: 280 UG/DL
WBC # FLD AUTO: 4.5 K/UL

## 2018-10-05 ENCOUNTER — EMERGENCY (EMERGENCY)
Facility: HOSPITAL | Age: 68
LOS: 0 days | Discharge: LEFT BEFORE DISCHARGE | End: 2018-10-05
Attending: EMERGENCY MEDICINE | Admitting: EMERGENCY MEDICINE

## 2018-10-05 VITALS
SYSTOLIC BLOOD PRESSURE: 100 MMHG | HEART RATE: 94 BPM | TEMPERATURE: 97 F | DIASTOLIC BLOOD PRESSURE: 56 MMHG | RESPIRATION RATE: 18 BRPM | OXYGEN SATURATION: 95 %

## 2018-10-05 DIAGNOSIS — R53.1 WEAKNESS: ICD-10-CM

## 2018-10-05 DIAGNOSIS — K72.90 HEPATIC FAILURE, UNSPECIFIED WITHOUT COMA: ICD-10-CM

## 2018-10-05 DIAGNOSIS — R60.0 LOCALIZED EDEMA: ICD-10-CM

## 2018-10-05 DIAGNOSIS — Z95.810 PRESENCE OF AUTOMATIC (IMPLANTABLE) CARDIAC DEFIBRILLATOR: ICD-10-CM

## 2018-10-05 DIAGNOSIS — Z79.01 LONG TERM (CURRENT) USE OF ANTICOAGULANTS: ICD-10-CM

## 2018-10-05 DIAGNOSIS — I50.9 HEART FAILURE, UNSPECIFIED: ICD-10-CM

## 2018-10-05 LAB
ALBUMIN SERPL ELPH-MCNC: 4 G/DL — SIGNIFICANT CHANGE UP (ref 3.5–5.2)
ALP SERPL-CCNC: 87 U/L — SIGNIFICANT CHANGE UP (ref 30–115)
ALT FLD-CCNC: 13 U/L — SIGNIFICANT CHANGE UP (ref 0–41)
AMMONIA BLD-MCNC: 86 UMOL/L — HIGH (ref 11–55)
ANION GAP SERPL CALC-SCNC: 17 MMOL/L — HIGH (ref 7–14)
APPEARANCE UR: CLEAR — SIGNIFICANT CHANGE UP
APTT BLD: 34.3 SEC — SIGNIFICANT CHANGE UP (ref 27–39.2)
AST SERPL-CCNC: 48 U/L — HIGH (ref 0–41)
BILIRUB SERPL-MCNC: 1.8 MG/DL — HIGH (ref 0.2–1.2)
BILIRUB UR-MCNC: NEGATIVE — SIGNIFICANT CHANGE UP
BUN SERPL-MCNC: 26 MG/DL — HIGH (ref 10–20)
CALCIUM SERPL-MCNC: 10.3 MG/DL — HIGH (ref 8.5–10.1)
CHLORIDE SERPL-SCNC: 94 MMOL/L — LOW (ref 98–110)
CO2 SERPL-SCNC: 24 MMOL/L — SIGNIFICANT CHANGE UP (ref 17–32)
COLOR SPEC: YELLOW — SIGNIFICANT CHANGE UP
CREAT SERPL-MCNC: 1.7 MG/DL — HIGH (ref 0.7–1.5)
DIFF PNL FLD: NEGATIVE — SIGNIFICANT CHANGE UP
DIGOXIN SERPL-MCNC: 0.8 NG/ML — SIGNIFICANT CHANGE UP (ref 0.8–2)
GAS PNL BLDV: SIGNIFICANT CHANGE UP
GLUCOSE SERPL-MCNC: 112 MG/DL — HIGH (ref 70–99)
GLUCOSE UR QL: NEGATIVE MG/DL — SIGNIFICANT CHANGE UP
HCT VFR BLD CALC: 39.3 % — LOW (ref 42–52)
HGB BLD-MCNC: 12.3 G/DL — LOW (ref 14–18)
INR BLD: 1.95 RATIO — HIGH (ref 0.65–1.3)
KETONES UR-MCNC: NEGATIVE — SIGNIFICANT CHANGE UP
LACTATE SERPL-SCNC: 1.5 MMOL/L — SIGNIFICANT CHANGE UP (ref 0.5–2.2)
LEUKOCYTE ESTERASE UR-ACNC: NEGATIVE — SIGNIFICANT CHANGE UP
LIDOCAIN IGE QN: 62 U/L — HIGH (ref 7–60)
MAGNESIUM SERPL-MCNC: 1.6 MG/DL — LOW (ref 1.8–2.4)
MCHC RBC-ENTMCNC: 29.7 PG — SIGNIFICANT CHANGE UP (ref 27–31)
MCHC RBC-ENTMCNC: 31.3 G/DL — LOW (ref 32–37)
MCV RBC AUTO: 94.9 FL — HIGH (ref 80–94)
NITRITE UR-MCNC: NEGATIVE — SIGNIFICANT CHANGE UP
NRBC # BLD: 0 /100 WBCS — SIGNIFICANT CHANGE UP (ref 0–0)
PH UR: 6.5 — SIGNIFICANT CHANGE UP (ref 5–8)
PLATELET # BLD AUTO: 131 K/UL — SIGNIFICANT CHANGE UP (ref 130–400)
POTASSIUM SERPL-MCNC: 5.4 MMOL/L — HIGH (ref 3.5–5)
POTASSIUM SERPL-SCNC: 5.4 MMOL/L — HIGH (ref 3.5–5)
PROT SERPL-MCNC: 7.3 G/DL — SIGNIFICANT CHANGE UP (ref 6–8)
PROT UR-MCNC: NEGATIVE MG/DL — SIGNIFICANT CHANGE UP
PROTHROM AB SERPL-ACNC: 20.9 SEC — HIGH (ref 9.95–12.87)
RBC # BLD: 4.14 M/UL — LOW (ref 4.7–6.1)
RBC # FLD: 21.5 % — HIGH (ref 11.5–14.5)
SODIUM SERPL-SCNC: 135 MMOL/L — SIGNIFICANT CHANGE UP (ref 135–146)
SP GR SPEC: 1.01 — SIGNIFICANT CHANGE UP (ref 1.01–1.03)
TROPONIN T SERPL-MCNC: <0.01 NG/ML — SIGNIFICANT CHANGE UP
UROBILINOGEN FLD QL: 0.2 MG/DL — SIGNIFICANT CHANGE UP (ref 0.2–0.2)
WBC # BLD: 5.61 K/UL — SIGNIFICANT CHANGE UP (ref 4.8–10.8)
WBC # FLD AUTO: 5.61 K/UL — SIGNIFICANT CHANGE UP (ref 4.8–10.8)

## 2018-10-05 RX ORDER — LACTULOSE 10 G/15ML
20 SOLUTION ORAL ONCE
Qty: 0 | Refills: 0 | Status: COMPLETED | OUTPATIENT
Start: 2018-10-05 | End: 2018-10-05

## 2018-10-05 RX ORDER — MAGNESIUM SULFATE 500 MG/ML
2 VIAL (ML) INJECTION ONCE
Qty: 0 | Refills: 0 | Status: DISCONTINUED | OUTPATIENT
Start: 2018-10-05 | End: 2018-10-05

## 2018-10-05 RX ORDER — SODIUM CHLORIDE 9 MG/ML
1000 INJECTION, SOLUTION INTRAVENOUS ONCE
Qty: 0 | Refills: 0 | Status: COMPLETED | OUTPATIENT
Start: 2018-10-05 | End: 2018-10-05

## 2018-10-05 RX ORDER — MAGNESIUM SULFATE 500 MG/ML
2 VIAL (ML) INJECTION ONCE
Qty: 0 | Refills: 0 | Status: COMPLETED | OUTPATIENT
Start: 2018-10-05 | End: 2018-10-05

## 2018-10-05 RX ORDER — SODIUM CHLORIDE 9 MG/ML
1000 INJECTION, SOLUTION INTRAVENOUS ONCE
Qty: 0 | Refills: 0 | Status: DISCONTINUED | OUTPATIENT
Start: 2018-10-05 | End: 2018-10-05

## 2018-10-05 RX ORDER — LACTULOSE 10 G/15ML
20 SOLUTION ORAL ONCE
Qty: 0 | Refills: 0 | Status: DISCONTINUED | OUTPATIENT
Start: 2018-10-05 | End: 2018-10-05

## 2018-10-05 RX ADMIN — Medication 50 GRAM(S): at 13:25

## 2018-10-05 RX ADMIN — SODIUM CHLORIDE 600 MILLILITER(S): 9 INJECTION, SOLUTION INTRAVENOUS at 13:25

## 2018-10-05 RX ADMIN — LACTULOSE 20 GRAM(S): 10 SOLUTION ORAL at 14:59

## 2018-10-05 NOTE — ED ADULT NURSE NOTE - NSIMPLEMENTINTERV_GEN_ALL_ED
Implemented All Fall Risk Interventions:  [place] to call system. Call bell, personal items and telephone within reach. Instruct patient to call for assistance. Room bathroom lighting operational. Non-slip footwear when patient is off stretcher. Physically safe environment: no spills, clutter or unnecessary equipment. Stretcher in lowest position, wheels locked, appropriate side rails in place. Provide visual cue, wrist band, yellow gown, etc. Monitor gait and stability. Monitor for mental status changes and reorient to person, place, and time. Review medications for side effects contributing to fall risk. Reinforce activity limits and safety measures with patient and family.

## 2018-10-05 NOTE — ED PROVIDER NOTE - MEDICAL DECISION MAKING DETAILS
69 Y/O M PMHX AS DOCUMENTED WITH ONE MONTH OF PROGRESSIVELY WORSENING LETHARGY. ALL DIAGNOSTIC TESTING REVIEWED. PT WITH HYPERAMMONEMIA AND GIVEN LACTULOSE. PT AND FAMILY REFUSING HOSPITAL ADMISSION AND SIGNING OUT AMA.

## 2018-10-05 NOTE — ED ADULT NURSE NOTE - PMH
Anemia, unspecified type    Congestive heart failure, unspecified HF chronicity, unspecified heart failure type    Gout, unspecified cause, unspecified chronicity, unspecified site    Hepatic cirrhosis, unspecified hepatic cirrhosis type, unspecified whether ascites present

## 2018-10-05 NOTE — ED PROVIDER NOTE - OBJECTIVE STATEMENT
69 y/o male with pmhx of iron deficiency anemia, CHF s/p AICD, liver cirrhosis s/p CHF, afib on coumadin presents with generalized weakness. Patient's son states he has been more fatigued for the past 1 month however last night, he became more somnolent; patient is still able to ambulate with cane however states he feels generalized weakness. Patient has a hx of nose bleeds, states he goes to check his hemoglobin and iron levels biweekly, gets iron transfusion if necessary. Patient denies fevers/chills, chest pain, worsening sob, n/v/d/abdominal pain, increasing lower extremity swelling. Cardiologist Dr. Barrios, PMD Dr. Charles.

## 2018-10-05 NOTE — ED PROVIDER NOTE - PHYSICAL EXAMINATION
CONSTITUTIONAL: Well-developed; well-nourished; elderly male, awake, alert  SKIN: warm, dry  HEAD: Normocephalic; atraumatic.  EYES: no conj injection  ENT: No nasal discharge; airway clear.  CARD: S1, S2 normal; no murmurs, gallops, or rubs. Regular rate and rhythm.   RESP: No wheezes, rales or rhonchi.  ABD: soft ntnd  EXT: Normal ROM. b/l 1+ pitting edema  NEURO: Alert, oriented, grossly unremarkable  PSYCH: Cooperative, appropriate.

## 2018-10-05 NOTE — ED PROVIDER NOTE - NS ED ROS FT
Constitutional: See HPI.  Eyes: No visual changes, eye pain or discharge.  ENMT: No hearing changes, pain, discharge or infections. No neck pain or stiffness.  Cardiac: No chest pain, SOB or edema. No chest pain with exertion.  Respiratory: No cough or respiratory distress.   GI: No nausea, vomiting, diarrhea or abdominal pain.  : No dysuria, frequency or burning.  MS: No myalgia, muscle weakness, joint pain or back pain.  Neuro: +weakness; No headache; No LOC.  Skin: No skin rash.  Endo: No hx of DM, thyroid disease  Except as documented in HPI, all other review of systems is negative

## 2018-10-05 NOTE — ED PROVIDER NOTE - PROGRESS NOTE DETAILS
I personally evaluated the patient. I reviewed the Resident’s or Physician Assistant’s note (as assigned above), and agree with the findings and plan except as documented in my note.  67 Y/O M OSLER-WEBER-RENDU SYNDROME, ANEMIA (RECEIVED IRON INFUSIONS), AFIB (ON COUMADIN), LIVER CIRRHOSIS, CHF, S/P AICD, C/O ONE MONTH OF PROGRESSIVELY WORSENING FATIGUE AND 2 DAYS OF DECREASED RESPONSIVENESS. PT SLEEPING SINCE YESTERDAY. NO FEVER, CHILLS. NO N/V/D. NORMAL URINATION. PT WITH H/O HYPERAMMONEMIA. NO HA. NO RECENT EPISTAXIS OR NOSE BLEEDS. VITALS NOTED. SOMNOLENT. AROUSABLE TO VERBAL STIMULI. OP NORMAL. DRY MUCOUS MEMBRANES. LUNGS CLEAR B/L. NORMAL RESP RATE. RRR S1S2. + AICD. ABD- SOFT FLAT NONTENDER. + B/L LEG EDEMA. NEURO EXAM NONFOCAL. NO ASTERIXIS. FAMILY REFUSING ADDITIONAL IVFS. Family aware of results, aware of decision to admit however family including patient is refusing admission - fully aware of risks involved in leaving against medical advice. PT AND FAMILY REFUSING HOSPITAL ADMISSION. AWARE OF ELEVATED AMMONIA LEVELS AND NEED FOR FURTHER TREATMENT. PT AND FAMILY UNDERSTAND THE RISKS OF SIGNING OUT AMA INCLUDING WORSENING HEPATIC ENCEPHALOPATHY, DISABILITY AND DEATH. PT AND FAMILY VERBALIZED UNDERSTANDING. WILL SIGN OUT AMA. WIFE AND SON AT BEDSIDE. Had extensive discussion with family and patient regarding AMA - aware of all concerns with signing out AMA. Patient received 1 dose of lactulose in ER, states will follow up with PMD tomorrow AM and make appt with GI.

## 2018-10-06 LAB
CULTURE RESULTS: NO GROWTH — SIGNIFICANT CHANGE UP
SPECIMEN SOURCE: SIGNIFICANT CHANGE UP

## 2018-10-08 ENCOUNTER — OUTPATIENT (OUTPATIENT)
Dept: OUTPATIENT SERVICES | Facility: HOSPITAL | Age: 68
LOS: 1 days | Discharge: HOME | End: 2018-10-08

## 2018-10-08 DIAGNOSIS — E03.9 HYPOTHYROIDISM, UNSPECIFIED: ICD-10-CM

## 2018-10-08 DIAGNOSIS — I25.10 ATHEROSCLEROTIC HEART DISEASE OF NATIVE CORONARY ARTERY WITHOUT ANGINA PECTORIS: ICD-10-CM

## 2018-10-08 DIAGNOSIS — I50.22 CHRONIC SYSTOLIC (CONGESTIVE) HEART FAILURE: ICD-10-CM

## 2018-10-08 DIAGNOSIS — Z01.810 ENCOUNTER FOR PREPROCEDURAL CARDIOVASCULAR EXAMINATION: ICD-10-CM

## 2018-10-08 DIAGNOSIS — E78.00 PURE HYPERCHOLESTEROLEMIA, UNSPECIFIED: ICD-10-CM

## 2018-10-08 DIAGNOSIS — I48.91 UNSPECIFIED ATRIAL FIBRILLATION: ICD-10-CM

## 2018-10-08 PROBLEM — K74.60 UNSPECIFIED CIRRHOSIS OF LIVER: Chronic | Status: ACTIVE | Noted: 2018-10-05

## 2018-10-08 PROBLEM — I50.9 HEART FAILURE, UNSPECIFIED: Chronic | Status: ACTIVE | Noted: 2018-10-05

## 2018-10-08 PROBLEM — D64.9 ANEMIA, UNSPECIFIED: Chronic | Status: ACTIVE | Noted: 2018-10-05

## 2018-10-08 PROBLEM — M10.9 GOUT, UNSPECIFIED: Chronic | Status: ACTIVE | Noted: 2018-10-05

## 2018-10-15 ENCOUNTER — OUTPATIENT (OUTPATIENT)
Dept: OUTPATIENT SERVICES | Facility: HOSPITAL | Age: 68
LOS: 1 days | Discharge: HOME | End: 2018-10-15

## 2018-10-15 DIAGNOSIS — I25.10 ATHEROSCLEROTIC HEART DISEASE OF NATIVE CORONARY ARTERY WITHOUT ANGINA PECTORIS: ICD-10-CM

## 2018-10-15 DIAGNOSIS — I48.91 UNSPECIFIED ATRIAL FIBRILLATION: ICD-10-CM

## 2018-10-17 ENCOUNTER — LABORATORY RESULT (OUTPATIENT)
Age: 68
End: 2018-10-17

## 2018-10-17 ENCOUNTER — APPOINTMENT (OUTPATIENT)
Dept: HEMATOLOGY ONCOLOGY | Facility: CLINIC | Age: 68
End: 2018-10-17

## 2018-10-17 ENCOUNTER — RESULT REVIEW (OUTPATIENT)
Age: 68
End: 2018-10-17

## 2018-10-17 LAB
HCT VFR BLD CALC: 40.9 %
HGB BLD-MCNC: 12.8 G/DL
MCHC RBC-ENTMCNC: 30.6 PG
MCHC RBC-ENTMCNC: 31.3 G/DL
MCV RBC AUTO: 97.8 FL
PLATELET # BLD AUTO: 126 K/UL
PMV BLD: 9.7 FL
RBC # BLD: 4.18 M/UL
RBC # FLD: 19.9 %
WBC # FLD AUTO: 3.64 K/UL

## 2018-10-18 LAB
FERRITIN SERPL-MCNC: 66 NG/ML
IRON SATN MFR SERPL: 14 %
IRON SERPL-MCNC: 50 UG/DL
TIBC SERPL-MCNC: 354 UG/DL
UIBC SERPL-MCNC: 304 UG/DL

## 2018-10-22 ENCOUNTER — OUTPATIENT (OUTPATIENT)
Dept: OUTPATIENT SERVICES | Facility: HOSPITAL | Age: 68
LOS: 1 days | Discharge: HOME | End: 2018-10-22

## 2018-10-22 DIAGNOSIS — I48.91 UNSPECIFIED ATRIAL FIBRILLATION: ICD-10-CM

## 2018-10-22 DIAGNOSIS — I25.10 ATHEROSCLEROTIC HEART DISEASE OF NATIVE CORONARY ARTERY WITHOUT ANGINA PECTORIS: ICD-10-CM

## 2018-10-29 ENCOUNTER — OUTPATIENT (OUTPATIENT)
Dept: OUTPATIENT SERVICES | Facility: HOSPITAL | Age: 68
LOS: 1 days | Discharge: HOME | End: 2018-10-29

## 2018-10-29 DIAGNOSIS — I25.10 ATHEROSCLEROTIC HEART DISEASE OF NATIVE CORONARY ARTERY WITHOUT ANGINA PECTORIS: ICD-10-CM

## 2018-10-29 DIAGNOSIS — I48.91 UNSPECIFIED ATRIAL FIBRILLATION: ICD-10-CM

## 2018-10-31 ENCOUNTER — LABORATORY RESULT (OUTPATIENT)
Age: 68
End: 2018-10-31

## 2018-10-31 ENCOUNTER — RESULT REVIEW (OUTPATIENT)
Age: 68
End: 2018-10-31

## 2018-10-31 ENCOUNTER — OUTPATIENT (OUTPATIENT)
Dept: OUTPATIENT SERVICES | Facility: HOSPITAL | Age: 68
LOS: 1 days | Discharge: HOME | End: 2018-10-31

## 2018-10-31 ENCOUNTER — APPOINTMENT (OUTPATIENT)
Dept: HEMATOLOGY ONCOLOGY | Facility: CLINIC | Age: 68
End: 2018-10-31

## 2018-10-31 DIAGNOSIS — I78.0 HEREDITARY HEMORRHAGIC TELANGIECTASIA: ICD-10-CM

## 2018-10-31 DIAGNOSIS — D50.0 IRON DEFICIENCY ANEMIA SECONDARY TO BLOOD LOSS (CHRONIC): ICD-10-CM

## 2018-10-31 LAB
HCT VFR BLD CALC: 36.2 %
HGB BLD-MCNC: 11.5 G/DL
IRON SATN MFR SERPL: 13 %
IRON SERPL-MCNC: 47 UG/DL
MCHC RBC-ENTMCNC: 30 PG
MCHC RBC-ENTMCNC: 31.8 G/DL
MCV RBC AUTO: 94.5 FL
PLATELET # BLD AUTO: 120 K/UL
PMV BLD: 10.3 FL
RBC # BLD: 3.83 M/UL
RBC # FLD: 18.6 %
TIBC SERPL-MCNC: 356 UG/DL
UIBC SERPL-MCNC: 309 UG/DL
WBC # FLD AUTO: 4.36 K/UL

## 2018-11-05 ENCOUNTER — OUTPATIENT (OUTPATIENT)
Dept: OUTPATIENT SERVICES | Facility: HOSPITAL | Age: 68
LOS: 1 days | Discharge: HOME | End: 2018-11-05

## 2018-11-05 DIAGNOSIS — I48.91 UNSPECIFIED ATRIAL FIBRILLATION: ICD-10-CM

## 2018-11-05 DIAGNOSIS — I25.10 ATHEROSCLEROTIC HEART DISEASE OF NATIVE CORONARY ARTERY WITHOUT ANGINA PECTORIS: ICD-10-CM

## 2018-11-06 LAB — FERRITIN SERPL-MCNC: 53 NG/ML

## 2018-11-07 ENCOUNTER — APPOINTMENT (OUTPATIENT)
Dept: CARDIOLOGY | Facility: CLINIC | Age: 68
End: 2018-11-07

## 2018-11-07 VITALS — DIASTOLIC BLOOD PRESSURE: 76 MMHG | SYSTOLIC BLOOD PRESSURE: 120 MMHG | BODY MASS INDEX: 33.72 KG/M2 | WEIGHT: 235 LBS

## 2018-11-07 VITALS — DIASTOLIC BLOOD PRESSURE: 63 MMHG | SYSTOLIC BLOOD PRESSURE: 96 MMHG

## 2018-11-07 RX ORDER — CARBOXYMETHYLCELLULOSE SODIUM 5 MG/ML
0.5 SOLUTION/ DROPS OPHTHALMIC
Qty: 50 | Refills: 0 | Status: COMPLETED | COMMUNITY
Start: 2018-03-26 | End: 2018-11-07

## 2018-11-07 RX ORDER — CHLORHEXIDINE GLUCONATE, 0.12% ORAL RINSE 1.2 MG/ML
0.12 SOLUTION DENTAL
Qty: 473 | Refills: 0 | Status: COMPLETED | COMMUNITY
Start: 2017-10-06 | End: 2018-11-07

## 2018-11-07 RX ORDER — GUAIFENESIN 600 MG/1
600 TABLET, EXTENDED RELEASE ORAL
Qty: 20 | Refills: 0 | Status: COMPLETED | COMMUNITY
Start: 2018-06-08 | End: 2018-11-07

## 2018-11-07 RX ORDER — LACTULOSE 10 G/15ML
10 SOLUTION ORAL TWICE DAILY
Refills: 0 | Status: ACTIVE | COMMUNITY

## 2018-11-07 RX ORDER — AMMONIUM LACTATE 12 %
12 CREAM (GRAM) TOPICAL
Qty: 280 | Refills: 0 | Status: COMPLETED | COMMUNITY
Start: 2017-11-17 | End: 2018-11-07

## 2018-11-07 RX ORDER — VITS A,C,E/LUTEIN/MINERALS 300MCG-200
TABLET ORAL
Qty: 60 | Refills: 0 | Status: COMPLETED | COMMUNITY
Start: 2018-03-26 | End: 2018-11-07

## 2018-11-07 NOTE — ASSESSMENT
[FreeTextEntry1] : AICD interrogation today- Normal CRT-D function, BiV paced 100 %, All parameters stable . Stable Thoracic impedance . 84 episodes of NSVT - lasting 1-4 seconds / pt is asymptomatic , no ATPs or shocks . Optivolt index- no pulmonary congestion. \par \par On Warfarin therapy  , intermittent nose bleeds d/t  Osler-Weber-Rendu syndrome . \par Patient has routine labs / iron study  with hem-onc . \par \par  Plan \par -Brief NSVT episodes - pt is asymptomatic , no shocks - Continue Sotalol 40 mg BID \par -Continue Metoprolol 25 mg BID \par -Continue Warfarin for CVA prevention \par -Remote monitoring\par -F/U with DR. Browning in 3-4  months \par -Repeat labs BMP, Mg\par -F/U with DR. Ortiz as scheduled \par -F/U with Hemonc as scheduled \par

## 2018-11-07 NOTE — REVIEW OF SYSTEMS
[Dyspnea on exertion] : dyspnea during exertion [Change In Color Of Skin] : change in skin color [Dizziness] : dizziness [Tremor] : a tremor was seen [Negative] : Heme/Lymph [Lower Ext Edema] : no extremity edema

## 2018-11-07 NOTE — DISCUSSION/SUMMARY
[AICD Function Normal] : normal AICD function [Patient] : the patient [Family] : the patient's family

## 2018-11-07 NOTE — HISTORY OF PRESENT ILLNESS
[Palpitations] : no palpitations [SOB] : no dyspnea [Syncope] : no syncope [ICD Shocks] : no recent ICD shocks [Erythema at Site] : no erythema at device site [Swelling at Site] : no swelling at device site [de-identified] : 68 years old male with pmh of CKD, COPD , HTN, NICM S/P CRT-D, Chronic AFib, CHADVASC 3 CHF/NYHA class III, multiple HF admissions in the past, VT , S/P multiple AICD shocks , recurrent anemias d/t nose bleed, hx of Osler-Weber-Rendu \par Recurrent VT with ATPs started on Sotalol  80 mg BID 9/2017, subsequently dose decreased to 40 mg BID d/t fatigue , hypotension .  \par He presents today for a routine device interrogation. \par Reports recent ER visit for generalized  weakness and hands tremors , found to have elevated Ammonia level , left  ER AMA . Seen by GI  on Lactulose , hx of liver cirrhosis. \par He denies CP/ SOB/ palpitations. Occasional dizziness, no syncope . hands tremor improved  \par Stable COSTELLO .  \par ECG ( 11/7/2018) -Afib , BiV paced at 86 bpm \par

## 2018-11-07 NOTE — PROCEDURE
[No] : not [Atrial Fibrillation] : atrial fibrillation [CRT-D] : Cardiac resynchronization therapy defibrillator [VVIR] : VVIR [Voltage: ___ volts] : Voltage was [unfilled] volts [Charge Time: ___ sec] : charge time was [unfilled] seconds [Longevity: ___ months] : The estimated remaining battery life is [unfilled] months [Threshold Testing Performed] : Threshold testing was performed [Ventricular] : Ventricular [Sensing Amplitude ___mv] : sensing amplitude was [unfilled] mv [Lead Imp:  ___ohms] : lead impedance was [unfilled] ohms [___V @] : [unfilled] V [___ ms] : [unfilled] ms [None] : none [Programmed for Longevity] : output reprogrammed for improved battery longevity [Sense ___ %] : Sense [unfilled]% [Pace ___ %] : Pace [unfilled]% [de-identified] : 14-20 [de-identified] : THERESA [de-identified] : Viva XT CRT-D [de-identified] : [id number] [de-identified] : 2/17/2015 [de-identified] :  [de-identified] : 84 NSVT episodes.  Instructed patient to plug in remote monitor, transmit and do not unplug\par No AICD shocks, no therapy

## 2018-11-07 NOTE — PHYSICAL EXAM
[General Appearance - Well Nourished] : well nourished [General Appearance - In No Acute Distress] : no acute distress [Heart Sounds] : normal S1 and S2 [Arterial Pulses Normal] : the arterial pulses were normal [Respiration, Rhythm And Depth] : normal respiratory rhythm and effort [Auscultation Breath Sounds / Voice Sounds] : lungs were clear to auscultation bilaterally [Left Infraclavicular] : left infraclavicular area [Clean] : clean [Dry] : dry [Well-Healed] : well-healed [Bowel Sounds] : normal bowel sounds [Abdomen Soft] : soft [] : no hepato-splenomegaly [Nail Clubbing] : no clubbing of the fingernails [Cyanosis, Localized] : no localized cyanosis [FreeTextEntry1] : irregular

## 2018-11-12 ENCOUNTER — OUTPATIENT (OUTPATIENT)
Dept: OUTPATIENT SERVICES | Facility: HOSPITAL | Age: 68
LOS: 1 days | Discharge: HOME | End: 2018-11-12

## 2018-11-12 DIAGNOSIS — I25.10 ATHEROSCLEROTIC HEART DISEASE OF NATIVE CORONARY ARTERY WITHOUT ANGINA PECTORIS: ICD-10-CM

## 2018-11-12 DIAGNOSIS — I48.91 UNSPECIFIED ATRIAL FIBRILLATION: ICD-10-CM

## 2018-11-12 DIAGNOSIS — E05.90 THYROTOXICOSIS, UNSPECIFIED WITHOUT THYROTOXIC CRISIS OR STORM: ICD-10-CM

## 2018-11-14 ENCOUNTER — APPOINTMENT (OUTPATIENT)
Dept: INFUSION THERAPY | Facility: CLINIC | Age: 68
End: 2018-11-14

## 2018-11-14 ENCOUNTER — LABORATORY RESULT (OUTPATIENT)
Age: 68
End: 2018-11-14

## 2018-11-14 ENCOUNTER — APPOINTMENT (OUTPATIENT)
Dept: HEMATOLOGY ONCOLOGY | Facility: CLINIC | Age: 68
End: 2018-11-14

## 2018-11-14 VITALS
HEART RATE: 92 BPM | TEMPERATURE: 96.9 F | HEIGHT: 70 IN | SYSTOLIC BLOOD PRESSURE: 95 MMHG | BODY MASS INDEX: 32.93 KG/M2 | WEIGHT: 230 LBS | DIASTOLIC BLOOD PRESSURE: 54 MMHG

## 2018-11-14 LAB
ALBUMIN SERPL ELPH-MCNC: 3.3 G/DL
ALP BLD-CCNC: 130 U/L
ALT SERPL-CCNC: 13 U/L
AMMONIA PLAS-MCNC: 88 UMOL/L
ANION GAP SERPL CALC-SCNC: 11 MMOL/L
AST SERPL-CCNC: 20 U/L
BILIRUB SERPL-MCNC: 1.2 MG/DL
BUN SERPL-MCNC: 28 MG/DL
CALCIUM SERPL-MCNC: 9.4 MG/DL
CHLORIDE SERPL-SCNC: 99 MMOL/L
CO2 SERPL-SCNC: 27 MMOL/L
CREAT SERPL-MCNC: 1.8 MG/DL
GLUCOSE SERPL-MCNC: 159 MG/DL
HCT VFR BLD CALC: 33.4 %
HGB BLD-MCNC: 10.4 G/DL
IRON SATN MFR SERPL: 11 %
IRON SERPL-MCNC: 42 UG/DL
MCHC RBC-ENTMCNC: 29.5 PG
MCHC RBC-ENTMCNC: 31.1 G/DL
MCV RBC AUTO: 94.9 FL
PLATELET # BLD AUTO: 127 K/UL
PMV BLD: 9.7 FL
POTASSIUM SERPL-SCNC: 4.6 MMOL/L
PROT SERPL-MCNC: 6.8 G/DL
RBC # BLD: 3.52 M/UL
RBC # FLD: 17.8 %
SODIUM SERPL-SCNC: 137 MMOL/L
TIBC SERPL-MCNC: 383 UG/DL
UIBC SERPL-MCNC: 341 UG/DL
WBC # FLD AUTO: 4.12 K/UL

## 2018-11-14 RX ORDER — FERUMOXYTOL 510 MG/17ML
510 INJECTION INTRAVENOUS ONCE
Qty: 0 | Refills: 0 | Status: COMPLETED | OUTPATIENT
Start: 2018-11-14 | End: 2018-11-14

## 2018-11-14 RX ORDER — ACETAMINOPHEN 500 MG
650 TABLET ORAL ONCE
Qty: 0 | Refills: 0 | Status: COMPLETED | OUTPATIENT
Start: 2018-11-14 | End: 2018-11-14

## 2018-11-14 RX ORDER — HYDROCORTISONE 20 MG
100 TABLET ORAL ONCE
Qty: 0 | Refills: 0 | Status: COMPLETED | OUTPATIENT
Start: 2018-11-14 | End: 2018-11-14

## 2018-11-14 RX ADMIN — Medication 650 MILLIGRAM(S): at 12:18

## 2018-11-14 RX ADMIN — Medication 156 MILLIGRAM(S): at 12:19

## 2018-11-14 RX ADMIN — FERUMOXYTOL 468 MILLIGRAM(S): 510 INJECTION INTRAVENOUS at 12:18

## 2018-11-14 RX ADMIN — Medication 650 MILLIGRAM(S): at 12:19

## 2018-11-15 NOTE — REVIEW OF SYSTEMS
[Fatigue] : fatigue [Nosebleeds] : nosebleeds [SOB on Exertion] : shortness of breath during exertion [Negative] : Allergic/Immunologic [Fever] : no fever [Recent Change In Weight] : ~T no recent weight change [Dysphagia] : no dysphagia [Chest Pain] : no chest pain [Shortness Of Breath] : no shortness of breath [Abdominal Pain] : no abdominal pain [Diarrhea] : no diarrhea [Joint Pain] : no joint pain [Skin Rash] : no skin rash [Easy Bleeding] : no tendency for easy bleeding [Easy Bruising] : no tendency for easy bruising [FreeTextEntry8] : hematuria has resolved [de-identified] : Was cosnfused

## 2018-11-15 NOTE — PHYSICAL EXAM
[Restricted in physically strenuous activity but ambulatory and able to carry out work of a light or sedentary nature] : Status 1- Restricted in physically strenuous activity but ambulatory and able to carry out work of a light or sedentary nature, e.g., light house work, office work [Obese] : obese [Normal] : affect appropriate [Ulcers] : no ulcers [de-identified] : Walks with a cane [de-identified] : He has an AICD on left [de-identified] : Has telangiectatic on face

## 2018-11-15 NOTE — HISTORY OF PRESENT ILLNESS
[de-identified] : 66 year old male who follows here periodically for management and treatment of anemia. He is non compliant with visits.   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 He has recurrent epistaxis mainly, on and off at least two or three times a week and episodes of severity and duration vary between 20 minutes to an hour. He also has multiple other comorbidities including cardiomyopathy with arrhythmia, status post pacemaker AICD; and COPD, gout, hypertension and CKD. He requires weekly Procrit and on and off intravenous iron but he is not compliant since whenever his hemoglobin gets better he stops coming to the [facility]. [de-identified] : 4/26/17\par He presents with his wife to establish care Dr. Ira garcía. He states he had a major nose bleed a few days ago. He had IV iron in March. No other complaints. \par \par 6/7/17\par He is doing  well. No bleeding. His HgB today is 11.4 \par \par 8/2/17\par He is doing well. Only had   a mild nose bleed. hgB today was 13.5. No other complaints.\par \par 10/4/17\par He reports a nose bleed one week ago that resolved. His HgB from today is 11.5. Has weakness in legs. \par \par 12/14/17\par He is here for follow up.  He had his  urinary stent removed and had hematuria. He held his Coumadin. He is thinking about a watchman procedure. Feels about the same otherwise. His HgB has been falling and so has his iron stores.\par \par 2/28/18\par Here for follow up. No recent bleeding Hgb is over 11. He decided not to proceed with watchman. He is burping after he eats and is to follow up with GI.\par \par 5/23/18\par He is here for followup with his wife. He reports a nosebleed yesterday. His hemoglobin has been stable responded well to IV iron and CBC from today shows a hemoglobin of 11.4.\par \par 7/18/18\par Here for follow up. Had heavy epistaxis last 2 days this resolved. On Coumadin. feels a bit week. CBC from today is unchanged from last visit.\par \par 8/22/18\par He is here for follow up. Has epistaxis. He had IV Feraheme. His Hgb is greater than 11 gm/dl continently. Taking Coumadin. \par \par 11/14/18\par Patient is here for follow-up.  He remains on coumadin.  Hgb is 10.4 today.  His Iron sat from 10/2018 is low at 13% with ferritin trending down at 53.  Approximately 5 days ago the patient reports heavy epistaxis.  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  He was experienced tremors, so he reported to the ED, his ammonia was high, so they prescribed him lactulose.  We discussed that there may be a multitude of reasons, including but not limited to liver failure or bleeding.  He is not having symptoms of encephalopathy such as confusion.  He reports mild itching, likely due to the cirrhosis.

## 2018-11-15 NOTE — ASSESSMENT
[FreeTextEntry1] :  Anemia of chronic kidney disease and chronic disease plus iron deficiency from chronic nose bleeds due to HHT; status post  IV Feraheme  as needed. His hgB has been stable and is  now near normal. He has mild occasional thrombocytopenia which may be due to consumption from bleeds will monitor. He is on Coumadin for afib and is being considered for Watchman procedure to take him off the Coumadin given bleeding history, Watchman is on hold for now.\par \par \par Plan:\par -CBC is stable will monitor iron studies  and CBC will administer one dose Feraheme today. Will Monitor  CBC every 2 weeks for now, since he has had bleeding. Holding Procrit at this point given hgb is over 10 gm/dl \par -he knows to stop Coumadin if he is bleeding and to go to ER if needed and to see us sooner if bleeding is severe.\par -would not use Tamoxifen or Tranexamic at this time acid since there is risk of thrombosis and since he is on Coumadin  for afib.  Topical therapy for epistaxis is preferred and will try afrin, another option is Avastin but that also has risk, if nose bleeds become difficult to manage will consider tranexamic acid first \par -We discussed that the patient can use any OTC antihistamine such as Allegra for the pruritus\par -on lactulose for his hepatic insufficiency from right sided heart failure and protal congestion \par -he has 11 doctors that he follows with \par \par RTC in 2 months

## 2018-11-16 LAB — FERRITIN SERPL-MCNC: 47 NG/ML

## 2018-11-19 ENCOUNTER — OUTPATIENT (OUTPATIENT)
Dept: OUTPATIENT SERVICES | Facility: HOSPITAL | Age: 68
LOS: 1 days | Discharge: HOME | End: 2018-11-19

## 2018-11-19 DIAGNOSIS — I25.10 ATHEROSCLEROTIC HEART DISEASE OF NATIVE CORONARY ARTERY WITHOUT ANGINA PECTORIS: ICD-10-CM

## 2018-11-19 DIAGNOSIS — I48.91 UNSPECIFIED ATRIAL FIBRILLATION: ICD-10-CM

## 2018-11-26 ENCOUNTER — OUTPATIENT (OUTPATIENT)
Dept: OUTPATIENT SERVICES | Facility: HOSPITAL | Age: 68
LOS: 1 days | Discharge: HOME | End: 2018-11-26

## 2018-11-26 DIAGNOSIS — I25.10 ATHEROSCLEROTIC HEART DISEASE OF NATIVE CORONARY ARTERY WITHOUT ANGINA PECTORIS: ICD-10-CM

## 2018-11-26 DIAGNOSIS — I48.91 UNSPECIFIED ATRIAL FIBRILLATION: ICD-10-CM

## 2018-11-28 ENCOUNTER — APPOINTMENT (OUTPATIENT)
Dept: HEMATOLOGY ONCOLOGY | Facility: CLINIC | Age: 68
End: 2018-11-28

## 2018-11-28 ENCOUNTER — LABORATORY RESULT (OUTPATIENT)
Age: 68
End: 2018-11-28

## 2018-11-29 LAB
FERRITIN SERPL-MCNC: 82 NG/ML
HCT VFR BLD CALC: 37.8 %
HGB BLD-MCNC: 11.9 G/DL
IRON SATN MFR SERPL: 20 %
IRON SERPL-MCNC: 69 UG/DL
MCHC RBC-ENTMCNC: 30.1 PG
MCHC RBC-ENTMCNC: 31.5 G/DL
MCV RBC AUTO: 95.7 FL
PLATELET # BLD AUTO: 128 K/UL
PMV BLD: 11.5 FL
RBC # BLD: 3.95 M/UL
RBC # FLD: 19.8 %
TIBC SERPL-MCNC: 345 UG/DL
UIBC SERPL-MCNC: 276 UG/DL
WBC # FLD AUTO: 4.32 K/UL

## 2018-12-01 ENCOUNTER — OUTPATIENT (OUTPATIENT)
Dept: OUTPATIENT SERVICES | Facility: HOSPITAL | Age: 68
LOS: 1 days | End: 2018-12-01

## 2018-12-03 ENCOUNTER — OUTPATIENT (OUTPATIENT)
Dept: OUTPATIENT SERVICES | Facility: HOSPITAL | Age: 68
LOS: 1 days | Discharge: HOME | End: 2018-12-03

## 2018-12-03 DIAGNOSIS — I25.10 ATHEROSCLEROTIC HEART DISEASE OF NATIVE CORONARY ARTERY WITHOUT ANGINA PECTORIS: ICD-10-CM

## 2018-12-03 DIAGNOSIS — I48.91 UNSPECIFIED ATRIAL FIBRILLATION: ICD-10-CM

## 2018-12-10 ENCOUNTER — OUTPATIENT (OUTPATIENT)
Dept: OUTPATIENT SERVICES | Facility: HOSPITAL | Age: 68
LOS: 1 days | Discharge: HOME | End: 2018-12-10

## 2018-12-10 ENCOUNTER — INPATIENT (INPATIENT)
Facility: HOSPITAL | Age: 68
LOS: 2 days | Discharge: HOME HEALTH | End: 2018-12-13
Attending: INTERNAL MEDICINE | Admitting: INTERNAL MEDICINE

## 2018-12-10 VITALS
SYSTOLIC BLOOD PRESSURE: 139 MMHG | DIASTOLIC BLOOD PRESSURE: 90 MMHG | OXYGEN SATURATION: 90 % | RESPIRATION RATE: 26 BRPM | TEMPERATURE: 97 F | HEART RATE: 96 BPM

## 2018-12-10 DIAGNOSIS — Z95.810 PRESENCE OF AUTOMATIC (IMPLANTABLE) CARDIAC DEFIBRILLATOR: Chronic | ICD-10-CM

## 2018-12-10 DIAGNOSIS — I25.10 ATHEROSCLEROTIC HEART DISEASE OF NATIVE CORONARY ARTERY WITHOUT ANGINA PECTORIS: ICD-10-CM

## 2018-12-10 DIAGNOSIS — I48.91 UNSPECIFIED ATRIAL FIBRILLATION: ICD-10-CM

## 2018-12-10 LAB
ALBUMIN SERPL ELPH-MCNC: 3.8 G/DL — SIGNIFICANT CHANGE UP (ref 3.5–5.2)
ALP SERPL-CCNC: 135 U/L — HIGH (ref 30–115)
ALT FLD-CCNC: 14 U/L — SIGNIFICANT CHANGE UP (ref 0–41)
AMMONIA BLD-MCNC: 61 UMOL/L — HIGH (ref 11–55)
ANION GAP SERPL CALC-SCNC: 16 MMOL/L — HIGH (ref 7–14)
APTT BLD: 41.8 SEC — HIGH (ref 27–39.2)
AST SERPL-CCNC: 25 U/L — SIGNIFICANT CHANGE UP (ref 0–41)
BASE EXCESS BLDV CALC-SCNC: 2.7 MMOL/L — HIGH (ref -2–2)
BASOPHILS # BLD AUTO: 0.03 K/UL — SIGNIFICANT CHANGE UP (ref 0–0.2)
BASOPHILS NFR BLD AUTO: 0.4 % — SIGNIFICANT CHANGE UP (ref 0–1)
BILIRUB SERPL-MCNC: 2.5 MG/DL — HIGH (ref 0.2–1.2)
BUN SERPL-MCNC: 24 MG/DL — HIGH (ref 10–20)
CA-I SERPL-SCNC: 1.17 MMOL/L — SIGNIFICANT CHANGE UP (ref 1.12–1.3)
CALCIUM SERPL-MCNC: 9.5 MG/DL — SIGNIFICANT CHANGE UP (ref 8.5–10.1)
CHLORIDE SERPL-SCNC: 97 MMOL/L — LOW (ref 98–110)
CO2 SERPL-SCNC: 25 MMOL/L — SIGNIFICANT CHANGE UP (ref 17–32)
CREAT SERPL-MCNC: 1.7 MG/DL — HIGH (ref 0.7–1.5)
EOSINOPHIL # BLD AUTO: 0.04 K/UL — SIGNIFICANT CHANGE UP (ref 0–0.7)
EOSINOPHIL NFR BLD AUTO: 0.6 % — SIGNIFICANT CHANGE UP (ref 0–8)
GAS PNL BLDV: 132 MMOL/L — LOW (ref 136–145)
GAS PNL BLDV: SIGNIFICANT CHANGE UP
GLUCOSE SERPL-MCNC: 110 MG/DL — HIGH (ref 70–99)
HCO3 BLDV-SCNC: 28 MMOL/L — SIGNIFICANT CHANGE UP (ref 22–29)
HCT VFR BLD CALC: 39.2 % — LOW (ref 42–52)
HCT VFR BLDA CALC: 39.4 % — SIGNIFICANT CHANGE UP (ref 34–44)
HGB BLD CALC-MCNC: 12.9 G/DL — LOW (ref 14–18)
HGB BLD-MCNC: 12.5 G/DL — LOW (ref 14–18)
IMM GRANULOCYTES NFR BLD AUTO: 0.1 % — SIGNIFICANT CHANGE UP (ref 0.1–0.3)
INR BLD: 1.95 RATIO — HIGH (ref 0.65–1.3)
LACTATE BLDV-MCNC: 2.3 MMOL/L — HIGH (ref 0.5–1.6)
LYMPHOCYTES # BLD AUTO: 0.52 K/UL — LOW (ref 1.2–3.4)
LYMPHOCYTES # BLD AUTO: 7.5 % — LOW (ref 20.5–51.1)
MCHC RBC-ENTMCNC: 29.5 PG — SIGNIFICANT CHANGE UP (ref 27–31)
MCHC RBC-ENTMCNC: 31.9 G/DL — LOW (ref 32–37)
MCV RBC AUTO: 92.5 FL — SIGNIFICANT CHANGE UP (ref 80–94)
MONOCYTES # BLD AUTO: 0.67 K/UL — HIGH (ref 0.1–0.6)
MONOCYTES NFR BLD AUTO: 9.7 % — HIGH (ref 1.7–9.3)
NEUTROPHILS # BLD AUTO: 5.66 K/UL — SIGNIFICANT CHANGE UP (ref 1.4–6.5)
NEUTROPHILS NFR BLD AUTO: 81.7 % — HIGH (ref 42.2–75.2)
NRBC # BLD: 0 /100 WBCS — SIGNIFICANT CHANGE UP (ref 0–0)
PCO2 BLDV: 44 MMHG — SIGNIFICANT CHANGE UP (ref 41–51)
PH BLDV: 7.41 — SIGNIFICANT CHANGE UP (ref 7.26–7.43)
PLATELET # BLD AUTO: 130 K/UL — SIGNIFICANT CHANGE UP (ref 130–400)
PO2 BLDV: 45 MMHG — HIGH (ref 20–40)
POTASSIUM BLDV-SCNC: 4.4 MMOL/L — SIGNIFICANT CHANGE UP (ref 3.3–5.6)
POTASSIUM SERPL-MCNC: 4.8 MMOL/L — SIGNIFICANT CHANGE UP (ref 3.5–5)
POTASSIUM SERPL-SCNC: 4.8 MMOL/L — SIGNIFICANT CHANGE UP (ref 3.5–5)
PROT SERPL-MCNC: 7.4 G/DL — SIGNIFICANT CHANGE UP (ref 6–8)
PROTHROM AB SERPL-ACNC: 22.3 SEC — HIGH (ref 9.95–12.87)
RBC # BLD: 4.24 M/UL — LOW (ref 4.7–6.1)
RBC # FLD: 18.9 % — HIGH (ref 11.5–14.5)
SAO2 % BLDV: 72 % — SIGNIFICANT CHANGE UP
SODIUM SERPL-SCNC: 138 MMOL/L — SIGNIFICANT CHANGE UP (ref 135–146)
WBC # BLD: 6.93 K/UL — SIGNIFICANT CHANGE UP (ref 4.8–10.8)
WBC # FLD AUTO: 6.93 K/UL — SIGNIFICANT CHANGE UP (ref 4.8–10.8)

## 2018-12-10 RX ORDER — ACETAMINOPHEN 500 MG
650 TABLET ORAL EVERY 6 HOURS
Qty: 0 | Refills: 0 | Status: DISCONTINUED | OUTPATIENT
Start: 2018-12-10 | End: 2018-12-11

## 2018-12-10 RX ORDER — VANCOMYCIN HCL 1 G
1000 VIAL (EA) INTRAVENOUS EVERY 24 HOURS
Qty: 0 | Refills: 0 | Status: DISCONTINUED | OUTPATIENT
Start: 2018-12-10 | End: 2018-12-11

## 2018-12-10 RX ORDER — ACETAMINOPHEN 500 MG
650 TABLET ORAL ONCE
Qty: 0 | Refills: 0 | Status: COMPLETED | OUTPATIENT
Start: 2018-12-10 | End: 2018-12-10

## 2018-12-10 RX ORDER — TAMSULOSIN HYDROCHLORIDE 0.4 MG/1
0.4 CAPSULE ORAL AT BEDTIME
Qty: 0 | Refills: 0 | Status: DISCONTINUED | OUTPATIENT
Start: 2018-12-10 | End: 2018-12-13

## 2018-12-10 RX ORDER — WARFARIN SODIUM 2.5 MG/1
2.5 TABLET ORAL ONCE
Qty: 0 | Refills: 0 | Status: COMPLETED | OUTPATIENT
Start: 2018-12-10 | End: 2018-12-10

## 2018-12-10 RX ORDER — COLCHICINE 0.6 MG
0.6 TABLET ORAL DAILY
Qty: 0 | Refills: 0 | Status: DISCONTINUED | OUTPATIENT
Start: 2018-12-10 | End: 2018-12-13

## 2018-12-10 RX ORDER — VANCOMYCIN HCL 1 G
1000 VIAL (EA) INTRAVENOUS ONCE
Qty: 0 | Refills: 0 | Status: COMPLETED | OUTPATIENT
Start: 2018-12-10 | End: 2018-12-10

## 2018-12-10 RX ORDER — DIGOXIN 250 MCG
0.12 TABLET ORAL DAILY
Qty: 0 | Refills: 0 | Status: DISCONTINUED | OUTPATIENT
Start: 2018-12-10 | End: 2018-12-13

## 2018-12-10 RX ORDER — MEROPENEM 1 G/30ML
1000 INJECTION INTRAVENOUS ONCE
Qty: 0 | Refills: 0 | Status: COMPLETED | OUTPATIENT
Start: 2018-12-10 | End: 2018-12-10

## 2018-12-10 RX ORDER — CEFEPIME 1 G/1
2000 INJECTION, POWDER, FOR SOLUTION INTRAMUSCULAR; INTRAVENOUS EVERY 24 HOURS
Qty: 0 | Refills: 0 | Status: DISCONTINUED | OUTPATIENT
Start: 2018-12-10 | End: 2018-12-11

## 2018-12-10 RX ORDER — FINASTERIDE 5 MG/1
5 TABLET, FILM COATED ORAL DAILY
Qty: 0 | Refills: 0 | Status: DISCONTINUED | OUTPATIENT
Start: 2018-12-10 | End: 2018-12-13

## 2018-12-10 RX ORDER — FUROSEMIDE 40 MG
40 TABLET ORAL
Qty: 0 | Refills: 0 | Status: DISCONTINUED | OUTPATIENT
Start: 2018-12-10 | End: 2018-12-10

## 2018-12-10 RX ORDER — FUROSEMIDE 40 MG
40 TABLET ORAL
Qty: 0 | Refills: 0 | Status: DISCONTINUED | OUTPATIENT
Start: 2018-12-10 | End: 2018-12-11

## 2018-12-10 RX ORDER — PANTOPRAZOLE SODIUM 20 MG/1
40 TABLET, DELAYED RELEASE ORAL
Qty: 0 | Refills: 0 | Status: DISCONTINUED | OUTPATIENT
Start: 2018-12-10 | End: 2018-12-13

## 2018-12-10 RX ORDER — LIPASE/PROTEASE/AMYLASE 16-48-48K
2 CAPSULE,DELAYED RELEASE (ENTERIC COATED) ORAL THREE TIMES A DAY
Qty: 0 | Refills: 0 | Status: DISCONTINUED | OUTPATIENT
Start: 2018-12-10 | End: 2018-12-13

## 2018-12-10 RX ORDER — LACTULOSE 10 G/15ML
20 SOLUTION ORAL
Qty: 0 | Refills: 0 | Status: DISCONTINUED | OUTPATIENT
Start: 2018-12-10 | End: 2018-12-13

## 2018-12-10 RX ORDER — METOPROLOL TARTRATE 50 MG
25 TABLET ORAL
Qty: 0 | Refills: 0 | Status: DISCONTINUED | OUTPATIENT
Start: 2018-12-10 | End: 2018-12-13

## 2018-12-10 RX ORDER — ALLOPURINOL 300 MG
100 TABLET ORAL DAILY
Qty: 0 | Refills: 0 | Status: DISCONTINUED | OUTPATIENT
Start: 2018-12-10 | End: 2018-12-13

## 2018-12-10 RX ORDER — SODIUM CHLORIDE 9 MG/ML
1000 INJECTION INTRAMUSCULAR; INTRAVENOUS; SUBCUTANEOUS ONCE
Qty: 0 | Refills: 0 | Status: COMPLETED | OUTPATIENT
Start: 2018-12-10 | End: 2018-12-10

## 2018-12-10 RX ORDER — SPIRONOLACTONE 25 MG/1
50 TABLET, FILM COATED ORAL DAILY
Qty: 0 | Refills: 0 | Status: DISCONTINUED | OUTPATIENT
Start: 2018-12-10 | End: 2018-12-13

## 2018-12-10 RX ORDER — SOTALOL HCL 120 MG
40 TABLET ORAL
Qty: 0 | Refills: 0 | Status: DISCONTINUED | OUTPATIENT
Start: 2018-12-10 | End: 2018-12-13

## 2018-12-10 RX ADMIN — Medication 25 MILLIGRAM(S): at 22:18

## 2018-12-10 RX ADMIN — MEROPENEM 1000 MILLIGRAM(S): 1 INJECTION INTRAVENOUS at 18:20

## 2018-12-10 RX ADMIN — MEROPENEM 200 MILLIGRAM(S): 1 INJECTION INTRAVENOUS at 17:46

## 2018-12-10 RX ADMIN — Medication 650 MILLIGRAM(S): at 23:26

## 2018-12-10 RX ADMIN — SPIRONOLACTONE 50 MILLIGRAM(S): 25 TABLET, FILM COATED ORAL at 22:19

## 2018-12-10 RX ADMIN — Medication 40 MILLIGRAM(S): at 22:19

## 2018-12-10 RX ADMIN — Medication 1000 MILLIGRAM(S): at 17:46

## 2018-12-10 RX ADMIN — WARFARIN SODIUM 2.5 MILLIGRAM(S): 2.5 TABLET ORAL at 21:58

## 2018-12-10 RX ADMIN — TAMSULOSIN HYDROCHLORIDE 0.4 MILLIGRAM(S): 0.4 CAPSULE ORAL at 21:58

## 2018-12-10 RX ADMIN — Medication 250 MILLIGRAM(S): at 16:45

## 2018-12-10 RX ADMIN — SODIUM CHLORIDE 500 MILLILITER(S): 9 INJECTION INTRAMUSCULAR; INTRAVENOUS; SUBCUTANEOUS at 18:20

## 2018-12-10 NOTE — ED ADULT NURSE REASSESSMENT NOTE - NS ED NURSE REASSESS COMMENT FT1
Patient at this time is put on bi-pap, IPAP-12 EPAP-6 RR- 14, O2- 40%, patient is tolerating bi-pap, VSS, will continue to watch and assess patient, safety and comfort measures maintained.

## 2018-12-10 NOTE — ED PROVIDER NOTE - MEDICAL DECISION MAKING DETAILS
69 Y/O M CM, HTN, DYSLIPIDEMIA, CHF, WITH CHILLS AND COUGH. PT DIAGNOSED WITH PNA. IV ABX GIVEN. PT ON NIPPV. PT EVALUATED BY ICU AND NOT A CANDIDATE AT THIS TIME. PT ADMITTED TO MEDICINE SERVICE.

## 2018-12-10 NOTE — H&P ADULT - ASSESSMENT
67 yo M with PMH of A-fib on Coumadin, HFrEF s/p AICD, HTN, gout, CKD, liver cirrhosis, pancreatic insufficiency, Osler-Weber-Rendu syndrome presented to ED complaining of SOB, chills, and cough. Patient was recently treated with [facility] as outpatient after PCP prescribed for URI ~1 week PTP. Symptoms did not improve. Report that PCP stated patient's throat was red in office. Deny any fevers, chest pain, abdominal pain, nausea, vomiting, constipation, diarrhea, dysuria, myalgias, rash, or other complaint or symptom.    #) SOB + chills + cough 2/2 RLL PNA  - CXR = new R basilar opacity/effusion  - symptoms persistent despite outpatient [facility] treatment  - fever=101.6 in ED  - will use Levaquin 750qd for now  - f/u blood Cx  - will check Flu/RVP panel    #) A-fib on Coumadin - rate controlled, c/w Coumadin + Dig + Metoprolol + Sotalol (per last Cardio note), monitor INR daily    #) HFrEF - c/w BB, given worsening LE edema + SOB + effusion will use IV Lasix 40 BID for now, can likely switch to PO in AM    #) HTN - c/w BB + Aldactone + Sotalol    #) Gout - c/w Allopurinol + Colchicine    #) CKD - Cr at baseline 1.7    #) Liver cirrhosis? - patient has been taking Lactulose for elevated ammonia, c/w to titrate to 2-3 BM's per day    #) Pancreatic insufficiency - c/w Creon    #) Osler-Weber-Rendu syndrome - patient has history of repeated nose bleeds in past, stable for now, please monitor    DVT ppx: on Coumadin  Diet: DASH  Activity: AAT  Code status: FULL  Dispo: anticipate home 67 yo M with PMH of A-fib on Coumadin, HFrEF s/p AICD, HTN, gout, CKD, liver cirrhosis, pancreatic insufficiency, Osler-Weber-Rendu syndrome presented to ED complaining of SOB, chills, and cough. Patient was recently treated with [facility] as outpatient after PCP prescribed for URI ~1 week PTP. Symptoms did not improve. Report that PCP stated patient's throat was red in office. Deny any fevers, chest pain, abdominal pain, nausea, vomiting, constipation, diarrhea, dysuria, myalgias, rash, or other complaint or symptom.    #) SOB + chills + cough 2/2 RLL PNA  - CXR = new R basilar opacity/effusion  - symptoms persistent despite outpatient [facility] treatment  - fever=101.6 in ED  - given recent Abx use will Cefepime + Vanc for now  - f/u blood Cx  - will check Flu/RVP panel  - ID eval    #) A-fib on Coumadin - rate controlled, c/w Coumadin + Dig + Metoprolol + Sotalol (per last Cardio note), monitor INR daily    #) HFrEF - c/w BB, given worsening LE edema + SOB + effusion will use IV Lasix 40 BID for now, can likely switch to PO in AM    #) HTN - c/w BB + Aldactone + Sotalol    #) Gout - c/w Allopurinol + Colchicine    #) CKD - Cr at baseline 1.7    #) Liver cirrhosis? - patient has been taking Lactulose for elevated ammonia, c/w to titrate to 2-3 BM's per day    #) Pancreatic insufficiency - c/w Creon    #) Osler-Weber-Rendu syndrome - patient has history of repeated nose bleeds in past, stable for now, please monitor    DVT ppx: on Coumadin  Diet: DASH  Activity: AAT  Code status: FULL  Dispo: anticipate home 69 yo M with PMH of A-fib on Coumadin, HFrEF s/p AICD, HTN, gout, CKD, BPH, liver cirrhosis, pancreatic insufficiency, Osler-Weber-Rendu syndrome presented to ED complaining of SOB, chills, and cough. Patient was recently treated with [facility] as outpatient after PCP prescribed for URI ~1 week PTP. Symptoms did not improve. Report that PCP stated patient's throat was red in office. Deny any fevers, chest pain, abdominal pain, nausea, vomiting, constipation, diarrhea, dysuria, myalgias, rash, or other complaint or symptom.    #) SOB + chills + cough 2/2 RLL PNA  - CXR = new R basilar opacity/effusion  - symptoms persistent despite outpatient [facility] treatment  - fever=101.6 in ED  - given recent Abx use will Cefepime + Vanc for now  - f/u blood Cx  - will check Flu/RVP panel  - ID eval    #) A-fib on Coumadin - rate controlled, c/w Coumadin + Dig + Metoprolol + Sotalol (per last Cardio note), monitor INR daily    #) HFrEF - c/w BB, given worsening LE edema + SOB + effusion will use IV Lasix 40 BID for now, can likely switch to PO in AM    #) HTN - c/w BB + Aldactone + Sotalol    #) Gout - c/w Allopurinol + Colchicine    #) CKD - Cr at baseline 1.7    #) BPH - c/w Flomax + Finasteride    #) Liver cirrhosis - patient has been taking Lactulose for elevated ammonia, c/w to titrate to 2-3 BM's per day    #) Pancreatic insufficiency - c/w Creon    #) Osler-Weber-Rendu syndrome - patient has history of repeated nose bleeds in past, stable for now, please monitor    DVT ppx: on Coumadin  Diet: DASH  Activity: AAT  Code status: FULL  Dispo: anticipate home

## 2018-12-10 NOTE — ED ADULT NURSE NOTE - NSIMPLEMENTINTERV_GEN_ALL_ED
Implemented All Fall Risk Interventions:  [place] to call system. Call bell, personal items and telephone within reach. Instruct patient to call for assistance. Room bathroom lighting operational. Non-slip footwear when patient is off stretcher. Physically safe environment: no spills, clutter or unnecessary equipment. Stretcher in lowest position, wheels locked, appropriate side rails in place. Provide visual cue, wrist band, yellow gown, etc. Monitor gait and stability. Monitor for mental status changes and reorient to person, place, and time. Review medications for side effects contributing to fall risk. Reinforce activity limits and safety measures with patient and family.

## 2018-12-10 NOTE — ED ADULT NURSE NOTE - OBJECTIVE STATEMENT
Patient present to ED with complains of SOB and febrile, states that he had chills and shaking today as per family member, denies chest pain, nausea, vomiting, and diarrhea.

## 2018-12-10 NOTE — H&P ADULT - HISTORY OF PRESENT ILLNESS
69 yo M with PMH of HTN, DLD, HFrEF s/p AICD, liver cirrhosis, CKD, Osler-Weber-Rendu syndrome presented to ED complaining of SOB, fevers, chills, and cough. Patient was recently treated with antibiotics for URI but had persistent symptoms. Denies any chest pain, abdominal pain, nausea, vomiting, dysuria, constipation, diarrhea. 69 yo M with PMH of A-fib on AC, HTN, DLD, HFrEF s/p AICD, liver cirrhosis, CKD, Osler-Weber-Rendu syndrome presented to ED complaining of SOB, fevers, chills, and cough. Patient was recently treated with antibiotics for URI but had persistent symptoms. Denies any chest pain, abdominal pain, nausea, vomiting, dysuria, constipation, diarrhea. 67 yo M with PMH of A-fib on Coumadin, HFrEF s/p AICD, HTN, gout, CKD, liver cirrhosis, pancreatic insufficiency, Osler-Weber-Rendu syndrome presented to ED complaining of SOB, chills, and cough. Patient was recently treated with [facility] as outpatient after PCP prescribed for URI ~1 week PTP. Symptoms did not improve. Report that PCP stated patient's throat was red in office. Deny any fevers, chest pain, abdominal pain, nausea, vomiting, constipation, diarrhea, dysuria, myalgias, rash, or other complaint or symptom.    Patient was initially admitted to ICU because he was unable to be weaned off BIPAP. While still in ED, patient did well of BIPAP and was downgraded to regular floor. S/p Archana + Vanc in ED. 69 yo M with PMH of A-fib on Coumadin, HFrEF s/p AICD, HTN, gout, CKD, BPH, liver cirrhosis, pancreatic insufficiency, Osler-Weber-Rendu syndrome presented to ED complaining of SOB, chills, and cough. Patient was recently treated with [facility] as outpatient after PCP prescribed for URI ~1 week PTP. Symptoms did not improve. Report that PCP stated patient's throat was red in office. Deny any fevers, chest pain, abdominal pain, nausea, vomiting, constipation, diarrhea, dysuria, myalgias, rash, or other complaint or symptom.    Patient was initially admitted to ICU because he was unable to be weaned off BIPAP. While still in ED, patient did well of BIPAP and was downgraded to regular floor. S/p Archana + Vanc in ED.

## 2018-12-10 NOTE — ED PROVIDER NOTE - PROGRESS NOTE DETAILS
I personally evaluated the patient. I reviewed the Resident’s or Physician Assistant’s note (as assigned above), and agree with the findings and plan except as documented in my note.  67 Y/O M HTN, DYSLIPIDEMIA, NONISCHEMIC CARDIOMYOPATHY, AFIB ON COUMADIN, OSLER RAYGOZA RENDU, CIRRHOSIS, CKD, ANEMIA, CHF (EF-25%), S/P AICD C/O SOB TODAY. + SHAKING CHILLS EARLIER TODAY. + COUGH. PT COMPLETED A COURSE OF ABX FOR URI SXS LAST WEEK. NO CP. NO N/V/D. + LEG EDEMA, WORSENING. VITALS NOTED. ALERT MODERATE RESP DISTRESS. + TACHYPNEA. OP WITH MMM. NECK SUPPLE. + JVD. LUNGS WITH B/L RHONCHI. RRR. SIS2. + AICD. ABD- SOFT DISTENDED NONTENDER. +B/L LEG EDEMA. Sepsis suspected at this time.   IVF bolus cannot be given due to: (X) CHF ( ) CRF ( ) Hospice or comfort measures only ( ) Patient refusal ICU resident d/w fellow [place]i that patient is tolerating off of bipap and is stable for the floor.

## 2018-12-10 NOTE — ED PROVIDER NOTE - OBJECTIVE STATEMENT
68yM with PMH osler walsh rendu, chf, liver cirrhosis, defibrillator p/w SOB and fever/chills with cough. 68yM with PMH osler walsh rendu, chf, liver cirrhosis, defibrillator p/w SOB and fever/chills with cough. patient was on abx last week for respiratory infection but had persistent sx. no pleurisy, hemoptysis. +leg swelling

## 2018-12-10 NOTE — ED PROVIDER NOTE - PHYSICAL EXAMINATION
Vital Signs: I have reviewed the initial vital signs.  Constitutional: NAD, well-nourished, appears stated age, moderate resp distress  HEENT: Airway patent, moist MM, no erythema/swelling/deformity of oral structures. EOMI, PERRLA.  CV: regular rate, regular rhythm, well-perfused extremities, 2+ b/l DP and radial pulses equal.  Lungs: tachypneic, increased WOB, BCTA except in R lung base  ABD: NTND, no guarding or rebound, no pulsatile mass, no hernias.   MSK: Neck supple, nontender, nl ROM, no stepoff. Chest nontender. Back nontender in TLS spine or to b/l bony structures or flanks. Ext nontender, nl rom, no deformity.   INTEG: Skin warm, dry, no rash.  NEURO: A&Ox3, moving all extremities, normal speech  PSYCH: Calm, cooperative, normal affect and interaction.

## 2018-12-10 NOTE — H&P ADULT - PMH
Anemia, unspecified type    Congestive heart failure, unspecified HF chronicity, unspecified heart failure type    Gout, unspecified cause, unspecified chronicity, unspecified site    Hepatic cirrhosis, unspecified hepatic cirrhosis type, unspecified whether ascites present Anemia, unspecified type    Atrial fibrillation    CKD (chronic kidney disease)    Congestive heart failure, unspecified HF chronicity, unspecified heart failure type    Gout, unspecified cause, unspecified chronicity, unspecified site    Hepatic cirrhosis, unspecified hepatic cirrhosis type, unspecified whether ascites present    HTN (hypertension)    Osler-Weber-Rendu disease Anemia, unspecified type    Atrial fibrillation    BPH (benign prostatic hyperplasia)    CKD (chronic kidney disease)    Congestive heart failure, unspecified HF chronicity, unspecified heart failure type    Gout, unspecified cause, unspecified chronicity, unspecified site    Hepatic cirrhosis, unspecified hepatic cirrhosis type, unspecified whether ascites present    HTN (hypertension)    Osler-Weber-Rendu disease

## 2018-12-10 NOTE — H&P ADULT - NSHPPHYSICALEXAM_GEN_ALL_CORE
GEN: NAD, comfortable  HEENT: telangectasia noted over nose  CARDIO: RRR, no m/r/g  RESP: CTAB, no w/r/r  ABD: soft, NT/ND, +BS  EXT: +1 pitting edema b/l LE  NEURO: AAOx3, grossly normal

## 2018-12-11 LAB
ALBUMIN SERPL ELPH-MCNC: 3.3 G/DL — LOW (ref 3.5–5.2)
ALP SERPL-CCNC: 115 U/L — SIGNIFICANT CHANGE UP (ref 30–115)
ALT FLD-CCNC: 11 U/L — SIGNIFICANT CHANGE UP (ref 0–41)
ANION GAP SERPL CALC-SCNC: 14 MMOL/L — SIGNIFICANT CHANGE UP (ref 7–14)
APTT BLD: 43.4 SEC — HIGH (ref 27–39.2)
AST SERPL-CCNC: 21 U/L — SIGNIFICANT CHANGE UP (ref 0–41)
BASOPHILS # BLD AUTO: 0.02 K/UL — SIGNIFICANT CHANGE UP (ref 0–0.2)
BASOPHILS NFR BLD AUTO: 0.5 % — SIGNIFICANT CHANGE UP (ref 0–1)
BILIRUB SERPL-MCNC: 2.2 MG/DL — HIGH (ref 0.2–1.2)
BUN SERPL-MCNC: 26 MG/DL — HIGH (ref 10–20)
CALCIUM SERPL-MCNC: 9.1 MG/DL — SIGNIFICANT CHANGE UP (ref 8.5–10.1)
CHLORIDE SERPL-SCNC: 95 MMOL/L — LOW (ref 98–110)
CO2 SERPL-SCNC: 26 MMOL/L — SIGNIFICANT CHANGE UP (ref 17–32)
CREAT SERPL-MCNC: 1.7 MG/DL — HIGH (ref 0.7–1.5)
EOSINOPHIL # BLD AUTO: 0.05 K/UL — SIGNIFICANT CHANGE UP (ref 0–0.7)
EOSINOPHIL NFR BLD AUTO: 1.1 % — SIGNIFICANT CHANGE UP (ref 0–8)
GLUCOSE SERPL-MCNC: 89 MG/DL — SIGNIFICANT CHANGE UP (ref 70–99)
HCT VFR BLD CALC: 34.8 % — LOW (ref 42–52)
HGB BLD-MCNC: 10.9 G/DL — LOW (ref 14–18)
IMM GRANULOCYTES NFR BLD AUTO: 0.2 % — SIGNIFICANT CHANGE UP (ref 0.1–0.3)
INR BLD: 2.03 RATIO — HIGH (ref 0.65–1.3)
LYMPHOCYTES # BLD AUTO: 0.52 K/UL — LOW (ref 1.2–3.4)
LYMPHOCYTES # BLD AUTO: 11.9 % — LOW (ref 20.5–51.1)
MAGNESIUM SERPL-MCNC: 1.6 MG/DL — LOW (ref 1.8–2.4)
MAGNESIUM SERPL-MCNC: 2.4 MG/DL — SIGNIFICANT CHANGE UP (ref 1.8–2.4)
MCHC RBC-ENTMCNC: 29.1 PG — SIGNIFICANT CHANGE UP (ref 27–31)
MCHC RBC-ENTMCNC: 31.3 G/DL — LOW (ref 32–37)
MCV RBC AUTO: 92.8 FL — SIGNIFICANT CHANGE UP (ref 80–94)
MONOCYTES # BLD AUTO: 0.51 K/UL — SIGNIFICANT CHANGE UP (ref 0.1–0.6)
MONOCYTES NFR BLD AUTO: 11.6 % — HIGH (ref 1.7–9.3)
NEUTROPHILS # BLD AUTO: 3.27 K/UL — SIGNIFICANT CHANGE UP (ref 1.4–6.5)
NEUTROPHILS NFR BLD AUTO: 74.7 % — SIGNIFICANT CHANGE UP (ref 42.2–75.2)
NRBC # BLD: 0 /100 WBCS — SIGNIFICANT CHANGE UP (ref 0–0)
NT-PROBNP SERPL-SCNC: 1759 PG/ML — HIGH (ref 0–300)
PHOSPHATE SERPL-MCNC: 4 MG/DL — SIGNIFICANT CHANGE UP (ref 2.1–4.9)
PLATELET # BLD AUTO: 114 K/UL — LOW (ref 130–400)
POTASSIUM SERPL-MCNC: 4.6 MMOL/L — SIGNIFICANT CHANGE UP (ref 3.5–5)
POTASSIUM SERPL-SCNC: 4.6 MMOL/L — SIGNIFICANT CHANGE UP (ref 3.5–5)
PROT SERPL-MCNC: 6.5 G/DL — SIGNIFICANT CHANGE UP (ref 6–8)
PROTHROM AB SERPL-ACNC: 23.2 SEC — HIGH (ref 9.95–12.87)
RAPID RVP RESULT: SIGNIFICANT CHANGE UP
RBC # BLD: 3.75 M/UL — LOW (ref 4.7–6.1)
RBC # FLD: 18.9 % — HIGH (ref 11.5–14.5)
SODIUM SERPL-SCNC: 135 MMOL/L — SIGNIFICANT CHANGE UP (ref 135–146)
WBC # BLD: 4.38 K/UL — LOW (ref 4.8–10.8)
WBC # FLD AUTO: 4.38 K/UL — LOW (ref 4.8–10.8)

## 2018-12-11 RX ORDER — MAGNESIUM SULFATE 500 MG/ML
2 VIAL (ML) INJECTION ONCE
Qty: 0 | Refills: 0 | Status: COMPLETED | OUTPATIENT
Start: 2018-12-11 | End: 2018-12-11

## 2018-12-11 RX ORDER — ACETAMINOPHEN 500 MG
650 TABLET ORAL EVERY 6 HOURS
Qty: 0 | Refills: 0 | Status: DISCONTINUED | OUTPATIENT
Start: 2018-12-11 | End: 2018-12-11

## 2018-12-11 RX ORDER — MAGNESIUM SULFATE 500 MG/ML
2 VIAL (ML) INJECTION ONCE
Qty: 0 | Refills: 0 | Status: DISCONTINUED | OUTPATIENT
Start: 2018-12-11 | End: 2018-12-11

## 2018-12-11 RX ORDER — FUROSEMIDE 40 MG
40 TABLET ORAL
Qty: 0 | Refills: 0 | Status: DISCONTINUED | OUTPATIENT
Start: 2018-12-11 | End: 2018-12-13

## 2018-12-11 RX ORDER — WARFARIN SODIUM 2.5 MG/1
2.5 TABLET ORAL ONCE
Qty: 0 | Refills: 0 | Status: COMPLETED | OUTPATIENT
Start: 2018-12-11 | End: 2018-12-11

## 2018-12-11 RX ORDER — CHLORHEXIDINE GLUCONATE 213 G/1000ML
1 SOLUTION TOPICAL
Qty: 0 | Refills: 0 | Status: DISCONTINUED | OUTPATIENT
Start: 2018-12-11 | End: 2018-12-13

## 2018-12-11 RX ADMIN — Medication 100 MILLIGRAM(S): at 12:44

## 2018-12-11 RX ADMIN — Medication 40 MILLIGRAM(S): at 05:53

## 2018-12-11 RX ADMIN — Medication 25 MILLIGRAM(S): at 05:54

## 2018-12-11 RX ADMIN — TAMSULOSIN HYDROCHLORIDE 0.4 MILLIGRAM(S): 0.4 CAPSULE ORAL at 21:44

## 2018-12-11 RX ADMIN — Medication 2 CAPSULE(S): at 05:54

## 2018-12-11 RX ADMIN — Medication 40 MILLIGRAM(S): at 18:16

## 2018-12-11 RX ADMIN — Medication 166.67 MILLIGRAM(S): at 05:53

## 2018-12-11 RX ADMIN — CEFEPIME 100 MILLIGRAM(S): 1 INJECTION, POWDER, FOR SOLUTION INTRAMUSCULAR; INTRAVENOUS at 05:53

## 2018-12-11 RX ADMIN — Medication 2 CAPSULE(S): at 21:44

## 2018-12-11 RX ADMIN — Medication 2 CAPSULE(S): at 14:55

## 2018-12-11 RX ADMIN — Medication 0.12 MILLIGRAM(S): at 05:54

## 2018-12-11 RX ADMIN — Medication 25 GRAM(S): at 10:39

## 2018-12-11 RX ADMIN — Medication 0.6 MILLIGRAM(S): at 14:55

## 2018-12-11 RX ADMIN — Medication 650 MILLIGRAM(S): at 02:58

## 2018-12-11 RX ADMIN — FINASTERIDE 5 MILLIGRAM(S): 5 TABLET, FILM COATED ORAL at 14:55

## 2018-12-11 RX ADMIN — WARFARIN SODIUM 2.5 MILLIGRAM(S): 2.5 TABLET ORAL at 21:47

## 2018-12-11 RX ADMIN — Medication 50 GRAM(S): at 15:55

## 2018-12-11 RX ADMIN — Medication 25 MILLIGRAM(S): at 18:16

## 2018-12-11 RX ADMIN — PANTOPRAZOLE SODIUM 40 MILLIGRAM(S): 20 TABLET, DELAYED RELEASE ORAL at 05:55

## 2018-12-11 RX ADMIN — SPIRONOLACTONE 50 MILLIGRAM(S): 25 TABLET, FILM COATED ORAL at 05:54

## 2018-12-11 NOTE — CONSULT NOTE ADULT - ASSESSMENT
IMPRESSION:  CHF  No bacterial PNA  WBC 4.3  CXR read as new RLL opacity    RECOMMENDATIONS:  D/c ABx

## 2018-12-11 NOTE — CONSULT NOTE ADULT - SUBJECTIVE AND OBJECTIVE BOX
MORAIMA OROZCO  68y, Male  Allergy: No Known Allergies    HPI:  69 yo M with PMH of A-fib on Coumadin, HFrEF s/p AICD, HTN, Gout, CKD, BPH, liver cirrhosis, pancreatic insufficiency, Osler-Weber-Rendu syndrome, Iron deficiency anemia presented to ED complaining of SOB, chills, and cough. Patient was recently treated with [facility] as outpatient after PCP prescribed for URI ~1 week PTP. Symptoms did not improve. Report that PCP stated patient's throat was red in office. Deny any fevers, chest pain, abdominal pain, nausea, vomiting, constipation, diarrhea, dysuria, myalgias, rash, or other complaint or symptom.    Patient was initially admitted to ICU because he was unable to be weaned off BIPAP. While still in ED, patient did well of BIPAP and was downgraded to regular floor. S/p Archana + Vanc in ED. (10 Dec 2018 19:12)   + LEG EDEMA, WORSENING. VITALS NOTED. ALERT MODERATE RESP DISTRESS. + TACHYPNEA. OP WITH MMM. NECK SUPPLE. + JVD. LUNGS WITH B/L RHONCHI.     FAMILY HISTORY:  No pertinent family history in first degree relatives    PAST MEDICAL & SURGICAL HISTORY:  BPH (benign prostatic hyperplasia)  CKD (chronic kidney disease)  Osler-Weber-Rendu disease  Atrial fibrillation  HTN (hypertension)  Hepatic cirrhosis, unspecified hepatic cirrhosis type, unspecified whether ascites present  Gout, unspecified cause, unspecified chronicity, unspecified site  Anemia, unspecified type  Congestive heart failure, unspecified HF chronicity, unspecified heart failure type  S/P implantation of automatic cardioverter/defibrillator (AICD)    VITALS:  T(F): 96.8, Max: 101.6 (12-10-18 @ 16:06)  HR: 87  BP: 99/55  RR: 18Vital Signs Last 24 Hrs  T(C): 36 (11 Dec 2018 05:44), Max: 38.7 (10 Dec 2018 16:06)  T(F): 96.8 (11 Dec 2018 05:44), Max: 101.6 (10 Dec 2018 16:06)  HR: 87 (11 Dec 2018 05:44) (82 - 96)  BP: 99/55 (11 Dec 2018 05:44) (89/52 - 139/90)  BP(mean): --  RR: 18 (11 Dec 2018 05:44) (18 - 26)  SpO2: 96% (10 Dec 2018 23:57) (90% - 97%)    PHYSICAL EXAM:  Constitutional: NAD  HEENT: anicteric sclera, oropharynx clear, MMM  Neck: No JVD  Respiratory: CTAB, no wheezes, rales or rhonchi  Cardiovascular: S1, S2, RRR  Gastrointestinal: BS+, soft, NT/ND  Extremities: No cyanosis or clubbing. No peripheral edema  Neurological: A/O x 3, no focal deficits  Psychiatric: Normal mood, normal affect  : No CVA tenderness. No lam.   Skin: No rashes  Vascular Access:    TESTS & MEASUREMENTS:                      10.9   4.38  )-----------( 114      ( 11 Dec 2018 05:37 )             34.8   WBC On Admission 6.93  Differentials:  - Neutrophils 81.7% > 74.7%  - Lymphocyte 7.5% > 11.9%    PT: 23.2  INR 2.03  PTT 43.4  BNP 1759    135  |  95<L>  |  26<H>  ----------------------------<  89  4.6   |  26  |  1.7<H>    Ca    9.1      11 Dec 2018 05:37  Phos  4.0     12-11  Mg     1.6     12-11    TPro  6.5  /  Alb  3.8 > 3.3<L>  /  TBili  2.5 > 2.2<H>  /  DBili  x   /  AST  21  /  ALT  11  /  AlkPhos  115  12-11  eGFR 40  Ammonia 61    LIVER FUNCTIONS - ( 11 Dec 2018 05:37 )  Alb: 3.3 g/dL / Pro: 6.5 g/dL / ALK PHOS: 115 U/L / ALT: 11 U/L / AST: 21 U/L / GGT: x           Rapid RVP: Not Detected    RADIOLOGY & ADDITIONAL TESTS:  CXR (12/10): New R Basilar Opacity/Effusion, compatible with pneumonia in appropriate clinical setting. Stable Pulmonary Congestion since 10/05/18.  EKG (12/10): Wide QRS with occasional ventricular-paced complexes and with occasional PVC    ANTIBIOTICS:  cefepime   IVPB 2000 milliGRAM(s) IV Intermittent every 24 hours  vancomycin  IVPB 1000 milliGRAM(s) IV Intermittent every 24 hours    ASSESSMENT AND PLAN:  #) SOB + chills + cough 2/2 RLL PNA  - CXR = new R basilar opacity/effusion  - symptoms persistent despite outpatient Z-[facility] treatment  - fever=101.6 in ED  - given recent Abx use will Cefepime + Vanc for now  - f/u blood Cx    #) Liver cirrhosis - patient has been taking Lactulose for elevated ammonia, c/w to titrate to 2-3 BM's per day    #) Pancreatic insufficiency - c/w Creon    #) Osler-Weber-Rendu syndrome - patient has history of repeated nose bleeds in past, stable for now, please monitor MORAIMA OROZCO  68y, Male  Allergy: No Known Allergies    HPI:  69 yo M with PMH of A-fib on Coumadin, HFrEF s/p AICD, HTN, Gout, CKD, BPH, liver cirrhosis, pancreatic insufficiency, Osler-Weber-Rendu syndrome, Iron deficiency anemia presented to ED complaining of SOB, chills, and cough. Patient was recently treated with Z-[facility] as outpatient after PCP prescribed for URI ~1 week PTP. Symptoms did not improve. Report that PCP stated patient's throat was red in office. Deny any fevers, chest pain, abdominal pain, nausea, vomiting, constipation, diarrhea, dysuria, myalgias, rash, or other complaint or symptom.    Patient was initially admitted to ICU because he was unable to be weaned off BIPAP. While still in ED, patient did well of BIPAP and was downgraded to regular floor. S/p Archana + Vanc in ED. (10 Dec 2018 19:12)   + LEG EDEMA, WORSENING. VITALS NOTED. ALERT MODERATE RESP DISTRESS. + TACHYPNEA. OP WITH MMM. NECK SUPPLE. + JVD. LUNGS WITH B/L RHONCHI.     FAMILY HISTORY:  No pertinent family history in first degree relatives    PAST MEDICAL & SURGICAL HISTORY:  BPH (benign prostatic hyperplasia)  CKD (chronic kidney disease)  Osler-Weber-Rendu disease  Atrial fibrillation  HTN (hypertension)  Hepatic cirrhosis, unspecified hepatic cirrhosis type, unspecified whether ascites present  Gout, unspecified cause, unspecified chronicity, unspecified site  Anemia, unspecified type  Congestive heart failure, unspecified HF chronicity, unspecified heart failure type  S/P implantation of automatic cardioverter/defibrillator (AICD)    VITALS:  T(F): 96.8, Max: 101.6 (12-10-18 @ 16:06)  HR: 87  BP: 99/55  RR: 18Vital Signs Last 24 Hrs  T(C): 36 (11 Dec 2018 05:44), Max: 38.7 (10 Dec 2018 16:06)  T(F): 96.8 (11 Dec 2018 05:44), Max: 101.6 (10 Dec 2018 16:06)  HR: 87 (11 Dec 2018 05:44) (82 - 96)  BP: 99/55 (11 Dec 2018 05:44) (89/52 - 139/90)  BP(mean): --  RR: 18 (11 Dec 2018 05:44) (18 - 26)  SpO2: 96% (10 Dec 2018 23:57) (90% - 97%)    PHYSICAL EXAM:  Constitutional: NAD  HEENT: anicteric sclera, oropharynx clear, MMM  Neck: No JVD  Respiratory: CTAB, no wheezes, rales or rhonchi  Cardiovascular: S1, S2, RRR  Gastrointestinal: BS+, soft, NT/ND  Extremities: No cyanosis or clubbing. No peripheral edema  Neurological: A/O x 3, no focal deficits  Psychiatric: Normal mood, normal affect  : No CVA tenderness. No lam.   Skin: No rashes  Vascular Access:    TESTS & MEASUREMENTS:                      10.9   4.38  )-----------( 114      ( 11 Dec 2018 05:37 )             34.8   WBC On Admission 6.93  Differentials:  - Neutrophils 81.7% > 74.7%  - Lymphocyte 7.5% > 11.9%    PT: 23.2  INR 2.03  PTT 43.4  BNP 1759    135  |  95<L>  |  26<H>  ----------------------------<  89  4.6   |  26  |  1.7<H>    Ca    9.1      11 Dec 2018 05:37  Phos  4.0     12-11  Mg     1.6     12-11    TPro  6.5  /  Alb  3.8 > 3.3<L>  /  TBili  2.5 > 2.2<H>  /  DBili  x   /  AST  21  /  ALT  11  /  AlkPhos  115  12-11  eGFR 40  Ammonia 61    LIVER FUNCTIONS - ( 11 Dec 2018 05:37 )  Alb: 3.3 g/dL / Pro: 6.5 g/dL / ALK PHOS: 115 U/L / ALT: 11 U/L / AST: 21 U/L / GGT: x           Rapid RVP: Not Detected  Blood and Urine Culture Pending  UA Pending    RADIOLOGY & ADDITIONAL TESTS:  CXR (12/10): New R Basilar Opacity/Effusion, compatible with pneumonia in appropriate clinical setting. Stable Pulmonary Congestion since 10/05/18.  EKG (12/10): Wide QRS with occasional ventricular-paced complexes and with occasional PVC    ANTIBIOTICS:  cefepime   IVPB 2000 milliGRAM(s) IV Intermittent every 24 hours  vancomycin  IVPB 1000 milliGRAM(s) IV Intermittent every 24 hours    ASSESSMENT AND PLAN:  #) SOB + chills + cough 2/2 RLL PNA  - CXR = new R basilar opacity/effusion  - symptoms persistent despite outpatient Z-[facility] treatment  - fever=101.6 in ED  - given recent Abx use will Cefepime + Vanc for now  - f/u blood Cx    #) Liver cirrhosis - patient has been taking Lactulose for elevated ammonia, c/w to titrate to 2-3 BM's per day    #) Pancreatic insufficiency - c/w Creon    #) Osler-Weber-Rendu syndrome - patient has history of repeated nose bleeds in past, stable for now, please monitor MORAIMA CATHERINE  68y, Male  Allergy: No Known Allergies    HPI:  67 yo M with PMH of A-fib on Coumadin, HFrEF s/p AICD, HTN, Gout, CKD, BPH, liver cirrhosis, pancreatic insufficiency, Osler-Weber-Rendu syndrome, Iron deficiency anemia presented to ED complaining of SOB and chills since 12/10. ID consulted for pneumonia. Pt seen at bedside, speaks Filipino: Son (Abelino Catherine) was contacted via phone. Patient was seen by PCP for "sore, red throat" on 12/04/18 and was prescribed/compliant with Z-[facility]. Symptoms did not improve. Denies any fevers, cough, hemoptysis, chest pain, abdominal pain, nausea, vomiting, constipation, diarrhea, dysuria, myalgias, rash, AMS. Denies any ill contacts, recent hospitalizations (last on 10/15/18 for hyperammonemia). Endorses worsening edema, PND, orthopnea (sleeps with 2 pillows), and exertional dyspnea. Reports compliance with Furosemide. At home, He uses 2.5L of O2 via NC on occasion when feeling weak.    On presentation, CURB was 2+ (Patient was tachypnic at 26 breaths/min). Patient was initially admitted to ICU because he was unable to be weaned off BIPAP. While still in ED, patient did well of BIPAP and was downgraded to regular floor. S/p Archana + Vanc in ED. Currently on Cefepime and Vancomycin.    FAMILY HISTORY:  No pertinent family history in first degree relatives    PAST MEDICAL & SURGICAL HISTORY:  BPH (benign prostatic hyperplasia)  CKD (chronic kidney disease)  Osler-Weber-Rendu disease  Atrial fibrillation  HTN (hypertension)  Hepatic cirrhosis, unspecified hepatic cirrhosis type, unspecified whether ascites present  Gout, unspecified cause, unspecified chronicity, unspecified site  Anemia, unspecified type  Congestive heart failure, unspecified HF chronicity, unspecified heart failure type  S/P implantation of automatic cardioverter/defibrillator (AICD)    Hospital Medications:   MEDICATIONS  (STANDING):  allopurinol 100 milliGRAM(s) Oral daily  amylase/lipase/protease  (CREON 12,000 Units) 2 Capsule(s) Oral three times a day  cefepime   IVPB 2000 milliGRAM(s) IV Intermittent every 24 hours  chlorhexidine 4% Liquid 1 Application(s) Topical <User Schedule>  colchicine 0.6 milliGRAM(s) Oral daily  digoxin     Tablet 0.125 milliGRAM(s) Oral daily  finasteride 5 milliGRAM(s) Oral daily  furosemide   Injectable 40 milliGRAM(s) IV Push two times a day  metoprolol tartrate 25 milliGRAM(s) Oral two times a day  pantoprazole    Tablet 40 milliGRAM(s) Oral before breakfast  sotalol 40 milliGRAM(s) Oral two times a day  spironolactone 50 milliGRAM(s) Oral daily  tamsulosin 0.4 milliGRAM(s) Oral at bedtime  vancomycin  IVPB 1000 milliGRAM(s) IV Intermittent every 24 hours    VITALS:  T(F): 96.8, Max: 101.6 (12-10-18 @ 16:06)  HR: 87  BP: 99/55  RR: 18Vital Signs Last 24 Hrs  T(C): 36 (11 Dec 2018 05:44), Max: 38.7 (10 Dec 2018 16:06)  T(F): 96.8 (11 Dec 2018 05:44), Max: 101.6 (10 Dec 2018 16:06)  HR: 87 (11 Dec 2018 05:44) (82 - 96)  BP: 99/55 (11 Dec 2018 05:44) (89/52 - 139/90)  BP(mean): --  RR: 18 (11 Dec 2018 05:44) (18 - 26)  SpO2: 96% (10 Dec 2018 23:57) (90% - 97%)    PHYSICAL EXAM:  Constitutional: Facial Telengectasia  HEENT: anicteric sclera, oropharynx clear, MMM  Neck: JVD +  Respiratory: Decreased breath sounds of the R lower lobe. Currently on NC, no wheezes, rales or rhonchi  Cardiovascular: S1, S2, RRR  Gastrointestinal: BS+, soft, NT/ND.  Extremities: No cyanosis or clubbing. PE 1+ BLE  Neurological: A/O x 3, no focal deficits  Psychiatric: Normal mood, normal affect  : No CVA tenderness. No lam.   Skin: No rashes    TESTS & MEASUREMENTS:                      10.9   4.38  )-----------( 114      ( 11 Dec 2018 05:37 )             34.8   WBC On Admission 6.93  Differentials:  - Neutrophils 81.7% > 74.7%  - Lymphocyte 7.5% > 11.9%    PT: 23.2  INR 2.03  PTT 43.4  BNP 1759    135  |  95<L>  |  26<H>  ----------------------------<  89  4.6   |  26  |  1.7<H>    Ca    9.1      11 Dec 2018 05:37  Phos  4.0     12-11  Mg     1.6     12-11    TPro  6.5  /  Alb  3.8 > 3.3<L>  /  TBili  2.5 > 2.2<H>  /  DBili  x   /  AST  21  /  ALT  11  /  AlkPhos  115  12-11  eGFR 40  Ammonia 61    LIVER FUNCTIONS - ( 11 Dec 2018 05:37 )  Alb: 3.3 g/dL / Pro: 6.5 g/dL / ALK PHOS: 115 U/L / ALT: 11 U/L / AST: 21 U/L / GGT: x           Rapid RVP: Not Detected  Blood and Urine Culture Pending  UA Pending    RADIOLOGY & ADDITIONAL TESTS:  CXR (12/10): New R Basilar Opacity/Effusion, compatible with pneumonia in appropriate clinical setting. Stable Pulmonary Congestion since 10/05/18.  EKG (12/10): Wide QRS with occasional ventricular-paced complexes and with occasional PVC    ANTIBIOTICS:  cefepime   IVPB 2000 milliGRAM(s) IV Intermittent every 24 hours  vancomycin  IVPB 1000 milliGRAM(s) IV Intermittent every 24 hours    ASSESSMENT AND PLAN:  #) SOB + chills + cough 2/2 RLL PNA  - CXR = new R basilar opacity/effusion  - symptoms persistent despite outpatient Z-[facility] treatment  - fever=101.6 in ED  - given recent Abx use will Cefepime + Vanc for now  - f/u blood Cx    #) Liver cirrhosis - patient has been taking Lactulose for elevated ammonia, c/w to titrate to 2-3 BM's per day    #) Pancreatic insufficiency - c/w Creon    #) Osler-Weber-Rendu syndrome - patient has history of repeated nose bleeds in past, stable for now, please monitor MORAIMA CATHERINE  68y, Male  Allergy: No Known Allergies    HPI:  69 yo M with PMH of A-fib on Coumadin, HFrEF s/p AICD, HTN, Gout, CKD, BPH, liver cirrhosis, pancreatic insufficiency, Osler-Weber-Rendu syndrome, Iron deficiency anemia presented to ED complaining of SOB and chills since 12/10. ID consulted for pneumonia. Pt seen at bedside, speaks Omani: Son (Abelino Catherine) was contacted via phone. Patient was seen by PCP for "sore, red throat" on 12/04/18 and was prescribed/compliant with Z-[facility]. Symptoms did not improve. Denies any fevers, cough, hemoptysis, chest pain, abdominal pain, nausea, vomiting, constipation, diarrhea, dysuria, myalgias, rash, AMS. Denies any ill contacts, recent hospitalizations (last on 10/15/18 for hyperammonemia). Endorses worsening edema, PND, orthopnea (sleeps with 2 pillows), and exertional dyspnea. Reports compliance with Furosemide. At home, He uses 2.5L of O2 via NC on occasion when feeling weak.    On presentation, CURB was 2+ (Patient was tachypnic at 26 breaths/min). Patient was initially admitted to ICU because he was unable to be weaned off BIPAP. While still in ED, patient did well of BIPAP and was downgraded to regular floor. S/p Archana + Vanc in ED. Currently on Cefepime and Vancomycin.    FAMILY HISTORY:  No pertinent family history in first degree relatives    PAST MEDICAL & SURGICAL HISTORY:  BPH (benign prostatic hyperplasia)  CKD (chronic kidney disease)  Osler-Weber-Rendu disease  Atrial fibrillation  HTN (hypertension)  Hepatic cirrhosis, unspecified hepatic cirrhosis type, unspecified whether ascites present  Gout, unspecified cause, unspecified chronicity, unspecified site  Anemia, unspecified type  Congestive heart failure, unspecified HF chronicity, unspecified heart failure type  S/P implantation of automatic cardioverter/defibrillator (AICD)    Hospital Medications:   MEDICATIONS  (STANDING):  allopurinol 100 milliGRAM(s) Oral daily  amylase/lipase/protease  (CREON 12,000 Units) 2 Capsule(s) Oral three times a day  cefepime   IVPB 2000 milliGRAM(s) IV Intermittent every 24 hours  chlorhexidine 4% Liquid 1 Application(s) Topical <User Schedule>  colchicine 0.6 milliGRAM(s) Oral daily  digoxin     Tablet 0.125 milliGRAM(s) Oral daily  finasteride 5 milliGRAM(s) Oral daily  furosemide   Injectable 40 milliGRAM(s) IV Push two times a day  metoprolol tartrate 25 milliGRAM(s) Oral two times a day  pantoprazole    Tablet 40 milliGRAM(s) Oral before breakfast  sotalol 40 milliGRAM(s) Oral two times a day  spironolactone 50 milliGRAM(s) Oral daily  tamsulosin 0.4 milliGRAM(s) Oral at bedtime  vancomycin  IVPB 1000 milliGRAM(s) IV Intermittent every 24 hours    VITALS:  T(F): 96.8, Max: 101.6 (12-10-18 @ 16:06)  HR: 87  BP: 99/55  RR: 18Vital Signs Last 24 Hrs  T(C): 36 (11 Dec 2018 05:44), Max: 38.7 (10 Dec 2018 16:06)  T(F): 96.8 (11 Dec 2018 05:44), Max: 101.6 (10 Dec 2018 16:06)  HR: 87 (11 Dec 2018 05:44) (82 - 96)  BP: 99/55 (11 Dec 2018 05:44) (89/52 - 139/90)  BP(mean): --  RR: 18 (11 Dec 2018 05:44) (18 - 26)  SpO2: 96% (10 Dec 2018 23:57) (90% - 97%)  Wt: 101.3 Kg    PHYSICAL EXAM:  Constitutional: Facial Telengectasia  HEENT: anicteric sclera, oropharynx clear, MMM  Neck: JVD +  Respiratory: Decreased breath sounds of the R lower lobe. Currently on NC, no wheezes, rales or rhonchi  Cardiovascular: S1, S2, RRR  Gastrointestinal: BS+, soft, NT/ND.  Extremities: No cyanosis or clubbing. PE 1+ BLE  Neurological: A/O x 3, no focal deficits  Psychiatric: Normal mood, normal affect  : No CVA tenderness. No lam.   Skin: No rashes    TESTS & MEASUREMENTS:                      10.9   4.38  )-----------( 114      ( 11 Dec 2018 05:37 )             34.8   WBC On Admission 6.93  Differentials:  - Neutrophils 81.7% > 74.7%  - Lymphocyte 7.5% > 11.9%    PT: 23.2  INR 2.03  PTT 43.4  BNP 1759    135  |  95<L>  |  26<H>  ----------------------------<  89  4.6   |  26  |  1.7<H>    Ca    9.1      11 Dec 2018 05:37  Phos  4.0     12-11  Mg     1.6     12-11    TPro  6.5  /  Alb  3.8 > 3.3<L>  /  TBili  2.5 > 2.2<H>  /  DBili  x   /  AST  21  /  ALT  11  /  AlkPhos  115  12-11  eGFR 40  Ammonia 61    LIVER FUNCTIONS - ( 11 Dec 2018 05:37 )  Alb: 3.3 g/dL / Pro: 6.5 g/dL / ALK PHOS: 115 U/L / ALT: 11 U/L / AST: 21 U/L / GGT: x           Rapid RVP: Not Detected  Blood and Urine Culture Pending  UA Pending    RADIOLOGY & ADDITIONAL TESTS:  CXR (12/10): New R Basilar Opacity/Effusion, compatible with pneumonia in appropriate clinical setting. Stable Pulmonary Congestion since 10/05/18.  EKG (12/10): Wide QRS with occasional ventricular-paced complexes and with occasional PVC. QTc 542    ANTIBIOTICS:  cefepime   IVPB 2000 milliGRAM(s) IV Intermittent every 24 hours  vancomycin  IVPB 1000 milliGRAM(s) IV Intermittent every 24 hours    ASSESSMENT AND PLAN:  #1: Acute Community-Acquired R Lower Lobe Pneumonia with evidence of Sepsis  - On XR, R Lower Lobe Opacity/Effusion  - Fever of 101.6 F, RR 26, WBC 4.38  - CURB >2: 69 yo, RR>26    vs. Acute CHF Exacerbation.   - Worsening SOB  - Worsening Pitting Edema   - Pulmonary Vascular Congestion on XR   - BNP 1759  - Reports PND, Exertional Dyspnea, Orthopnea  - JVD + on presentation    Plan:  - Continue Vancomycin. Increase dose to 1500mg Q24H to account for weight and renal impairment.   - Continue with antipneumococcal beta-lactam: Cefepime 2000mg Q24H  - Start Doxycycline  mg BID for 5 to 7 days.   - Continue Furosemide 40mg IV BID

## 2018-12-11 NOTE — CONSULT NOTE ADULT - SUBJECTIVE AND OBJECTIVE BOX
Date of Admission:    CHIEF COMPLAINT:    HISTORY OF PRESENT ILLNESS: 68yMale with PMH below presented to the hospital for fever cough and RLL pneumonia     PAST MEDICAL & SURGICAL HISTORY:  BPH (benign prostatic hyperplasia)  CKD (chronic kidney disease)  Osler-Weber-Rendu disease  Atrial fibrillation  HTN (hypertension)  Hepatic cirrhosis, unspecified hepatic cirrhosis type, unspecified whether ascites present  Gout, unspecified cause, unspecified chronicity, unspecified site  Anemia, unspecified type  Congestive heart failure, unspecified HF chronicity, unspecified heart failure type  S/P implantation of automatic cardioverter/defibrillator (AICD)    HEALTH ISSUES - PROBLEM Dx:        FAMILY HISTORY:  No pertinent family history in first degree relatives    Allergies    No Known Allergies    Intolerances    	  Home Medications:  allopurinol 100 mg oral tablet: 1 tab(s) orally once a day (10 Dec 2018 20:00)  Colcrys 0.6 mg oral tablet: 1 tab(s) orally once a day (10 Dec 2018 20:02)  Coumadin 2.5 mg oral tablet: 1 tab(s) orally once a day (10 Dec 2018 20:00)  Creon 24,000 units oral delayed release capsule: 1 cap(s) orally 3 times a day (10 Dec 2018 20:02)  digoxin 125 mcg (0.125 mg) oral tablet: 1 tab(s) orally once a day (10 Dec 2018 20:01)  finasteride 5 mg oral tablet: 1 tab(s) orally once a day (10 Dec 2018 20:02)  furosemide 40 mg oral tablet: 1 tab(s) orally 2 times a day (10 Dec 2018 20:04)  lactulose 10 g/15 mL oral solution: 30 milliliter(s) orally 2 times a day (10 Dec 2018 20:05)  Metoprolol Tartrate 25 mg oral tablet: 1 tab(s) orally 2 times a day (10 Dec 2018 20:02)  pantoprazole 40 mg oral delayed release tablet: 1 tab(s) orally once a day (10 Dec 2018 20:04)  sotalol 80 mg oral tablet: 0.5 tab(s) orally 2 times a day (10 Dec 2018 20:03)  spironolactone 50 mg oral tablet: 1 tab(s) orally once a day (10 Dec 2018 20:01)  tamsulosin 0.4 mg oral capsule: 1 cap(s) orally once a day (10 Dec 2018 20:02)    MEDICATIONS  (STANDING):  allopurinol 100 milliGRAM(s) Oral daily  amylase/lipase/protease  (CREON 12,000 Units) 2 Capsule(s) Oral three times a day  chlorhexidine 4% Liquid 1 Application(s) Topical <User Schedule>  colchicine 0.6 milliGRAM(s) Oral daily  digoxin     Tablet 0.125 milliGRAM(s) Oral daily  finasteride 5 milliGRAM(s) Oral daily  furosemide    Tablet 40 milliGRAM(s) Oral two times a day  metoprolol tartrate 25 milliGRAM(s) Oral two times a day  pantoprazole    Tablet 40 milliGRAM(s) Oral before breakfast  sotalol 40 milliGRAM(s) Oral two times a day  spironolactone 50 milliGRAM(s) Oral daily  tamsulosin 0.4 milliGRAM(s) Oral at bedtime  warfarin 2.5 milliGRAM(s) Oral once    MEDICATIONS  (PRN):  lactulose Syrup 20 Gram(s) Oral two times a day PRN 2 BM's per day              SOCIAL HISTORY:    [ ] Non-smoker  [ ] Smoker  [ ] Alcohol          PHYSICAL EXAM:  T(C): 35.6 (12-11-18 @ 14:07), Max: 36.7 (12-10-18 @ 23:57)  HR: 87 (12-11-18 @ 14:07) (82 - 87)  BP: 91/53 (12-11-18 @ 14:07) (89/52 - 130/72)  RR: 18 (12-11-18 @ 14:07) (18 - 20)  SpO2: 96% (12-10-18 @ 23:57) (92% - 96%)  Wt(kg): --  I&O's Summary    11 Dec 2018 07:01  -  11 Dec 2018 16:15  --------------------------------------------------------  IN: 0 mL / OUT: 650 mL / NET: -650 mL      Daily Height in cm: 175.26 (10 Dec 2018 23:57)    Daily     General Appearance: Normal	  Cardiovascular: Normal S1 S2, 1-2/6 systolic murmur   No JVD, No murmurs, No edema  Respiratory: Lungs clear to auscultation	left ICD  Psychiatry: A & O x 3, Mood & affect appropriate  Gastrointestinal:  Soft, Non-tender  Skin: No rashes, No ecchymoses, No cyanosis	  Neurologic: Non-focal          LABS:	 	                          10.9   4.38  )-----------( 114      ( 11 Dec 2018 05:37 )             34.8     12-11    135  |  95<L>  |  26<H>  ----------------------------<  89  4.6   |  26  |  1.7<H>    Ca    9.1      11 Dec 2018 05:37  Phos  4.0     12-11  Mg     1.6     12-11    TPro  6.5  /  Alb  3.3<L>  /  TBili  2.2<H>  /  DBili  x   /  AST  21  /  ALT  11  /  AlkPhos  115  12-11        PT/INR - ( 11 Dec 2018 05:37 )   PT: 23.20 sec;   INR: 2.03 ratio         PTT - ( 11 Dec 2018 05:37 )  PTT:43.4 sec    proBNP: Serum Pro-Brain Natriuretic Peptide: 1759 pg/mL (12-11 @ 05:37)    Lipid Profile:   HgA1c:   TSH:       CARDIAC MARKERS:            TELEMETRY EVENTS: NSVT 	    ECG:  < from: 12 Lead ECG (12.10.18 @ 16:22) >  Diagnosis Line Wide QRS rhythm with occasional ventricular-paced complexes and with  occasional Premature ventricular complexes  Right bundle branch block  Inferior infarct , age undetermined  Anterior infarct , age undetermined  Abnormal ECG    Confirmed by LAXMI COOPER MD ([id number]) on 12/10/2018 11:22:25 PM    < end of copied text >  	  RADIOLOGY:  OTHER: 	    PREVIOUS DIAGNOSTIC TESTING:    [ ] Echocardiogram:  [ ]  Catheterization:  [ ] Stress Test:  	  	  ASSESSMENT/PLAN: 	  HX of HREF ? EF by echo  Severe MR  Dung walsh   BPH  BIv ICD MDT  PAF  Cirrhosis  Creat 1.7  NSVT  stable electrolytes and has ICD no change tx    Rec continue current meds

## 2018-12-11 NOTE — MEDICAL STUDENT ADULT H&P (EDUCATION) - NS MD HP STUD ASPLAN PLAN FT
#) RLL PNA  - CXR = new R basilar opacity/effusion  - cw cefepime and Vanco  - f/u blood Cx  - RVP negative  - f/u MRSA  - f/u ID eval  - will get vanco trough    #) A-fib on Coumadin - rate controlled, c/w Coumadin + Dig + Metoprolol + Sotalol (per last Cardio note), monitor INR daily    #) HFrEF - c/w BB, given worsening LE edema + SOB + effusion , will give po lasix    #) HTN - c/w BB + Aldactone + Sotalol    #) Gout - c/w Allopurinol + Colchicine    #) CKD - Cr at baseline 1.7    #) BPH - c/w Flomax + Finasteride    #) Liver cirrhosis - patient has been taking Lactulose for elevated ammonia, c/w to titrate to 2-3 BM's per day    #) Pancreatic insufficiency - c/w Creon    #) Osler-Weber-Rendu syndrome - patient has history of repeated nose bleeds in past, stable for now, please monitor    DVT ppx: on Coumadin  Diet: DASH  Activity: AAT  Code status: FULL  Dispo: anticipate home

## 2018-12-11 NOTE — PROGRESS NOTE ADULT - SUBJECTIVE AND OBJECTIVE BOX
Patient is a 68y old  Male who presents with a chief complaint of Pneumonia (11 Dec 2018 09:11)      HPI:  69 yo M with PMH of A-fib on Coumadin, HFrEF s/p AICD, HTN, gout, CKD, BPH, liver cirrhosis, pancreatic insufficiency, Osler-Weber-Rendu syndrome presented to ED complaining of SOB, chills, and cough. Patient was recently treated with [facility] as outpatient after PCP prescribed for URI ~1 week PTP. Symptoms did not improve. Report that PCP stated patient's throat was red in office. Deny any fevers, chest pain, abdominal pain, nausea, vomiting, constipation, diarrhea, dysuria, myalgias, rash, or other complaint or symptom.    Patient was initially admitted to ICU because he was unable to be weaned off BIPAP. While still in ED, patient did well of BIPAP and was downgraded to regular floor. S/p Archana + Vanc in ED. (10 Dec 2018 19:12)      PAST MEDICAL & SURGICAL HISTORY:  BPH (benign prostatic hyperplasia)  CKD (chronic kidney disease)  Osler-Weber-Rendu disease  Atrial fibrillation  HTN (hypertension)  Hepatic cirrhosis, unspecified hepatic cirrhosis type, unspecified whether ascites present  Gout, unspecified cause, unspecified chronicity, unspecified site  Anemia, unspecified type  Congestive heart failure, unspecified HF chronicity, unspecified heart failure type  S/P implantation of automatic cardioverter/defibrillator (AICD)      SOCIAL HX:   Smoking                         ETOH                            Other    FAMILY HISTORY:  No pertinent family history in first degree relatives  :  No known cardiovacular family hisotry     ROS:  See HPI     Allergies    No Known Allergies    Intolerances          PHYSICAL EXAM    ICU Vital Signs Last 24 Hrs  T(C): 36 (11 Dec 2018 05:44), Max: 38.7 (10 Dec 2018 16:06)  T(F): 96.8 (11 Dec 2018 05:44), Max: 101.6 (10 Dec 2018 16:06)  HR: 87 (11 Dec 2018 05:44) (82 - 96)  BP: 99/55 (11 Dec 2018 05:44) (89/52 - 139/90)  BP(mean): --  ABP: --  ABP(mean): --  RR: 18 (11 Dec 2018 05:44) (18 - 26)  SpO2: 96% (10 Dec 2018 23:57) (90% - 97%)      General: In NAD   HEENT:  PANFILO              Lymphatic system: No cervical LN   Lungs: Bilateral BS  Cardiovascular: Regular  Gastrointestinal: Soft, Positive BS  Musculoskeletal: No clubbing.  Moves all extremities.  Full range of motion   Skin: Warm.  Intact  Neurological: No motor or sensory deficit         LABS:                          10.9   4.38  )-----------( 114      ( 11 Dec 2018 05:37 )             34.8                                               12-11    135  |  95<L>  |  26<H>  ----------------------------<  89  4.6   |  26  |  1.7<H>    Ca    9.1      11 Dec 2018 05:37  Phos  4.0     12-11  Mg     1.6     12-11    TPro  6.5  /  Alb  3.3<L>  /  TBili  2.2<H>  /  DBili  x   /  AST  21  /  ALT  11  /  AlkPhos  115  12-11      PT/INR - ( 11 Dec 2018 05:37 )   PT: 23.20 sec;   INR: 2.03 ratio         PTT - ( 11 Dec 2018 05:37 )  PTT:43.4 sec                                                                                     LIVER FUNCTIONS - ( 11 Dec 2018 05:37 )  Alb: 3.3 g/dL / Pro: 6.5 g/dL / ALK PHOS: 115 U/L / ALT: 11 U/L / AST: 21 U/L / GGT: x                                                                                             < from: Xray Chest 1 View-PORTABLE IMMEDIATE (12.10.18 @ 15:37) >    INTERPRETATION:  Clinical History / Reason for exam: Sepsis.    Comparison : Chest radiograph 10/5/2018.    Technique/Positioning: AP portable.    Findings:    Support devices: Cardiac pacing device in stable position. Telemetry leads    Cardiac/mediastinum/hilum: Stable cardiomegaly.    Lung parenchyma/Pleura: New right basilar opacity/effusion. Stable   pulmonary vascular congestion. No pneumothorax.    Skeleton/soft tissues: Unchanged.    Impression:      New right basilar opacity/effusion, compatible with pneumonia in   appropriate clinical setting.    < end of copied text >                                          Serum Pro-Brain Natriuretic Peptide: 1759 pg/mL (12.11.18 @ 05:37)    Blood Gas Profile - Venous (12.10.18 @ 15:21)    pH, Venous: 7.41: Dr. Preciado notified @ 15:24. read back.    pCO2, Venous: 44 mmHg    pO2, Venous: 45 mmHg    HCO3, Venous: 28 mmoL/L    Base Excess, Venous: 2.7 mmoL/L    Oxygen Saturation, Venous: 72 %          MEDICATIONS  (STANDING):  allopurinol 100 milliGRAM(s) Oral daily  amylase/lipase/protease  (CREON 12,000 Units) 2 Capsule(s) Oral three times a day  cefepime   IVPB 2000 milliGRAM(s) IV Intermittent every 24 hours  chlorhexidine 4% Liquid 1 Application(s) Topical <User Schedule>  colchicine 0.6 milliGRAM(s) Oral daily  digoxin     Tablet 0.125 milliGRAM(s) Oral daily  finasteride 5 milliGRAM(s) Oral daily  furosemide   Injectable 40 milliGRAM(s) IV Push two times a day  magnesium sulfate  IVPB 2 Gram(s) IV Intermittent once  metoprolol tartrate 25 milliGRAM(s) Oral two times a day  pantoprazole    Tablet 40 milliGRAM(s) Oral before breakfast  sotalol 40 milliGRAM(s) Oral two times a day  spironolactone 50 milliGRAM(s) Oral daily  tamsulosin 0.4 milliGRAM(s) Oral at bedtime  vancomycin  IVPB 1000 milliGRAM(s) IV Intermittent every 24 hours    MEDICATIONS  (PRN):  acetaminophen   Tablet .. 650 milliGRAM(s) Oral every 6 hours PRN Mild Pain (1 - 3)  acetaminophen   Tablet .. 650 milliGRAM(s) Oral every 6 hours PRN Temp greater or equal to 38C (100.4F)  lactulose Syrup 20 Gram(s) Oral two times a day PRN 2 BM's per day Patient is a 68y old  Male who presents with a chief complaint of Pneumonia (11 Dec 2018 09:11)      HPI:    69 yo M with AFUB on coumadin, CKD, HFref s/p AICD, liver cirrhosis, HTN, gout, CKD, BPH, pancreatic insufficiency, Osler-Weber-Rendu syndrome presented to ED complaining of SOB, chills, and cough. Patient was recently treated with [facility] as outpatient after PCP prescribed for URI ~1 week PTP. No response to abx. Deny any fevers, chest pain, abdominal pain, nausea, vomiting, constipation, diarrhea, dysuria, myalgias, rash, or other complaint or symptom.    Patient was in respiratory distress on presentation and palced on BPAP and was weaned down to nasal canula.    He feels better this morning, still has cough less dyspnea.    PAST MEDICAL & SURGICAL HISTORY:  BPH (benign prostatic hyperplasia)  CKD (chronic kidney disease)  Osler-Weber-Rendu disease  Atrial fibrillation  HTN (hypertension)  Hepatic cirrhosis, unspecified hepatic cirrhosis type, unspecified whether ascites present  Gout, unspecified cause, unspecified chronicity, unspecified site  Anemia, unspecified type  Congestive heart failure, unspecified HF chronicity, unspecified heart failure type  S/P implantation of automatic cardioverter/defibrillator (AICD)      SOCIAL HX:   Smoking   no                      ETOH        no                    Other no    FAMILY HISTORY:  No pertinent family history in first degree relatives  :  No known cardiovacular family hisotry     ROS:  See HPI     Allergies    No Known Allergies    Intolerances          PHYSICAL EXAM    ICU Vital Signs Last 24 Hrs  T(C): 36 (11 Dec 2018 05:44), Max: 38.7 (10 Dec 2018 16:06)  T(F): 96.8 (11 Dec 2018 05:44), Max: 101.6 (10 Dec 2018 16:06)  HR: 87 (11 Dec 2018 05:44) (82 - 96)  BP: 99/55 (11 Dec 2018 05:44) (89/52 - 139/90)  BP(mean): --  ABP: --  ABP(mean): --  RR: 18 (11 Dec 2018 05:44) (18 - 26)  SpO2: 96% (10 Dec 2018 23:57) (90% - 97%)      General: In NAD   HEENT:  PANFILO              Lymphatic system: No cervical LN   Lungs: Bilateral BS, right basilar crackles, reduced breath sounds b/l, no wheeze  Cardiovascular: irregular no jvd  Gastrointestinal: Soft, Positive BS obese  Musculoskeletal: No clubbing.  Moves all extremities.  Full range of motion faint pedal edema b/l   Skin: Warm.  Intact  Neurological: No motor or sensory deficit         LABS:                          10.9   4.38  )-----------( 114      ( 11 Dec 2018 05:37 )             34.8                                               12-11    135  |  95<L>  |  26<H>  ----------------------------<  89  4.6   |  26  |  1.7<H>    Ca    9.1      11 Dec 2018 05:37  Phos  4.0     12-11  Mg     1.6     12-11    TPro  6.5  /  Alb  3.3<L>  /  TBili  2.2<H>  /  DBili  x   /  AST  21  /  ALT  11  /  AlkPhos  115  12-11      PT/INR - ( 11 Dec 2018 05:37 )   PT: 23.20 sec;   INR: 2.03 ratio         PTT - ( 11 Dec 2018 05:37 )  PTT:43.4 sec                                                                                     LIVER FUNCTIONS - ( 11 Dec 2018 05:37 )  Alb: 3.3 g/dL / Pro: 6.5 g/dL / ALK PHOS: 115 U/L / ALT: 11 U/L / AST: 21 U/L / GGT: x                                                                                             < from: Xray Chest 1 View-PORTABLE IMMEDIATE (12.10.18 @ 15:37) >    INTERPRETATION:  Clinical History / Reason for exam: Sepsis.    Comparison : Chest radiograph 10/5/2018.    Technique/Positioning: AP portable.    Findings:    Support devices: Cardiac pacing device in stable position. Telemetry leads    Cardiac/mediastinum/hilum: Stable cardiomegaly.    Lung parenchyma/Pleura: New right basilar opacity/effusion. Stable   pulmonary vascular congestion. No pneumothorax.    Skeleton/soft tissues: Unchanged.    Impression:      New right basilar opacity/effusion, compatible with pneumonia in   appropriate clinical setting.    < end of copied text >                                          Serum Pro-Brain Natriuretic Peptide: 1759 pg/mL (12.11.18 @ 05:37)    Blood Gas Profile - Venous (12.10.18 @ 15:21)    pH, Venous: 7.41: Dr. Preciado notified @ 15:24. read back.    pCO2, Venous: 44 mmHg    pO2, Venous: 45 mmHg    HCO3, Venous: 28 mmoL/L    Base Excess, Venous: 2.7 mmoL/L    Oxygen Saturation, Venous: 72 %          MEDICATIONS  (STANDING):  allopurinol 100 milliGRAM(s) Oral daily  amylase/lipase/protease  (CREON 12,000 Units) 2 Capsule(s) Oral three times a day  cefepime   IVPB 2000 milliGRAM(s) IV Intermittent every 24 hours  chlorhexidine 4% Liquid 1 Application(s) Topical <User Schedule>  colchicine 0.6 milliGRAM(s) Oral daily  digoxin     Tablet 0.125 milliGRAM(s) Oral daily  finasteride 5 milliGRAM(s) Oral daily  furosemide   Injectable 40 milliGRAM(s) IV Push two times a day  magnesium sulfate  IVPB 2 Gram(s) IV Intermittent once  metoprolol tartrate 25 milliGRAM(s) Oral two times a day  pantoprazole    Tablet 40 milliGRAM(s) Oral before breakfast  sotalol 40 milliGRAM(s) Oral two times a day  spironolactone 50 milliGRAM(s) Oral daily  tamsulosin 0.4 milliGRAM(s) Oral at bedtime  vancomycin  IVPB 1000 milliGRAM(s) IV Intermittent every 24 hours    MEDICATIONS  (PRN):  acetaminophen   Tablet .. 650 milliGRAM(s) Oral every 6 hours PRN Mild Pain (1 - 3)  acetaminophen   Tablet .. 650 milliGRAM(s) Oral every 6 hours PRN Temp greater or equal to 38C (100.4F)  lactulose Syrup 20 Gram(s) Oral two times a day PRN 2 BM's per day Patient is a 68y old  Male who presents with a chief complaint of Pneumonia (11 Dec 2018 09:11)      HPI:    67 yo M with AFIB on coumadin, CKD, HFref s/p AICD, liver cirrhosis, HTN, gout, CKD, BPH, pancreatic insufficiency, Osler-Weber-Rendu syndrome presented to ED complaining of SOB, chills, and cough. Patient was recently treated with [facility] as outpatient after PCP prescribed for URI ~1 week PTP. No response to abx. Deny any fevers, chest pain, abdominal pain, nausea, vomiting, constipation, diarrhea, dysuria, myalgias, rash, or other complaint or symptom.    Patient was in respiratory distress on presentation and palced on BPAP and was weaned down to nasal canula.    He feels better this morning, still has cough less dyspnea.    PAST MEDICAL & SURGICAL HISTORY:  BPH (benign prostatic hyperplasia)  CKD (chronic kidney disease)  Osler-Weber-Rendu disease  Atrial fibrillation  HTN (hypertension)  Hepatic cirrhosis, unspecified hepatic cirrhosis type, unspecified whether ascites present  Gout, unspecified cause, unspecified chronicity, unspecified site  Anemia, unspecified type  Congestive heart failure, unspecified HF chronicity, unspecified heart failure type  S/P implantation of automatic cardioverter/defibrillator (AICD)      SOCIAL HX:   Smoking   no                      ETOH        no                    Other no    FAMILY HISTORY:  No pertinent family history in first degree relatives  :  No known cardiovacular family hisotry     ROS:  See HPI     Allergies    No Known Allergies    Intolerances          PHYSICAL EXAM    ICU Vital Signs Last 24 Hrs  T(C): 36 (11 Dec 2018 05:44), Max: 38.7 (10 Dec 2018 16:06)  T(F): 96.8 (11 Dec 2018 05:44), Max: 101.6 (10 Dec 2018 16:06)  HR: 87 (11 Dec 2018 05:44) (82 - 96)  BP: 99/55 (11 Dec 2018 05:44) (89/52 - 139/90)  BP(mean): --  ABP: --  ABP(mean): --  RR: 18 (11 Dec 2018 05:44) (18 - 26)  SpO2: 96% (10 Dec 2018 23:57) (90% - 97%)      General: In NAD   HEENT:  PANFILO              Lymphatic system: No cervical LN   Lungs: Bilateral BS, right basilar crackles, reduced breath sounds b/l, no wheeze  Cardiovascular: irregular no jvd  Gastrointestinal: Soft, Positive BS obese  Musculoskeletal: No clubbing.  Moves all extremities.  Full range of motion faint pedal edema b/l   Skin: Warm.  Intact  Neurological: No motor or sensory deficit         LABS:                          10.9   4.38  )-----------( 114      ( 11 Dec 2018 05:37 )             34.8                                               12-11    135  |  95<L>  |  26<H>  ----------------------------<  89  4.6   |  26  |  1.7<H>    Ca    9.1      11 Dec 2018 05:37  Phos  4.0     12-11  Mg     1.6     12-11    TPro  6.5  /  Alb  3.3<L>  /  TBili  2.2<H>  /  DBili  x   /  AST  21  /  ALT  11  /  AlkPhos  115  12-11      PT/INR - ( 11 Dec 2018 05:37 )   PT: 23.20 sec;   INR: 2.03 ratio         PTT - ( 11 Dec 2018 05:37 )  PTT:43.4 sec                                                                                     LIVER FUNCTIONS - ( 11 Dec 2018 05:37 )  Alb: 3.3 g/dL / Pro: 6.5 g/dL / ALK PHOS: 115 U/L / ALT: 11 U/L / AST: 21 U/L / GGT: x                                                                                             < from: Xray Chest 1 View-PORTABLE IMMEDIATE (12.10.18 @ 15:37) >    INTERPRETATION:  Clinical History / Reason for exam: Sepsis.    Comparison : Chest radiograph 10/5/2018.    Technique/Positioning: AP portable.    Findings:    Support devices: Cardiac pacing device in stable position. Telemetry leads    Cardiac/mediastinum/hilum: Stable cardiomegaly.    Lung parenchyma/Pleura: New right basilar opacity/effusion. Stable   pulmonary vascular congestion. No pneumothorax.    Skeleton/soft tissues: Unchanged.    Impression:      New right basilar opacity/effusion, compatible with pneumonia in   appropriate clinical setting.    < end of copied text >                                          Serum Pro-Brain Natriuretic Peptide: 1759 pg/mL (12.11.18 @ 05:37)    Blood Gas Profile - Venous (12.10.18 @ 15:21)    pH, Venous: 7.41: Dr. Preciado notified @ 15:24. read back.    pCO2, Venous: 44 mmHg    pO2, Venous: 45 mmHg    HCO3, Venous: 28 mmoL/L    Base Excess, Venous: 2.7 mmoL/L    Oxygen Saturation, Venous: 72 %          MEDICATIONS  (STANDING):  allopurinol 100 milliGRAM(s) Oral daily  amylase/lipase/protease  (CREON 12,000 Units) 2 Capsule(s) Oral three times a day  cefepime   IVPB 2000 milliGRAM(s) IV Intermittent every 24 hours  chlorhexidine 4% Liquid 1 Application(s) Topical <User Schedule>  colchicine 0.6 milliGRAM(s) Oral daily  digoxin     Tablet 0.125 milliGRAM(s) Oral daily  finasteride 5 milliGRAM(s) Oral daily  furosemide   Injectable 40 milliGRAM(s) IV Push two times a day  magnesium sulfate  IVPB 2 Gram(s) IV Intermittent once  metoprolol tartrate 25 milliGRAM(s) Oral two times a day  pantoprazole    Tablet 40 milliGRAM(s) Oral before breakfast  sotalol 40 milliGRAM(s) Oral two times a day  spironolactone 50 milliGRAM(s) Oral daily  tamsulosin 0.4 milliGRAM(s) Oral at bedtime  vancomycin  IVPB 1000 milliGRAM(s) IV Intermittent every 24 hours    MEDICATIONS  (PRN):  acetaminophen   Tablet .. 650 milliGRAM(s) Oral every 6 hours PRN Mild Pain (1 - 3)  acetaminophen   Tablet .. 650 milliGRAM(s) Oral every 6 hours PRN Temp greater or equal to 38C (100.4F)  lactulose Syrup 20 Gram(s) Oral two times a day PRN 2 BM's per day Patient is a 68y old  Male who presents with a chief complaint of Pneumonia (11 Dec 2018 09:11)      HPI:    67 yo M with AFIB on coumadin, CKD, HFref s/p AICD, liver cirrhosis, HTN, gout, CKD, BPH, pancreatic insufficiency, Osler-Weber-Rendu syndrome presented to ED complaining of SOB, chills, and cough. Patient was recently treated with [facility] as outpatient after PCP prescribed for URI ~1 week PTP. No response to abx. Deny any fevers, chest pain, abdominal pain, nausea, vomiting, constipation, diarrhea, dysuria, myalgias, rash, or other complaint or symptom.    Patient was in respiratory distress on presentation and palced on BPAP and was weaned down to nasal canula.    He feels better this morning, still has cough less dyspnea.    PAST MEDICAL & SURGICAL HISTORY:  BPH (benign prostatic hyperplasia)  CKD (chronic kidney disease)  Osler-Weber-Rendu disease  Atrial fibrillation  HTN (hypertension)  Hepatic cirrhosis, unspecified hepatic cirrhosis type, unspecified whether ascites present  Gout, unspecified cause, unspecified chronicity, unspecified site  Anemia, unspecified type  Congestive heart failure, unspecified HF chronicity, unspecified heart failure type  S/P implantation of automatic cardioverter/defibrillator (AICD)      SOCIAL HX:   Smoking   no                      ETOH        no                    Other no    FAMILY HISTORY:  No pertinent family history in first degree relatives  :  No known cardiovacular family hisotry     ROS:  See HPI     Allergies    No Known Allergies    Intolerances          PHYSICAL EXAM    ICU Vital Signs Last 24 Hrs  T(C): 36 (11 Dec 2018 05:44), Max: 38.7 (10 Dec 2018 16:06)  T(F): 96.8 (11 Dec 2018 05:44), Max: 101.6 (10 Dec 2018 16:06)  HR: 87 (11 Dec 2018 05:44) (82 - 96)  BP: 99/55 (11 Dec 2018 05:44) (89/52 - 139/90)  BP(mean): --  ABP: --  ABP(mean): --  RR: 18 (11 Dec 2018 05:44) (18 - 26)  SpO2: 96% (10 Dec 2018 23:57) (90% - 97%)      General: In NAD   HEENT:  PANFILO              Lymphatic system: No cervical LN   Lungs: Bilateral BS, right basilar crackles, reduced breath sounds b/l, no wheeze  Cardiovascular: irregular no jvd  Gastrointestinal: Soft, Positive BS obese  Musculoskeletal: No clubbing.  Moves all extremities.  Full range of motion faint pedal edema b/l   Skin: Warm.  Intact  Neurological: No motor or sensory deficit         LABS:                          10.9   4.38  )-----------( 114      ( 11 Dec 2018 05:37 )             34.8                                               12-11    135  |  95<L>  |  26<H>  ----------------------------<  89  4.6   |  26  |  1.7<H>    Ca    9.1      11 Dec 2018 05:37  Phos  4.0     12-11  Mg     1.6     12-11    TPro  6.5  /  Alb  3.3<L>  /  TBili  2.2<H>  /  DBili  x   /  AST  21  /  ALT  11  /  AlkPhos  115  12-11      PT/INR - ( 11 Dec 2018 05:37 )   PT: 23.20 sec;   INR: 2.03 ratio         PTT - ( 11 Dec 2018 05:37 )  PTT:43.4 sec                                                                                     LIVER FUNCTIONS - ( 11 Dec 2018 05:37 )  Alb: 3.3 g/dL / Pro: 6.5 g/dL / ALK PHOS: 115 U/L / ALT: 11 U/L / AST: 21 U/L / GGT: x                                                                                             < from: Xray Chest 1 View-PORTABLE IMMEDIATE (12.10.18 @ 15:37) >    INTERPRETATION:  Clinical History / Reason for exam: Sepsis.    Comparison : Chest radiograph 10/5/2018.    Technique/Positioning: AP portable.    Findings:    Support devices: Cardiac pacing device in stable position. Telemetry leads    Cardiac/mediastinum/hilum: Stable cardiomegaly.    Lung parenchyma/Pleura: New right basilar opacity/effusion. Stable   pulmonary vascular congestion. No pneumothorax.    Skeleton/soft tissues: Unchanged.    Impression:      New right basilar opacity/effusion, compatible with pneumonia in   appropriate clinical setting.    < end of copied text >                                          Serum Pro-Brain Natriuretic Peptide: 1759 pg/mL (12.11.18 @ 05:37)    Blood Gas Profile - Venous (12.10.18 @ 15:21)    pH, Venous: 7.41: Dr. Preciado notified @ 15:24. read back.    pCO2, Venous: 44 mmHg    pO2, Venous: 45 mmHg    HCO3, Venous: 28 mmoL/L    Base Excess, Venous: 2.7 mmoL/L    Oxygen Saturation, Venous: 72 %          MEDICATIONS  (STANDING):  allopurinol 100 milliGRAM(s) Oral daily  amylase/lipase/protease  (CREON 12,000 Units) 2 Capsule(s) Oral three times a day  cefepime   IVPB 2000 milliGRAM(s) IV Intermittent every 24 hours  chlorhexidine 4% Liquid 1 Application(s) Topical <User Schedule>  colchicine 0.6 milliGRAM(s) Oral daily  digoxin     Tablet 0.125 milliGRAM(s) Oral daily  finasteride 5 milliGRAM(s) Oral daily  furosemide   Injectable 40 milliGRAM(s) IV Push two times a day  magnesium sulfate  IVPB 2 Gram(s) IV Intermittent once  metoprolol tartrate 25 milliGRAM(s) Oral two times a day  pantoprazole    Tablet 40 milliGRAM(s) Oral before breakfast  sotalol 40 milliGRAM(s) Oral two times a day  spironolactone 50 milliGRAM(s) Oral daily  tamsulosin 0.4 milliGRAM(s) Oral at bedtime  vancomycin  IVPB 1000 milliGRAM(s) IV Intermittent every 24 hours    MEDICATIONS  (PRN):  acetaminophen   Tablet .. 650 milliGRAM(s) Oral every 6 hours PRN Mild Pain (1 - 3)  acetaminophen   Tablet .. 650 milliGRAM(s) Oral every 6 hours PRN Temp greater or equal to 38C (100.4F)  lactulose Syrup 20 Gram(s) Oral two times a day PRN 2 BM's per day        Right chest US.  Very small effusion.  positive infiltrate

## 2018-12-11 NOTE — MEDICAL STUDENT ADULT H&P (EDUCATION) - NS MD HP STUD RESULTS LAB FT
LABS:                        10.9   4.38  )-----------( 114      ( 11 Dec 2018 05:37 )             34.8   12-11  135  |  95<L>  |  26<H>  ----------------------------<  89  4.6   |  26  |  1.7<H>    Ca    9.1      11 Dec 2018 05:37  Phos  4.0     12-11  Mg     1.6     12-11    TPro  6.5  /  Alb  3.3<L>  /  TBili  2.2<H>  /  DBili  x   /  AST  21  /  ALT  11  /  AlkPhos  115  12-11    PT/INR - ( 11 Dec 2018 05:37 )   PT: 23.20 sec;   INR: 2.03 ratio         PTT - ( 11 Dec 2018 05:37 )  PTT:43.4 sec

## 2018-12-11 NOTE — PROGRESS NOTE ADULT - SUBJECTIVE AND OBJECTIVE BOX
SUBJECTIVE:    Patient is a 68y old Male who presents with a chief complaint of Pneumonia (11 Dec 2018 13:27)    Currently admitted to medicine with the primary diagnosis of Pneumonia of right lower lobe.     Today is hospital day 1d. This morning he is resting comfortably in bed and reports no new issues or overnight events.     PAST MEDICAL & SURGICAL HISTORY  BPH (benign prostatic hyperplasia)  CKD (chronic kidney disease)  Osler-Weber-Rendu disease  Atrial fibrillation  HTN (hypertension)  Hepatic cirrhosis, unspecified hepatic cirrhosis type, unspecified whether ascites present  Gout, unspecified cause, unspecified chronicity, unspecified site  Anemia, unspecified type  Congestive heart failure, unspecified HF chronicity, unspecified heart failure type  S/P implantation of automatic cardioverter/defibrillator (AICD)    ALLERGIES:  No Known Allergies    MEDICATIONS:  STANDING MEDICATIONS  allopurinol 100 milliGRAM(s) Oral daily  amylase/lipase/protease  (CREON 12,000 Units) 2 Capsule(s) Oral three times a day  cefepime   IVPB 2000 milliGRAM(s) IV Intermittent every 24 hours  chlorhexidine 4% Liquid 1 Application(s) Topical <User Schedule>  colchicine 0.6 milliGRAM(s) Oral daily  digoxin     Tablet 0.125 milliGRAM(s) Oral daily  finasteride 5 milliGRAM(s) Oral daily  furosemide    Tablet 40 milliGRAM(s) Oral two times a day  magnesium sulfate  IVPB 2 Gram(s) IV Intermittent once  metoprolol tartrate 25 milliGRAM(s) Oral two times a day  pantoprazole    Tablet 40 milliGRAM(s) Oral before breakfast  sotalol 40 milliGRAM(s) Oral two times a day  spironolactone 50 milliGRAM(s) Oral daily  tamsulosin 0.4 milliGRAM(s) Oral at bedtime  vancomycin  IVPB 1000 milliGRAM(s) IV Intermittent every 24 hours    PRN MEDICATIONS  lactulose Syrup 20 Gram(s) Oral two times a day PRN    VITALS:   T(F): 96.8  HR: 87  BP: 99/55  RR: 18  SpO2: 96%    LABS:                        10.9   4.38  )-----------( 114      ( 11 Dec 2018 05:37 )             34.8     12-11    135  |  95<L>  |  26<H>  ----------------------------<  89  4.6   |  26  |  1.7<H>    Ca    9.1      11 Dec 2018 05:37  Phos  4.0     12-11  Mg     1.6     12-11    TPro  6.5  /  Alb  3.3<L>  /  TBili  2.2<H>  /  DBili  x   /  AST  21  /  ALT  11  /  AlkPhos  115  12-11    PT/INR - ( 11 Dec 2018 05:37 )   PT: 23.20 sec;   INR: 2.03 ratio         PTT - ( 11 Dec 2018 05:37 )  PTT:43.4 sec      PHYSICAL EXAM:  GEN: No acute distress  LUNGS: dec breath sound, basal crackles+  HEART: irregular   ABD: Soft, non-tender, non-distended.  EXT:2+PP/CHOWDHURY/Skin Intact.   NEURO: AAOX3 SUBJECTIVE:    Patient is a 68y old Male who presents with a chief complaint of Pneumonia (11 Dec 2018 13:27)    Currently admitted to medicine with the primary diagnosis of Pneumonia of right lower lobe.     Today is hospital day 1d.   sob improving    PAST MEDICAL & SURGICAL HISTORY  BPH (benign prostatic hyperplasia)  CKD (chronic kidney disease)  Osler-Weber-Rendu disease  Atrial fibrillation  HTN (hypertension)  Hepatic cirrhosis, unspecified hepatic cirrhosis type, unspecified whether ascites present  Gout, unspecified cause, unspecified chronicity, unspecified site  Anemia, unspecified type  Congestive heart failure, unspecified HF chronicity, unspecified heart failure type  S/P implantation of automatic cardioverter/defibrillator (AICD)    ALLERGIES:  No Known Allergies    MEDICATIONS:  STANDING MEDICATIONS  allopurinol 100 milliGRAM(s) Oral daily  amylase/lipase/protease  (CREON 12,000 Units) 2 Capsule(s) Oral three times a day  cefepime   IVPB 2000 milliGRAM(s) IV Intermittent every 24 hours  chlorhexidine 4% Liquid 1 Application(s) Topical <User Schedule>  colchicine 0.6 milliGRAM(s) Oral daily  digoxin     Tablet 0.125 milliGRAM(s) Oral daily  finasteride 5 milliGRAM(s) Oral daily  furosemide    Tablet 40 milliGRAM(s) Oral two times a day  magnesium sulfate  IVPB 2 Gram(s) IV Intermittent once  metoprolol tartrate 25 milliGRAM(s) Oral two times a day  pantoprazole    Tablet 40 milliGRAM(s) Oral before breakfast  sotalol 40 milliGRAM(s) Oral two times a day  spironolactone 50 milliGRAM(s) Oral daily  tamsulosin 0.4 milliGRAM(s) Oral at bedtime  vancomycin  IVPB 1000 milliGRAM(s) IV Intermittent every 24 hours    PRN MEDICATIONS  lactulose Syrup 20 Gram(s) Oral two times a day PRN    VITALS:   T(F): 96.8  HR: 87  BP: 99/55  RR: 18  SpO2: 96%    LABS:                        10.9   4.38  )-----------( 114      ( 11 Dec 2018 05:37 )             34.8     12-11    135  |  95<L>  |  26<H>  ----------------------------<  89  4.6   |  26  |  1.7<H>    Ca    9.1      11 Dec 2018 05:37  Phos  4.0     12-11  Mg     1.6     12-11    TPro  6.5  /  Alb  3.3<L>  /  TBili  2.2<H>  /  DBili  x   /  AST  21  /  ALT  11  /  AlkPhos  115  12-11    PT/INR - ( 11 Dec 2018 05:37 )   PT: 23.20 sec;   INR: 2.03 ratio         PTT - ( 11 Dec 2018 05:37 )  PTT:43.4 sec      PHYSICAL EXAM:  GEN: No acute distress  LUNGS: dec breath sound, basal crackles+  HEART: irregular   ABD: Soft, non-tender, non-distended.  EXT:2+PP/CHOWDHURY/Skin Intact.   NEURO: AAOX3

## 2018-12-11 NOTE — MEDICAL STUDENT ADULT H&P (EDUCATION) - NS MD HP STUD PE VITALS FT
VITALS:   T(F): 96.8  HR: 87  BP: 99/55  RR: 18  SpO2: 96% T, P, R, SpO2, BP    Temperature  Temp (F): 96 Degrees F  Temp (C) Temp (C): 35.6 Degrees C  Temp site Temp Site: oral    Heart Rate  Heart Rate Heart Rate (beats/min): 87 /min  Heart Rate Method: noninvasive blood pressure monitor    Noninvasive Blood Pressure  BP Systolic Systolic: 91 mm Hg  BP Diastolic Diastolic (mm Hg): 53 mm Hg  Blood Pressure - Site Site: left upper arm  Blood Pressure - Method Method: electronic    Respiratory/Pulse Oximetry  Respiration Rate (breaths/min) Respiration Rate (breaths/min): 18 /min

## 2018-12-11 NOTE — PROGRESS NOTE ADULT - ASSESSMENT
RLL PNA:   c/w IV vanco and cefepime renal dose   vanc trough   ID eval pending   follow blood cultures   pulm on board     CKD3 stable     Afib on coumadin INR 2.03 on sotalol, digoxin lopressor 25 BID rate controlled     liver cirrhosis : c/w aldactone     ??Chronic CHFref : c/w BB, aldactone. switch to po lasix no need for IV now. aldactone check Echo RLL PNA:   c/w IV vanco and cefepime renal dose   vanc trough   ID eval pending   follow blood cultures   pulm on board     CKD3 stable     Afib on coumadin INR 2.03 on sotalol, digoxin lopressor 25 BID rate controlled     liver cirrhosis : c/w aldactone     Chronic CHFref : c/w BB, aldactone. switch to po lasix no need for IV now. aldactone check Echo. interrogate AICD

## 2018-12-11 NOTE — MEDICAL STUDENT ADULT H&P (EDUCATION) - NS MD HP STUD CHIEF COMPLAINT FT
Patient is a 68y old Male who presents with a chief complaint of Pneumonia (11 Dec 2018 13:27)    Currently admitted to medicine with the primary diagnosis of Pneumonia of right lower lobe.     Today is hospital day 1d.   sob improving

## 2018-12-11 NOTE — PROGRESS NOTE ADULT - SUBJECTIVE AND OBJECTIVE BOX
CC: SOB and cough     HPI : patient feels better SOB improved but still with cough     ROS: 10 point ROS neg except what mentioned in HPI     Vital Signs Last 24 Hrs  T(C): 36 (11 Dec 2018 05:44), Max: 38.7 (10 Dec 2018 16:06)  T(F): 96.8 (11 Dec 2018 05:44), Max: 101.6 (10 Dec 2018 16:06)  HR: 87 (11 Dec 2018 05:44) (82 - 96)  BP: 99/55 (11 Dec 2018 05:44) (89/52 - 139/90)  BP(mean): --  RR: 18 (11 Dec 2018 05:44) (18 - 26)  SpO2: 96% (10 Dec 2018 23:57) (90% - 97%)    PHYSICAL EXAM:  GENERAL: NAD, well-developed  HEAD:  Atraumatic, Normocephalic  EYES: EOMI, PERRLA, conjunctiva and sclera clear  NECK: Supple, No JVD  Pulm: right basilar crackles   CV: IRRegular rate and rhythm;  GI: Soft, Nontender, Nondistended; Bowel sounds present  EXTREMITIES:  2+ Peripheral Pulses, No clubbing, cyanosis, or edema  PSYCH: AAOx3  NEUROLOGY: non-focal  SKIN: No rashes or lesions                          10.9   4.38  )-----------( 114      ( 11 Dec 2018 05:37 )             34.8     12-11    135  |  95<L>  |  26<H>  ----------------------------<  89  4.6   |  26  |  1.7<H>    Ca    9.1      11 Dec 2018 05:37  Phos  4.0     12-11  Mg     1.6     12-11    TPro  6.5  /  Alb  3.3<L>  /  TBili  2.2<H>  /  DBili  x   /  AST  21  /  ALT  11  /  AlkPhos  115  12-11    LIVER FUNCTIONS - ( 11 Dec 2018 05:37 )  Alb: 3.3 g/dL / Pro: 6.5 g/dL / ALK PHOS: 115 U/L / ALT: 11 U/L / AST: 21 U/L / GGT: x           PT/INR - ( 11 Dec 2018 05:37 )   PT: 23.20 sec;   INR: 2.03 ratio         PTT - ( 11 Dec 2018 05:37 )  PTT:43.4 sec    MEDICATIONS  (STANDING):  allopurinol 100 milliGRAM(s) Oral daily  amylase/lipase/protease  (CREON 12,000 Units) 2 Capsule(s) Oral three times a day  cefepime   IVPB 2000 milliGRAM(s) IV Intermittent every 24 hours  chlorhexidine 4% Liquid 1 Application(s) Topical <User Schedule>  colchicine 0.6 milliGRAM(s) Oral daily  digoxin     Tablet 0.125 milliGRAM(s) Oral daily  finasteride 5 milliGRAM(s) Oral daily  furosemide   Injectable 40 milliGRAM(s) IV Push two times a day  metoprolol tartrate 25 milliGRAM(s) Oral two times a day  pantoprazole    Tablet 40 milliGRAM(s) Oral before breakfast  sotalol 40 milliGRAM(s) Oral two times a day  spironolactone 50 milliGRAM(s) Oral daily  tamsulosin 0.4 milliGRAM(s) Oral at bedtime  vancomycin  IVPB 1000 milliGRAM(s) IV Intermittent every 24 hours    MEDICATIONS  (PRN):  acetaminophen   Tablet .. 650 milliGRAM(s) Oral every 6 hours PRN Mild Pain (1 - 3)  acetaminophen   Tablet .. 650 milliGRAM(s) Oral every 6 hours PRN Temp greater or equal to 38C (100.4F)  lactulose Syrup 20 Gram(s) Oral two times a day PRN 2 BM's per day CC: SOB and cough     HPI : patient feels better SOB improved but still with cough     ROS: 10 point ROS neg except what mentioned in HPI     Vital Signs Last 24 Hrs  T(C): 36 (11 Dec 2018 05:44), Max: 38.7 (10 Dec 2018 16:06)  T(F): 96.8 (11 Dec 2018 05:44), Max: 101.6 (10 Dec 2018 16:06)  HR: 87 (11 Dec 2018 05:44) (82 - 96)  BP: 99/55 (11 Dec 2018 05:44) (89/52 - 139/90)  BP(mean): --  RR: 18 (11 Dec 2018 05:44) (18 - 26)  SpO2: 96% (10 Dec 2018 23:57) (90% - 97%)    PHYSICAL EXAM:  GENERAL: NAD, well-developed  HEAD:  Atraumatic, Normocephalic  EYES: EOMI, PERRLA, conjunctiva and sclera clear  NECK: Supple, No JVD  Pulm: right basilar crackles   CV: IRRegular rate and rhythm;  GI: Soft, Nontender, Nondistended; Bowel sounds present  EXTREMITIES:  trace edema B/L LE   PSYCH: AAOx3  NEUROLOGY: non-focal  SKIN: No rashes or lesions                          10.9   4.38  )-----------( 114      ( 11 Dec 2018 05:37 )             34.8     12-11    135  |  95<L>  |  26<H>  ----------------------------<  89  4.6   |  26  |  1.7<H>    Ca    9.1      11 Dec 2018 05:37  Phos  4.0     12-11  Mg     1.6     12-11    TPro  6.5  /  Alb  3.3<L>  /  TBili  2.2<H>  /  DBili  x   /  AST  21  /  ALT  11  /  AlkPhos  115  12-11    LIVER FUNCTIONS - ( 11 Dec 2018 05:37 )  Alb: 3.3 g/dL / Pro: 6.5 g/dL / ALK PHOS: 115 U/L / ALT: 11 U/L / AST: 21 U/L / GGT: x           PT/INR - ( 11 Dec 2018 05:37 )   PT: 23.20 sec;   INR: 2.03 ratio         PTT - ( 11 Dec 2018 05:37 )  PTT:43.4 sec    MEDICATIONS  (STANDING):  allopurinol 100 milliGRAM(s) Oral daily  amylase/lipase/protease  (CREON 12,000 Units) 2 Capsule(s) Oral three times a day  cefepime   IVPB 2000 milliGRAM(s) IV Intermittent every 24 hours  chlorhexidine 4% Liquid 1 Application(s) Topical <User Schedule>  colchicine 0.6 milliGRAM(s) Oral daily  digoxin     Tablet 0.125 milliGRAM(s) Oral daily  finasteride 5 milliGRAM(s) Oral daily  furosemide   Injectable 40 milliGRAM(s) IV Push two times a day  metoprolol tartrate 25 milliGRAM(s) Oral two times a day  pantoprazole    Tablet 40 milliGRAM(s) Oral before breakfast  sotalol 40 milliGRAM(s) Oral two times a day  spironolactone 50 milliGRAM(s) Oral daily  tamsulosin 0.4 milliGRAM(s) Oral at bedtime  vancomycin  IVPB 1000 milliGRAM(s) IV Intermittent every 24 hours    MEDICATIONS  (PRN):  acetaminophen   Tablet .. 650 milliGRAM(s) Oral every 6 hours PRN Mild Pain (1 - 3)  acetaminophen   Tablet .. 650 milliGRAM(s) Oral every 6 hours PRN Temp greater or equal to 38C (100.4F)  lactulose Syrup 20 Gram(s) Oral two times a day PRN 2 BM's per day

## 2018-12-11 NOTE — PROGRESS NOTE ADULT - ASSESSMENT
IMPRESSION:    Acute respiratory failure improved  Right side pneumonia concern for MDR org  H/O Afib on coumadin  H/O HFrEF  H/O Liver cirrhosis    PLAN:    - C/w broad spectrum abx  - F/u cultures, send urine legionella, urine strept, MRSA nare  - Will consider thora if clinically no improvement  - Vanco trough before 4th dose  - Wean off oxygen keep pulse ox>96%, NIV prn  - Maximize cardiac therapy  - DVT ppx IMPRESSION:    Acute respiratory failure improved  Pneumonia concern for MDR pathogens with possible parapneumonic effusion  H/O Afib on coumadin  H/O HFrEF  H/O Liver cirrhosis    PLAN:    - C/w broad spectrum abx  - F/u cultures, send urine legionella, urine strept, MRSA nare  - Will consider thora if clinically no improvement  - Vanco trough before 4th dose  - Wean off oxygen keep pulse ox>96%, NIV prn  - Maximize cardiac therapy  - DVT ppx IMPRESSION:    Sepsis   Doubt Pneumonia   H/O Afib on coumadin  H/O HFrEF  H/O Liver cirrhosis    PLAN:    - C/w broad spectrum abx for now   - F/u cultures, send urine legionella, urine strept, MRSA nare  - Vanco trough before 4th dose  - Wean off oxygen keep pulse ox > 92%, NIV prn  - Maximize cardiac therapy  - ECHO  - Negative balance  - FU Coags IMPRESSION:    Sepsis   RLL Pneumonia   H/O Afib on coumadin  H/O HFrEF  H/O Liver cirrhosis    PLAN:    - C/w broad spectrum abx for now   - F/u cultures, send urine legionella, urine strept, MRSA nare  - Vanco trough before 4th dose  - Wean off oxygen keep pulse ox > 92%, NIV prn  - Maximize cardiac therapy and volume status   - ECHO  - Negative balance  - FU Coags

## 2018-12-12 ENCOUNTER — APPOINTMENT (OUTPATIENT)
Dept: HEMATOLOGY ONCOLOGY | Facility: CLINIC | Age: 68
End: 2018-12-12

## 2018-12-12 DIAGNOSIS — Z71.89 OTHER SPECIFIED COUNSELING: ICD-10-CM

## 2018-12-12 LAB
ALBUMIN SERPL ELPH-MCNC: 3.2 G/DL — LOW (ref 3.5–5.2)
ALP SERPL-CCNC: 124 U/L — HIGH (ref 30–115)
ALT FLD-CCNC: 13 U/L — SIGNIFICANT CHANGE UP (ref 0–41)
ANION GAP SERPL CALC-SCNC: 12 MMOL/L — SIGNIFICANT CHANGE UP (ref 7–14)
AST SERPL-CCNC: 24 U/L — SIGNIFICANT CHANGE UP (ref 0–41)
BILIRUB SERPL-MCNC: 2.1 MG/DL — HIGH (ref 0.2–1.2)
BUN SERPL-MCNC: 29 MG/DL — HIGH (ref 10–20)
CALCIUM SERPL-MCNC: 9.4 MG/DL — SIGNIFICANT CHANGE UP (ref 8.5–10.1)
CHLORIDE SERPL-SCNC: 95 MMOL/L — LOW (ref 98–110)
CO2 SERPL-SCNC: 31 MMOL/L — SIGNIFICANT CHANGE UP (ref 17–32)
CREAT SERPL-MCNC: 2 MG/DL — HIGH (ref 0.7–1.5)
GLUCOSE SERPL-MCNC: 94 MG/DL — SIGNIFICANT CHANGE UP (ref 70–99)
HCT VFR BLD CALC: 37.9 % — LOW (ref 42–52)
HGB BLD-MCNC: 11.6 G/DL — LOW (ref 14–18)
INR BLD: 2.12 RATIO — HIGH (ref 0.65–1.3)
MAGNESIUM SERPL-MCNC: 2.1 MG/DL — SIGNIFICANT CHANGE UP (ref 1.8–2.4)
MCHC RBC-ENTMCNC: 28.5 PG — SIGNIFICANT CHANGE UP (ref 27–31)
MCHC RBC-ENTMCNC: 30.6 G/DL — LOW (ref 32–37)
MCV RBC AUTO: 93.1 FL — SIGNIFICANT CHANGE UP (ref 80–94)
MRSA PCR RESULT.: NEGATIVE — SIGNIFICANT CHANGE UP
NRBC # BLD: 0 /100 WBCS — SIGNIFICANT CHANGE UP (ref 0–0)
PLATELET # BLD AUTO: 122 K/UL — LOW (ref 130–400)
POTASSIUM SERPL-MCNC: 4.5 MMOL/L — SIGNIFICANT CHANGE UP (ref 3.5–5)
POTASSIUM SERPL-SCNC: 4.5 MMOL/L — SIGNIFICANT CHANGE UP (ref 3.5–5)
PROT SERPL-MCNC: 6.8 G/DL — SIGNIFICANT CHANGE UP (ref 6–8)
PROTHROM AB SERPL-ACNC: 24.2 SEC — HIGH (ref 9.95–12.87)
RBC # BLD: 4.07 M/UL — LOW (ref 4.7–6.1)
RBC # FLD: 18.9 % — HIGH (ref 11.5–14.5)
SODIUM SERPL-SCNC: 138 MMOL/L — SIGNIFICANT CHANGE UP (ref 135–146)
WBC # BLD: 4.68 K/UL — LOW (ref 4.8–10.8)
WBC # FLD AUTO: 4.68 K/UL — LOW (ref 4.8–10.8)

## 2018-12-12 RX ORDER — FUROSEMIDE 40 MG
60 TABLET ORAL ONCE
Qty: 0 | Refills: 0 | Status: COMPLETED | OUTPATIENT
Start: 2018-12-12 | End: 2018-12-12

## 2018-12-12 RX ORDER — ACETAMINOPHEN 500 MG
650 TABLET ORAL ONCE
Qty: 0 | Refills: 0 | Status: COMPLETED | OUTPATIENT
Start: 2018-12-12 | End: 2018-12-12

## 2018-12-12 RX ORDER — WARFARIN SODIUM 2.5 MG/1
2.5 TABLET ORAL ONCE
Qty: 0 | Refills: 0 | Status: COMPLETED | OUTPATIENT
Start: 2018-12-12 | End: 2018-12-12

## 2018-12-12 RX ADMIN — Medication 2 CAPSULE(S): at 05:51

## 2018-12-12 RX ADMIN — Medication 40 MILLIGRAM(S): at 05:51

## 2018-12-12 RX ADMIN — Medication 40 MILLIGRAM(S): at 18:45

## 2018-12-12 RX ADMIN — Medication 2 CAPSULE(S): at 22:13

## 2018-12-12 RX ADMIN — TAMSULOSIN HYDROCHLORIDE 0.4 MILLIGRAM(S): 0.4 CAPSULE ORAL at 22:13

## 2018-12-12 RX ADMIN — Medication 2 CAPSULE(S): at 13:46

## 2018-12-12 RX ADMIN — LACTULOSE 20 GRAM(S): 10 SOLUTION ORAL at 08:05

## 2018-12-12 RX ADMIN — PANTOPRAZOLE SODIUM 40 MILLIGRAM(S): 20 TABLET, DELAYED RELEASE ORAL at 05:53

## 2018-12-12 RX ADMIN — Medication 25 MILLIGRAM(S): at 05:51

## 2018-12-12 RX ADMIN — Medication 0.6 MILLIGRAM(S): at 11:44

## 2018-12-12 RX ADMIN — Medication 650 MILLIGRAM(S): at 05:11

## 2018-12-12 RX ADMIN — Medication 650 MILLIGRAM(S): at 05:49

## 2018-12-12 RX ADMIN — FINASTERIDE 5 MILLIGRAM(S): 5 TABLET, FILM COATED ORAL at 11:44

## 2018-12-12 RX ADMIN — Medication 0.12 MILLIGRAM(S): at 05:51

## 2018-12-12 RX ADMIN — Medication 25 MILLIGRAM(S): at 18:45

## 2018-12-12 RX ADMIN — SPIRONOLACTONE 50 MILLIGRAM(S): 25 TABLET, FILM COATED ORAL at 05:51

## 2018-12-12 RX ADMIN — Medication 100 MILLIGRAM(S): at 11:44

## 2018-12-12 RX ADMIN — WARFARIN SODIUM 2.5 MILLIGRAM(S): 2.5 TABLET ORAL at 22:13

## 2018-12-12 NOTE — PROGRESS NOTE ADULT - SUBJECTIVE AND OBJECTIVE BOX
CORBINMORAIMA THOMAS  68y, Male      OVERNIGHT EVENTS:    No fevers, no cough/ SOB/ chest pain    VITALS:  T(F): 96.5, Max: 96.6 (12-11-18 @ 20:56)  HR: 84  BP: 105/64  RR: 18Vital Signs Last 24 Hrs  T(C): 35.8 (12 Dec 2018 05:58), Max: 35.9 (11 Dec 2018 20:56)  T(F): 96.5 (12 Dec 2018 05:58), Max: 96.6 (11 Dec 2018 20:56)  HR: 84 (12 Dec 2018 05:58) (84 - 87)  BP: 105/64 (12 Dec 2018 05:58) (91/53 - 105/64)  BP(mean): --  RR: 18 (12 Dec 2018 05:58) (17 - 18)  SpO2: 96% (12 Dec 2018 05:58) (88% - 96%)    TESTS & MEASUREMENTS:                        11.6   4.68  )-----------( 122      ( 12 Dec 2018 04:44 )             37.9     12-12    138  |  95<L>  |  29<H>  ----------------------------<  94  4.5   |  31  |  2.0<H>    Ca    9.4      12 Dec 2018 04:44  Phos  4.0     12-11  Mg     2.1     12-12    TPro  6.8  /  Alb  3.2<L>  /  TBili  2.1<H>  /  DBili  x   /  AST  24  /  ALT  13  /  AlkPhos  124<H>  12-12    LIVER FUNCTIONS - ( 12 Dec 2018 04:44 )  Alb: 3.2 g/dL / Pro: 6.8 g/dL / ALK PHOS: 124 U/L / ALT: 13 U/L / AST: 24 U/L / GGT: x             Culture - Blood (collected 12-10-18 @ 15:10)  Source: .Blood Blood-Peripheral  Preliminary Report (12-11-18 @ 23:01):    No growth to date.    Culture - Blood (collected 12-10-18 @ 15:10)  Source: .Blood Blood-Peripheral  Preliminary Report (12-11-18 @ 23:01):    No growth to date.            RADIOLOGY & ADDITIONAL TESTS:    ANTIBIOTICS:

## 2018-12-12 NOTE — PROGRESS NOTE ADULT - SUBJECTIVE AND OBJECTIVE BOX
SUBJ:  difficult sleeping  no cp mild orthopnea     MEDICATIONS  (STANDING):  allopurinol 100 milliGRAM(s) Oral daily  amylase/lipase/protease  (CREON 12,000 Units) 2 Capsule(s) Oral three times a day  chlorhexidine 4% Liquid 1 Application(s) Topical <User Schedule>  colchicine 0.6 milliGRAM(s) Oral daily  digoxin     Tablet 0.125 milliGRAM(s) Oral daily  finasteride 5 milliGRAM(s) Oral daily  furosemide    Tablet 40 milliGRAM(s) Oral two times a day  metoprolol tartrate 25 milliGRAM(s) Oral two times a day  pantoprazole    Tablet 40 milliGRAM(s) Oral before breakfast  sotalol 40 milliGRAM(s) Oral two times a day  spironolactone 50 milliGRAM(s) Oral daily  tamsulosin 0.4 milliGRAM(s) Oral at bedtime    MEDICATIONS  (PRN):  lactulose Syrup 20 Gram(s) Oral two times a day PRN 2 BM's per day            Vital Signs Last 24 Hrs  T(C): 35.8 (12 Dec 2018 05:58), Max: 35.9 (11 Dec 2018 20:56)  T(F): 96.5 (12 Dec 2018 05:58), Max: 96.6 (11 Dec 2018 20:56)  HR: 84 (12 Dec 2018 05:58) (84 - 87)  BP: 105/64 (12 Dec 2018 05:58) (91/53 - 105/64)  BP(mean): --  RR: 18 (12 Dec 2018 05:58) (17 - 18)  SpO2: 96% (12 Dec 2018 05:58) (88% - 96%)          PHYSICAL EXAM:  · CONSTITUTIONAL:	Well-developed, well nourished    BMI-  ·RESPIRATORY:   airway patent; breath sounds equal; good air movement; respirations non-labored; clear to auscultation bilaterally; no chest wall tenderness; no intercostal retractions; no rales,rhonchi or wheeze left ICD   · CARDIOVASCULAR	regular rate and rhythm  no rub  no murmur  normal PMI  · EXTREMITIES: No cyanosis, clubbing or edema  · VASCULAR: 	Equal and normal pulses (carotid, femoral, dorsalis pedis)  	  TELEMETRY:    ECG:    TTE:    LABS:                        10.9   4.38  )-----------( 114      ( 11 Dec 2018 05:37 )             34.8     12-11    135  |  95<L>  |  26<H>  ----------------------------<  89  4.6   |  26  |  1.7<H>    Ca    9.1      11 Dec 2018 05:37  Phos  4.0     12-11  Mg     2.4     12-11    TPro  6.5  /  Alb  3.3<L>  /  TBili  2.2<H>  /  DBili  x   /  AST  21  /  ALT  11  /  AlkPhos  115  12-11        PT/INR - ( 11 Dec 2018 05:37 )   PT: 23.20 sec;   INR: 2.03 ratio         PTT - ( 11 Dec 2018 05:37 )  PTT:43.4 sec    I&O's Summary    11 Dec 2018 07:01  -  12 Dec 2018 06:33  --------------------------------------------------------  IN: 450 mL / OUT: 1600 mL / NET: -1150 mL      BNP  RADIOLOGY & ADDITIONAL STUDIES:    IMPRESSION AND PLAN:    admitted Pneumonia  orthopnea ? Mild CHF     Hx of severe MR   MDT ICD BiV   PAF  CKD   Cirrhoisis     Rec   change lasix to IVP for 2 doses   can d/c tele

## 2018-12-12 NOTE — PROGRESS NOTE ADULT - SUBJECTIVE AND OBJECTIVE BOX
SUBJECTIVE:    Patient is a 68y old Male who presents with a chief complaint of Increased SOB (12 Dec 2018 07:26)    Currently admitted to medicine with the primary diagnosis of CHF     Today is hospital day 2d.   This morning he is resting comfortably in bed and reports no new issues or overnight events.     PAST MEDICAL & SURGICAL HISTORY  BPH (benign prostatic hyperplasia)  CKD (chronic kidney disease)  Osler-Weber-Rendu disease  Atrial fibrillation  HTN (hypertension)  Hepatic cirrhosis, unspecified hepatic cirrhosis type, unspecified whether ascites present  Gout, unspecified cause, unspecified chronicity, unspecified site  Anemia, unspecified type  Congestive heart failure, unspecified HF chronicity, unspecified heart failure type  S/P implantation of automatic cardioverter/defibrillator (AICD).     ALLERGIES:  No Known Allergies    MEDICATIONS:  STANDING MEDICATIONS  allopurinol 100 milliGRAM(s) Oral daily  amylase/lipase/protease  (CREON 12,000 Units) 2 Capsule(s) Oral three times a day  chlorhexidine 4% Liquid 1 Application(s) Topical <User Schedule>  colchicine 0.6 milliGRAM(s) Oral daily  digoxin     Tablet 0.125 milliGRAM(s) Oral daily  finasteride 5 milliGRAM(s) Oral daily  furosemide    Tablet 40 milliGRAM(s) Oral two times a day  furosemide   Injectable 60 milliGRAM(s) IV Push once  metoprolol tartrate 25 milliGRAM(s) Oral two times a day  pantoprazole    Tablet 40 milliGRAM(s) Oral before breakfast  sotalol 40 milliGRAM(s) Oral two times a day  spironolactone 50 milliGRAM(s) Oral daily  tamsulosin 0.4 milliGRAM(s) Oral at bedtime    PRN MEDICATIONS  lactulose Syrup 20 Gram(s) Oral two times a day PRN    VITALS:   T(F): 96.5  HR: 84  BP: 105/64  RR: 18  SpO2: 96%    LABS:                        11.6   4.68  )-----------( 122      ( 12 Dec 2018 04:44 )             37.9     12-12    138  |  95<L>  |  29<H>  ----------------------------<  94  4.5   |  31  |  2.0<H>    Ca    9.4      12 Dec 2018 04:44  Phos  4.0     12-11  Mg     2.1     12-12    TPro  6.8  /  Alb  3.2<L>  /  TBili  2.1<H>  /  DBili  x   /  AST  24  /  ALT  13  /  AlkPhos  124<H>  12-12    PT/INR - ( 12 Dec 2018 04:44 )   PT: 24.20 sec;   INR: 2.12 ratio         PTT - ( 11 Dec 2018 05:37 )  PTT:43.4 sec    Culture - Blood (collected 10 Dec 2018 15:10)  Source: .Blood Blood-Peripheral  Preliminary Report (11 Dec 2018 23:01):    No growth to date.    Culture - Blood (collected 10 Dec 2018 15:10)  Source: .Blood Blood-Peripheral  Preliminary Report (11 Dec 2018 23:01):    No growth to date.      PHYSICAL EXAM:  GEN: No acute distress, on 3 l nasal cannula  LUNGS: dec BS b/l bases, crackles+ rt base.  HEART: Regular  ABD: Soft, non-tender, non-distended.  EXT: trace pedal edema, no cyanosis, no clubbing  NEURO: AAOX3

## 2018-12-12 NOTE — MEDICAL STUDENT ADULT H&P (EDUCATION) - NS MD HP STUD PMH FT
BPH (benign prostatic hyperplasia)  CKD (chronic kidney disease)  Osler-Weber-Rendu disease  Atrial fibrillation  HTN (hypertension)  Hepatic cirrhosis, unspecified hepatic cirrhosis type, unspecified whether ascites present  Gout, unspecified cause, unspecified chronicity, unspecified site  Anemia, unspecified type  Congestive heart failure, unspecified HF chronicity, unspecified heart failure type  S/P implantation of automatic cardioverter/defibrillator (AICD).

## 2018-12-12 NOTE — PROGRESS NOTE ADULT - SUBJECTIVE AND OBJECTIVE BOX
CC: SOB     HPI : patient saying SOB better. but still with mild SOB at night when flat in bed     ROS : 10 point ros neg except what mentioned in HPI     Vital Signs Last 24 Hrs  T(C): 35.8 (12 Dec 2018 05:58), Max: 35.9 (11 Dec 2018 20:56)  T(F): 96.5 (12 Dec 2018 05:58), Max: 96.6 (11 Dec 2018 20:56)  HR: 84 (12 Dec 2018 05:58) (84 - 87)  BP: 105/64 (12 Dec 2018 05:58) (91/53 - 105/64)  BP(mean): --  RR: 18 (12 Dec 2018 05:58) (17 - 18)  SpO2: 96% (12 Dec 2018 05:58) (88% - 96%)    PHYSICAL EXAM:  GENERAL: NAD, well-developed  HEAD:  Atraumatic, Normocephalic  EYES: EOMI, PERRLA, conjunctiva and sclera clear  NECK: Supple, No JVD  Pulm: mild right sided lower crackles   CV: Regular rate and rhythm; No murmurs, rubs, or gallops  GI: Soft, Nontender, Nondistended; Bowel sounds present  EXTREMITIES:  trace edema B/L   PSYCH: AAOx3  NEUROLOGY: non-focal  SKIN: No rashes or lesions                          11.6   4.68  )-----------( 122      ( 12 Dec 2018 04:44 )             37.9     12-12    138  |  95<L>  |  29<H>  ----------------------------<  94  4.5   |  31  |  2.0<H>    Ca    9.4      12 Dec 2018 04:44  Phos  4.0     12-11  Mg     2.1     12-12    TPro  6.8  /  Alb  3.2<L>  /  TBili  2.1<H>  /  DBili  x   /  AST  24  /  ALT  13  /  AlkPhos  124<H>  12-12    LIVER FUNCTIONS - ( 12 Dec 2018 04:44 )  Alb: 3.2 g/dL / Pro: 6.8 g/dL / ALK PHOS: 124 U/L / ALT: 13 U/L / AST: 24 U/L / GGT: x           PT/INR - ( 12 Dec 2018 04:44 )   PT: 24.20 sec;   INR: 2.12 ratio         PTT - ( 11 Dec 2018 05:37 )  PTT:43.4 sec        Culture - Blood (collected 10 Dec 2018 15:10)  Source: .Blood Blood-Peripheral  Preliminary Report (11 Dec 2018 23:01):    No growth to date.    Culture - Blood (collected 10 Dec 2018 15:10)  Source: .Blood Blood-Peripheral  Preliminary Report (11 Dec 2018 23:01):    No growth to date.

## 2018-12-12 NOTE — MEDICAL STUDENT ADULT H&P (EDUCATION) - NS MD HP STUD RESULTS OTHER FT
Culture - Blood (collected 10 Dec 2018 15:10)  Source: .Blood Blood-Peripheral  Preliminary Report (11 Dec 2018 23:01):    No growth to date.  Culture - Blood (collected 10 Dec 2018 15:10)  Source: .Blood Blood-Peripheral  Preliminary Report (11 Dec 2018 23:01):    No growth to date.

## 2018-12-12 NOTE — MEDICAL STUDENT ADULT H&P (EDUCATION) - NS MD HP STUD HX OF PRESENT ILLNESS FT
Pt is currently admitted to medicine with the primary diagnosis of systolic HF.     Today is hospital day 2d.   This morning he is resting comfortably in bed and reports no new issues or overnight events other than chest pain while lying down. Currently, pt does not complain of chest pain while lying down.

## 2018-12-12 NOTE — PROGRESS NOTE ADULT - ASSESSMENT
IMPRESSION:  CHF  No bacterial PNA  WBC 4.6  CXR read as new RLL opacity  BCx NTD    RECOMMENDATIONS:  Off  ABx      Recall prn please

## 2018-12-12 NOTE — MEDICAL STUDENT ADULT H&P (EDUCATION) - NS MD HP STUD MEDICATIONS FT
MEDICATIONS:  STANDING MEDICATIONS  allopurinol 100 milliGRAM(s) Oral daily  amylase/lipase/protease  (CREON 12,000 Units) 2 Capsule(s) Oral three times a day  cefepime   IVPB 2000 milliGRAM(s) IV Intermittent every 24 hours  chlorhexidine 4% Liquid 1 Application(s) Topical <User Schedule>  colchicine 0.6 milliGRAM(s) Oral daily  digoxin     Tablet 0.125 milliGRAM(s) Oral daily  finasteride 5 milliGRAM(s) Oral daily  furosemide    Tablet 40 milliGRAM(s) Oral two times a day  magnesium sulfate  IVPB 2 Gram(s) IV Intermittent once  metoprolol tartrate 25 milliGRAM(s) Oral two times a day  pantoprazole    Tablet 40 milliGRAM(s) Oral before breakfast  sotalol 40 milliGRAM(s) Oral two times a day  spironolactone 50 milliGRAM(s) Oral daily  tamsulosin 0.4 milliGRAM(s) Oral at bedtime  vancomycin  IVPB 1000 milliGRAM(s) IV Intermittent every 24 hours    PRN MEDICATIONS  lactulose Syrup 20 Gram(s) Oral two times a day PRN
STANDING MEDICATIONS  allopurinol 100 milliGRAM(s) Oral daily  amylase/lipase/protease  (CREON 12,000 Units) 2 Capsule(s) Oral three times a day  chlorhexidine 4% Liquid 1 Application(s) Topical <User Schedule>  colchicine 0.6 milliGRAM(s) Oral daily  digoxin     Tablet 0.125 milliGRAM(s) Oral daily  finasteride 5 milliGRAM(s) Oral daily  furosemide    Tablet 40 milliGRAM(s) Oral two times a day  furosemide   Injectable 60 milliGRAM(s) IV Push once  metoprolol tartrate 25 milliGRAM(s) Oral two times a day  pantoprazole    Tablet 40 milliGRAM(s) Oral before breakfast  sotalol 40 milliGRAM(s) Oral two times a day  spironolactone 50 milliGRAM(s) Oral daily  tamsulosin 0.4 milliGRAM(s) Oral at bedtime    PRN MEDICATIONS  lactulose Syrup 20 Gram(s) Oral two times a day PRN

## 2018-12-12 NOTE — PROGRESS NOTE ADULT - ASSESSMENT
67 yo M with PMH of A-fib on Coumadin, HFrEF s/p AICD, HTN, gout, CKD, BPH, liver cirrhosis, pancreatic insufficiency, Osler-Weber-Rendu syndrome presented to ED complaining of SOB, chills, and cough. Patient was recently treated with [facility] as outpatient after PCP prescribed for URI ~1 week PTP. Symptoms did not improve. Report that PCP stated patient's throat was red in office. Deny any fevers, chest pain, abdominal pain, nausea, vomiting, constipation, diarrhea, dysuria, myalgias, rash, or other complaint or symptom.    #) CHF Vs RLL PNA  - new R basilar opacity/effusion  - dc cefepime and Vanco  - blood Cx negative  - RVP negative  - WBC 4.6  - moniter off ABx for now    #) A-fib on Coumadin - rate controlled, c/w Coumadin + Dig + Metoprolol + Sotalol (per last Cardio note), monitor INR daily    #) HFrEF - c/w BB, given worsening LE edema + SOB + effusion , stat 60 mg iv lasix f/b PO lasix same as home dose    #) HTN - c/w BB + Aldactone + Sotalol    #) Gout - c/w Allopurinol + Colchicine    #) CKD - Cr at baseline 1.7    #) BPH - c/w Flomax + Finasteride    #) Liver cirrhosis - patient has been taking Lactulose for elevated ammonia, c/w to titrate to 2-3 BM's per day    #) Pancreatic insufficiency - c/w Creon    #) Osler-Weber-Rendu syndrome - patient has history of repeated nose bleeds in past, stable for now, please monitor    DVT ppx: on Coumadin  Diet: DASH  Activity: AAT  Code status: FULL  Dispo: anticipate home 69 yo M with PMH of A-fib on Coumadin, HFrEF s/p AICD, HTN, gout, CKD, BPH, liver cirrhosis, pancreatic insufficiency, Osler-Weber-Rendu syndrome presented to ED complaining of SOB, chills, and cough. Patient was recently treated with [facility] as outpatient after PCP prescribed for URI ~1 week PTP. Symptoms did not improve. Report that PCP stated patient's throat was red in office. Deny any fevers, chest pain, abdominal pain, nausea, vomiting, constipation, diarrhea, dysuria, myalgias, rash, or other complaint or symptom.    #) CHF Vs RLL PNA  - new R basilar opacity/effusion  - dc cefepime and Vanco  - blood Cx negative  - RVP negative  - WBC 4.6  - moniter off ABx for now    #) A-fib on Coumadin - rate controlled, c/w Coumadin + Dig + Metoprolol + Sotalol (per last Cardio note), monitor INR daily    #) HFrEF - c/w BB, given worsening LE edema + SOB + effusion , stat 60 mg iv lasix f/b PO lasix same as home dose    #) HTN - c/w BB + Aldactone + Sotalol    #) Gout - c/w Allopurinol + Colchicine    #) CKD 3 - Cr 2, baseline is 1.7    #) BPH - c/w Flomax + Finasteride    #) Liver cirrhosis - patient has been taking Lactulose for elevated ammonia, c/w to titrate to 2-3 BM's per day    #) Pancreatic insufficiency - c/w Creon    #) Osler-Weber-Rendu syndrome - patient has history of repeated nose bleeds in past, stable for now, please monitor    DVT ppx: on Coumadin  Diet: DASH  Activity: AAT  Code status: FULL  Dispo: anticipate home

## 2018-12-12 NOTE — MEDICAL STUDENT ADULT H&P (EDUCATION) - NS MD HP STUD ASPLAN ASSES FT
• Assessment	  67 yo M with PMH of A-fib on Coumadin, HFrEF s/p AICD, HTN, gout, CKD, BPH, liver cirrhosis, pancreatic insufficiency, Osler-Weber-Rendu syndrome presented to ED complaining of SOB, chills, and cough. Patient was recently treated with [facility] as outpatient after PCP prescribed for URI ~1 week PTP. Symptoms did not improve. Report that PCP stated patient's throat was red in office. Deny any fevers, chest pain, abdominal pain, nausea, vomiting, constipation, diarrhea, dysuria, myalgias, rash, or other complaint or symptom.
• Assessment	  #SOB   Initially the SOB was thought to be PNA. However, after ID consult, it was determined that the SOB was more likely due to acute on chronic systolic CHF exacerbation.   -This is further supported by the pt's improvement with IV furosemide. Pt was switched to po furosemide yesterday. Since pt still has orthopnea, continue IV furosemide times one 60 mg.   -Monitor off abx    #NSVT on tele already has AICD and is on BB. no further intervention by cardiology     #history of liver cirrhosis c/w diuretics and spironolactone    #CKD3 : monitor BMP while on diuretics baseline 1.7 today up to 2     # Afib on warfarin INR 2.12 target INR 2-3     # c/w rest of meds     plan for discharge tomorrow if Renal function stable

## 2018-12-12 NOTE — PROGRESS NOTE ADULT - ASSESSMENT
#SOB after reevaluating this sounds more like Acute on chronic systolic chf excacerbation rather than PNA. ID does not think this is PNA and I agree since patient improved significantly with IV lasix:   patient was switched to po lasix yesterday. since patient still with some orthopnea will give IV lasix times one 60 mg. monitor off abx       #NSVT on tele already has AICD and is on BB. no further intervention by cardiology     #history of liver cirrhosis c/w diuretics and aldactone     #CKD3 : monitor BMP while on diuretics baseline 1.7 today up to 2     # Afib on coumadin INR 2.12 targert INR 2-3     # c/w rest of meds     will plan for discharge tomorrow if Renal function stable

## 2018-12-12 NOTE — MEDICAL STUDENT ADULT H&P (EDUCATION) - NS MD HP STUD RESULTS LAB FT
11.6   4.68  )-----------( 122      ( 12 Dec 2018 04:44 )             37.9   12-12  138  |  95<L>  |  29<H>  ----------------------------<  94  4.5   |  31  |  2.0<H>    Ca    9.4      12 Dec 2018 04:44  Phos  4.0     12-11  Mg     2.1     12-12  TPro  6.8  /  Alb  3.2<L>  /  TBili  2.1<H>  /  DBili  x   /  AST  24  /  ALT  13  /  AlkPhos  124<H>  12-12  PT/INR - ( 12 Dec 2018 04:44 )   PT: 24.20 sec;   INR: 2.12 ratio     PTT - ( 11 Dec 2018 05:37 )  PTT:43.4 sec

## 2018-12-13 ENCOUNTER — TRANSCRIPTION ENCOUNTER (OUTPATIENT)
Age: 68
End: 2018-12-13

## 2018-12-13 VITALS
RESPIRATION RATE: 18 BRPM | SYSTOLIC BLOOD PRESSURE: 136 MMHG | HEART RATE: 76 BPM | TEMPERATURE: 98 F | DIASTOLIC BLOOD PRESSURE: 57 MMHG

## 2018-12-13 LAB
ALBUMIN SERPL ELPH-MCNC: 3.5 G/DL — SIGNIFICANT CHANGE UP (ref 3.5–5.2)
ALP SERPL-CCNC: 126 U/L — HIGH (ref 30–115)
ALT FLD-CCNC: 14 U/L — SIGNIFICANT CHANGE UP (ref 0–41)
ANION GAP SERPL CALC-SCNC: 14 MMOL/L — SIGNIFICANT CHANGE UP (ref 7–14)
AST SERPL-CCNC: 27 U/L — SIGNIFICANT CHANGE UP (ref 0–41)
BILIRUB SERPL-MCNC: 2 MG/DL — HIGH (ref 0.2–1.2)
BUN SERPL-MCNC: 29 MG/DL — HIGH (ref 10–20)
CALCIUM SERPL-MCNC: 9.3 MG/DL — SIGNIFICANT CHANGE UP (ref 8.5–10.1)
CHLORIDE SERPL-SCNC: 92 MMOL/L — LOW (ref 98–110)
CO2 SERPL-SCNC: 28 MMOL/L — SIGNIFICANT CHANGE UP (ref 17–32)
CREAT SERPL-MCNC: 1.8 MG/DL — HIGH (ref 0.7–1.5)
GLUCOSE SERPL-MCNC: 99 MG/DL — SIGNIFICANT CHANGE UP (ref 70–99)
HCT VFR BLD CALC: 36.1 % — LOW (ref 42–52)
HGB BLD-MCNC: 11.3 G/DL — LOW (ref 14–18)
INR BLD: 2.26 RATIO — HIGH (ref 0.65–1.3)
LEGIONELLA AG UR QL: NEGATIVE — SIGNIFICANT CHANGE UP
MCHC RBC-ENTMCNC: 29 PG — SIGNIFICANT CHANGE UP (ref 27–31)
MCHC RBC-ENTMCNC: 31.3 G/DL — LOW (ref 32–37)
MCV RBC AUTO: 92.6 FL — SIGNIFICANT CHANGE UP (ref 80–94)
NRBC # BLD: 0 /100 WBCS — SIGNIFICANT CHANGE UP (ref 0–0)
PLATELET # BLD AUTO: 112 K/UL — LOW (ref 130–400)
POTASSIUM SERPL-MCNC: 4.2 MMOL/L — SIGNIFICANT CHANGE UP (ref 3.5–5)
POTASSIUM SERPL-SCNC: 4.2 MMOL/L — SIGNIFICANT CHANGE UP (ref 3.5–5)
PROT SERPL-MCNC: 7.1 G/DL — SIGNIFICANT CHANGE UP (ref 6–8)
PROTHROM AB SERPL-ACNC: 25.8 SEC — HIGH (ref 9.95–12.87)
RBC # BLD: 3.9 M/UL — LOW (ref 4.7–6.1)
RBC # FLD: 18.5 % — HIGH (ref 11.5–14.5)
SODIUM SERPL-SCNC: 134 MMOL/L — LOW (ref 135–146)
WBC # BLD: 5.53 K/UL — SIGNIFICANT CHANGE UP (ref 4.8–10.8)
WBC # FLD AUTO: 5.53 K/UL — SIGNIFICANT CHANGE UP (ref 4.8–10.8)

## 2018-12-13 RX ORDER — SPIRONOLACTONE 25 MG/1
2 TABLET, FILM COATED ORAL
Qty: 0 | Refills: 0 | DISCHARGE
Start: 2018-12-13

## 2018-12-13 RX ORDER — FINASTERIDE 5 MG/1
1 TABLET, FILM COATED ORAL
Qty: 0 | Refills: 0 | DISCHARGE
Start: 2018-12-13

## 2018-12-13 RX ORDER — SPIRONOLACTONE 25 MG/1
1 TABLET, FILM COATED ORAL
Qty: 0 | Refills: 0 | COMMUNITY

## 2018-12-13 RX ORDER — FINASTERIDE 5 MG/1
1 TABLET, FILM COATED ORAL
Qty: 0 | Refills: 0 | COMMUNITY

## 2018-12-13 RX ORDER — TAMSULOSIN HYDROCHLORIDE 0.4 MG/1
1 CAPSULE ORAL
Qty: 0 | Refills: 0 | COMMUNITY

## 2018-12-13 RX ORDER — TAMSULOSIN HYDROCHLORIDE 0.4 MG/1
1 CAPSULE ORAL
Qty: 0 | Refills: 0 | DISCHARGE
Start: 2018-12-13

## 2018-12-13 RX ADMIN — Medication 2 CAPSULE(S): at 05:51

## 2018-12-13 RX ADMIN — Medication 0.12 MILLIGRAM(S): at 05:51

## 2018-12-13 RX ADMIN — Medication 100 MILLIGRAM(S): at 11:19

## 2018-12-13 RX ADMIN — PANTOPRAZOLE SODIUM 40 MILLIGRAM(S): 20 TABLET, DELAYED RELEASE ORAL at 05:52

## 2018-12-13 RX ADMIN — SPIRONOLACTONE 50 MILLIGRAM(S): 25 TABLET, FILM COATED ORAL at 05:52

## 2018-12-13 RX ADMIN — FINASTERIDE 5 MILLIGRAM(S): 5 TABLET, FILM COATED ORAL at 11:20

## 2018-12-13 RX ADMIN — Medication 25 MILLIGRAM(S): at 05:52

## 2018-12-13 RX ADMIN — Medication 40 MILLIGRAM(S): at 05:51

## 2018-12-13 NOTE — CHART NOTE - NSCHARTNOTEFT_GEN_A_CORE
<<<RESIDENT DISCHARGE NOTE>>>     MORAIMA OROZCO  MRN-[id number]    VITAL SIGNS:  T(F): 97.5 (12-13-18 @ 05:57), Max: 97.8 (12-12-18 @ 19:48)  HR: 84 (12-13-18 @ 05:57)  BP: 108/55 (12-13-18 @ 05:57)  SpO2: 92% (12-13-18 @ 06:19)      PHYSICAL EXAMINATION:  General: comfortable, on NC  Head & Neck:nad  Pulmonary:dec BS  Cardiovascular:s1s2+  Gastrointestinal/Abdomen & Pelvis:soft, BS+  Neurologic/Motor:non focal    TEST RESULTS:                        11.3   5.53  )-----------( 112      ( 13 Dec 2018 04:20 )             36.1       12-13    134<L>  |  92<L>  |  29<H>  ----------------------------<  99  4.2   |  28  |  1.8<H>    Ca    9.3      13 Dec 2018 04:20  Mg     2.1     12-12    TPro  7.1  /  Alb  3.5  /  TBili  2.0<H>  /  DBili  x   /  AST  27  /  ALT  14  /  AlkPhos  126<H>  12-13      FINAL DISCHARGE INTERVIEW:  Resident(s) Present: (Name:____Darci________), RN Present: (Name:  quinton_______)    DISCHARGE MEDICATION RECONCILIATION  reviewed with Attending (Name:___Elsy________)    DISPOSITION:   [  ] Home,    [ * ] Home with Visiting Nursing Services,   [    ]  SNF/ NH,    [   ] Acute Rehab (4A),   [   ] Other (Specify:_________)

## 2018-12-13 NOTE — DISCHARGE NOTE ADULT - SECONDARY DIAGNOSIS.
Hepatic cirrhosis, unspecified hepatic cirrhosis type, unspecified whether ascites present Gout, unspecified cause, unspecified chronicity, unspecified site Atrial fibrillation CKD (chronic kidney disease)

## 2018-12-13 NOTE — DISCHARGE NOTE ADULT - PLAN OF CARE
stabilization Acute exacerbation of CHF  continue with the medication and f/u with Cardiology Dr. Ortiz in 5 days. f/u with PMD continue coumadin 2.5 mg daily.  f/u INR routinely INR goal 2-3, and adjust coumadin accordingly f/u with nephrology

## 2018-12-13 NOTE — PROGRESS NOTE ADULT - ASSESSMENT
IMPRESSION:    Sepsis improved   RLL Pneumonia   H/O Afib on coumadin  H/O HFrEF  H/O Liver cirrhosis    PLAN:    - Favor finish ABX course  - F/u cultures, send urine legionella, urine strept, MRSA nare  - Wean off oxygen keep pulse ox > 92%,  - Maximize cardiac therapy and volume status   - FU ECHO  - Negative balance as tolerated  - OP pulm FU   - FU Coags

## 2018-12-13 NOTE — DISCHARGE NOTE ADULT - PATIENT PORTAL LINK FT
You can access the [facility], offered by [facility], by registering with the following website: [email or web address]

## 2018-12-13 NOTE — DISCHARGE NOTE ADULT - CARE PLAN
Principal Discharge DX:	Congestive heart failure, unspecified HF chronicity, unspecified heart failure type  Goal:	stabilization  Assessment and plan of treatment:	Acute exacerbation of CHF  continue with the medication and f/u with Cardiology Dr. Ortiz in 5 days.  Secondary Diagnosis:	Hepatic cirrhosis, unspecified hepatic cirrhosis type, unspecified whether ascites present  Assessment and plan of treatment:	f/u with PMD  Secondary Diagnosis:	Gout, unspecified cause, unspecified chronicity, unspecified site  Assessment and plan of treatment:	f/u with PMD  Secondary Diagnosis:	Atrial fibrillation  Assessment and plan of treatment:	continue coumadin 2.5 mg daily.  f/u INR routinely INR goal 2-3, and adjust coumadin accordingly  Secondary Diagnosis:	CKD (chronic kidney disease)  Assessment and plan of treatment:	f/u with nephrology

## 2018-12-13 NOTE — PROGRESS NOTE ADULT - ASSESSMENT
#Acute on chronic systolic chf : improved   on po lasix   patient does have home O2 and uses it needed   no clinical evidence of PNA     #NSVT on tele already has AICD and is on BB. no further intervention by cardiology     #history of liver cirrhosis c/w diuretics and aldactone     #CKD3 :stable OPT f/u     # Afib on coumadin INR 2.12 targert INR 2-3     # c/w rest of meds     #discharge today   spent 33 min on discharge

## 2018-12-13 NOTE — PROGRESS NOTE ADULT - SUBJECTIVE AND OBJECTIVE BOX
no chest pain   SOB resolved     Vital Signs Last 24 Hrs  T(C): 36.4 (13 Dec 2018 05:57), Max: 36.6 (12 Dec 2018 19:48)  T(F): 97.5 (13 Dec 2018 05:57), Max: 97.8 (12 Dec 2018 19:48)  HR: 84 (13 Dec 2018 05:57) (84 - 86)  BP: 108/55 (13 Dec 2018 05:57) (102/58 - 108/55)  BP(mean): --  RR: 18 (13 Dec 2018 05:57) (18 - 18)  SpO2: 92% (13 Dec 2018 06:19) (92% - 92%)    PHYSICAL EXAM:  GENERAL: NAD, well-developed  HEAD:  Atraumatic, Normocephalic  EYES: EOMI, PERRLA, conjunctiva and sclera clear  NECK: Supple, No JVD  Pulm mild basilar crackles   CV: Regular rate and rhythm; No murmurs, rubs, or gallops  GI: Soft, Nontender, Nondistended; Bowel sounds present  EXTREMITIES:  2+ Peripheral Pulses, No clubbing, cyanosis, or edema  PSYCH: AAOx3  NEUROLOGY: non-focal  SKIN: No rashes or lesions                          11.3   5.53  )-----------( 112      ( 13 Dec 2018 04:20 )             36.1     12-13    134<L>  |  92<L>  |  29<H>  ----------------------------<  99  4.2   |  28  |  1.8<H>    Ca    9.3      13 Dec 2018 04:20  Mg     2.1     12-12    TPro  7.1  /  Alb  3.5  /  TBili  2.0<H>  /  DBili  x   /  AST  27  /  ALT  14  /  AlkPhos  126<H>  12-13    LIVER FUNCTIONS - ( 13 Dec 2018 04:20 )  Alb: 3.5 g/dL / Pro: 7.1 g/dL / ALK PHOS: 126 U/L / ALT: 14 U/L / AST: 27 U/L / GGT: x           PT/INR - ( 13 Dec 2018 04:20 )   PT: 25.80 sec;   INR: 2.26 ratio                 Culture - Blood (collected 10 Dec 2018 15:10)  Source: .Blood Blood-Peripheral  Preliminary Report (11 Dec 2018 23:01):    No growth to date.    Culture - Blood (collected 10 Dec 2018 15:10)  Source: .Blood Blood-Peripheral  Preliminary Report (11 Dec 2018 23:01):    No growth to date.

## 2018-12-13 NOTE — DISCHARGE NOTE ADULT - CARE PROVIDER_API CALL
Jose A Ortiz), Cardiovascular Disease; Nuclear Cardiology  [address] [place] [address]  [address]  [place] [address]  Phone: [phone]  Fax: [phone]    GASPER GARCIA  Phone: (   )    -  Fax: (   )    -

## 2018-12-13 NOTE — DISCHARGE NOTE ADULT - MEDICATION SUMMARY - MEDICATIONS TO TAKE
I will START or STAY ON the medications listed below when I get home from the hospital:    finasteride 5 mg oral tablet  -- 1 tab(s) by mouth once a day  -- Indication: For BPH (benign prostatic hyperplasia)    spironolactone 25 mg oral tablet  -- 2 tab(s) by mouth once a day  -- Indication: For Chf    tamsulosin 0.4 mg oral capsule  -- 1 cap(s) by mouth once a day (at bedtime)  -- Indication: For BPH (benign prostatic hyperplasia)    digoxin 125 mcg (0.125 mg) oral tablet  -- 1 tab(s) by mouth once a day  -- Indication: For Chf    sotalol 80 mg oral tablet  -- 0.5 tab(s) by mouth 2 times a day  -- Indication: For Chf    Coumadin 2.5 mg oral tablet  -- 1 tab(s) by mouth once a day  -- Indication: For Atrial fibrillation    allopurinol 100 mg oral tablet  -- 1 tab(s) by mouth once a day  -- Indication: For gout    Colcrys 0.6 mg oral tablet  -- 1 tab(s) by mouth once a day  -- Indication: For gout    Metoprolol Tartrate 25 mg oral tablet  -- 1 tab(s) by mouth 2 times a day  -- Indication: For Chf    Creon 24,000 units oral delayed release capsule  -- 1 cap(s) by mouth 3 times a day  -- Indication: For digestive enzymes    furosemide 40 mg oral tablet  -- 1 tab(s) by mouth 2 times a day  -- Indication: For Chf    lactulose 10 g/15 mL oral solution  -- 30 milliliter(s) by mouth 2 times a day  -- Indication: For Constipation    pantoprazole 40 mg oral delayed release tablet  -- 1 tab(s) by mouth once a day  -- Indication: For gerd

## 2018-12-13 NOTE — PROGRESS NOTE ADULT - SUBJECTIVE AND OBJECTIVE BOX
Patient is a 68y old  Male who presents with a chief complaint of CHF exacerbation (13 Dec 2018 09:04)        SUBJECTIVE:  Feels better.  No fevers or chills       REVIEW OF SYSTEMS:  See HPI    PHYSICAL EXAM  Vital Signs Last 24 Hrs  T(C): 36.4 (13 Dec 2018 05:57), Max: 36.6 (12 Dec 2018 19:48)  T(F): 97.5 (13 Dec 2018 05:57), Max: 97.8 (12 Dec 2018 19:48)  HR: 84 (13 Dec 2018 05:57) (84 - 86)  BP: 108/55 (13 Dec 2018 05:57) (102/58 - 108/55)  BP(mean): --  RR: 18 (13 Dec 2018 05:57) (18 - 18)  SpO2: 92% (13 Dec 2018 06:19) (92% - 92%)    General: In NAD  HEENT: PANFILO               Lymphatic system: No Cervical LN    Respiratory: Bon BS  Cardiovascular: Regular  Gastrointestinal: Soft. + BS  Musculoskeletal: No clubbing.  moves all extremities.  Full range of motion    Skin: Warm.  Intact  Neurological: No motor or sensory deficit      12-12-18 @ 07:01  -  12-13-18 @ 07:00  --------------------------------------------------------  IN:    Oral Fluid: 300 mL  Total IN: 300 mL    OUT:    Voided: 670 mL  Total OUT: 670 mL    Total NET: -370 mL          LABS:                          11.3   5.53  )-----------( 112      ( 13 Dec 2018 04:20 )             36.1                                               12-13    134<L>  |  92<L>  |  29<H>  ----------------------------<  99  4.2   |  28  |  1.8<H>    Ca    9.3      13 Dec 2018 04:20  Mg     2.1     12-12    TPro  7.1  /  Alb  3.5  /  TBili  2.0<H>  /  DBili  x   /  AST  27  /  ALT  14  /  AlkPhos  126<H>  12-13      PT/INR - ( 13 Dec 2018 04:20 )   PT: 25.80 sec;   INR: 2.26 ratio                                                                                              LIVER FUNCTIONS - ( 13 Dec 2018 04:20 )  Alb: 3.5 g/dL / Pro: 7.1 g/dL / ALK PHOS: 126 U/L / ALT: 14 U/L / AST: 27 U/L / GGT: x                                                  Culture - Blood (collected 10 Dec 2018 15:10)  Source: .Blood Blood-Peripheral  Preliminary Report (11 Dec 2018 23:01):    No growth to date.    Culture - Blood (collected 10 Dec 2018 15:10)  Source: .Blood Blood-Peripheral  Preliminary Report (11 Dec 2018 23:01):    No growth to date.                                                    MEDICATIONS  (STANDING):  allopurinol 100 milliGRAM(s) Oral daily  amylase/lipase/protease  (CREON 12,000 Units) 2 Capsule(s) Oral three times a day  chlorhexidine 4% Liquid 1 Application(s) Topical <User Schedule>  colchicine 0.6 milliGRAM(s) Oral daily  digoxin     Tablet 0.125 milliGRAM(s) Oral daily  finasteride 5 milliGRAM(s) Oral daily  furosemide    Tablet 40 milliGRAM(s) Oral two times a day  metoprolol tartrate 25 milliGRAM(s) Oral two times a day  pantoprazole    Tablet 40 milliGRAM(s) Oral before breakfast  sotalol 40 milliGRAM(s) Oral two times a day  spironolactone 50 milliGRAM(s) Oral daily  tamsulosin 0.4 milliGRAM(s) Oral at bedtime    MEDICATIONS  (PRN):  lactulose Syrup 20 Gram(s) Oral two times a day PRN 2 BM's per day

## 2018-12-14 LAB — S PNEUM AG UR QL: NEGATIVE — SIGNIFICANT CHANGE UP

## 2018-12-15 LAB
CULTURE RESULTS: SIGNIFICANT CHANGE UP
CULTURE RESULTS: SIGNIFICANT CHANGE UP
SPECIMEN SOURCE: SIGNIFICANT CHANGE UP
SPECIMEN SOURCE: SIGNIFICANT CHANGE UP

## 2018-12-17 ENCOUNTER — OUTPATIENT (OUTPATIENT)
Dept: OUTPATIENT SERVICES | Facility: HOSPITAL | Age: 68
LOS: 1 days | Discharge: HOME | End: 2018-12-17

## 2018-12-17 DIAGNOSIS — I48.91 UNSPECIFIED ATRIAL FIBRILLATION: ICD-10-CM

## 2018-12-17 DIAGNOSIS — Z95.810 PRESENCE OF AUTOMATIC (IMPLANTABLE) CARDIAC DEFIBRILLATOR: Chronic | ICD-10-CM

## 2018-12-17 DIAGNOSIS — I25.10 ATHEROSCLEROTIC HEART DISEASE OF NATIVE CORONARY ARTERY WITHOUT ANGINA PECTORIS: ICD-10-CM

## 2018-12-17 PROBLEM — I10 ESSENTIAL (PRIMARY) HYPERTENSION: Chronic | Status: ACTIVE | Noted: 2018-12-10

## 2018-12-17 PROBLEM — I78.0 HEREDITARY HEMORRHAGIC TELANGIECTASIA: Chronic | Status: ACTIVE | Noted: 2018-12-10

## 2018-12-17 PROBLEM — N18.9 CHRONIC KIDNEY DISEASE, UNSPECIFIED: Chronic | Status: ACTIVE | Noted: 2018-12-10

## 2018-12-17 PROBLEM — N40.0 BENIGN PROSTATIC HYPERPLASIA WITHOUT LOWER URINARY TRACT SYMPTOMS: Chronic | Status: ACTIVE | Noted: 2018-12-10

## 2018-12-18 DIAGNOSIS — I34.0 NONRHEUMATIC MITRAL (VALVE) INSUFFICIENCY: ICD-10-CM

## 2018-12-18 DIAGNOSIS — K86.89 OTHER SPECIFIED DISEASES OF PANCREAS: ICD-10-CM

## 2018-12-18 DIAGNOSIS — I42.9 CARDIOMYOPATHY, UNSPECIFIED: ICD-10-CM

## 2018-12-18 DIAGNOSIS — R06.03 ACUTE RESPIRATORY DISTRESS: ICD-10-CM

## 2018-12-18 DIAGNOSIS — I50.23 ACUTE ON CHRONIC SYSTOLIC (CONGESTIVE) HEART FAILURE: ICD-10-CM

## 2018-12-18 DIAGNOSIS — Z95.810 PRESENCE OF AUTOMATIC (IMPLANTABLE) CARDIAC DEFIBRILLATOR: ICD-10-CM

## 2018-12-18 DIAGNOSIS — J18.9 PNEUMONIA, UNSPECIFIED ORGANISM: ICD-10-CM

## 2018-12-18 DIAGNOSIS — E78.5 HYPERLIPIDEMIA, UNSPECIFIED: ICD-10-CM

## 2018-12-18 DIAGNOSIS — N18.3 CHRONIC KIDNEY DISEASE, STAGE 3 (MODERATE): ICD-10-CM

## 2018-12-18 DIAGNOSIS — I78.0 HEREDITARY HEMORRHAGIC TELANGIECTASIA: ICD-10-CM

## 2018-12-18 DIAGNOSIS — Z79.01 LONG TERM (CURRENT) USE OF ANTICOAGULANTS: ICD-10-CM

## 2018-12-18 DIAGNOSIS — D64.9 ANEMIA, UNSPECIFIED: ICD-10-CM

## 2018-12-18 DIAGNOSIS — I47.2 VENTRICULAR TACHYCARDIA: ICD-10-CM

## 2018-12-18 DIAGNOSIS — I13.0 HYPERTENSIVE HEART AND CHRONIC KIDNEY DISEASE WITH HEART FAILURE AND STAGE 1 THROUGH STAGE 4 CHRONIC KIDNEY DISEASE, OR UNSPECIFIED CHRONIC KIDNEY DISEASE: ICD-10-CM

## 2018-12-18 DIAGNOSIS — I48.91 UNSPECIFIED ATRIAL FIBRILLATION: ICD-10-CM

## 2018-12-24 ENCOUNTER — OUTPATIENT (OUTPATIENT)
Dept: OUTPATIENT SERVICES | Facility: HOSPITAL | Age: 68
LOS: 1 days | Discharge: HOME | End: 2018-12-24

## 2018-12-24 DIAGNOSIS — Z01.810 ENCOUNTER FOR PREPROCEDURAL CARDIOVASCULAR EXAMINATION: ICD-10-CM

## 2018-12-24 DIAGNOSIS — Z95.810 PRESENCE OF AUTOMATIC (IMPLANTABLE) CARDIAC DEFIBRILLATOR: Chronic | ICD-10-CM

## 2018-12-24 DIAGNOSIS — E03.9 HYPOTHYROIDISM, UNSPECIFIED: ICD-10-CM

## 2018-12-24 DIAGNOSIS — I25.10 ATHEROSCLEROTIC HEART DISEASE OF NATIVE CORONARY ARTERY WITHOUT ANGINA PECTORIS: ICD-10-CM

## 2018-12-24 DIAGNOSIS — I48.91 UNSPECIFIED ATRIAL FIBRILLATION: ICD-10-CM

## 2018-12-26 ENCOUNTER — APPOINTMENT (OUTPATIENT)
Dept: HEMATOLOGY ONCOLOGY | Facility: CLINIC | Age: 68
End: 2018-12-26

## 2018-12-31 ENCOUNTER — OUTPATIENT (OUTPATIENT)
Dept: OUTPATIENT SERVICES | Facility: HOSPITAL | Age: 68
LOS: 1 days | Discharge: HOME | End: 2018-12-31

## 2018-12-31 DIAGNOSIS — Z95.810 PRESENCE OF AUTOMATIC (IMPLANTABLE) CARDIAC DEFIBRILLATOR: Chronic | ICD-10-CM

## 2018-12-31 DIAGNOSIS — I48.91 UNSPECIFIED ATRIAL FIBRILLATION: ICD-10-CM

## 2018-12-31 DIAGNOSIS — I25.10 ATHEROSCLEROTIC HEART DISEASE OF NATIVE CORONARY ARTERY WITHOUT ANGINA PECTORIS: ICD-10-CM

## 2018-12-31 DIAGNOSIS — E03.9 HYPOTHYROIDISM, UNSPECIFIED: ICD-10-CM

## 2019-01-07 ENCOUNTER — OUTPATIENT (OUTPATIENT)
Dept: OUTPATIENT SERVICES | Facility: HOSPITAL | Age: 69
LOS: 1 days | Discharge: HOME | End: 2019-01-07

## 2019-01-07 DIAGNOSIS — Z95.810 PRESENCE OF AUTOMATIC (IMPLANTABLE) CARDIAC DEFIBRILLATOR: Chronic | ICD-10-CM

## 2019-01-07 DIAGNOSIS — I25.10 ATHEROSCLEROTIC HEART DISEASE OF NATIVE CORONARY ARTERY WITHOUT ANGINA PECTORIS: ICD-10-CM

## 2019-01-07 DIAGNOSIS — I48.0 PAROXYSMAL ATRIAL FIBRILLATION: ICD-10-CM

## 2019-01-07 DIAGNOSIS — E03.9 HYPOTHYROIDISM, UNSPECIFIED: ICD-10-CM

## 2019-01-14 ENCOUNTER — OUTPATIENT (OUTPATIENT)
Dept: OUTPATIENT SERVICES | Facility: HOSPITAL | Age: 69
LOS: 1 days | Discharge: HOME | End: 2019-01-14

## 2019-01-14 DIAGNOSIS — Z95.810 PRESENCE OF AUTOMATIC (IMPLANTABLE) CARDIAC DEFIBRILLATOR: Chronic | ICD-10-CM

## 2019-01-14 DIAGNOSIS — I25.10 ATHEROSCLEROTIC HEART DISEASE OF NATIVE CORONARY ARTERY WITHOUT ANGINA PECTORIS: ICD-10-CM

## 2019-01-14 DIAGNOSIS — I48.0 PAROXYSMAL ATRIAL FIBRILLATION: ICD-10-CM

## 2019-01-16 ENCOUNTER — LABORATORY RESULT (OUTPATIENT)
Age: 69
End: 2019-01-16

## 2019-01-16 ENCOUNTER — APPOINTMENT (OUTPATIENT)
Dept: HEMATOLOGY ONCOLOGY | Facility: CLINIC | Age: 69
End: 2019-01-16

## 2019-01-16 ENCOUNTER — OUTPATIENT (OUTPATIENT)
Dept: OUTPATIENT SERVICES | Facility: HOSPITAL | Age: 69
LOS: 1 days | Discharge: HOME | End: 2019-01-16

## 2019-01-16 VITALS
TEMPERATURE: 95.6 F | HEART RATE: 86 BPM | WEIGHT: 218 LBS | RESPIRATION RATE: 18 BRPM | DIASTOLIC BLOOD PRESSURE: 59 MMHG | BODY MASS INDEX: 31.21 KG/M2 | SYSTOLIC BLOOD PRESSURE: 94 MMHG | HEIGHT: 70 IN

## 2019-01-16 DIAGNOSIS — K72.90 HEPATIC FAILURE, UNSPECIFIED W/OUT COMA: ICD-10-CM

## 2019-01-16 DIAGNOSIS — Z00.00 ENCOUNTER FOR GENERAL ADULT MEDICAL EXAMINATION W/OUT ABNORMAL FINDINGS: ICD-10-CM

## 2019-01-16 DIAGNOSIS — Z95.810 PRESENCE OF AUTOMATIC (IMPLANTABLE) CARDIAC DEFIBRILLATOR: Chronic | ICD-10-CM

## 2019-01-16 DIAGNOSIS — D50.0 IRON DEFICIENCY ANEMIA SECONDARY TO BLOOD LOSS (CHRONIC): ICD-10-CM

## 2019-01-17 LAB
FERRITIN SERPL-MCNC: 55 NG/ML
HCT VFR BLD CALC: 38.3 %
HGB BLD-MCNC: 12.4 G/DL
IRON SATN MFR SERPL: 10 %
IRON SERPL-MCNC: 36 UG/DL
MCHC RBC-ENTMCNC: 29.6 PG
MCHC RBC-ENTMCNC: 32.4 G/DL
MCV RBC AUTO: 91.4 FL
PLATELET # BLD AUTO: 140 K/UL
PMV BLD: 11.9 FL
RBC # BLD: 4.19 M/UL
RBC # FLD: 18.9 %
TIBC SERPL-MCNC: 359 UG/DL
UIBC SERPL-MCNC: 323 UG/DL
WBC # FLD AUTO: 4.48 K/UL

## 2019-01-21 ENCOUNTER — APPOINTMENT (OUTPATIENT)
Dept: HEMATOLOGY ONCOLOGY | Facility: CLINIC | Age: 69
End: 2019-01-21

## 2019-01-21 ENCOUNTER — OUTPATIENT (OUTPATIENT)
Dept: OUTPATIENT SERVICES | Facility: HOSPITAL | Age: 69
LOS: 1 days | Discharge: HOME | End: 2019-01-21

## 2019-01-21 DIAGNOSIS — I25.10 ATHEROSCLEROTIC HEART DISEASE OF NATIVE CORONARY ARTERY WITHOUT ANGINA PECTORIS: ICD-10-CM

## 2019-01-21 DIAGNOSIS — I48.0 PAROXYSMAL ATRIAL FIBRILLATION: ICD-10-CM

## 2019-01-21 DIAGNOSIS — Z95.810 PRESENCE OF AUTOMATIC (IMPLANTABLE) CARDIAC DEFIBRILLATOR: Chronic | ICD-10-CM

## 2019-01-22 NOTE — ASSESSMENT
[FreeTextEntry1] :  Anemia of chronic kidney disease and chronic disease plus iron deficiency from chronic nose bleeds due to HHT; status post  IV Feraheme  as needed. His hgB has been stable and is  now near normal. He has mild occasional thrombocytopenia which may be due to consumption from bleeds will monitor. He is on Coumadin for afib and is being considered for Watchman procedure to take him off the Coumadin given bleeding history, Watchman is on hold for now. 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 Holding Procrit at this point given hgb is over 10 gm/dl \par -he has blood work done at home, INR, will add on  CBC and iron studies as well \par - he knows to stop Coumadin if he is bleeding and to go to ER if needed and to see us sooner if bleeding is severe.\par - would not use Tamoxifen or Tranexamic at this time acid since there is risk of thrombosis and since he is on Coumadin  for afib.  Topical therapy for epistaxis is preferred and will try afrin, another option is Avastin but that also has risk, if nose bleeds become difficult to manage will consider tranexamic acid first \par - on lactulose for his hepatic insufficiency from right sided heart failure and protal congestion \par - he has 11 doctors that he follows with \par \par RTC in 2 months

## 2019-01-22 NOTE — HISTORY OF PRESENT ILLNESS
[de-identified] : 66 year old male who follows here periodically for management and treatment of anemia. He is non compliant with visits.   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 He has recurrent epistaxis mainly, on and off at least two or three times a week and episodes of severity and duration vary between 20 minutes to an hour. He also has multiple other comorbidities including cardiomyopathy with arrhythmia, status post pacemaker AICD; and COPD, gout, hypertension and CKD. He requires weekly Procrit and on and off intravenous iron but he is not compliant since whenever his hemoglobin gets better he stops coming to the [facility]. [de-identified] : 4/26/17\par He presents with his wife to establish care Dr. Ira garcía. He states he had a major nose bleed a few days ago. He had IV iron in March. No other complaints. \par \par 6/7/17\par He is doing  well. No bleeding. His HgB today is 11.4 \par \par 8/2/17\par He is doing well. Only had   a mild nose bleed. hgB today was 13.5. No other complaints.\par \par 10/4/17\par He reports a nose bleed one week ago that resolved. His HgB from today is 11.5. Has weakness in legs. \par \par 12/14/17\par He is here for follow up.  He had his  urinary stent removed and had hematuria. He held his Coumadin. He is thinking about a watchman procedure. Feels about the same otherwise. His HgB has been falling and so has his iron stores.\par \par 2/28/18\par Here for follow up. No recent bleeding Hgb is over 11. He decided not to proceed with watchman. He is burping after he eats and is to follow up with GI.\par \par 5/23/18\par He is here for followup with his wife. He reports a nosebleed yesterday. His hemoglobin has been stable responded well to IV iron and CBC from today shows a hemoglobin of 11.4.\par \par 7/18/18\par Here for follow up. Had heavy epistaxis last 2 days this resolved. On Coumadin. feels a bit week. CBC from today is unchanged from last visit.\par \par 8/22/18\par He is here for follow up. Has epistaxis. He had IV Feraheme. His Hgb is greater than 11 gm/dl continently. Taking Coumadin. \par \par 11/14/18\par Patient is here for follow-up.  He remains on coumadin.  Hgb is 10.4 today.  His Iron sat from 10/2018 is low at 13% with ferritin trending down at 53.  Approximately 5 days ago the patient reports heavy epistaxis.  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  He was experienced tremors, so he reported to the ED, his ammonia was high, so they prescribed him lactulose.  We discussed that there may be a multitude of reasons, including but not limited to liver failure or bleeding.  He is not having symptoms of encephalopathy such as confusion.  He reports mild itching, likely due to the cirrhosis.  \par \par 1/16/19\par He is here for follow-up with his daughter.  He was admitted for a CHF exacerbation about 3 weeks ago.  They gave him lasix and it resolved.  On Friday the cardiologist said there was no fluid (previous right sided effusion).  The last few days he has been SOB and some weight gain.  The cardiologist told them to take more lasix.  He has seen some improvement in the frequency of his epistaxis.  He has been having nausea, poorly controlled by Reglan PRN.  We discussed this may be due to the numerous medications he takes.

## 2019-01-22 NOTE — REVIEW OF SYSTEMS
[Fatigue] : fatigue [Nosebleeds] : nosebleeds [SOB on Exertion] : shortness of breath during exertion [Negative] : Allergic/Immunologic [Fever] : no fever [Recent Change In Weight] : ~T no recent weight change [Dysphagia] : no dysphagia [Chest Pain] : no chest pain [Shortness Of Breath] : no shortness of breath [Abdominal Pain] : no abdominal pain [Diarrhea] : no diarrhea [Joint Pain] : no joint pain [Skin Rash] : no skin rash [Easy Bleeding] : no tendency for easy bleeding [Easy Bruising] : no tendency for easy bruising [FreeTextEntry8] : hematuria has resolved [de-identified] : Was confused in past due to encephalopathy

## 2019-01-22 NOTE — PHYSICAL EXAM
[Restricted in physically strenuous activity but ambulatory and able to carry out work of a light or sedentary nature] : Status 1- Restricted in physically strenuous activity but ambulatory and able to carry out work of a light or sedentary nature, e.g., light house work, office work [Obese] : obese [Normal] : affect appropriate [Ulcers] : no ulcers [de-identified] : Walks with a cane [de-identified] : He has an AICD on left [de-identified] : Has telangiectasis on face

## 2019-01-28 ENCOUNTER — CLINICAL ADVICE (OUTPATIENT)
Age: 69
End: 2019-01-28

## 2019-01-28 ENCOUNTER — OUTPATIENT (OUTPATIENT)
Dept: OUTPATIENT SERVICES | Facility: HOSPITAL | Age: 69
LOS: 1 days | Discharge: HOME | End: 2019-01-28

## 2019-01-28 DIAGNOSIS — D63.8 ANEMIA IN OTHER CHRONIC DISEASES CLASSIFIED ELSEWHERE: ICD-10-CM

## 2019-01-28 DIAGNOSIS — I28.0 ARTERIOVENOUS FISTULA OF PULMONARY VESSELS: ICD-10-CM

## 2019-01-28 DIAGNOSIS — D50.0 IRON DEFICIENCY ANEMIA SECONDARY TO BLOOD LOSS (CHRONIC): ICD-10-CM

## 2019-01-28 DIAGNOSIS — Z95.810 PRESENCE OF AUTOMATIC (IMPLANTABLE) CARDIAC DEFIBRILLATOR: Chronic | ICD-10-CM

## 2019-01-28 DIAGNOSIS — I78.0 HEREDITARY HEMORRHAGIC TELANGIECTASIA: ICD-10-CM

## 2019-01-28 DIAGNOSIS — I25.10 ATHEROSCLEROTIC HEART DISEASE OF NATIVE CORONARY ARTERY WITHOUT ANGINA PECTORIS: ICD-10-CM

## 2019-01-28 DIAGNOSIS — I48.0 PAROXYSMAL ATRIAL FIBRILLATION: ICD-10-CM

## 2019-01-31 ENCOUNTER — OUTPATIENT (OUTPATIENT)
Dept: OUTPATIENT SERVICES | Facility: HOSPITAL | Age: 69
LOS: 1 days | Discharge: HOME | End: 2019-01-31

## 2019-01-31 DIAGNOSIS — I25.10 ATHEROSCLEROTIC HEART DISEASE OF NATIVE CORONARY ARTERY WITHOUT ANGINA PECTORIS: ICD-10-CM

## 2019-01-31 DIAGNOSIS — Z95.810 PRESENCE OF AUTOMATIC (IMPLANTABLE) CARDIAC DEFIBRILLATOR: Chronic | ICD-10-CM

## 2019-01-31 DIAGNOSIS — Z01.810 ENCOUNTER FOR PREPROCEDURAL CARDIOVASCULAR EXAMINATION: ICD-10-CM

## 2019-02-05 ENCOUNTER — OUTPATIENT (OUTPATIENT)
Dept: OUTPATIENT SERVICES | Facility: HOSPITAL | Age: 69
LOS: 1 days | Discharge: HOME | End: 2019-02-05

## 2019-02-05 DIAGNOSIS — Z95.810 PRESENCE OF AUTOMATIC (IMPLANTABLE) CARDIAC DEFIBRILLATOR: Chronic | ICD-10-CM

## 2019-02-05 DIAGNOSIS — I25.10 ATHEROSCLEROTIC HEART DISEASE OF NATIVE CORONARY ARTERY WITHOUT ANGINA PECTORIS: ICD-10-CM

## 2019-02-05 DIAGNOSIS — I48.0 PAROXYSMAL ATRIAL FIBRILLATION: ICD-10-CM

## 2019-02-07 ENCOUNTER — EMERGENCY (EMERGENCY)
Facility: HOSPITAL | Age: 69
LOS: 0 days | Discharge: HOME | End: 2019-02-07
Attending: EMERGENCY MEDICINE | Admitting: EMERGENCY MEDICINE

## 2019-02-07 VITALS
HEART RATE: 86 BPM | TEMPERATURE: 98 F | DIASTOLIC BLOOD PRESSURE: 53 MMHG | RESPIRATION RATE: 18 BRPM | OXYGEN SATURATION: 98 % | SYSTOLIC BLOOD PRESSURE: 96 MMHG

## 2019-02-07 VITALS
OXYGEN SATURATION: 97 % | DIASTOLIC BLOOD PRESSURE: 54 MMHG | HEART RATE: 84 BPM | RESPIRATION RATE: 18 BRPM | SYSTOLIC BLOOD PRESSURE: 89 MMHG

## 2019-02-07 DIAGNOSIS — I50.9 HEART FAILURE, UNSPECIFIED: ICD-10-CM

## 2019-02-07 DIAGNOSIS — Z79.1 LONG TERM (CURRENT) USE OF NON-STEROIDAL ANTI-INFLAMMATORIES (NSAID): ICD-10-CM

## 2019-02-07 DIAGNOSIS — R10.30 LOWER ABDOMINAL PAIN, UNSPECIFIED: ICD-10-CM

## 2019-02-07 DIAGNOSIS — Z79.01 LONG TERM (CURRENT) USE OF ANTICOAGULANTS: ICD-10-CM

## 2019-02-07 DIAGNOSIS — Z95.810 PRESENCE OF AUTOMATIC (IMPLANTABLE) CARDIAC DEFIBRILLATOR: Chronic | ICD-10-CM

## 2019-02-07 DIAGNOSIS — Z79.891 LONG TERM (CURRENT) USE OF OPIATE ANALGESIC: ICD-10-CM

## 2019-02-07 DIAGNOSIS — Z79.2 LONG TERM (CURRENT) USE OF ANTIBIOTICS: ICD-10-CM

## 2019-02-07 DIAGNOSIS — Z79.899 OTHER LONG TERM (CURRENT) DRUG THERAPY: ICD-10-CM

## 2019-02-07 DIAGNOSIS — R41.0 DISORIENTATION, UNSPECIFIED: ICD-10-CM

## 2019-02-07 DIAGNOSIS — R11.10 VOMITING, UNSPECIFIED: ICD-10-CM

## 2019-02-07 DIAGNOSIS — R10.9 UNSPECIFIED ABDOMINAL PAIN: ICD-10-CM

## 2019-02-07 LAB
ALBUMIN SERPL ELPH-MCNC: 3.4 G/DL — LOW (ref 3.5–5.2)
ALP SERPL-CCNC: 151 U/L — HIGH (ref 30–115)
ALT FLD-CCNC: 18 U/L — SIGNIFICANT CHANGE UP (ref 0–41)
AMMONIA BLD-MCNC: 75 UMOL/L — HIGH (ref 11–55)
ANION GAP SERPL CALC-SCNC: 17 MMOL/L — HIGH (ref 7–14)
ANISOCYTOSIS BLD QL: SIGNIFICANT CHANGE UP
APPEARANCE UR: ABNORMAL
APTT BLD: 48.7 SEC — HIGH (ref 27–39.2)
AST SERPL-CCNC: 28 U/L — SIGNIFICANT CHANGE UP (ref 0–41)
BASE EXCESS BLDV CALC-SCNC: 2.4 MMOL/L — HIGH (ref -2–2)
BASOPHILS # BLD AUTO: 0 K/UL — SIGNIFICANT CHANGE UP (ref 0–0.2)
BASOPHILS NFR BLD AUTO: 0 % — SIGNIFICANT CHANGE UP (ref 0–1)
BILIRUB SERPL-MCNC: 3.1 MG/DL — HIGH (ref 0.2–1.2)
BILIRUB UR-MCNC: NEGATIVE — SIGNIFICANT CHANGE UP
BUN SERPL-MCNC: 45 MG/DL — HIGH (ref 10–20)
CA-I SERPL-SCNC: 1.13 MMOL/L — SIGNIFICANT CHANGE UP (ref 1.12–1.3)
CALCIUM SERPL-MCNC: 9.8 MG/DL — SIGNIFICANT CHANGE UP (ref 8.5–10.1)
CHLORIDE SERPL-SCNC: 90 MMOL/L — LOW (ref 98–110)
CO2 SERPL-SCNC: 25 MMOL/L — SIGNIFICANT CHANGE UP (ref 17–32)
COLOR SPEC: YELLOW — SIGNIFICANT CHANGE UP
CREAT SERPL-MCNC: 2.1 MG/DL — HIGH (ref 0.7–1.5)
DIFF PNL FLD: NEGATIVE — SIGNIFICANT CHANGE UP
ELLIPTOCYTES BLD QL SMEAR: SLIGHT — SIGNIFICANT CHANGE UP
EOSINOPHIL # BLD AUTO: 0 K/UL — SIGNIFICANT CHANGE UP (ref 0–0.7)
EOSINOPHIL NFR BLD AUTO: 0 % — SIGNIFICANT CHANGE UP (ref 0–8)
EPI CELLS # UR: ABNORMAL /HPF
GAS PNL BLDV: 131 MMOL/L — LOW (ref 136–145)
GAS PNL BLDV: SIGNIFICANT CHANGE UP
GIANT PLATELETS BLD QL SMEAR: PRESENT — SIGNIFICANT CHANGE UP
GLUCOSE SERPL-MCNC: 114 MG/DL — HIGH (ref 70–99)
GLUCOSE UR QL: NEGATIVE MG/DL — SIGNIFICANT CHANGE UP
HCO3 BLDV-SCNC: 29 MMOL/L — SIGNIFICANT CHANGE UP (ref 22–29)
HCT VFR BLD CALC: 38.2 % — LOW (ref 42–52)
HGB BLD-MCNC: 12.2 G/DL — LOW (ref 14–18)
HYALINE CASTS # UR AUTO: ABNORMAL /LPF
INR BLD: 2.58 RATIO — HIGH (ref 0.65–1.3)
KETONES UR-MCNC: NEGATIVE — SIGNIFICANT CHANGE UP
LACTATE BLDV-MCNC: 1.5 MMOL/L — SIGNIFICANT CHANGE UP (ref 0.5–1.6)
LACTATE SERPL-SCNC: 1.6 MMOL/L — SIGNIFICANT CHANGE UP (ref 0.5–2.2)
LEUKOCYTE ESTERASE UR-ACNC: NEGATIVE — SIGNIFICANT CHANGE UP
LIDOCAIN IGE QN: 73 U/L — HIGH (ref 7–60)
LYMPHOCYTES # BLD AUTO: 0.45 K/UL — LOW (ref 1.2–3.4)
LYMPHOCYTES # BLD AUTO: 11.5 % — LOW (ref 20.5–51.1)
MACROCYTES BLD QL: SIGNIFICANT CHANGE UP
MAGNESIUM SERPL-MCNC: 1.9 MG/DL — SIGNIFICANT CHANGE UP (ref 1.8–2.4)
MANUAL SMEAR VERIFICATION: SIGNIFICANT CHANGE UP
MCHC RBC-ENTMCNC: 28.4 PG — SIGNIFICANT CHANGE UP (ref 27–31)
MCHC RBC-ENTMCNC: 31.9 G/DL — LOW (ref 32–37)
MCV RBC AUTO: 88.8 FL — SIGNIFICANT CHANGE UP (ref 80–94)
MONOCYTES # BLD AUTO: 0.03 K/UL — LOW (ref 0.1–0.6)
MONOCYTES NFR BLD AUTO: 0.9 % — LOW (ref 1.7–9.3)
NEUTROPHILS # BLD AUTO: 3.26 K/UL — SIGNIFICANT CHANGE UP (ref 1.4–6.5)
NEUTROPHILS NFR BLD AUTO: 83.2 % — HIGH (ref 42.2–75.2)
NEUTS BAND # BLD: 0.9 % — SIGNIFICANT CHANGE UP (ref 0–6)
NITRITE UR-MCNC: NEGATIVE — SIGNIFICANT CHANGE UP
NRBC # BLD: 0 /100 WBCS — SIGNIFICANT CHANGE UP (ref 0–0)
PCO2 BLDV: 51 MMHG — SIGNIFICANT CHANGE UP (ref 41–51)
PH BLDV: 7.36 — SIGNIFICANT CHANGE UP (ref 7.26–7.43)
PH UR: 6 — SIGNIFICANT CHANGE UP (ref 5–8)
PLAT MORPH BLD: ABNORMAL
PLATELET # BLD AUTO: 101 K/UL — LOW (ref 130–400)
PO2 BLDV: 46 MMHG — HIGH (ref 20–40)
POIKILOCYTOSIS BLD QL AUTO: SIGNIFICANT CHANGE UP
POTASSIUM BLDV-SCNC: 4.5 MMOL/L — SIGNIFICANT CHANGE UP (ref 3.3–5.6)
POTASSIUM SERPL-MCNC: 4.9 MMOL/L — SIGNIFICANT CHANGE UP (ref 3.5–5)
POTASSIUM SERPL-SCNC: 4.9 MMOL/L — SIGNIFICANT CHANGE UP (ref 3.5–5)
PROT SERPL-MCNC: 7 G/DL — SIGNIFICANT CHANGE UP (ref 6–8)
PROT UR-MCNC: NEGATIVE MG/DL — SIGNIFICANT CHANGE UP
PROTHROM AB SERPL-ACNC: 29.4 SEC — HIGH (ref 9.95–12.87)
RBC # BLD: 4.3 M/UL — LOW (ref 4.7–6.1)
RBC # FLD: 20.7 % — HIGH (ref 11.5–14.5)
RBC BLD AUTO: ABNORMAL
SAO2 % BLDV: 73 % — SIGNIFICANT CHANGE UP
SCHISTOCYTES BLD QL AUTO: SLIGHT — SIGNIFICANT CHANGE UP
SODIUM SERPL-SCNC: 132 MMOL/L — LOW (ref 135–146)
SP GR SPEC: 1.01 — SIGNIFICANT CHANGE UP (ref 1.01–1.03)
TROPONIN T SERPL-MCNC: <0.01 NG/ML — SIGNIFICANT CHANGE UP
UROBILINOGEN FLD QL: 1 MG/DL (ref 0.2–0.2)
VARIANT LYMPHS # BLD: 3.5 % — SIGNIFICANT CHANGE UP (ref 0–5)
WBC # BLD: 3.88 K/UL — LOW (ref 4.8–10.8)
WBC # FLD AUTO: 3.88 K/UL — LOW (ref 4.8–10.8)

## 2019-02-07 RX ORDER — IOHEXOL 300 MG/ML
30 INJECTION, SOLUTION INTRAVENOUS ONCE
Qty: 0 | Refills: 0 | Status: COMPLETED | OUTPATIENT
Start: 2019-02-07 | End: 2019-02-07

## 2019-02-07 RX ORDER — SODIUM CHLORIDE 9 MG/ML
1000 INJECTION, SOLUTION INTRAVENOUS ONCE
Qty: 0 | Refills: 0 | Status: COMPLETED | OUTPATIENT
Start: 2019-02-07 | End: 2019-02-07

## 2019-02-07 RX ORDER — ACETAMINOPHEN 500 MG
650 TABLET ORAL ONCE
Qty: 0 | Refills: 0 | Status: COMPLETED | OUTPATIENT
Start: 2019-02-07 | End: 2019-02-07

## 2019-02-07 RX ADMIN — SODIUM CHLORIDE 1000 MILLILITER(S): 9 INJECTION, SOLUTION INTRAVENOUS at 05:28

## 2019-02-07 RX ADMIN — Medication 650 MILLIGRAM(S): at 05:28

## 2019-02-07 RX ADMIN — IOHEXOL 30 MILLILITER(S): 300 INJECTION, SOLUTION INTRAVENOUS at 07:14

## 2019-02-07 NOTE — ED ADULT NURSE NOTE - PMH
Anemia, unspecified type    Atrial fibrillation    BPH (benign prostatic hyperplasia)    CKD (chronic kidney disease)    Congestive heart failure, unspecified HF chronicity, unspecified heart failure type    Gout, unspecified cause, unspecified chronicity, unspecified site    Hepatic cirrhosis, unspecified hepatic cirrhosis type, unspecified whether ascites present    HTN (hypertension)    Osler-Weber-Rendu disease

## 2019-02-07 NOTE — ED PROVIDER NOTE - OBJECTIVE STATEMENT
history from son Gonzalez  67 y/o M with h/o hyperammonemia from liver cirrhosis, AICD, h/o frequent nosebleeds, CHF, hypotension (SBP in 90s), gout, sometimes on home 02 (3.5L), on coumadin - here with increased confusion for the past few days and also not sleeping.  Pt was seen by PCP last week and had an ammonia that was in the 100s.  Pt was started yesterday on Xifaxan for the ammonia and two days ago was started on paroxitine for sleep.  Pt also has been c/o yesterday of AP and has been having diarrhea the past few days.  PCP [place], Alfredo Ortiz.

## 2019-02-07 NOTE — ED PROVIDER NOTE - NS ED ROS FT
Review of Systems:  · CONSTITUTIONAL: no fever and no chills.  · EYES: no discharge, no irritation, no pain, no redness, and no visual changes.  · ENMT: Ears: no ear pain and no hearing problems. Nose: no nasal congestion and no nasal drainage. Mouth/Throat: no dysphagia, no hoarseness and no throat pain.  · CARDIOVASCULAR: no chest pain  · RESPIRATORY: no cough, and no shortness of breath.  · GASTROINTESTINAL: + abdominal pain, +diarrhea, no nausea and no vomiting.  · GENITOURINARY: no dysuria  · MUSCULOSKELETAL: no back pain, no musculoskeletal pain, no weakness.  · SKIN: no rashes.  · NEURO: +ams, restlessness, sleeplessness.  · ROS STATEMENT: all other ROS negative except as per HPI

## 2019-02-07 NOTE — ED PROVIDER NOTE - PHYSICAL EXAMINATION
Physical Examination:   VITAL SIGNS: I have reviewed vital signs.  CONSTITUTIONAL: well appearing, NAD.   SKIN: Skin exam is warm and dry.  No rashes  HEAD: Normocephalic; atraumatic.  EYES: PERRL, EOM intact; conjunctiva and sclera clear.  ENT: No nasal discharge; airway clear.  NECK: No nuchal rigidity.  CARD: S1, S2 reg  RESP: CTAB. No wheezes, rales or rhonchi.  ABD: soft; non-distended; mild lower abd ttp. No apparent guarding or rebound.   EXT: Normal ROM. 1+ edema b/l  NEURO: Alert. Grossly unremarkable. No focal deficits. Answering questions appropriately to son in [facility].   PSYCH: Cooperative, appropriate.

## 2019-02-07 NOTE — ED PROVIDER NOTE - PROGRESS NOTE DETAILS
signed out to Dr. Leija for continued care. The patient appears well, smiling, states he is feeling much better, denies any complaints at present,  Abdomen is soft, NT/ND, he is awake and alert.  results of all test were d/w patient in the presence of his wife and son, copies of tests were given. Patient want to go home, tolerating PO in ED, MS at baseline, wife comfortable  to take him home.   Advised to follow up with his doctor, strict return precautions given,  He and wife verbalized understanding and are amenable with the plan.

## 2019-02-07 NOTE — ED PROVIDER NOTE - MEDICAL DECISION MAKING DETAILS
67 yo male with vomiting and abdominal pain since yesterday, recently started on new meds for cirrhosis  and depression,  Nml mental status in ED, feeling much better, tolerating PO, CT scan negative, VS and labs at baseline.  D/c home in a stable condition.

## 2019-02-08 LAB
CULTURE RESULTS: SIGNIFICANT CHANGE UP
SPECIMEN SOURCE: SIGNIFICANT CHANGE UP

## 2019-02-12 ENCOUNTER — LABORATORY RESULT (OUTPATIENT)
Age: 69
End: 2019-02-12

## 2019-02-12 ENCOUNTER — OUTPATIENT (OUTPATIENT)
Dept: OUTPATIENT SERVICES | Facility: HOSPITAL | Age: 69
LOS: 1 days | Discharge: HOME | End: 2019-02-12

## 2019-02-12 DIAGNOSIS — Z95.810 PRESENCE OF AUTOMATIC (IMPLANTABLE) CARDIAC DEFIBRILLATOR: Chronic | ICD-10-CM

## 2019-02-12 NOTE — ED ADULT NURSE NOTE - BREATHING, MLM
reading glasses/Normal vision: sees adequately in most situations; can see medication labels, newsprint
Tachypnea

## 2019-02-13 DIAGNOSIS — I48.0 PAROXYSMAL ATRIAL FIBRILLATION: ICD-10-CM

## 2019-02-13 DIAGNOSIS — I25.10 ATHEROSCLEROTIC HEART DISEASE OF NATIVE CORONARY ARTERY WITHOUT ANGINA PECTORIS: ICD-10-CM

## 2019-02-13 DIAGNOSIS — D50.0 IRON DEFICIENCY ANEMIA SECONDARY TO BLOOD LOSS (CHRONIC): ICD-10-CM

## 2019-02-13 DIAGNOSIS — I78.0 HEREDITARY HEMORRHAGIC TELANGIECTASIA: ICD-10-CM

## 2019-02-19 ENCOUNTER — OUTPATIENT (OUTPATIENT)
Dept: OUTPATIENT SERVICES | Facility: HOSPITAL | Age: 69
LOS: 1 days | Discharge: HOME | End: 2019-02-19

## 2019-02-19 DIAGNOSIS — Z95.810 PRESENCE OF AUTOMATIC (IMPLANTABLE) CARDIAC DEFIBRILLATOR: Chronic | ICD-10-CM

## 2019-02-19 DIAGNOSIS — I25.10 ATHEROSCLEROTIC HEART DISEASE OF NATIVE CORONARY ARTERY WITHOUT ANGINA PECTORIS: ICD-10-CM

## 2019-02-19 DIAGNOSIS — I48.0 PAROXYSMAL ATRIAL FIBRILLATION: ICD-10-CM

## 2019-02-26 ENCOUNTER — LABORATORY RESULT (OUTPATIENT)
Age: 69
End: 2019-02-26

## 2019-02-26 ENCOUNTER — OUTPATIENT (OUTPATIENT)
Dept: OUTPATIENT SERVICES | Facility: HOSPITAL | Age: 69
LOS: 1 days | Discharge: HOME | End: 2019-02-26

## 2019-02-26 DIAGNOSIS — Z95.810 PRESENCE OF AUTOMATIC (IMPLANTABLE) CARDIAC DEFIBRILLATOR: Chronic | ICD-10-CM

## 2019-02-26 DIAGNOSIS — I25.10 ATHEROSCLEROTIC HEART DISEASE OF NATIVE CORONARY ARTERY WITHOUT ANGINA PECTORIS: ICD-10-CM

## 2019-02-26 DIAGNOSIS — D50.0 IRON DEFICIENCY ANEMIA SECONDARY TO BLOOD LOSS (CHRONIC): ICD-10-CM

## 2019-02-26 DIAGNOSIS — I28.0 ARTERIOVENOUS FISTULA OF PULMONARY VESSELS: ICD-10-CM

## 2019-02-26 DIAGNOSIS — I48.0 PAROXYSMAL ATRIAL FIBRILLATION: ICD-10-CM

## 2019-03-04 ENCOUNTER — OUTPATIENT (OUTPATIENT)
Dept: OUTPATIENT SERVICES | Facility: HOSPITAL | Age: 69
LOS: 1 days | Discharge: HOME | End: 2019-03-04

## 2019-03-04 DIAGNOSIS — I48.0 PAROXYSMAL ATRIAL FIBRILLATION: ICD-10-CM

## 2019-03-04 DIAGNOSIS — I25.10 ATHEROSCLEROTIC HEART DISEASE OF NATIVE CORONARY ARTERY WITHOUT ANGINA PECTORIS: ICD-10-CM

## 2019-03-04 DIAGNOSIS — Z95.810 PRESENCE OF AUTOMATIC (IMPLANTABLE) CARDIAC DEFIBRILLATOR: Chronic | ICD-10-CM

## 2019-03-11 ENCOUNTER — LABORATORY RESULT (OUTPATIENT)
Age: 69
End: 2019-03-11

## 2019-03-11 ENCOUNTER — OUTPATIENT (OUTPATIENT)
Dept: OUTPATIENT SERVICES | Facility: HOSPITAL | Age: 69
LOS: 1 days | Discharge: HOME | End: 2019-03-11

## 2019-03-11 DIAGNOSIS — I25.10 ATHEROSCLEROTIC HEART DISEASE OF NATIVE CORONARY ARTERY WITHOUT ANGINA PECTORIS: ICD-10-CM

## 2019-03-11 DIAGNOSIS — Z95.810 PRESENCE OF AUTOMATIC (IMPLANTABLE) CARDIAC DEFIBRILLATOR: Chronic | ICD-10-CM

## 2019-03-11 DIAGNOSIS — I48.0 PAROXYSMAL ATRIAL FIBRILLATION: ICD-10-CM

## 2019-03-11 DIAGNOSIS — D50.0 IRON DEFICIENCY ANEMIA SECONDARY TO BLOOD LOSS (CHRONIC): ICD-10-CM

## 2019-03-11 DIAGNOSIS — I78.0 HEREDITARY HEMORRHAGIC TELANGIECTASIA: ICD-10-CM

## 2019-03-12 DIAGNOSIS — Z02.9 ENCOUNTER FOR ADMINISTRATIVE EXAMINATIONS, UNSPECIFIED: ICD-10-CM

## 2019-03-13 ENCOUNTER — APPOINTMENT (OUTPATIENT)
Dept: HEMATOLOGY ONCOLOGY | Facility: CLINIC | Age: 69
End: 2019-03-13

## 2019-03-13 VITALS
HEART RATE: 84 BPM | DIASTOLIC BLOOD PRESSURE: 60 MMHG | TEMPERATURE: 97.1 F | HEIGHT: 70 IN | RESPIRATION RATE: 18 BRPM | SYSTOLIC BLOOD PRESSURE: 100 MMHG

## 2019-03-13 DIAGNOSIS — I78.0 HEREDITARY HEMORRHAGIC TELANGIECTASIA: ICD-10-CM

## 2019-03-13 LAB — AMMONIA PLAS-MCNC: 102 UMOL/L

## 2019-03-14 DIAGNOSIS — I78.0 HEREDITARY HEMORRHAGIC TELANGIECTASIA: ICD-10-CM

## 2019-03-14 DIAGNOSIS — D50.0 IRON DEFICIENCY ANEMIA SECONDARY TO BLOOD LOSS (CHRONIC): ICD-10-CM

## 2019-03-14 DIAGNOSIS — Z02.9 ENCOUNTER FOR ADMINISTRATIVE EXAMINATIONS, UNSPECIFIED: ICD-10-CM

## 2019-03-18 ENCOUNTER — OUTPATIENT (OUTPATIENT)
Dept: OUTPATIENT SERVICES | Facility: HOSPITAL | Age: 69
LOS: 1 days | Discharge: HOME | End: 2019-03-18

## 2019-03-18 DIAGNOSIS — I48.0 PAROXYSMAL ATRIAL FIBRILLATION: ICD-10-CM

## 2019-03-18 DIAGNOSIS — I25.10 ATHEROSCLEROTIC HEART DISEASE OF NATIVE CORONARY ARTERY WITHOUT ANGINA PECTORIS: ICD-10-CM

## 2019-03-18 DIAGNOSIS — Z95.810 PRESENCE OF AUTOMATIC (IMPLANTABLE) CARDIAC DEFIBRILLATOR: Chronic | ICD-10-CM

## 2019-03-19 PROBLEM — I78.0: Status: ACTIVE | Noted: 2017-03-03

## 2019-03-19 NOTE — PHYSICAL EXAM
[Restricted in physically strenuous activity but ambulatory and able to carry out work of a light or sedentary nature] : Status 1- Restricted in physically strenuous activity but ambulatory and able to carry out work of a light or sedentary nature, e.g., light house work, office work [Obese] : obese [Normal] : affect appropriate [Ulcers] : no ulcers [de-identified] : Walks with a cane, seated in wheelchair today [de-identified] : He has an AICD on left [de-identified] : Has telangiectasis on face

## 2019-03-19 NOTE — HISTORY OF PRESENT ILLNESS
[de-identified] : 66 year old male who follows here periodically for management and treatment of anemia. He is non compliant with visits.   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 He has recurrent epistaxis mainly, on and off at least two or three times a week and episodes of severity and duration vary between 20 minutes to an hour. He also has multiple other comorbidities including cardiomyopathy with arrhythmia, status post pacemaker AICD; and COPD, gout, hypertension and CKD. He requires weekly Procrit and on and off intravenous iron but he is not compliant since whenever his hemoglobin gets better he stops coming to the [facility]. [de-identified] : 4/26/17\par He presents with his wife to establish care Dr. Ira garcía. He states he had a major nose bleed a few days ago. He had IV iron in March. No other complaints. \par \par 6/7/17\par He is doing  well. No bleeding. His HgB today is 11.4 \par \par 8/2/17\par He is doing well. Only had   a mild nose bleed. hgB today was 13.5. No other complaints.\par \par 10/4/17\par He reports a nose bleed one week ago that resolved. His HgB from today is 11.5. Has weakness in legs. \par \par 12/14/17\par He is here for follow up.  He had his  urinary stent removed and had hematuria. He held his Coumadin. He is thinking about a watchman procedure. Feels about the same otherwise. His HgB has been falling and so has his iron stores.\par \par 2/28/18\par Here for follow up. No recent bleeding Hgb is over 11. He decided not to proceed with watchman. He is burping after he eats and is to follow up with GI.\par \par 5/23/18\par He is here for followup with his wife. He reports a nosebleed yesterday. His hemoglobin has been stable responded well to IV iron and CBC from today shows a hemoglobin of 11.4.\par \par 7/18/18\par Here for follow up. Had heavy epistaxis last 2 days this resolved. On Coumadin. feels a bit week. CBC from today is unchanged from last visit.\par \par 8/22/18\par He is here for follow up. Has epistaxis. He had IV Feraheme. His Hgb is greater than 11 gm/dl continently. Taking Coumadin. \par \par 11/14/18\par Patient is here for follow-up.  He remains on coumadin.  Hgb is 10.4 today.  His Iron sat from 10/2018 is low at 13% with ferritin trending down at 53.  Approximately 5 days ago the patient reports heavy epistaxis.  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  He was experienced tremors, so he reported to the ED, his ammonia was high, so they prescribed him lactulose.  We discussed that there may be a multitude of reasons, including but not limited to liver failure or bleeding.  He is not having symptoms of encephalopathy such as confusion.  He reports mild itching, likely due to the cirrhosis.  \par \par 1/16/19\par He is here for follow-up with his daughter.  He was admitted for a CHF exacerbation about 3 weeks ago.  They gave him lasix and it resolved.  On Friday the cardiologist said there was no fluid (previous right sided effusion).  The last few days he has been SOB and some weight gain.  The cardiologist told them to take more lasix.  He has seen some improvement in the frequency of his epistaxis.  He has been having nausea, poorly controlled by Reglan PRN.  We discussed this may be due to the numerous medications he takes.\par \par 3/13/19\par He is here for follow-up with his daughter.  She reports that he is unsteady, cannot stand and weak.  He is tired during the day and cannot sleep at night.  They saw a psychologist who started him on Seroquel, however they report it made him agitated, so he stopped taking it.  They went to [place] to see Cardiology who offered Midodrine  drip for end-stage CHF but he declined.  We had a discussion regarding his worsening heart failure  and mortality/prognosis given his status.  We spoke about code status/wishes and they will have this conversation further in detail at home. Nose bleeds are as before.\par \par

## 2019-03-19 NOTE — REVIEW OF SYSTEMS
[Fatigue] : fatigue [Nosebleeds] : nosebleeds [SOB on Exertion] : shortness of breath during exertion [Negative] : Allergic/Immunologic [Difficulty Walking] : difficulty walking [Fever] : no fever [Recent Change In Weight] : ~T no recent weight change [Dysphagia] : no dysphagia [Chest Pain] : no chest pain [Shortness Of Breath] : no shortness of breath [Abdominal Pain] : no abdominal pain [Diarrhea] : no diarrhea [Joint Pain] : no joint pain [Skin Rash] : no skin rash [Easy Bleeding] : no tendency for easy bleeding [Easy Bruising] : no tendency for easy bruising [FreeTextEntry8] : hematuria has resolved [de-identified] : Was confused in past due to encephalopathy

## 2019-03-19 NOTE — ASSESSMENT
[FreeTextEntry1] :  Anemia of chronic kidney disease and chronic disease plus iron deficiency from chronic nose bleeds due to HHT; status post  IV Feraheme  as needed. His hgB has been stable.  He has mild occasional thrombocytopenia which may be due to consumption from bleeds  and or splenic sequestration from CHF, will monitor. He is on Coumadin for afib. Watchman was not recommended after an evaluation.  He has multiple medical comorbidities including  NYHA calss III-IV, has  liver dysfunction from congestive hepatopathy resulting in encephalopathy, afiv, had VT with multiple AICD shocks\par \par  Plan\par - Will Monitor  CBC every 2 weeks and Iron studies monthly for now x 6 months, since he has had bleeding.\par  - Holding Procrit at this point given hgb is over 10 gm/dl \par - he has blood work done at home, INR, CBC and iron studies as well \par - he knows to stop Coumadin if he is bleeding and to go to ER if needed and to see us sooner if bleeding is severe.\par - would not use Tamoxifen or Tranexamic at this time acid since there is risk of thrombosis and since he is on Coumadin  for afib.  Topical therapy for epistaxis is preferred and will try afrin, another option is Avastin but that also has risk, if nose bleeds become difficult to manage will consider tranexamic acid first \par - on lactulose + Xifaxan for his hepatic insufficiency from right sided heart failure and protal congestion \par - he has 11 doctors that he follows with \par -IV iron as needed\par -will continue with Coumadin unless platelets are less than 50,000\par -he is declining, spoke about prognosis and advance directives today\par \par RTC in 2 months\par \par [phone] Evette

## 2019-03-22 ENCOUNTER — APPOINTMENT (OUTPATIENT)
Dept: CARDIOLOGY | Facility: CLINIC | Age: 69
End: 2019-03-22

## 2019-03-22 VITALS
HEART RATE: 88 BPM | BODY MASS INDEX: 29.85 KG/M2 | WEIGHT: 208 LBS | DIASTOLIC BLOOD PRESSURE: 50 MMHG | SYSTOLIC BLOOD PRESSURE: 75 MMHG

## 2019-03-22 DIAGNOSIS — I47.2 VENTRICULAR TACHYCARDIA: ICD-10-CM

## 2019-03-22 DIAGNOSIS — I50.22 CHRONIC SYSTOLIC (CONGESTIVE) HEART FAILURE: ICD-10-CM

## 2019-03-22 DIAGNOSIS — I48.91 UNSPECIFIED ATRIAL FIBRILLATION: ICD-10-CM

## 2019-03-22 DIAGNOSIS — I10 ESSENTIAL (PRIMARY) HYPERTENSION: ICD-10-CM

## 2019-03-22 RX ORDER — SOTALOL HYDROCHLORIDE 80 MG/1
80 TABLET ORAL
Qty: 90 | Refills: 3 | Status: COMPLETED | COMMUNITY
End: 2019-03-22

## 2019-03-22 RX ORDER — RIFAXIMIN 550 MG/1
550 TABLET ORAL DAILY
Refills: 0 | Status: ACTIVE | COMMUNITY

## 2019-03-22 RX ORDER — ADHESIVE TAPE 3"X 2.3 YD
50 MCG TAPE, NON-MEDICATED TOPICAL
Qty: 90 | Refills: 0 | Status: COMPLETED | COMMUNITY
Start: 2018-05-25 | End: 2019-03-22

## 2019-03-22 RX ORDER — COLCHICINE 0.6 MG/1
0.6 TABLET, FILM COATED ORAL
Refills: 0 | Status: COMPLETED | COMMUNITY
End: 2019-03-22

## 2019-03-22 RX ORDER — PANCRELIPASE 60000; 12000; 38000 [USP'U]/1; [USP'U]/1; [USP'U]/1
12000-38000 CAPSULE, DELAYED RELEASE PELLETS ORAL
Refills: 0 | Status: COMPLETED | COMMUNITY
End: 2019-03-22

## 2019-03-22 RX ORDER — FUROSEMIDE 40 MG/1
40 TABLET ORAL DAILY
Refills: 0 | Status: ACTIVE | COMMUNITY

## 2019-03-22 RX ORDER — METOCLOPRAMIDE HYDROCHLORIDE 5 MG/1
5 TABLET ORAL
Refills: 0 | Status: COMPLETED | COMMUNITY
End: 2019-03-22

## 2019-03-22 RX ORDER — METOPROLOL SUCCINATE 25 MG/1
25 TABLET, EXTENDED RELEASE ORAL TWICE DAILY
Refills: 0 | Status: ACTIVE | COMMUNITY

## 2019-03-22 RX ORDER — ESOMEPRAZOLE MAGNESIUM 40 MG/1
40 CAPSULE, DELAYED RELEASE ORAL DAILY
Qty: 90 | Refills: 3 | Status: COMPLETED | COMMUNITY
Start: 2017-10-28 | End: 2019-03-22

## 2019-03-22 NOTE — ASSESSMENT
[FreeTextEntry1] : AICD interrogation today- Normal CRT-D function, BiV paced 99.1 %, All parameters stable . Stable Thoracic impedance . 60 episodes of NSVT - no shocks \par \par On Warfarin therapy for CVA prevention , intermittent nose bleeds d/t  Osler-Weber-Rendu syndrome . \par  \par

## 2019-03-22 NOTE — REVIEW OF SYSTEMS
[Dyspnea on exertion] : dyspnea during exertion [Lower Ext Edema] : no extremity edema [Change In Color Of Skin] : change in skin color [Dizziness] : dizziness [Negative] : Psychiatric

## 2019-03-22 NOTE — PROCEDURE
[No] : not [Atrial Fibrillation] : atrial fibrillation [CRT-D] : Cardiac resynchronization therapy defibrillator [VVIR] : VVIR [Voltage: ___ volts] : Voltage was [unfilled] volts [Charge Time: ___ sec] : charge time was [unfilled] seconds [Longevity: ___ months] : The estimated remaining battery life is [unfilled] months [Threshold Testing Performed] : Threshold testing was performed [Sensing Amplitude ___mv] : sensing amplitude was [unfilled] mv [Lead Imp:  ___ohms] : lead impedance was [unfilled] ohms [___V @] : [unfilled] V [___ ms] : [unfilled] ms [Programmed for Longevity] : output reprogrammed for improved battery longevity [Pace ___ %] : Pace [unfilled]% [de-identified] : Medtronic [de-identified] : Viva Quad  [de-identified] : [id number] [de-identified] : 2/17/15 [de-identified] : 85 [de-identified] : [employer] crossing on 2/1/17\par

## 2019-03-22 NOTE — PHYSICAL EXAM
[General Appearance - Well Nourished] : well nourished [General Appearance - In No Acute Distress] : no acute distress [Heart Sounds] : normal S1 and S2 [Arterial Pulses Normal] : the arterial pulses were normal [FreeTextEntry1] : irregular  [Respiration, Rhythm And Depth] : normal respiratory rhythm and effort [Auscultation Breath Sounds / Voice Sounds] : lungs were clear to auscultation bilaterally [Left Infraclavicular] : left infraclavicular area [Clean] : clean [Dry] : dry [Well-Healed] : well-healed [Bowel Sounds] : normal bowel sounds [Abdomen Soft] : soft [] : no hepato-splenomegaly [Nail Clubbing] : no clubbing of the fingernails [Cyanosis, Localized] : no localized cyanosis

## 2019-03-22 NOTE — HISTORY OF PRESENT ILLNESS
[Palpitations] : no palpitations [SOB] : dyspnea [Syncope] : no syncope [ICD Shocks] : no recent ICD shocks [Erythema at Site] : no erythema at device site [Swelling at Site] : no swelling at device site [de-identified] : 67 years old male with pmh of CKD, COPD , HTN, NICM S/P CRT-D, Chronic AFib, CHADVASC 3 CHF/NYHA class III, multiple HF admissions in the past, VT , S/P multiple AICD shocks , recurrent anemias d/t nose bleed, hx of Osler-Weber-Rendu \par VT burden  has significantly improved on Sotolol \par \par Patient was offered millinor drip if his symptoms deteriorated\par

## 2019-03-25 ENCOUNTER — LABORATORY RESULT (OUTPATIENT)
Age: 69
End: 2019-03-25

## 2019-03-25 ENCOUNTER — OUTPATIENT (OUTPATIENT)
Dept: OUTPATIENT SERVICES | Facility: HOSPITAL | Age: 69
LOS: 1 days | Discharge: HOME | End: 2019-03-25

## 2019-03-25 DIAGNOSIS — Z95.810 PRESENCE OF AUTOMATIC (IMPLANTABLE) CARDIAC DEFIBRILLATOR: Chronic | ICD-10-CM

## 2019-03-25 DIAGNOSIS — D50.0 IRON DEFICIENCY ANEMIA SECONDARY TO BLOOD LOSS (CHRONIC): ICD-10-CM

## 2019-03-25 DIAGNOSIS — I78.0 HEREDITARY HEMORRHAGIC TELANGIECTASIA: ICD-10-CM

## 2019-03-25 DIAGNOSIS — I25.10 ATHEROSCLEROTIC HEART DISEASE OF NATIVE CORONARY ARTERY WITHOUT ANGINA PECTORIS: ICD-10-CM

## 2019-03-25 DIAGNOSIS — I48.0 PAROXYSMAL ATRIAL FIBRILLATION: ICD-10-CM

## 2019-03-27 DIAGNOSIS — I78.0 HEREDITARY HEMORRHAGIC TELANGIECTASIA: ICD-10-CM

## 2019-03-27 DIAGNOSIS — Z02.9 ENCOUNTER FOR ADMINISTRATIVE EXAMINATIONS, UNSPECIFIED: ICD-10-CM

## 2019-03-27 DIAGNOSIS — D50.0 IRON DEFICIENCY ANEMIA SECONDARY TO BLOOD LOSS (CHRONIC): ICD-10-CM

## 2019-04-01 ENCOUNTER — OUTPATIENT (OUTPATIENT)
Dept: OUTPATIENT SERVICES | Facility: HOSPITAL | Age: 69
LOS: 1 days | Discharge: HOME | End: 2019-04-01

## 2019-04-01 DIAGNOSIS — Z95.810 PRESENCE OF AUTOMATIC (IMPLANTABLE) CARDIAC DEFIBRILLATOR: Chronic | ICD-10-CM

## 2019-04-01 DIAGNOSIS — I48.0 PAROXYSMAL ATRIAL FIBRILLATION: ICD-10-CM

## 2019-04-01 DIAGNOSIS — I25.10 ATHEROSCLEROTIC HEART DISEASE OF NATIVE CORONARY ARTERY WITHOUT ANGINA PECTORIS: ICD-10-CM

## 2019-04-01 DIAGNOSIS — D50.0 IRON DEFICIENCY ANEMIA SECONDARY TO BLOOD LOSS (CHRONIC): ICD-10-CM

## 2019-04-01 DIAGNOSIS — I78.0 HEREDITARY HEMORRHAGIC TELANGIECTASIA: ICD-10-CM

## 2019-04-08 ENCOUNTER — OUTPATIENT (OUTPATIENT)
Dept: OUTPATIENT SERVICES | Facility: HOSPITAL | Age: 69
LOS: 1 days | Discharge: HOME | End: 2019-04-08

## 2019-04-08 ENCOUNTER — LABORATORY RESULT (OUTPATIENT)
Age: 69
End: 2019-04-08

## 2019-04-08 DIAGNOSIS — D50.0 IRON DEFICIENCY ANEMIA SECONDARY TO BLOOD LOSS (CHRONIC): ICD-10-CM

## 2019-04-08 DIAGNOSIS — I48.0 PAROXYSMAL ATRIAL FIBRILLATION: ICD-10-CM

## 2019-04-08 DIAGNOSIS — Z95.810 PRESENCE OF AUTOMATIC (IMPLANTABLE) CARDIAC DEFIBRILLATOR: Chronic | ICD-10-CM

## 2019-04-08 DIAGNOSIS — I25.10 ATHEROSCLEROTIC HEART DISEASE OF NATIVE CORONARY ARTERY WITHOUT ANGINA PECTORIS: ICD-10-CM

## 2019-04-08 DIAGNOSIS — I78.0 HEREDITARY HEMORRHAGIC TELANGIECTASIA: ICD-10-CM

## 2019-04-11 DIAGNOSIS — Z02.9 ENCOUNTER FOR ADMINISTRATIVE EXAMINATIONS, UNSPECIFIED: ICD-10-CM

## 2019-04-15 ENCOUNTER — OUTPATIENT (OUTPATIENT)
Dept: OUTPATIENT SERVICES | Facility: HOSPITAL | Age: 69
LOS: 1 days | Discharge: HOME | End: 2019-04-15

## 2019-04-15 DIAGNOSIS — I25.10 ATHEROSCLEROTIC HEART DISEASE OF NATIVE CORONARY ARTERY WITHOUT ANGINA PECTORIS: ICD-10-CM

## 2019-04-15 DIAGNOSIS — I78.0 HEREDITARY HEMORRHAGIC TELANGIECTASIA: ICD-10-CM

## 2019-04-15 DIAGNOSIS — I48.0 PAROXYSMAL ATRIAL FIBRILLATION: ICD-10-CM

## 2019-04-15 DIAGNOSIS — D50.0 IRON DEFICIENCY ANEMIA SECONDARY TO BLOOD LOSS (CHRONIC): ICD-10-CM

## 2019-04-15 DIAGNOSIS — Z95.810 PRESENCE OF AUTOMATIC (IMPLANTABLE) CARDIAC DEFIBRILLATOR: Chronic | ICD-10-CM

## 2019-04-22 ENCOUNTER — LABORATORY RESULT (OUTPATIENT)
Age: 69
End: 2019-04-22

## 2019-04-22 ENCOUNTER — OUTPATIENT (OUTPATIENT)
Dept: OUTPATIENT SERVICES | Facility: HOSPITAL | Age: 69
LOS: 1 days | Discharge: HOME | End: 2019-04-22

## 2019-04-22 DIAGNOSIS — I48.0 PAROXYSMAL ATRIAL FIBRILLATION: ICD-10-CM

## 2019-04-22 DIAGNOSIS — I25.10 ATHEROSCLEROTIC HEART DISEASE OF NATIVE CORONARY ARTERY WITHOUT ANGINA PECTORIS: ICD-10-CM

## 2019-04-22 DIAGNOSIS — Z95.810 PRESENCE OF AUTOMATIC (IMPLANTABLE) CARDIAC DEFIBRILLATOR: Chronic | ICD-10-CM

## 2019-04-22 DIAGNOSIS — I78.0 HEREDITARY HEMORRHAGIC TELANGIECTASIA: ICD-10-CM

## 2019-04-22 DIAGNOSIS — D50.0 IRON DEFICIENCY ANEMIA SECONDARY TO BLOOD LOSS (CHRONIC): ICD-10-CM

## 2019-04-24 DIAGNOSIS — Z02.9 ENCOUNTER FOR ADMINISTRATIVE EXAMINATIONS, UNSPECIFIED: ICD-10-CM

## 2019-04-29 ENCOUNTER — LABORATORY RESULT (OUTPATIENT)
Age: 69
End: 2019-04-29

## 2019-04-29 ENCOUNTER — OUTPATIENT (OUTPATIENT)
Dept: OUTPATIENT SERVICES | Facility: HOSPITAL | Age: 69
LOS: 1 days | Discharge: HOME | End: 2019-04-29

## 2019-04-29 DIAGNOSIS — D50.0 IRON DEFICIENCY ANEMIA SECONDARY TO BLOOD LOSS (CHRONIC): ICD-10-CM

## 2019-04-29 DIAGNOSIS — Z95.810 PRESENCE OF AUTOMATIC (IMPLANTABLE) CARDIAC DEFIBRILLATOR: Chronic | ICD-10-CM

## 2019-04-29 DIAGNOSIS — I25.10 ATHEROSCLEROTIC HEART DISEASE OF NATIVE CORONARY ARTERY WITHOUT ANGINA PECTORIS: ICD-10-CM

## 2019-04-29 DIAGNOSIS — I78.0 HEREDITARY HEMORRHAGIC TELANGIECTASIA: ICD-10-CM

## 2019-04-29 DIAGNOSIS — I48.0 PAROXYSMAL ATRIAL FIBRILLATION: ICD-10-CM

## 2019-05-03 ENCOUNTER — APPOINTMENT (OUTPATIENT)
Dept: CARDIOLOGY | Facility: CLINIC | Age: 69
End: 2019-05-03
Payer: MEDICARE

## 2019-05-03 PROCEDURE — 93000 ELECTROCARDIOGRAM COMPLETE: CPT

## 2019-05-03 PROCEDURE — 99214 OFFICE O/P EST MOD 30 MIN: CPT

## 2019-05-06 ENCOUNTER — OUTPATIENT (OUTPATIENT)
Dept: OUTPATIENT SERVICES | Facility: HOSPITAL | Age: 69
LOS: 1 days | Discharge: HOME | End: 2019-05-06

## 2019-05-06 ENCOUNTER — LABORATORY RESULT (OUTPATIENT)
Age: 69
End: 2019-05-06

## 2019-05-06 DIAGNOSIS — I48.0 PAROXYSMAL ATRIAL FIBRILLATION: ICD-10-CM

## 2019-05-06 DIAGNOSIS — D50.0 IRON DEFICIENCY ANEMIA SECONDARY TO BLOOD LOSS (CHRONIC): ICD-10-CM

## 2019-05-06 DIAGNOSIS — Z95.810 PRESENCE OF AUTOMATIC (IMPLANTABLE) CARDIAC DEFIBRILLATOR: Chronic | ICD-10-CM

## 2019-05-06 DIAGNOSIS — I78.0 HEREDITARY HEMORRHAGIC TELANGIECTASIA: ICD-10-CM

## 2019-05-06 DIAGNOSIS — I25.10 ATHEROSCLEROTIC HEART DISEASE OF NATIVE CORONARY ARTERY WITHOUT ANGINA PECTORIS: ICD-10-CM

## 2019-05-08 DIAGNOSIS — Z02.9 ENCOUNTER FOR ADMINISTRATIVE EXAMINATIONS, UNSPECIFIED: ICD-10-CM

## 2019-05-13 ENCOUNTER — OUTPATIENT (OUTPATIENT)
Dept: OUTPATIENT SERVICES | Facility: HOSPITAL | Age: 69
LOS: 1 days | Discharge: HOME | End: 2019-05-13

## 2019-05-13 ENCOUNTER — LABORATORY RESULT (OUTPATIENT)
Age: 69
End: 2019-05-13

## 2019-05-13 DIAGNOSIS — D50.0 IRON DEFICIENCY ANEMIA SECONDARY TO BLOOD LOSS (CHRONIC): ICD-10-CM

## 2019-05-13 DIAGNOSIS — I78.0 HEREDITARY HEMORRHAGIC TELANGIECTASIA: ICD-10-CM

## 2019-05-13 DIAGNOSIS — Z95.810 PRESENCE OF AUTOMATIC (IMPLANTABLE) CARDIAC DEFIBRILLATOR: Chronic | ICD-10-CM

## 2019-05-13 DIAGNOSIS — I25.10 ATHEROSCLEROTIC HEART DISEASE OF NATIVE CORONARY ARTERY WITHOUT ANGINA PECTORIS: ICD-10-CM

## 2019-05-13 DIAGNOSIS — I48.0 PAROXYSMAL ATRIAL FIBRILLATION: ICD-10-CM

## 2019-05-15 ENCOUNTER — APPOINTMENT (OUTPATIENT)
Dept: HEMATOLOGY ONCOLOGY | Facility: CLINIC | Age: 69
End: 2019-05-15

## 2019-05-15 ENCOUNTER — LABORATORY RESULT (OUTPATIENT)
Age: 69
End: 2019-05-15

## 2019-05-15 VITALS
BODY MASS INDEX: 29.78 KG/M2 | HEART RATE: 86 BPM | RESPIRATION RATE: 18 BRPM | HEIGHT: 70 IN | WEIGHT: 208 LBS | SYSTOLIC BLOOD PRESSURE: 86 MMHG | TEMPERATURE: 96.9 F | DIASTOLIC BLOOD PRESSURE: 54 MMHG

## 2019-05-15 DIAGNOSIS — D63.1 CHRONIC KIDNEY DISEASE, UNSPECIFIED: ICD-10-CM

## 2019-05-15 DIAGNOSIS — N18.9 CHRONIC KIDNEY DISEASE, UNSPECIFIED: ICD-10-CM

## 2019-05-15 DIAGNOSIS — D50.0 IRON DEFICIENCY ANEMIA SECONDARY TO BLOOD LOSS (CHRONIC): ICD-10-CM

## 2019-05-16 LAB
ALBUMIN SERPL ELPH-MCNC: 3 G/DL
ALP BLD-CCNC: 165 U/L
ALT SERPL-CCNC: 11 U/L
ANION GAP SERPL CALC-SCNC: 11 MMOL/L
AST SERPL-CCNC: 20 U/L
BILIRUB SERPL-MCNC: 2 MG/DL
BUN SERPL-MCNC: 30 MG/DL
CALCIUM SERPL-MCNC: 9.3 MG/DL
CEA SERPL-MCNC: 4.4 NG/ML
CHLORIDE SERPL-SCNC: 98 MMOL/L
CO2 SERPL-SCNC: 27 MMOL/L
CREAT SERPL-MCNC: 1.8 MG/DL
FERRITIN SERPL-MCNC: 38 NG/ML
GLUCOSE SERPL-MCNC: 89 MG/DL
HCT VFR BLD CALC: 31.3 %
HGB BLD-MCNC: 10.1 G/DL
IRON SATN MFR SERPL: 12 %
IRON SERPL-MCNC: 38 UG/DL
MCHC RBC-ENTMCNC: 31.4 PG
MCHC RBC-ENTMCNC: 32.3 G/DL
MCV RBC AUTO: 97.2 FL
PLATELET # BLD AUTO: 146 K/UL
PMV BLD: 11.3 FL
POTASSIUM SERPL-SCNC: 4.8 MMOL/L
PROT SERPL-MCNC: 6.4 G/DL
RBC # BLD: 3.22 M/UL
RBC # FLD: 19.2 %
SODIUM SERPL-SCNC: 136 MMOL/L
TIBC SERPL-MCNC: 326 UG/DL
UIBC SERPL-MCNC: 288 UG/DL
WBC # FLD AUTO: 4.49 K/UL

## 2019-05-17 NOTE — END OF VISIT
[FreeTextEntry3] : Patient seen and examined with Dr. Vieyra, who agrees with the above assessment and plan.\par

## 2019-05-17 NOTE — HISTORY OF PRESENT ILLNESS
[de-identified] : 66 year old male who follows here periodically for management and treatment of anemia. He is non compliant with visits.   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 He has recurrent epistaxis mainly, on and off at least two or three times a week and episodes of severity and duration vary between 20 minutes to an hour. He also has multiple other comorbidities including cardiomyopathy with arrhythmia, status post pacemaker AICD; and COPD, gout, hypertension and CKD. He requires weekly Procrit and on and off intravenous iron but he is not compliant since whenever his hemoglobin gets better he stops coming to the [facility]. [de-identified] : 4/26/17\par He presents with his wife to establish care Dr. Ira garcía. He states he had a major nose bleed a few days ago. He had IV iron in March. No other complaints. \par \par 6/7/17\par He is doing  well. No bleeding. His HgB today is 11.4 \par \par 8/2/17\par He is doing well. Only had   a mild nose bleed. hgB today was 13.5. No other complaints.\par \par 10/4/17\par He reports a nose bleed one week ago that resolved. His HgB from today is 11.5. Has weakness in legs. \par \par 12/14/17\par He is here for follow up.  He had his  urinary stent removed and had hematuria. He held his Coumadin. He is thinking about a watchman procedure. Feels about the same otherwise. His HgB has been falling and so has his iron stores.\par \par 2/28/18\par Here for follow up. No recent bleeding Hgb is over 11. He decided not to proceed with watchman. He is burping after he eats and is to follow up with GI.\par \par 5/23/18\par He is here for followup with his wife. He reports a nosebleed yesterday. His hemoglobin has been stable responded well to IV iron and CBC from today shows a hemoglobin of 11.4.\par \par 7/18/18\par Here for follow up. Had heavy epistaxis last 2 days this resolved. On Coumadin. feels a bit week. CBC from today is unchanged from last visit.\par \par 8/22/18\par He is here for follow up. Has epistaxis. He had IV Feraheme. His Hgb is greater than 11 gm/dl continently. Taking Coumadin. \par \par 11/14/18\par Patient is here for follow-up.  He remains on coumadin.  Hgb is 10.4 today.  His Iron sat from 10/2018 is low at 13% with ferritin trending down at 53.  Approximately 5 days ago the patient reports heavy epistaxis.  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  He was experienced tremors, so he reported to the ED, his ammonia was high, so they prescribed him lactulose.  We discussed that there may be a multitude of reasons, including but not limited to liver failure or bleeding.  He is not having symptoms of encephalopathy such as confusion.  He reports mild itching, likely due to the cirrhosis.  \par \par 1/16/19\par He is here for follow-up with his daughter.  He was admitted for a CHF exacerbation about 3 weeks ago.  They gave him lasix and it resolved.  On Friday the cardiologist said there was no fluid (previous right sided effusion).  The last few days he has been SOB and some weight gain.  The cardiologist told them to take more lasix.  He has seen some improvement in the frequency of his epistaxis.  He has been having nausea, poorly controlled by Reglan PRN.  We discussed this may be due to the numerous medications he takes.\par \par 3/13/19\par He is here for follow-up with his daughter.  She reports that he is unsteady, cannot stand and weak.  He is tired during the day and cannot sleep at night.  They saw a psychologist who started him on Seroquel, however they report it made him agitated, so he stopped taking it.  They went to [place] to see Cardiology who offered Midodrine  drip for end-stage CHF but he declined.  We had a discussion regarding his worsening heart failure  and mortality/prognosis given his status.  We spoke about code status/wishes and they will have this conversation further in detail at home. Nose bleeds are as before.\par \par 5/15/19\par He is here for follow-up with daughter Evette.  He is having lower extremity edema, we discussed that this is likely an effect from the heart failure.   He is feeling much better now than on last visit and looks better. He now has worsening of his anemia. Epistaxis  has not been so bad

## 2019-05-17 NOTE — ASSESSMENT
[FreeTextEntry1] :  Anemia of chronic kidney disease and chronic disease plus iron deficiency from chronic nose bleeds due to HHT; status post  IV Feraheme  as needed. His hgB has been stable.  He has mild occasional thrombocytopenia which may be due to consumption from bleeds  and or splenic sequestration from CHF, will monitor. He is on Coumadin for afib. Watchman was not recommended after an evaluation.  He has multiple medical comorbidities including  NYHA calss III-IV, has  liver dysfunction from congestive hepatopathy resulting in encephalopathy, afiv, had VT with multiple AICD shocks\par \par  Plan\par -  CBC every 2 weeks and Iron studies monthly for now x 6 months, since he has had bleeding.  If low, we will replete with IV iron.\par - Holding Procrit at this point but  hgb is now under 10 gm/dl, if does not  responds to IV iron will restart \par - he has blood work done at home, INR, CBC and iron studies as well \par - he knows to stop Coumadin if he is bleeding or PLT < 50,000.  He knows to go to ER if needed and to see us sooner if bleeding is severe.\par - would not use Tamoxifen or Tranexamic at this time acid since there is risk of thrombosis and since he is on Coumadin  for afib.  Topical therapy for epistaxis is preferred and will try Affrin, another option is Avastin but that also has risk, if nose bleeds become difficult to manage will consider tranexamic acid first \par - on lactulose + Xifaxan for his hepatic insufficiency from right sided heart failure and protal congestion \par - he has 11 doctors that he follows with \par \par RTC in 2 months\par \par [phone] Evette

## 2019-05-17 NOTE — REVIEW OF SYSTEMS
[Fatigue] : fatigue [Nosebleeds] : nosebleeds [SOB on Exertion] : shortness of breath during exertion [Difficulty Walking] : difficulty walking [Negative] : Allergic/Immunologic [Lower Ext Edema] : lower extremity edema [Fever] : no fever [Dysphagia] : no dysphagia [Recent Change In Weight] : ~T no recent weight change [Chest Pain] : no chest pain [Shortness Of Breath] : no shortness of breath [Abdominal Pain] : no abdominal pain [Diarrhea] : no diarrhea [Joint Pain] : no joint pain [Skin Rash] : no skin rash [Easy Bleeding] : no tendency for easy bleeding [Easy Bruising] : no tendency for easy bruising [FreeTextEntry8] : hematuria has resolved [de-identified] : reports that LE are weeping [de-identified] : Was confused in past due to encephalopathy

## 2019-05-17 NOTE — PHYSICAL EXAM
[Restricted in physically strenuous activity but ambulatory and able to carry out work of a light or sedentary nature] : Status 1- Restricted in physically strenuous activity but ambulatory and able to carry out work of a light or sedentary nature, e.g., light house work, office work [Obese] : obese [Normal] : grossly intact [Ulcers] : no ulcers [de-identified] : Walks with a single-point cane [de-identified] : mild rhonchi auscultated in bilateral lower lobes [de-identified] : He has an AICD on left, bilateral LE +2 pitting edema, legs are weepy [de-identified] : Has telangiectasis on face

## 2019-05-20 ENCOUNTER — LABORATORY RESULT (OUTPATIENT)
Age: 69
End: 2019-05-20

## 2019-05-20 ENCOUNTER — OUTPATIENT (OUTPATIENT)
Dept: OUTPATIENT SERVICES | Facility: HOSPITAL | Age: 69
LOS: 1 days | Discharge: HOME | End: 2019-05-20

## 2019-05-20 DIAGNOSIS — D50.0 IRON DEFICIENCY ANEMIA SECONDARY TO BLOOD LOSS (CHRONIC): ICD-10-CM

## 2019-05-20 DIAGNOSIS — Z95.810 PRESENCE OF AUTOMATIC (IMPLANTABLE) CARDIAC DEFIBRILLATOR: Chronic | ICD-10-CM

## 2019-05-20 DIAGNOSIS — I48.0 PAROXYSMAL ATRIAL FIBRILLATION: ICD-10-CM

## 2019-05-20 DIAGNOSIS — I78.0 HEREDITARY HEMORRHAGIC TELANGIECTASIA: ICD-10-CM

## 2019-05-20 DIAGNOSIS — I25.10 ATHEROSCLEROTIC HEART DISEASE OF NATIVE CORONARY ARTERY WITHOUT ANGINA PECTORIS: ICD-10-CM

## 2019-05-22 ENCOUNTER — APPOINTMENT (OUTPATIENT)
Dept: INFUSION THERAPY | Facility: CLINIC | Age: 69
End: 2019-05-22

## 2019-05-22 RX ORDER — FERUMOXYTOL 510 MG/17ML
510 INJECTION INTRAVENOUS ONCE
Refills: 0 | Status: COMPLETED | OUTPATIENT
Start: 2019-05-22 | End: 2019-05-22

## 2019-05-22 RX ORDER — HYDROCORTISONE 20 MG
100 TABLET ORAL ONCE
Refills: 0 | Status: COMPLETED | OUTPATIENT
Start: 2019-05-22 | End: 2019-05-22

## 2019-05-22 RX ORDER — ACETAMINOPHEN 500 MG
650 TABLET ORAL ONCE
Refills: 0 | Status: COMPLETED | OUTPATIENT
Start: 2019-05-22 | End: 2019-05-22

## 2019-05-22 RX ADMIN — Medication 650 MILLIGRAM(S): at 10:36

## 2019-05-22 RX ADMIN — FERUMOXYTOL 234 MILLIGRAM(S): 510 INJECTION INTRAVENOUS at 11:05

## 2019-05-22 RX ADMIN — FERUMOXYTOL 510 MILLIGRAM(S): 510 INJECTION INTRAVENOUS at 11:35

## 2019-05-22 RX ADMIN — Medication 100 MILLIGRAM(S): at 11:00

## 2019-05-22 RX ADMIN — Medication 650 MILLIGRAM(S): at 10:37

## 2019-05-22 RX ADMIN — Medication 150 MILLIGRAM(S): at 10:36

## 2019-05-28 ENCOUNTER — LABORATORY RESULT (OUTPATIENT)
Age: 69
End: 2019-05-28

## 2019-05-28 ENCOUNTER — OUTPATIENT (OUTPATIENT)
Dept: OUTPATIENT SERVICES | Facility: HOSPITAL | Age: 69
LOS: 1 days | Discharge: HOME | End: 2019-05-28

## 2019-05-28 DIAGNOSIS — I25.10 ATHEROSCLEROTIC HEART DISEASE OF NATIVE CORONARY ARTERY WITHOUT ANGINA PECTORIS: ICD-10-CM

## 2019-05-28 DIAGNOSIS — I78.0 HEREDITARY HEMORRHAGIC TELANGIECTASIA: ICD-10-CM

## 2019-05-28 DIAGNOSIS — Z95.810 PRESENCE OF AUTOMATIC (IMPLANTABLE) CARDIAC DEFIBRILLATOR: Chronic | ICD-10-CM

## 2019-05-28 DIAGNOSIS — I48.0 PAROXYSMAL ATRIAL FIBRILLATION: ICD-10-CM

## 2019-05-28 DIAGNOSIS — D50.0 IRON DEFICIENCY ANEMIA SECONDARY TO BLOOD LOSS (CHRONIC): ICD-10-CM

## 2019-05-30 ENCOUNTER — OUTPATIENT (OUTPATIENT)
Dept: OUTPATIENT SERVICES | Facility: HOSPITAL | Age: 69
LOS: 1 days | Discharge: HOME | End: 2019-05-30

## 2019-05-30 ENCOUNTER — APPOINTMENT (OUTPATIENT)
Dept: INFUSION THERAPY | Facility: CLINIC | Age: 69
End: 2019-05-30

## 2019-05-30 ENCOUNTER — APPOINTMENT (OUTPATIENT)
Dept: CARDIOLOGY | Facility: CLINIC | Age: 69
End: 2019-05-30

## 2019-05-30 DIAGNOSIS — Z95.810 PRESENCE OF AUTOMATIC (IMPLANTABLE) CARDIAC DEFIBRILLATOR: Chronic | ICD-10-CM

## 2019-05-30 DIAGNOSIS — I50.22 CHRONIC SYSTOLIC (CONGESTIVE) HEART FAILURE: ICD-10-CM

## 2019-05-30 DIAGNOSIS — D50.0 IRON DEFICIENCY ANEMIA SECONDARY TO BLOOD LOSS (CHRONIC): ICD-10-CM

## 2019-05-30 DIAGNOSIS — I78.0 HEREDITARY HEMORRHAGIC TELANGIECTASIA: ICD-10-CM

## 2019-05-30 DIAGNOSIS — N18.3 CHRONIC KIDNEY DISEASE, STAGE 3 (MODERATE): ICD-10-CM

## 2019-05-30 RX ORDER — FERUMOXYTOL 510 MG/17ML
510 INJECTION INTRAVENOUS ONCE
Refills: 0 | Status: COMPLETED | OUTPATIENT
Start: 2019-05-30 | End: 2019-05-30

## 2019-05-30 RX ORDER — HYDROCORTISONE 20 MG
100 TABLET ORAL ONCE
Refills: 0 | Status: COMPLETED | OUTPATIENT
Start: 2019-05-30 | End: 2019-05-30

## 2019-05-30 RX ORDER — ACETAMINOPHEN 500 MG
650 TABLET ORAL ONCE
Refills: 0 | Status: COMPLETED | OUTPATIENT
Start: 2019-05-30 | End: 2019-05-30

## 2019-05-30 RX ADMIN — Medication 650 MILLIGRAM(S): at 11:25

## 2019-05-30 RX ADMIN — Medication 150 MILLIGRAM(S): at 11:25

## 2019-05-30 RX ADMIN — FERUMOXYTOL 234 MILLIGRAM(S): 510 INJECTION INTRAVENOUS at 11:25

## 2019-05-30 RX ADMIN — FERUMOXYTOL 510 MILLIGRAM(S): 510 INJECTION INTRAVENOUS at 12:26

## 2019-05-30 RX ADMIN — Medication 100 MILLIGRAM(S): at 11:46

## 2019-06-03 ENCOUNTER — LABORATORY RESULT (OUTPATIENT)
Age: 69
End: 2019-06-03

## 2019-06-03 ENCOUNTER — OUTPATIENT (OUTPATIENT)
Dept: OUTPATIENT SERVICES | Facility: HOSPITAL | Age: 69
LOS: 1 days | Discharge: HOME | End: 2019-06-03

## 2019-06-03 DIAGNOSIS — I48.0 PAROXYSMAL ATRIAL FIBRILLATION: ICD-10-CM

## 2019-06-03 DIAGNOSIS — I25.10 ATHEROSCLEROTIC HEART DISEASE OF NATIVE CORONARY ARTERY WITHOUT ANGINA PECTORIS: ICD-10-CM

## 2019-06-03 DIAGNOSIS — I78.0 HEREDITARY HEMORRHAGIC TELANGIECTASIA: ICD-10-CM

## 2019-06-03 DIAGNOSIS — Z95.810 PRESENCE OF AUTOMATIC (IMPLANTABLE) CARDIAC DEFIBRILLATOR: Chronic | ICD-10-CM

## 2019-06-03 DIAGNOSIS — D50.8 OTHER IRON DEFICIENCY ANEMIAS: ICD-10-CM

## 2019-06-10 ENCOUNTER — MEDICATION RENEWAL (OUTPATIENT)
Age: 69
End: 2019-06-10

## 2019-06-10 ENCOUNTER — LABORATORY RESULT (OUTPATIENT)
Age: 69
End: 2019-06-10

## 2019-06-10 ENCOUNTER — OUTPATIENT (OUTPATIENT)
Dept: OUTPATIENT SERVICES | Facility: HOSPITAL | Age: 69
LOS: 1 days | Discharge: HOME | End: 2019-06-10

## 2019-06-10 DIAGNOSIS — Z95.810 PRESENCE OF AUTOMATIC (IMPLANTABLE) CARDIAC DEFIBRILLATOR: Chronic | ICD-10-CM

## 2019-06-10 DIAGNOSIS — I48.0 PAROXYSMAL ATRIAL FIBRILLATION: ICD-10-CM

## 2019-06-10 DIAGNOSIS — I25.10 ATHEROSCLEROTIC HEART DISEASE OF NATIVE CORONARY ARTERY WITHOUT ANGINA PECTORIS: ICD-10-CM

## 2019-06-11 ENCOUNTER — RX RENEWAL (OUTPATIENT)
Age: 69
End: 2019-06-11

## 2019-06-11 RX ORDER — SOTALOL HYDROCHLORIDE 80 MG/1
80 TABLET ORAL
Qty: 180 | Refills: 0 | Status: ACTIVE | COMMUNITY
Start: 2019-06-11 | End: 1900-01-01

## 2019-06-17 ENCOUNTER — OUTPATIENT (OUTPATIENT)
Dept: OUTPATIENT SERVICES | Facility: HOSPITAL | Age: 69
LOS: 1 days | Discharge: HOME | End: 2019-06-17

## 2019-06-17 ENCOUNTER — LABORATORY RESULT (OUTPATIENT)
Age: 69
End: 2019-06-17

## 2019-06-17 DIAGNOSIS — Z95.810 PRESENCE OF AUTOMATIC (IMPLANTABLE) CARDIAC DEFIBRILLATOR: Chronic | ICD-10-CM

## 2019-06-17 DIAGNOSIS — I48.0 PAROXYSMAL ATRIAL FIBRILLATION: ICD-10-CM

## 2019-06-17 DIAGNOSIS — I25.10 ATHEROSCLEROTIC HEART DISEASE OF NATIVE CORONARY ARTERY WITHOUT ANGINA PECTORIS: ICD-10-CM

## 2019-06-24 ENCOUNTER — LABORATORY RESULT (OUTPATIENT)
Age: 69
End: 2019-06-24

## 2019-06-24 ENCOUNTER — OUTPATIENT (OUTPATIENT)
Dept: OUTPATIENT SERVICES | Facility: HOSPITAL | Age: 69
LOS: 1 days | Discharge: HOME | End: 2019-06-24

## 2019-06-24 DIAGNOSIS — Z95.810 PRESENCE OF AUTOMATIC (IMPLANTABLE) CARDIAC DEFIBRILLATOR: Chronic | ICD-10-CM

## 2019-06-24 DIAGNOSIS — I48.0 PAROXYSMAL ATRIAL FIBRILLATION: ICD-10-CM

## 2019-06-24 DIAGNOSIS — I25.10 ATHEROSCLEROTIC HEART DISEASE OF NATIVE CORONARY ARTERY WITHOUT ANGINA PECTORIS: ICD-10-CM

## 2019-07-01 ENCOUNTER — LABORATORY RESULT (OUTPATIENT)
Age: 69
End: 2019-07-01

## 2019-07-01 ENCOUNTER — OUTPATIENT (OUTPATIENT)
Dept: OUTPATIENT SERVICES | Facility: HOSPITAL | Age: 69
LOS: 1 days | Discharge: HOME | End: 2019-07-01

## 2019-07-01 DIAGNOSIS — Z95.810 PRESENCE OF AUTOMATIC (IMPLANTABLE) CARDIAC DEFIBRILLATOR: Chronic | ICD-10-CM

## 2019-07-01 DIAGNOSIS — I48.0 PAROXYSMAL ATRIAL FIBRILLATION: ICD-10-CM

## 2019-07-01 DIAGNOSIS — I25.10 ATHEROSCLEROTIC HEART DISEASE OF NATIVE CORONARY ARTERY WITHOUT ANGINA PECTORIS: ICD-10-CM

## 2019-07-08 ENCOUNTER — LABORATORY RESULT (OUTPATIENT)
Age: 69
End: 2019-07-08

## 2019-07-08 ENCOUNTER — OUTPATIENT (OUTPATIENT)
Dept: OUTPATIENT SERVICES | Facility: HOSPITAL | Age: 69
LOS: 1 days | Discharge: HOME | End: 2019-07-08

## 2019-07-08 DIAGNOSIS — Z95.810 PRESENCE OF AUTOMATIC (IMPLANTABLE) CARDIAC DEFIBRILLATOR: Chronic | ICD-10-CM

## 2019-07-08 DIAGNOSIS — I48.91 UNSPECIFIED ATRIAL FIBRILLATION: ICD-10-CM

## 2019-07-15 ENCOUNTER — LABORATORY RESULT (OUTPATIENT)
Age: 69
End: 2019-07-15

## 2019-07-15 ENCOUNTER — OUTPATIENT (OUTPATIENT)
Dept: OUTPATIENT SERVICES | Facility: HOSPITAL | Age: 69
LOS: 1 days | Discharge: HOME | End: 2019-07-15

## 2019-07-15 DIAGNOSIS — Z95.810 PRESENCE OF AUTOMATIC (IMPLANTABLE) CARDIAC DEFIBRILLATOR: Chronic | ICD-10-CM

## 2019-07-15 DIAGNOSIS — I48.91 UNSPECIFIED ATRIAL FIBRILLATION: ICD-10-CM

## 2019-07-22 ENCOUNTER — LABORATORY RESULT (OUTPATIENT)
Age: 69
End: 2019-07-22

## 2019-07-22 ENCOUNTER — OUTPATIENT (OUTPATIENT)
Dept: OUTPATIENT SERVICES | Facility: HOSPITAL | Age: 69
LOS: 1 days | Discharge: HOME | End: 2019-07-22

## 2019-07-22 DIAGNOSIS — Z95.810 PRESENCE OF AUTOMATIC (IMPLANTABLE) CARDIAC DEFIBRILLATOR: Chronic | ICD-10-CM

## 2019-07-22 DIAGNOSIS — I48.91 UNSPECIFIED ATRIAL FIBRILLATION: ICD-10-CM

## 2019-07-22 DIAGNOSIS — D50.9 IRON DEFICIENCY ANEMIA, UNSPECIFIED: ICD-10-CM

## 2019-07-24 ENCOUNTER — APPOINTMENT (OUTPATIENT)
Dept: CARDIOLOGY | Facility: CLINIC | Age: 69
End: 2019-07-24
Payer: MEDICARE

## 2019-07-24 PROCEDURE — 93000 ELECTROCARDIOGRAM COMPLETE: CPT

## 2019-07-24 PROCEDURE — 99214 OFFICE O/P EST MOD 30 MIN: CPT | Mod: 25

## 2019-07-29 ENCOUNTER — OUTPATIENT (OUTPATIENT)
Dept: OUTPATIENT SERVICES | Facility: HOSPITAL | Age: 69
LOS: 1 days | Discharge: HOME | End: 2019-07-29

## 2019-07-29 ENCOUNTER — LABORATORY RESULT (OUTPATIENT)
Age: 69
End: 2019-07-29

## 2019-07-29 DIAGNOSIS — I48.91 UNSPECIFIED ATRIAL FIBRILLATION: ICD-10-CM

## 2019-07-29 DIAGNOSIS — Z95.810 PRESENCE OF AUTOMATIC (IMPLANTABLE) CARDIAC DEFIBRILLATOR: Chronic | ICD-10-CM

## 2019-07-31 ENCOUNTER — APPOINTMENT (OUTPATIENT)
Dept: HEMATOLOGY ONCOLOGY | Facility: CLINIC | Age: 69
End: 2019-07-31

## 2019-08-05 ENCOUNTER — LABORATORY RESULT (OUTPATIENT)
Age: 69
End: 2019-08-05

## 2019-08-05 ENCOUNTER — OUTPATIENT (OUTPATIENT)
Dept: OUTPATIENT SERVICES | Facility: HOSPITAL | Age: 69
LOS: 1 days | Discharge: HOME | End: 2019-08-05

## 2019-08-05 DIAGNOSIS — Z95.810 PRESENCE OF AUTOMATIC (IMPLANTABLE) CARDIAC DEFIBRILLATOR: Chronic | ICD-10-CM

## 2019-08-05 DIAGNOSIS — I48.91 UNSPECIFIED ATRIAL FIBRILLATION: ICD-10-CM

## 2019-08-12 ENCOUNTER — LABORATORY RESULT (OUTPATIENT)
Age: 69
End: 2019-08-12

## 2019-08-12 ENCOUNTER — OUTPATIENT (OUTPATIENT)
Dept: OUTPATIENT SERVICES | Facility: HOSPITAL | Age: 69
LOS: 1 days | Discharge: HOME | End: 2019-08-12

## 2019-08-12 DIAGNOSIS — D64.9 ANEMIA, UNSPECIFIED: ICD-10-CM

## 2019-08-12 DIAGNOSIS — Z95.810 PRESENCE OF AUTOMATIC (IMPLANTABLE) CARDIAC DEFIBRILLATOR: Chronic | ICD-10-CM

## 2019-08-19 ENCOUNTER — LABORATORY RESULT (OUTPATIENT)
Age: 69
End: 2019-08-19

## 2019-08-19 ENCOUNTER — OUTPATIENT (OUTPATIENT)
Dept: OUTPATIENT SERVICES | Facility: HOSPITAL | Age: 69
LOS: 1 days | Discharge: HOME | End: 2019-08-19

## 2019-08-19 DIAGNOSIS — I48.91 UNSPECIFIED ATRIAL FIBRILLATION: ICD-10-CM

## 2019-08-19 DIAGNOSIS — Z95.810 PRESENCE OF AUTOMATIC (IMPLANTABLE) CARDIAC DEFIBRILLATOR: Chronic | ICD-10-CM

## 2019-08-26 ENCOUNTER — LABORATORY RESULT (OUTPATIENT)
Age: 69
End: 2019-08-26

## 2019-08-26 ENCOUNTER — OUTPATIENT (OUTPATIENT)
Dept: OUTPATIENT SERVICES | Facility: HOSPITAL | Age: 69
LOS: 1 days | Discharge: HOME | End: 2019-08-26

## 2019-08-26 DIAGNOSIS — I48.91 UNSPECIFIED ATRIAL FIBRILLATION: ICD-10-CM

## 2019-08-26 DIAGNOSIS — Z95.810 PRESENCE OF AUTOMATIC (IMPLANTABLE) CARDIAC DEFIBRILLATOR: Chronic | ICD-10-CM

## 2019-08-26 DIAGNOSIS — D64.9 ANEMIA, UNSPECIFIED: ICD-10-CM

## 2019-08-29 ENCOUNTER — APPOINTMENT (OUTPATIENT)
Dept: CARDIOLOGY | Facility: CLINIC | Age: 69
End: 2019-08-29
Payer: MEDICARE

## 2019-08-29 ENCOUNTER — APPOINTMENT (OUTPATIENT)
Dept: VASCULAR SURGERY | Facility: CLINIC | Age: 69
End: 2019-08-29
Payer: MEDICARE

## 2019-08-29 VITALS
BODY MASS INDEX: 29.92 KG/M2 | DIASTOLIC BLOOD PRESSURE: 80 MMHG | HEIGHT: 70 IN | SYSTOLIC BLOOD PRESSURE: 110 MMHG | WEIGHT: 209 LBS

## 2019-08-29 DIAGNOSIS — M79.89 OTHER SPECIFIED SOFT TISSUE DISORDERS: ICD-10-CM

## 2019-08-29 PROCEDURE — 29580 STRAPPING UNNA BOOT: CPT | Mod: 50

## 2019-08-29 PROCEDURE — 93000 ELECTROCARDIOGRAM COMPLETE: CPT

## 2019-08-29 PROCEDURE — 99214 OFFICE O/P EST MOD 30 MIN: CPT

## 2019-08-29 PROCEDURE — 93970 EXTREMITY STUDY: CPT

## 2019-08-29 PROCEDURE — 99203 OFFICE O/P NEW LOW 30 MIN: CPT | Mod: 25

## 2019-08-29 NOTE — ASSESSMENT
[FreeTextEntry1] : The patient is a 69-year-old gentleman with a history of bilateral lower extremity edema and more currently weeping edema with cellulitis of the right lower extremity. The patient has been treated with oral antibiotics. I believe he will benefit from bilateral lower extremity unna boot therapy to treat his edema. I performed a venous duplex there was no evidence of acute or chronic deep vein thrombosis. I will see the patient back in my office in one weeks time.

## 2019-08-29 NOTE — HISTORY OF PRESENT ILLNESS
[FreeTextEntry1] : The patient was referred by Dr. Ortiz for evaluation and treatment for bilateral leg swelling and right leg  weeping edema for the past month. The patient was treated for cellulitis with oral antibiotic for his right leg as well a diuretics.

## 2019-09-01 ENCOUNTER — INPATIENT (INPATIENT)
Facility: HOSPITAL | Age: 69
LOS: 5 days | End: 2019-09-07
Attending: INTERNAL MEDICINE | Admitting: INTERNAL MEDICINE
Payer: MEDICARE

## 2019-09-01 VITALS
DIASTOLIC BLOOD PRESSURE: 39 MMHG | RESPIRATION RATE: 17 BRPM | HEART RATE: 84 BPM | SYSTOLIC BLOOD PRESSURE: 66 MMHG | TEMPERATURE: 98 F | OXYGEN SATURATION: 93 %

## 2019-09-01 DIAGNOSIS — Z95.810 PRESENCE OF AUTOMATIC (IMPLANTABLE) CARDIAC DEFIBRILLATOR: Chronic | ICD-10-CM

## 2019-09-01 LAB
ALBUMIN SERPL ELPH-MCNC: 2.8 G/DL — LOW (ref 3.5–5.2)
ALP SERPL-CCNC: 139 U/L — HIGH (ref 30–115)
ALT FLD-CCNC: 21 U/L — SIGNIFICANT CHANGE UP (ref 0–41)
AMMONIA BLD-MCNC: 130 UMOL/L — HIGH (ref 11–55)
ANION GAP SERPL CALC-SCNC: 12 MMOL/L — SIGNIFICANT CHANGE UP (ref 7–14)
ANISOCYTOSIS BLD QL: SIGNIFICANT CHANGE UP
APPEARANCE UR: CLEAR — SIGNIFICANT CHANGE UP
APTT BLD: 43.4 SEC — HIGH (ref 27–39.2)
AST SERPL-CCNC: 40 U/L — SIGNIFICANT CHANGE UP (ref 0–41)
BASE EXCESS BLDA CALC-SCNC: 1.5 MMOL/L — SIGNIFICANT CHANGE UP (ref -2–2)
BASOPHILS # BLD AUTO: 0 K/UL — SIGNIFICANT CHANGE UP (ref 0–0.2)
BASOPHILS NFR BLD AUTO: 0 % — SIGNIFICANT CHANGE UP (ref 0–1)
BILIRUB SERPL-MCNC: 5 MG/DL — HIGH (ref 0.2–1.2)
BILIRUB UR-MCNC: NEGATIVE — SIGNIFICANT CHANGE UP
BUN SERPL-MCNC: 40 MG/DL — HIGH (ref 10–20)
CALCIUM SERPL-MCNC: 9.1 MG/DL — SIGNIFICANT CHANGE UP (ref 8.5–10.1)
CHLORIDE SERPL-SCNC: 95 MMOL/L — LOW (ref 98–110)
CO2 SERPL-SCNC: 22 MMOL/L — SIGNIFICANT CHANGE UP (ref 17–32)
COLOR SPEC: YELLOW — SIGNIFICANT CHANGE UP
CREAT SERPL-MCNC: 2.4 MG/DL — HIGH (ref 0.7–1.5)
DIFF PNL FLD: NEGATIVE — SIGNIFICANT CHANGE UP
EOSINOPHIL # BLD AUTO: 0 K/UL — SIGNIFICANT CHANGE UP (ref 0–0.7)
EOSINOPHIL NFR BLD AUTO: 0 % — SIGNIFICANT CHANGE UP (ref 0–8)
ETHANOL SERPL-MCNC: <10 MG/DL — SIGNIFICANT CHANGE UP
GIANT PLATELETS BLD QL SMEAR: PRESENT — SIGNIFICANT CHANGE UP
GLUCOSE SERPL-MCNC: 82 MG/DL — SIGNIFICANT CHANGE UP (ref 70–99)
GLUCOSE UR QL: NEGATIVE — SIGNIFICANT CHANGE UP
HCO3 BLDA-SCNC: 25 MMOL/L — SIGNIFICANT CHANGE UP (ref 23–27)
HCT VFR BLD CALC: 38 % — LOW (ref 42–52)
HGB BLD-MCNC: 13.2 G/DL — LOW (ref 14–18)
INR BLD: 2.24 RATIO — HIGH (ref 0.65–1.3)
KETONES UR-MCNC: NEGATIVE — SIGNIFICANT CHANGE UP
LACTATE SERPL-SCNC: 2.7 MMOL/L — HIGH (ref 0.5–2.2)
LEUKOCYTE ESTERASE UR-ACNC: NEGATIVE — SIGNIFICANT CHANGE UP
LIDOCAIN IGE QN: 59 U/L — SIGNIFICANT CHANGE UP (ref 7–60)
LYMPHOCYTES # BLD AUTO: 0.21 K/UL — LOW (ref 1.2–3.4)
LYMPHOCYTES # BLD AUTO: 3.5 % — LOW (ref 20.5–51.1)
MACROCYTES BLD QL: SIGNIFICANT CHANGE UP
MANUAL SMEAR VERIFICATION: SIGNIFICANT CHANGE UP
MCHC RBC-ENTMCNC: 32.1 PG — HIGH (ref 27–31)
MCHC RBC-ENTMCNC: 34.7 G/DL — SIGNIFICANT CHANGE UP (ref 32–37)
MCV RBC AUTO: 92.5 FL — SIGNIFICANT CHANGE UP (ref 80–94)
MONOCYTES # BLD AUTO: 0.1 K/UL — SIGNIFICANT CHANGE UP (ref 0.1–0.6)
MONOCYTES NFR BLD AUTO: 1.7 % — SIGNIFICANT CHANGE UP (ref 1.7–9.3)
NEUTROPHILS # BLD AUTO: 5.51 K/UL — SIGNIFICANT CHANGE UP (ref 1.4–6.5)
NEUTROPHILS NFR BLD AUTO: 93.1 % — HIGH (ref 42.2–75.2)
NITRITE UR-MCNC: NEGATIVE — SIGNIFICANT CHANGE UP
PCO2 BLDA: 37 MMHG — LOW (ref 38–42)
PH BLDA: 7.45 — HIGH (ref 7.38–7.42)
PH UR: 6.5 — SIGNIFICANT CHANGE UP (ref 5–8)
PLAT MORPH BLD: NORMAL — SIGNIFICANT CHANGE UP
PLATELET # BLD AUTO: 60 K/UL — LOW (ref 130–400)
PO2 BLDA: 50 MMHG — LOW (ref 78–95)
POIKILOCYTOSIS BLD QL AUTO: SIGNIFICANT CHANGE UP
POTASSIUM SERPL-MCNC: 4.6 MMOL/L — SIGNIFICANT CHANGE UP (ref 3.5–5)
POTASSIUM SERPL-SCNC: 4.6 MMOL/L — SIGNIFICANT CHANGE UP (ref 3.5–5)
PROT SERPL-MCNC: 6.2 G/DL — SIGNIFICANT CHANGE UP (ref 6–8)
PROT UR-MCNC: SIGNIFICANT CHANGE UP
PROTHROM AB SERPL-ACNC: 25.6 SEC — HIGH (ref 9.95–12.87)
RBC # BLD: 4.11 M/UL — LOW (ref 4.7–6.1)
RBC # FLD: 22.5 % — HIGH (ref 11.5–14.5)
RBC BLD AUTO: SIGNIFICANT CHANGE UP
SAO2 % BLDA: 82 % — LOW (ref 94–98)
SMUDGE CELLS # BLD: PRESENT — SIGNIFICANT CHANGE UP
SODIUM SERPL-SCNC: 129 MMOL/L — LOW (ref 135–146)
SP GR SPEC: 1.02 — SIGNIFICANT CHANGE UP (ref 1.01–1.02)
TROPONIN T SERPL-MCNC: 0.03 NG/ML — CRITICAL HIGH
UROBILINOGEN FLD QL: SIGNIFICANT CHANGE UP
VARIANT LYMPHS # BLD: 1.7 % — SIGNIFICANT CHANGE UP (ref 0–5)
WBC # BLD: 5.92 K/UL — SIGNIFICANT CHANGE UP (ref 4.8–10.8)
WBC # FLD AUTO: 5.92 K/UL — SIGNIFICANT CHANGE UP (ref 4.8–10.8)

## 2019-09-01 PROCEDURE — 71045 X-RAY EXAM CHEST 1 VIEW: CPT | Mod: 26

## 2019-09-01 PROCEDURE — 72125 CT NECK SPINE W/O DYE: CPT | Mod: 26

## 2019-09-01 PROCEDURE — 76705 ECHO EXAM OF ABDOMEN: CPT | Mod: 26

## 2019-09-01 PROCEDURE — 99291 CRITICAL CARE FIRST HOUR: CPT | Mod: 25

## 2019-09-01 PROCEDURE — 74177 CT ABD & PELVIS W/CONTRAST: CPT | Mod: 26

## 2019-09-01 PROCEDURE — 71260 CT THORAX DX C+: CPT | Mod: 26

## 2019-09-01 PROCEDURE — 70450 CT HEAD/BRAIN W/O DYE: CPT | Mod: 26

## 2019-09-01 PROCEDURE — 72170 X-RAY EXAM OF PELVIS: CPT | Mod: 26

## 2019-09-01 PROCEDURE — 36556 INSERT NON-TUNNEL CV CATH: CPT

## 2019-09-01 PROCEDURE — 93010 ELECTROCARDIOGRAM REPORT: CPT

## 2019-09-01 RX ORDER — LIPASE/PROTEASE/AMYLASE 16-48-48K
1 CAPSULE,DELAYED RELEASE (ENTERIC COATED) ORAL
Qty: 0 | Refills: 0 | DISCHARGE

## 2019-09-01 RX ORDER — METOPROLOL TARTRATE 50 MG
1 TABLET ORAL
Qty: 0 | Refills: 0 | DISCHARGE

## 2019-09-01 RX ORDER — NOREPINEPHRINE BITARTRATE/D5W 8 MG/250ML
0.05 PLASTIC BAG, INJECTION (ML) INTRAVENOUS
Qty: 16 | Refills: 0 | Status: DISCONTINUED | OUTPATIENT
Start: 2019-09-01 | End: 2019-09-02

## 2019-09-01 RX ORDER — DIGOXIN 250 MCG
1 TABLET ORAL
Qty: 0 | Refills: 0 | DISCHARGE

## 2019-09-01 RX ORDER — METOPROLOL TARTRATE 50 MG
1.5 TABLET ORAL
Qty: 0 | Refills: 0 | DISCHARGE

## 2019-09-01 RX ORDER — WARFARIN SODIUM 2.5 MG/1
1 TABLET ORAL
Qty: 0 | Refills: 0 | DISCHARGE

## 2019-09-01 RX ORDER — FUROSEMIDE 40 MG
1 TABLET ORAL
Qty: 0 | Refills: 0 | DISCHARGE

## 2019-09-01 RX ORDER — CHLORHEXIDINE GLUCONATE 213 G/1000ML
1 SOLUTION TOPICAL
Refills: 0 | Status: DISCONTINUED | OUTPATIENT
Start: 2019-09-01 | End: 2019-09-07

## 2019-09-01 RX ORDER — COLCHICINE 0.6 MG
1 TABLET ORAL
Qty: 0 | Refills: 0 | DISCHARGE

## 2019-09-01 RX ORDER — SOTALOL HCL 120 MG
0.5 TABLET ORAL
Qty: 0 | Refills: 0 | DISCHARGE

## 2019-09-01 RX ORDER — ALLOPURINOL 300 MG
100 TABLET ORAL DAILY
Refills: 0 | Status: DISCONTINUED | OUTPATIENT
Start: 2019-09-01 | End: 2019-09-07

## 2019-09-01 RX ORDER — SODIUM CHLORIDE 9 MG/ML
1000 INJECTION INTRAMUSCULAR; INTRAVENOUS; SUBCUTANEOUS ONCE
Refills: 0 | Status: COMPLETED | OUTPATIENT
Start: 2019-09-01 | End: 2019-09-01

## 2019-09-01 RX ORDER — CEFEPIME 1 G/1
2000 INJECTION, POWDER, FOR SOLUTION INTRAMUSCULAR; INTRAVENOUS ONCE
Refills: 0 | Status: COMPLETED | OUTPATIENT
Start: 2019-09-01 | End: 2019-09-01

## 2019-09-01 RX ORDER — HYDROCORTISONE 20 MG
100 TABLET ORAL ONCE
Refills: 0 | Status: COMPLETED | OUTPATIENT
Start: 2019-09-01 | End: 2019-09-01

## 2019-09-01 RX ORDER — METRONIDAZOLE 500 MG
500 TABLET ORAL EVERY 8 HOURS
Refills: 0 | Status: DISCONTINUED | OUTPATIENT
Start: 2019-09-01 | End: 2019-09-04

## 2019-09-01 RX ORDER — LACTULOSE 10 G/15ML
10 SOLUTION ORAL THREE TIMES A DAY
Refills: 0 | Status: DISCONTINUED | OUTPATIENT
Start: 2019-09-01 | End: 2019-09-03

## 2019-09-01 RX ORDER — CEFEPIME 1 G/1
INJECTION, POWDER, FOR SOLUTION INTRAMUSCULAR; INTRAVENOUS
Refills: 0 | Status: DISCONTINUED | OUTPATIENT
Start: 2019-09-01 | End: 2019-09-04

## 2019-09-01 RX ORDER — LACTULOSE 10 G/15ML
30 SOLUTION ORAL
Qty: 0 | Refills: 0 | DISCHARGE

## 2019-09-01 RX ORDER — PANTOPRAZOLE SODIUM 20 MG/1
40 TABLET, DELAYED RELEASE ORAL
Refills: 0 | Status: DISCONTINUED | OUTPATIENT
Start: 2019-09-01 | End: 2019-09-07

## 2019-09-01 RX ORDER — CEFEPIME 1 G/1
2000 INJECTION, POWDER, FOR SOLUTION INTRAMUSCULAR; INTRAVENOUS EVERY 12 HOURS
Refills: 0 | Status: DISCONTINUED | OUTPATIENT
Start: 2019-09-02 | End: 2019-09-04

## 2019-09-01 RX ORDER — ALLOPURINOL 300 MG
1 TABLET ORAL
Qty: 0 | Refills: 0 | DISCHARGE

## 2019-09-01 RX ORDER — VANCOMYCIN HCL 1 G
1500 VIAL (EA) INTRAVENOUS ONCE
Refills: 0 | Status: COMPLETED | OUTPATIENT
Start: 2019-09-01 | End: 2019-09-01

## 2019-09-01 RX ORDER — PANTOPRAZOLE SODIUM 20 MG/1
1 TABLET, DELAYED RELEASE ORAL
Qty: 0 | Refills: 0 | DISCHARGE

## 2019-09-01 RX ORDER — NOREPINEPHRINE BITARTRATE/D5W 8 MG/250ML
0.05 PLASTIC BAG, INJECTION (ML) INTRAVENOUS
Qty: 8 | Refills: 0 | Status: DISCONTINUED | OUTPATIENT
Start: 2019-09-01 | End: 2019-09-01

## 2019-09-01 RX ADMIN — CEFEPIME 100 MILLIGRAM(S): 1 INJECTION, POWDER, FOR SOLUTION INTRAMUSCULAR; INTRAVENOUS at 16:30

## 2019-09-01 RX ADMIN — SODIUM CHLORIDE 1000 MILLILITER(S): 9 INJECTION INTRAMUSCULAR; INTRAVENOUS; SUBCUTANEOUS at 15:30

## 2019-09-01 RX ADMIN — Medication 100 MILLIGRAM(S): at 18:55

## 2019-09-01 RX ADMIN — CEFEPIME 100 MILLIGRAM(S): 1 INJECTION, POWDER, FOR SOLUTION INTRAMUSCULAR; INTRAVENOUS at 22:55

## 2019-09-01 RX ADMIN — Medication 8.91 MICROGRAM(S)/KG/MIN: at 19:40

## 2019-09-01 RX ADMIN — LACTULOSE 10 GRAM(S): 10 SOLUTION ORAL at 22:58

## 2019-09-01 RX ADMIN — CEFEPIME 2000 MILLIGRAM(S): 1 INJECTION, POWDER, FOR SOLUTION INTRAMUSCULAR; INTRAVENOUS at 17:00

## 2019-09-01 RX ADMIN — Medication 500 MILLIGRAM(S): at 22:55

## 2019-09-01 RX ADMIN — SODIUM CHLORIDE 1000 MILLILITER(S): 9 INJECTION INTRAMUSCULAR; INTRAVENOUS; SUBCUTANEOUS at 14:47

## 2019-09-01 RX ADMIN — Medication 300 MILLIGRAM(S): at 17:00

## 2019-09-01 NOTE — ED PROVIDER NOTE - OBJECTIVE STATEMENT
Patient is a 67 yo M with PMH of A-fib on Coumadin, HFrEF s/p AICD, HTN, gout, CKD, BPH, liver cirrhosis p/w hypotension. Patient's baseline BP is in the 90s systolic; day prior to presentation, patient had mechanical fall landing on buttock, denied head injury or LOC. Patient has had lower BP than usual for past 2 days; today he was altered per family; responding less than usual. In ED, patient was more alert; denies vomiting, diarrhea, dysuria, cough, chest pain, SOB. Endorses decreased PO intake and decreased lower extremity edema.

## 2019-09-01 NOTE — ED PROVIDER NOTE - PHYSICAL EXAMINATION
CONSTITUTIONAL: Well-developed; well-nourished; in no acute distress.   SKIN: warm, dry.  HEAD: Normocephalic; atraumatic.  EYES: PERRL, EOMI.   ENT: No nasal discharge; airway clear.  NECK: Supple; non tender.  CARD: S1, S2 normal; no murmurs, gallops, or rubs. Regular rate and rhythm.   RESP: crackles on right lung.   ABD: soft nondistended, nontender to palpation.   EXT: Normal ROM.  minimally edematous lower extremities bilaterally; RLE is erythematous and warm to touch.   NEURO: Alert, oriented, grossly unremarkable in ED.   PSYCH: Cooperative, appropriate. CONSTITUTIONAL: Well-developed; well-nourished; in no acute distress.   SKIN: warm, dry.  HEAD: Normocephalic; atraumatic.  EYES: PERRL, EOMI.   ENT: No nasal discharge; airway clear.  NECK: Supple; non tender.  CARD: S1, S2 normal; no murmurs, gallops, or rubs. Regular rate and rhythm.   RESP: crackles on right lung.   ABD: soft nondistended, tender to palpation in the RUQ.   EXT: Normal ROM.  minimally edematous lower extremities bilaterally; RLE is erythematous and warm to touch.   NEURO: Alert, oriented, grossly unremarkable in ED.   PSYCH: Cooperative, appropriate. CONSTITUTIONAL: Well-developed; well-nourished; in no acute distress.   SKIN: warm, dry.  HEAD: Normocephalic; atraumatic.  EYES: PERRL, EOMI.   ENT: No nasal discharge; airway clear.  NECK: Supple; non tender.  CARD: S1, S2 normal; no murmurs, gallops, or rubs. Regular rate and rhythm.   RESP: crackles on right lung.   ABD: soft nondistended, non-tender  RECTAL: brown stool, no gross blood  EXT: Normal ROM.  minimally edematous lower extremities bilaterally; RLE is erythematous and warm to touch.   NEURO: Alert, oriented, grossly unremarkable in ED.   PSYCH: Cooperative, appropriate.

## 2019-09-01 NOTE — H&P ADULT - HISTORY OF PRESENT ILLNESS
68 y/o M with PMH of A-fib on Coumadin, HFrEF (LVEF 17% 12/2018) s/p AICD, HTN, gout, CKD III, BPH, Liver cirrhosis secondary to CHF p/w hypotension.     As per son at bedside, this AM pt was found to be slightly more confused than normal, not responding appropriately to questions. He checked his blood pressure and was found to have SBP in the 70s. AS per son, pt is hypotensive at baseline with SBP 80-90's. Son reports pt having a fall the night prior preceded by lightheadedness and dizziness. HE denies any loss of consciousness, head trauma, bowel or urinary incontinence or confusion after the episode. Pt denies any chest pain, palpitations, headaches, or lower extremity edema.     in the ED, VS: BP 66/39 HR 84 RR 17 T 98.3. Pt received Cefepime and vancomycin, Hydrocortisone 100mg, and 1 L NS, and was started on levophed through R IJ.

## 2019-09-01 NOTE — ED PROVIDER NOTE - SECONDARY DIAGNOSIS.
Hepatic cirrhosis, unspecified hepatic cirrhosis type, unspecified whether ascites present Congestive heart failure, unspecified HF chronicity, unspecified heart failure type Elevated bilirubin Hyperammonemia Hyponatremia Acute kidney injury

## 2019-09-01 NOTE — ED ADULT NURSE NOTE - OBJECTIVE STATEMENT
Pt BIBA and family from home for weakness, altered mental status/lethargy, poor po intake x 2-3 days. As per pt's son, pt felt very weak yesterday, lost his balance while on standing, fell backwards on his buttocks and struck his b/l upper arms on the floor. No head injury. Pt on Coumadin for A-Fib. (+) abrasions to right upper arm and left elbow. Pt arrived in ER lethargic, arouses w/ tactile stimuli. No fever at home as per family.

## 2019-09-01 NOTE — ED PROVIDER NOTE - NS ED ROS FT
Constitutional: No fevers.   Eyes:  No visual changes, eye pain or discharge.  ENMT:  No hearing changes, pain, no sore throat or runny nose, no difficulty swallowing.   Cardiac:  No chest pain, SOB or edema.   Respiratory:  No cough or respiratory distress. No hemoptysis. No history of asthma or RAD.  GI:  No nausea, vomiting, diarrhea or abdominal pain. +decreased PO intake.   :  No dysuria, frequency or burning.  MS:  No myalgia, muscle weakness, joint pain or back pain.  Neuro:  No headache. +Altered mental status.   Skin:  lower extremity erythema.   Endocrine: No history of thyroid disease or diabetes.

## 2019-09-01 NOTE — ED ADULT TRIAGE NOTE - CHIEF COMPLAINT QUOTE
Pt BIBA from home, Pt has hx of CHF, on coumadin. as per family Pt fell yesterday, onto buttock. no injury reported. denies LOC. Pt woke up today with altered mental status, and multiple episodes of vomiting.  Pt hypotensive and lethargic in triage.

## 2019-09-01 NOTE — ED ADULT NURSE NOTE - ED COMFORT CARE
Family informed/Darkened room/Explanation of wait/Warm blanket/Mouth care/Pillow/Patient informed/Incontinence care

## 2019-09-01 NOTE — ED ADULT NURSE NOTE - CAS EDN DISCHARGE ASSESSMENT
Symptoms improved/Pt drowsy on arrival/Patient baseline mental status/No adverse reaction to first time med in ED

## 2019-09-01 NOTE — ED ADULT NURSE NOTE - INTERVENTIONS DEFINITIONS
Non-slip footwear when patient is off stretcher/Physically safe environment: no spills, clutter or unnecessary equipment/Monitor for mental status changes and reorient to person, place, and time/Stretcher in lowest position, wheels locked, appropriate side rails in place/Monitor gait and stability/Reinforce activity limits and safety measures with patient and family/Review medications for side effects contributing to fall risk

## 2019-09-01 NOTE — H&P ADULT - NSHPPHYSICALEXAM_GEN_ALL_CORE
ICU Vital Signs Last 24 Hrs  T(C): 37.3 (01 Sep 2019 18:30), Max: 37.3 (01 Sep 2019 17:00)  T(F): 99.1 (01 Sep 2019 18:30), Max: 99.2 (01 Sep 2019 17:00)  HR: 92 (01 Sep 2019 18:45) (84 - 94)  BP: 107/68 (01 Sep 2019 18:45) (58/39 - 107/68)  BP(mean): 82 (01 Sep 2019 18:45) (55 - 82)  ABP: --  ABP(mean): --  RR: 20 (01 Sep 2019 18:45) (17 - 23)  SpO2: 96% (01 Sep 2019 18:45) (93% - 97%)      PHYSICAL EXAM:    General: looks distressed, pale.. AAOx2  HEENT: Atraumatic, normocephalic. Moist mucus membranes. PERRLA.  Cardio: Regular rate and rhythm. S1, S2 appreciated. no murmurs.  Pulm: Decreased BS bilaterally. RLL crackles   Abdomen: RUQ tenderness to deep palpation.   Extremities: No cyanosis or edema bilaterally.   Neuro: No focal deficits. Motor 5/5 throughout. Sensation intact

## 2019-09-01 NOTE — ED PROVIDER NOTE - CLINICAL SUMMARY MEDICAL DECISION MAKING FREE TEXT BOX
Patient presented with decreased PO intake, failure to thrive at home, hypotension. On arrival afebrile but markedly hypotensive to 60s systolic. Mentating at baseline per family, fully neurovascular intact, no evidence of bleeding on exam. Family states patient runs low in high 80s at baseline for BP systolic, and normally drops when his ammonia level goes up. Started on IVF given patient appears dry on exam and with decreased PO intake, plus possibility of underlying sepsis, covered with broad spectrum abx. Obtained labs which were remarkable for elevated ammonia, Hb 13, which is unlikely to be bleed given duration of symptoms, mildly elevated lactate, VERO as well. Pan scan negative for acute bleeding or signs of infection in abdomen except for signs of cystitis. Troponin mildly elevated but EKG unchanged from prior and with elevated creatinine as confounding factor, troponin 0.03 does not sufficiently explain symptoms in terms of cardiogenic shock. Despite IVF patient did not improve with BP and therefore family consented for central line, which was placed and patient started on levophed which improved BP. Unclear etiology of hypotension but possibly 2/2 sepsis vs dehydration component. As above, no bleeding, cardiogenic shock is possible but less likely given above reasoning, myxedema was considered but not likely given no hx thyroid disease and patient not hypothermic and is at baseline mental status. Adrenal insufficiency possible as well, but patient very responsive to pressors and therefore this is unclear. Will admit for further work up and management. Patient's family agreeable with plan. at time of admission, patient HD stable on pressors.

## 2019-09-01 NOTE — H&P ADULT - NSHPLABSRESULTS_GEN_ALL_CORE
13.2   5.92  )-----------( 60       ( 01 Sep 2019 14:45 )             38.0       09-01    129<L>  |  95<L>  |  40<H>  ----------------------------<  82  4.6   |  22  |  2.4<H>    Ca    9.1      01 Sep 2019 14:45    TPro  6.2  /  Alb  2.8<L>  /  TBili  5.0<H>  /  DBili  x   /  AST  40  /  ALT  21  /  AlkPhos  139<H>  09-01      CARDIAC MARKERS ( 01 Sep 2019 16:03 )  x     / 0.03 ng/mL / x     / x     / x        RADIOLOGY:      < from: US Abdomen Limited (09.01.19 @ 19:08) >    IMPRESSION:    Cholelithiasis with wall thickening, edema and dilated CBD. Negative   Pryor sign precludes sonographic diagnosis of acute cholecystitis. If   clinical suspicion is high recommend HIDA scan.    Right pleural effusion.    < end of copied text >        < from: Xray Pelvis AP only (09.01.19 @ 17:44) >    Impression:         Examination is limited secondary to underpenetration versus body habitus.    No evidence of acute fracture or dislocation. However, the right femoral   neck is not well visualized on this exam because of patient positioning   Mild degenerative changes of bilateral hip joints.    < end of copied text >      < from: CT Abdomen and Pelvis w/ IV Cont (09.01.19 @ 15:39) >    IMPRESSION:     Bilateral small pleural effusions greater on the right.    Prominent mediastinal lymph nodes measuring up to 1.3 cm, may be   reactive. Recommend follow-up to resolution.    Minimal fat stranding along the anterior wall of the urinary bladder may   be secondary to under distention versus cystitis. Correlate with   urinalysis.    Main pulmonary artery measures 4 cm in diameter consistent with pulmonary   arterial hypertension.    < end of copied text >    < from: CT Head No Cont (09.01.19 @ 15:30) >    Impression:      No mass effect or intracranial hemorrhage.     Chronic microvascular ischemic changes.    < end of copied text >          < from: Transthoracic Echocardiogram (12.12.18 @ 10:15) >    Summary:   1. Severely decreased global left ventricular systolic function.   2. Left atrial enlargement.   3. LV Ejection Fraction by Mason's Method with a biplane EF of 17 %.   4. Mildly increased left ventricular internal cavity size.   5. Mildly increased LV wall thickness.   6. Moderate mitral valve regurgitation.   7. Moderate-severe tricuspid regurgitation.   8. Mild to moderate aortic regurgitation.   9. Estimated pulmonary artery systolic pressure is 42.4 mmHg assuming a   right atrial pressure of 15 mmHg, which is consistent with mild pulmonary   hypertension.    < end of copied text >

## 2019-09-01 NOTE — ED PROVIDER NOTE - PROGRESS NOTE DETAILS
Sepsis suspected at this time. Abx ordered. Central line placed.  Fluids given. Stat RUQ ultrasound ordered.   Approved to ICU by Dr. Jerome.

## 2019-09-01 NOTE — H&P ADULT - NSICDXPASTMEDICALHX_GEN_ALL_CORE_FT
PAST MEDICAL HISTORY:  Anemia, unspecified type     Atrial fibrillation     BPH (benign prostatic hyperplasia)     CKD (chronic kidney disease)     Congestive heart failure, unspecified HF chronicity, unspecified heart failure type     Gout, unspecified cause, unspecified chronicity, unspecified site     Hepatic cirrhosis, unspecified hepatic cirrhosis type, unspecified whether ascites present     HTN (hypertension)     Osler-Weber-Rendu disease

## 2019-09-01 NOTE — ED PROVIDER NOTE - ATTENDING CONTRIBUTION TO CARE
68 year old male, pmhx as documented above, presenting with hypotension and generalized weakness x 2-3 days. Per family at home, patient's baseline BP is in the high 80s systolic, but over the past few days it has gone down to the 70s. Family states when patient's ammonia level goes up, his BP usually goes down and he presents like this. Also state patient has had decreased PO intake over the past few days. Patient is poor historian and unable to provide further history. Family at home however, denies known fevers, vomiting, diarrhea, blood in stool/dark stools, urinary symptoms, rash, recent travel or sick contacts. No new changes in medication. Per family, patient lost his balance and slowly fell to the ground yesterday afternoon, landing on his buttocks but denies head trauma and patient ambulatory since then.    Vital Signs: I have reviewed the initial vital signs.  Constitutional: NAD, well-nourished, appears stated age, no acute distress.  HEENT: Airway patent, moist MM, no erythema/swelling/deformity of oral structures. EOMI, PERRLA.  CV: regular rate, regular rhythm, well-perfused extremities, 2+ b/l DP and radial pulses equal.  Lungs: BCTA, no increased WOB.  ABD: NTND, no guarding or rebound, no pulsatile mass, no hernias.   MSK: Neck supple, nontender, nl ROM, no stepoff. Chest nontender. Back nontender in TLS spine or to b/l bony structures or flanks. Ext nontender, nl rom, no deformity.   INTEG: Skin warm, dry, no rash.  NEURO: A&Ox2 to person and place but not time (baseline per family who translated to Mauritian), normal strength, nl sensation throughout, normal speech.   PSYCH: Calm, cooperative, normal affect and interaction.    Patient profoundly hypotensive in ED. No evidence of bleeding on exam, abd non-tender, neurovascular intact. Will obtain labs, pan scan for internal bleeding although this is not highly suspected, sepsis work up, re-eval.

## 2019-09-01 NOTE — ED PROVIDER NOTE - CARE PLAN
Principal Discharge DX:	Hypotension  Secondary Diagnosis:	Hepatic cirrhosis, unspecified hepatic cirrhosis type, unspecified whether ascites present  Secondary Diagnosis:	Congestive heart failure, unspecified HF chronicity, unspecified heart failure type  Secondary Diagnosis:	Elevated bilirubin  Secondary Diagnosis:	Hyperammonemia Principal Discharge DX:	Hypotension  Secondary Diagnosis:	Hepatic cirrhosis, unspecified hepatic cirrhosis type, unspecified whether ascites present  Secondary Diagnosis:	Congestive heart failure, unspecified HF chronicity, unspecified heart failure type  Secondary Diagnosis:	Elevated bilirubin  Secondary Diagnosis:	Hyperammonemia  Secondary Diagnosis:	Hyponatremia  Secondary Diagnosis:	Acute kidney injury

## 2019-09-01 NOTE — H&P ADULT - ASSESSMENT
IMPRESSION:    Hypotension secondary to cardiogenic shock vs adrenal insufficiency vs Hypothyroidism?  Afib  Liver cirrhosis      PLAN:    CNS: no depressants.     HEENT: Oral care    PULMONARY:  HOB @ 45 degrees    CARDIOVASCULAR: C/w Levophed, target MAP is 60mmhg, FU 2d echo, F/u trops 11.30 PM and 4.30 AM.     GI: GI prophylaxis.  C/w lactulose, titrate to >4 BM/day. Consider Diagnostic paracentesis.    RENAL:  Follow up lytes.  Correct as needed..     INFECTIOUS DISEASE: Cefepime and Flagyl. F/u blood cx.     HEMATOLOGICAL:  On Coumadin 1mg; therapeutic    ENDOCRINE:  AM cortisol Level. start hydrocrotisone 100mg q8hr after level drawn.     MUSCULOSKELETAL: increase ambulation as tolerated      Lines: R IJ    Dispo: MICU

## 2019-09-02 ENCOUNTER — LABORATORY RESULT (OUTPATIENT)
Age: 69
End: 2019-09-02

## 2019-09-02 LAB
ALBUMIN SERPL ELPH-MCNC: 1.4 G/DL — LOW (ref 3.5–5.2)
ALBUMIN SERPL ELPH-MCNC: 3.1 G/DL — LOW (ref 3.5–5.2)
ALP SERPL-CCNC: 172 U/L — HIGH (ref 30–115)
ALP SERPL-CCNC: 72 U/L — SIGNIFICANT CHANGE UP (ref 30–115)
ALT FLD-CCNC: 17 U/L — SIGNIFICANT CHANGE UP (ref 0–41)
ALT FLD-CCNC: 24 U/L — SIGNIFICANT CHANGE UP (ref 0–41)
AMMONIA BLD-MCNC: 64 UMOL/L — HIGH (ref 11–55)
ANION GAP SERPL CALC-SCNC: 13 MMOL/L — SIGNIFICANT CHANGE UP (ref 7–14)
ANION GAP SERPL CALC-SCNC: 17 MMOL/L — HIGH (ref 7–14)
APTT BLD: 55.4 SEC — HIGH (ref 27–39.2)
APTT BLD: 60.3 SEC — HIGH (ref 27–39.2)
AST SERPL-CCNC: 37 U/L — SIGNIFICANT CHANGE UP (ref 0–41)
AST SERPL-CCNC: 39 U/L — SIGNIFICANT CHANGE UP (ref 0–41)
BILIRUB SERPL-MCNC: 3.5 MG/DL — HIGH (ref 0.2–1.2)
BILIRUB SERPL-MCNC: 6.1 MG/DL — HIGH (ref 0.2–1.2)
BLD GP AB SCN SERPL QL: SIGNIFICANT CHANGE UP
BUN SERPL-MCNC: 28 MG/DL — HIGH (ref 10–20)
BUN SERPL-MCNC: 37 MG/DL — HIGH (ref 10–20)
CALCIUM SERPL-MCNC: 4.2 MG/DL — CRITICAL LOW (ref 8.5–10.1)
CALCIUM SERPL-MCNC: 9.1 MG/DL — SIGNIFICANT CHANGE UP (ref 8.5–10.1)
CHLORIDE SERPL-SCNC: 117 MMOL/L — HIGH (ref 98–110)
CHLORIDE SERPL-SCNC: 93 MMOL/L — LOW (ref 98–110)
CO2 SERPL-SCNC: 10 MMOL/L — LOW (ref 17–32)
CO2 SERPL-SCNC: 18 MMOL/L — SIGNIFICANT CHANGE UP (ref 17–32)
CREAT SERPL-MCNC: 1.3 MG/DL — SIGNIFICANT CHANGE UP (ref 0.7–1.5)
CREAT SERPL-MCNC: 2.5 MG/DL — HIGH (ref 0.7–1.5)
GLUCOSE SERPL-MCNC: 50 MG/DL — LOW (ref 70–99)
GLUCOSE SERPL-MCNC: 92 MG/DL — SIGNIFICANT CHANGE UP (ref 70–99)
HCT VFR BLD CALC: 41.9 % — LOW (ref 42–52)
HGB BLD-MCNC: 14.6 G/DL — SIGNIFICANT CHANGE UP (ref 14–18)
INR BLD: 2.99 RATIO — HIGH (ref 0.65–1.3)
INR BLD: 4.06 RATIO — HIGH (ref 0.65–1.3)
LACTATE SERPL-SCNC: 4.9 MMOL/L — CRITICAL HIGH (ref 0.5–2.2)
LACTATE SERPL-SCNC: 6.4 MMOL/L — CRITICAL HIGH (ref 0.5–2.2)
LIDOCAIN IGE QN: 37 U/L — SIGNIFICANT CHANGE UP (ref 7–60)
MAGNESIUM SERPL-MCNC: 1.1 MG/DL — LOW (ref 1.8–2.4)
MAGNESIUM SERPL-MCNC: 1.7 MG/DL — LOW (ref 1.8–2.4)
MCHC RBC-ENTMCNC: 32.4 PG — HIGH (ref 27–31)
MCHC RBC-ENTMCNC: 34.8 G/DL — SIGNIFICANT CHANGE UP (ref 32–37)
MCV RBC AUTO: 92.9 FL — SIGNIFICANT CHANGE UP (ref 80–94)
NRBC # BLD: 0 /100 WBCS — SIGNIFICANT CHANGE UP (ref 0–0)
PHOSPHATE SERPL-MCNC: 2.8 MG/DL — SIGNIFICANT CHANGE UP (ref 2.1–4.9)
PHOSPHATE SERPL-MCNC: 3.5 MG/DL — SIGNIFICANT CHANGE UP (ref 2.1–4.9)
PLATELET # BLD AUTO: 75 K/UL — LOW (ref 130–400)
POTASSIUM SERPL-MCNC: 2.5 MMOL/L — CRITICAL LOW (ref 3.5–5)
POTASSIUM SERPL-MCNC: 4.8 MMOL/L — SIGNIFICANT CHANGE UP (ref 3.5–5)
POTASSIUM SERPL-SCNC: 2.5 MMOL/L — CRITICAL LOW (ref 3.5–5)
POTASSIUM SERPL-SCNC: 4.8 MMOL/L — SIGNIFICANT CHANGE UP (ref 3.5–5)
PROT SERPL-MCNC: 3.1 G/DL — LOW (ref 6–8)
PROT SERPL-MCNC: 6.5 G/DL — SIGNIFICANT CHANGE UP (ref 6–8)
PROTHROM AB SERPL-ACNC: 34 SEC — HIGH (ref 9.95–12.87)
PROTHROM AB SERPL-ACNC: >40 SEC — HIGH (ref 9.95–12.87)
RBC # BLD: 4.51 M/UL — LOW (ref 4.7–6.1)
RBC # FLD: 23.5 % — HIGH (ref 11.5–14.5)
SODIUM SERPL-SCNC: 128 MMOL/L — LOW (ref 135–146)
SODIUM SERPL-SCNC: 140 MMOL/L — SIGNIFICANT CHANGE UP (ref 135–146)
TROPONIN T SERPL-MCNC: 0.03 NG/ML — CRITICAL HIGH
TROPONIN T SERPL-MCNC: 0.03 NG/ML — CRITICAL HIGH
WBC # BLD: 9.5 K/UL — SIGNIFICANT CHANGE UP (ref 4.8–10.8)
WBC # FLD AUTO: 9.5 K/UL — SIGNIFICANT CHANGE UP (ref 4.8–10.8)

## 2019-09-02 PROCEDURE — 99223 1ST HOSP IP/OBS HIGH 75: CPT

## 2019-09-02 PROCEDURE — 93970 EXTREMITY STUDY: CPT | Mod: 26

## 2019-09-02 PROCEDURE — 78226 HEPATOBILIARY SYSTEM IMAGING: CPT | Mod: 26

## 2019-09-02 PROCEDURE — 93925 LOWER EXTREMITY STUDY: CPT | Mod: 26

## 2019-09-02 PROCEDURE — 71045 X-RAY EXAM CHEST 1 VIEW: CPT | Mod: 26

## 2019-09-02 PROCEDURE — 71045 X-RAY EXAM CHEST 1 VIEW: CPT | Mod: 26,77

## 2019-09-02 PROCEDURE — 93306 TTE W/DOPPLER COMPLETE: CPT | Mod: 26

## 2019-09-02 RX ORDER — MAGNESIUM SULFATE 500 MG/ML
2 VIAL (ML) INJECTION EVERY 4 HOURS
Refills: 0 | Status: COMPLETED | OUTPATIENT
Start: 2019-09-02 | End: 2019-09-03

## 2019-09-02 RX ORDER — POTASSIUM CHLORIDE 20 MEQ
20 PACKET (EA) ORAL
Refills: 0 | Status: COMPLETED | OUTPATIENT
Start: 2019-09-02 | End: 2019-09-03

## 2019-09-02 RX ORDER — FUROSEMIDE 40 MG
60 TABLET ORAL ONCE
Refills: 0 | Status: COMPLETED | OUTPATIENT
Start: 2019-09-02 | End: 2019-09-02

## 2019-09-02 RX ORDER — VASOPRESSIN 20 [USP'U]/ML
0.02 INJECTION INTRAVENOUS
Qty: 50 | Refills: 0 | Status: DISCONTINUED | OUTPATIENT
Start: 2019-09-02 | End: 2019-09-02

## 2019-09-02 RX ORDER — MAGNESIUM SULFATE 500 MG/ML
2 VIAL (ML) INJECTION ONCE
Refills: 0 | Status: COMPLETED | OUTPATIENT
Start: 2019-09-02 | End: 2019-09-02

## 2019-09-02 RX ORDER — VASOPRESSIN 20 [USP'U]/ML
0.04 INJECTION INTRAVENOUS
Qty: 50 | Refills: 0 | Status: DISCONTINUED | OUTPATIENT
Start: 2019-09-02 | End: 2019-09-07

## 2019-09-02 RX ORDER — CALCIUM GLUCONATE 100 MG/ML
1 VIAL (ML) INTRAVENOUS ONCE
Refills: 0 | Status: COMPLETED | OUTPATIENT
Start: 2019-09-02 | End: 2019-09-02

## 2019-09-02 RX ORDER — NOREPINEPHRINE BITARTRATE/D5W 8 MG/250ML
0.05 PLASTIC BAG, INJECTION (ML) INTRAVENOUS
Qty: 16 | Refills: 0 | Status: DISCONTINUED | OUTPATIENT
Start: 2019-09-02 | End: 2019-09-04

## 2019-09-02 RX ORDER — VASOPRESSIN 20 [USP'U]/ML
0.03 INJECTION INTRAVENOUS
Qty: 50 | Refills: 0 | Status: DISCONTINUED | OUTPATIENT
Start: 2019-09-02 | End: 2019-09-02

## 2019-09-02 RX ADMIN — Medication 500 MILLIGRAM(S): at 06:47

## 2019-09-02 RX ADMIN — Medication 100 MILLIGRAM(S): at 12:50

## 2019-09-02 RX ADMIN — Medication 4.67 MICROGRAM(S)/KG/MIN: at 11:07

## 2019-09-02 RX ADMIN — Medication 50 GRAM(S): at 23:56

## 2019-09-02 RX ADMIN — Medication 4.45 MICROGRAM(S)/KG/MIN: at 06:49

## 2019-09-02 RX ADMIN — CHLORHEXIDINE GLUCONATE 1 APPLICATION(S): 213 SOLUTION TOPICAL at 06:49

## 2019-09-02 RX ADMIN — Medication 500 MILLIGRAM(S): at 22:19

## 2019-09-02 RX ADMIN — CEFEPIME 100 MILLIGRAM(S): 1 INJECTION, POWDER, FOR SOLUTION INTRAMUSCULAR; INTRAVENOUS at 06:48

## 2019-09-02 RX ADMIN — CEFEPIME 100 MILLIGRAM(S): 1 INJECTION, POWDER, FOR SOLUTION INTRAMUSCULAR; INTRAVENOUS at 18:03

## 2019-09-02 RX ADMIN — LACTULOSE 10 GRAM(S): 10 SOLUTION ORAL at 06:48

## 2019-09-02 RX ADMIN — VASOPRESSIN 2.4 UNIT(S)/MIN: 20 INJECTION INTRAVENOUS at 18:03

## 2019-09-02 RX ADMIN — PANTOPRAZOLE SODIUM 40 MILLIGRAM(S): 20 TABLET, DELAYED RELEASE ORAL at 06:47

## 2019-09-02 RX ADMIN — VASOPRESSIN 2.4 UNIT(S)/MIN: 20 INJECTION INTRAVENOUS at 09:12

## 2019-09-02 RX ADMIN — LACTULOSE 10 GRAM(S): 10 SOLUTION ORAL at 16:48

## 2019-09-02 RX ADMIN — Medication 4.67 MICROGRAM(S)/KG/MIN: at 18:04

## 2019-09-02 RX ADMIN — Medication 25 GRAM(S): at 08:56

## 2019-09-02 RX ADMIN — Medication 500 MILLIGRAM(S): at 16:47

## 2019-09-02 RX ADMIN — Medication 60 MILLIGRAM(S): at 23:49

## 2019-09-02 NOTE — CONSULT NOTE ADULT - ASSESSMENT
#)Transaminitis cholestatic pattern  - Bili 5, , AST 40, ALT 21, lactate 2.7, alb 2.8, PLT 60, ammonia 130, inc to Bili 6.1, , AST 39, ALT 24  -US abdomen showed cholelithiasis with wall thickening up to 1.1cm, CBD 9mm, no intrahepatic ductal dilatation 68 y/o M with PMH of A-fib on Coumadin, HFrEF (LVEF 17% 12/2018) s/p AICD, HTN, gout, CKD III, BPH, Liver cirrhosis secondary to CHF p/w hypotension and confusion more than baseline likely cardiogenic shock currently requiring 2 pressors (levo and vaso). GI was consulted for inc bilurubin and ALP.     #)?cholecystitis / transaminits cholestatic pattern  -No fever, no inc WBC count, no abdominal pain, mild tenderness in right and left upper quadrant  - Bili 5, , AST 40, ALT 21, lactate 2.7, alb 2.8, PLT 60, ammonia 130, inc to Bili 6.1, , AST 39, ALT 24  -US abdomen showed cholelithiasis with wall thickening up to 1.1cm, CBD 9mm, no intrahepatic ductal dilatation    recommendations:   -c/w Abx cefepime and flagyl  -MRCP   -NPO    #)Cirrhosis non alcoholic likely due to CHF  -Low Albumin, Low platelet count, splenomegaly in CT scan in 2/2019  -CT scan in 2/2019 showed 1.1 cm exophytic lesion in segment 5/6 of liver  -confused, No ascites  -MELD score, child colbert score    Recommendations:   -c/w lactulose  -MRI with liver protocol  - 68 y/o M with PMH of A-fib on Coumadin, HFrEF (LVEF 17% 12/2018) s/p AICD, HTN, gout, CKD III, BPH, Liver cirrhosis secondary to CHF p/w hypotension and confusion more than baseline likely cardiogenic shock currently requiring 2 pressors (levo and vaso). GI was consulted for inc bilurubin and ALP.     #)?cholecystitis / transaminits cholestatic pattern  -No fever, no inc WBC count, no abdominal pain, mild tenderness in right and left upper quadrant  - Bili 5, , AST 40, ALT 21, lactate 2.7, alb 2.8, PLT 60, ammonia 130, inc to Bili 6.1, , AST 39, ALT 24  -US abdomen showed cholelithiasis with wall thickening up to 1.1cm, CBD 9mm, no intrahepatic ductal dilatation    recommendations:   -c/w Abx cefepime and flagyl  -MRCP   -NPO    #)Cirrhosis non alcoholic likely due to CHF  -Low Albumin, Low platelet count, splenomegaly in CT scan in 2/2019  -CT scan in 2/2019 showed 1.1 cm exophytic lesion in segment 5/6 of liver  -confused, No ascites  -MELD score, child colbert score    Recommendations:   -c/w lactulose  -MRI with liver protocol  -    Need to discuss with attending this is not final recs 70 y/o M with PMH of A-fib on Coumadin, HFrEF (LVEF 17% 12/2018) s/p AICD, HTN, gout, CKD III, BPH, Liver cirrhosis secondary to CHF p/w hypotension and confusion more than baseline likely cardiogenic shock currently requiring 2 pressors (levo and vaso). GI was consulted for inc bilurubin and ALP.     #)Acute gall stone cholecystitis / transaminits cholestatic pattern  -No fever, no inc WBC count, no abdominal pain, mild tenderness in right and left upper quadrant  - Bili 5, , AST 40, ALT 21, lactate 2.7, alb 2.8, PLT 60, ammonia 130, inc to Bili 6.1, , AST 39, ALT 24  -US abdomen showed cholelithiasis with wall thickening up to 1.1cm, CBD 9mm, no intrahepatic ductal dilatation    recommendations:   -c/w Abx cefepime and flagyl  -MRCP   -NPO  -Surgery consult    #)Cirrhosis non alcoholic likely due to CHF  -Low Albumin 2.8 , Low platelet count of 60, Ammonia 130 splenomegaly in CT scan in 2/2019  -CT scan in 2/2019 showed 1.1 cm exophytic lesion in segment 5/6 of liver  -Encephalopathic, No ascites  -MELD score 36, child colbert score 11    Recommendations:   -c/w lactulose, start on rifaximin  -MRI with liver protocol if it is ok by renal  -trend LFT    Need to discuss with attending this is not final recs 68 y/o M with PMH of A-fib on Coumadin, HFrEF (LVEF 17% 12/2018) s/p AICD, HTN, gout, CKD III, BPH, Liver cirrhosis secondary to CHF p/w hypotension and confusion more than baseline likely cardiogenic shock currently requiring 2 pressors (levo and vaso). GI was consulted for inc bilurubin and ALP.     #)Acute gall stone cholecystitis / transaminits cholestatic pattern  -No fever, no inc WBC count, no abdominal pain, mild tenderness in right and left upper quadrant  - Bili 5, , AST 40, ALT 21, lactate 2.7, alb 2.8, PLT 60, ammonia 130, inc to Bili 6.1, , AST 39, ALT 24  -US abdomen showed cholelithiasis with wall thickening up to 1.1cm, CBD 9mm, no intrahepatic ductal dilatation    recommendations:   -c/w Abx cefepime and flagyl  -MRCP  -NPO  -Surgery consult    #)Cirrhosis non alcoholic likely due to CHF  -Low Albumin 2.8 , Low platelet count of 60, Ammonia 130 splenomegaly in CT scan in 2/2019  -CT scan in 2/2019 showed 1.1 cm exophytic lesion in segment 5/6 of liver  -Encephalopathic, No ascites  -MELD score 36, child colbert score 11    Recommendations:   -c/w lactulose, start on rifaximin  -MRI with liver protocol if it is ok by renal  -trend LFT    Need to discuss with attending this is not final recs 70 y/o M with PMH of A-fib on Coumadin, HFrEF (LVEF 17% 12/2018) s/p AICD, HTN, gout, CKD III, BPH, Liver cirrhosis secondary to CHF p/w hypotension and confusion more than baseline likely cardiogenic shock currently requiring 2 pressors (levo and vaso). GI was consulted for inc bilurubin and ALP.     #)Acute gall stone cholecystitis / transaminits cholestatic pattern  -No fever, no inc WBC count, no abdominal pain, mild tenderness in right and left upper quadrant  - Bili 5, , AST 40, ALT 21, lactate 2.7, alb 2.8, PLT 60, ammonia 130, inc to Bili 6.1, , AST 39, ALT 24  -US abdomen showed cholelithiasis with wall thickening up to 1.1cm, CBD 9mm, no intrahepatic ductal dilatation    recommendations:   -c/w Abx cefepime and flagyl  -MRCP  -Follow up with results of HIDA scan  -NPO  -Surgery consult    #)Cirrhosis non alcoholic likely due to CHF  -Low Albumin 2.8 , Low platelet count of 60, Ammonia 130 splenomegaly in CT scan in 2/2019  -CT scan in 2/2019 showed 1.1 cm exophytic lesion in segment 5/6 of liver but CT scan yesterday didn't show.  -Encephalopathic, No ascites  -MELD score 36, child colbert score 11    Recommendations:   -c/w lactulose, start on rifaximin  -MRI with liver protocol needed but given her CKD currently risk outweigh benefit can do it as an OP  -trend LFT  -    Need to discuss with attending this is not final recs 68 y/o M with PMH of A-fib on Coumadin, HFrEF (LVEF 17% 12/2018) s/p AICD, HTN, gout, CKD III, BPH, Liver cirrhosis secondary to CHF p/w hypotension and confusion more than baseline likely cardiogenic shock currently requiring 2 pressors (levo and vaso). GI was consulted for inc bilurubin and ALP.     #)??cholecystitis / transaminitis cholestatic pattern  -No fever, no inc WBC count, no abdominal pain, mild tenderness in right and left upper quadrant  - Bili 5, , AST 40, ALT 21, lactate 2.7, alb 2.8, PLT 60, ammonia 130, inc to Bili 6.1, , AST 39, ALT 24  -US abdomen showed cholelithiasis with wall thickening up to 1.1cm, CBD 9mm, no intrahepatic ductal dilatation    recommendations:   -c/w Abx cefepime and flagyl  -MRCP needed as per son AICD was not compatible  -HIDA scan negative  -NPO  -Surgery consult    #)Cirrhosis non alcoholic likely due to CHF  -Low Albumin 2.8 , Low platelet count of 60, Ammonia 130 splenomegaly in CT scan in 2/2019  -CT scan in 2/2019 showed 1.1 cm exophytic lesion in segment 5/6 of liver but CT scan yesterday didn't show.  -Encephalopathic, No ascites  -MELD score 36, child colbert score 11    Recommendations:   -c/w lactulose  -MRI with liver protocol needed but as per son couldn't do because of AICD  -trend LFT  -CLD work up Hep A IgG, IgM, Hep B Surface Ag, Ab, Core Ab IgM, IgG, Hep C Ab, Ferritin, Transferin saturation, Ceruloplasmin, CANDI, AMA, ASMA, LKM1 Ab, Soluble liver antigen, IgA, IgM, IgG, EBV, CMV, HIV.        Discussed with attending 70 y/o M with PMH of A-fib on Coumadin, HFrEF (LVEF 17% 12/2018) s/p AICD, HTN, gout, CKD III, BPH, Liver cirrhosis secondary to CHF p/w hypotension and confusion more than baseline likely cardiogenic shock currently requiring 2 pressors (levo and vaso). GI was consulted for for evaluation of possibility of cholangitis or cholecystitis due to abnormal lfts..     #)Lfts: There is no evidence of cholangitis: lfts have been abnormal due to cirrhosis, and hepatic congestion:   -HIDA scan negative, no  evidence of cholecystitis  -No fever, no inc WBC count, no abdominal pain, mild tenderness in right and left upper quadrant.  Although additional imaging would be useful, MRCP contraindicated as AICD was not compatible with MRI  -  recommendations:   Optimize hemodynamics  Follow lfts  Surgical followup    #)Cirrhosis non alcoholic likely due to CHF  -Low Albumin 2.8 , Low platelet count of 60, Ammonia 130 splenomegaly in CT scan in 2/2019  MELD score 36, child colbert score 11  Rec:  -trend LFTs  -CLD work up Hep A IgG, IgM, Hep B Surface Ag, Ab, Core Ab IgM, IgG, Hep C Ab, Ferritin, Transferin saturation, Ceruloplasmin, CANDI, AMA, ASMA, LKM1 Ab, Soluble liver antigen, IgA, IgM, IgG, EBV, CMV, HIV.  lactulose for encephalopathy  add Rifaximin 550 mg po bid  -  Liver Lesion  CT scan in 2/2019 showed 1.1 cm exophytic lesion in segment 5/6 of liver but CT scan yesterday didn't show.  -Encephalopathic, No ascites  -Recommendations:   - Review scans with radiology directly  Check Alpha fetoprotein , CEA, and CA 19-9

## 2019-09-02 NOTE — PROGRESS NOTE ADULT - ASSESSMENT
68 y/o M with PMH of A-fib on Coumadin, HFrEF (LVEF 17% 12/2018) s/p AICD, HTN, gout, CKD III, BPH, Liver cirrhosis secondary to congestive hepatopathy p/w hypotension.     # hypotension (cardiogenic vs septic shock)  - avoid IV hydration  - wean OFF levophed and start vasopressin  - AIM to a MAP 55   - c/w hydrocortisone 100 mg every 8 hrs for now  - keep cefepime + flagyl for now pending Pancx result  - accurate input & output + daily body weight  - daily CXR  - repeat TTE   - hold lasix and spironolactone for now  - keep I<O    # Afib   - daily PT /INR   - keep 2<INR<3  - rate control if needed with metoprolol    # liver cirrhosis:  - c/w lactulose with target BM > 3 times a day  - trend bilirubin  - consider diagnostic paracentesis if any clinical deterioration    DVT prophylaxis: coumadin  PPI prophylaxis  FULL CODE  dispo : active  pending TTE and duplex arterial and venous LE 68 y/o M with PMH of A-fib on Coumadin, HFrEF (LVEF 17% 12/2018) s/p AICD, HTN, gout, CKD III, BPH, Liver cirrhosis secondary to congestive hepatopathy p/w hypotension.     # hypotension (cardiogenic vs septic shock)  - avoid IV hydration  - wean OFF levophed and start vasopressin  - AIM to a MAP 55   - c/w hydrocortisone 100 mg every 8 hrs for now  - keep cefepime + flagyl for now pending Pancx result  - accurate input & output + daily body weight  - daily CXR  - repeat TTE   - hold lasix and spironolactone for now  - keep I<O    # Afib   - daily PT /INR   - keep 2<INR<3  - rate control if needed with metoprolol    # cholecystitis??  - less likely. pericholecystic fluid could be seen in the setting of heart failure and volume overload  - f/u GI recommendations  - consider HIDA scan to R/O acute cholecystitis    # liver cirrhosis:  - c/w lactulose with target BM > 3 times a day  - trend bilirubin  - consider diagnostic paracentesis if any clinical deterioration    DVT prophylaxis: coumadin  PPI prophylaxis  FULL CODE  dispo : active  pending TTE and duplex arterial and venous LE

## 2019-09-02 NOTE — CONSULT NOTE ADULT - ASSESSMENT
IMPRESSION:    Shock likely cardiogenic  HF REF  Doubt Cholecystitis  HO Afib on Coumadin     PLAN:    CNS: No depressants    HEENT: Oral care    PULMONARY:  HOB @ 45 degrees.  Wean O2 as tolerated     CARDIOVASCULAR:  Add Vasopressin.  Wean Levophed.  ECHO./  Cardiology Follow up     GI: GI prophylaxis.  NPO.  GI evaluation.  HIDA scan.      RENAL:  Follow up lytes.  Correct as needed    INFECTIOUS DISEASE: Follow up cultures.  Continue ABX until cultures     HEMATOLOGICAL:  DVT prophylaxis.  RLE venous / Arterial duplex.      ENDOCRINE:  Follow up FS.  Insulin protocol if needed.    MUSCULOSKELETAL:  Bed chair position     Advance directives    Overall prognosis poor

## 2019-09-02 NOTE — CHART NOTE - NSCHARTNOTEFT_GEN_A_CORE
Called by Dr Davies GI. Pt has been following with him for about 1 and a half years now. he already had hepatic cirrhosis secondary to cardiomyopathy at that time. Since his initial presentation to Dr Davies his course has been stable but it was noted that he had 1 episode of hepatic encephalopathy prior to that treated with lactulose. He folows with Dr Davies about every 3-5 months, has had imaging including CT and US showing no varices and asymptomatic cholelithiasis.     Most recent labs in June 2019:    Hgb 11.7  Plt 95  BUN 46  Cr 2.0  Albumin 2.9  T Bili 2.4

## 2019-09-02 NOTE — PROGRESS NOTE ADULT - SUBJECTIVE AND OBJECTIVE BOX
70 y/o M with PMH of A-fib on Coumadin, HFrEF (LVEF 17% 2018) s/p AICD, HTN, gout, CKD III, BPH, Liver cirrhosis secondary to CHF p/w hypotension. no fever, or chills, no recent URTI. to note that the patient blood pressure is 70 to 80 at baseline due to his advanced heart failure  admitted to the ICU and started on levophed and vasopressin  he got started empirically on IV antibiotics w/o any evidence of infectious process    OVERNIGHT EVENTS: no acute events overnight    SUBJECTIVE / INTERVAL HPI: Patient seen and examined at bedside.     VITAL SIGNS:  Vital Signs Last 24 Hrs  T(C): 35.3 (02 Sep 2019 08:00), Max: 37.6 (01 Sep 2019 19:45)  T(F): 95.6 (02 Sep 2019 08:00), Max: 99.6 (01 Sep 2019 19:45)  HR: 102 (02 Sep 2019 11:45) (84 - 120)  BP: 76/52 (02 Sep 2019 11:30) (58/39 - 107/68)  BP(mean): 61 (02 Sep 2019 11:30) (49 - 86)  RR: 26 (02 Sep 2019 11:45) (17 - 29)  SpO2: 93% (02 Sep 2019 11:45) (90% - 99%)    PHYSICAL EXAM:    General: WDWN  HEENT: NC/AT; PERRL, clear conjunctiva  Neck: supple  Cardiovascular: +S1/S2; RRR  Respiratory: CTA b/l; no W/R/R  Gastrointestinal: soft, NT/ND; +BSx4  Extremities: right lower extremity is more edematous than the left one + venous stasis ulcers bilaterally + sloughy skin lesions in the Right upper ext  Neurological: AAOx3; no focal deficits    MEDICATIONS:  MEDICATIONS  (STANDING):  allopurinol 100 milliGRAM(s) Oral daily  cefepime   IVPB      cefepime   IVPB 2000 milliGRAM(s) IV Intermittent every 12 hours  chlorhexidine 4% Liquid 1 Application(s) Topical <User Schedule>  lactulose Syrup 10 Gram(s) Oral three times a day  metroNIDAZOLE    Tablet 500 milliGRAM(s) Oral every 8 hours  norepinephrine Infusion 0.05 MICROgram(s)/kG/Min (4.673 mL/Hr) IV Continuous <Continuous>  pantoprazole    Tablet 40 milliGRAM(s) Oral before breakfast  vasopressin Infusion 0.04 Unit(s)/Min (2.4 mL/Hr) IV Continuous <Continuous>    MEDICATIONS  (PRN):      ALLERGIES:  Allergies    No Known Allergies    Intolerances        LABS:                        14.6   9.50  )-----------( 75       ( 02 Sep 2019 04:40 )             41.9     09-    128<L>  |  93<L>  |  37<H>  ----------------------------<  92  4.8   |  18  |  2.5<H>    Ca    9.1      02 Sep 2019 04:40  Phos  3.5       Mg     1.7         TPro  6.5  /  Alb  3.1<L>  /  TBili  6.1<H>  /  DBili  x   /  AST  39  /  ALT  24  /  AlkPhos  172<H>      PT/INR - ( 02 Sep 2019 04:40 )   PT: 34.00 sec;   INR: 2.99 ratio         PTT - ( 02 Sep 2019 04:40 )  PTT:55.4 sec  Urinalysis Basic negative ( 01 Sep 2019 17:10 )    Color: Yellow / Appearance: Clear / S.025 / pH: x  Gluc: x / Ketone: Negative  / Bili: Negative / Urobili: <2 mg/dL   Blood: x / Protein: Trace / Nitrite: Negative   Leuk Esterase: Negative / RBC: x / WBC x   Sq Epi: x / Non Sq Epi: x / Bacteria: x      CAPILLARY BLOOD GLUCOSE      POCT Blood Glucose.: 73 mg/dL (01 Sep 2019 14:33)      RADIOLOGY & ADDITIONAL TESTS:    US abdomen: Findings consistent with acute cholecystitis. Dilated common bile duct   suggests the possibility of common bile duct stone and MRCP may be of   help for further evaluation.    CXR:   Right basilar opacity/pleural effusion and cardiomegaly, stable. Support   devices in place.

## 2019-09-02 NOTE — CONSULT NOTE ADULT - SUBJECTIVE AND OBJECTIVE BOX
Patient is a 69y old  Male who presents with a chief complaint of Hypotension (01 Sep 2019 19:34)      HPI:  68 y/o M with PMH of A-fib on Coumadin, HFrEF (LVEF 17% 2018) s/p AICD, HTN, gout, CKD III, BPH, Liver cirrhosis secondary to CHF p/w hypotension.     As per son at bedside, this AM pt was found to be slightly more confused than normal, not responding appropriately to questions. He checked his blood pressure and was found to have SBP in the 70s. AS per son, pt is hypotensive at baseline with SBP 80-90's. Son reports pt having a fall the night prior preceded by lightheadedness and dizziness. HE denies any loss of consciousness, head trauma, bowel or urinary incontinence or confusion after the episode. Pt denies any chest pain, palpitations, headaches, or lower extremity edema.     in the ED, VS: BP 66/39 HR 84 RR 17 T 98.3. Pt received Cefepime and vancomycin, Hydrocortisone 100mg, and 1 L NS, and was started on levophed through R IJ. (01 Sep 2019 19:34)      PAST MEDICAL & SURGICAL HISTORY:  BPH (benign prostatic hyperplasia)  CKD (chronic kidney disease)  Osler-Weber-Rendu disease  Atrial fibrillation  HTN (hypertension)  Hepatic cirrhosis, unspecified hepatic cirrhosis type, unspecified whether ascites present  Gout, unspecified cause, unspecified chronicity, unspecified site  Anemia, unspecified type  Congestive heart failure, unspecified HF chronicity, unspecified heart failure type  S/P implantation of automatic cardioverter/defibrillator (AICD)      SOCIAL HX:   Smoking   Former smoker                       ETOH                            Other    FAMILY HISTORY:  FH: hypertension  :  No known cardiovacular family hisotry     ROS:  See HPI     Allergies    No Known Allergies    Intolerances          PHYSICAL EXAM    ICU Vital Signs Last 24 Hrs  T(C): 35.3 (02 Sep 2019 08:00), Max: 37.6 (01 Sep 2019 19:45)  T(F): 95.6 (02 Sep 2019 08:00), Max: 99.6 (01 Sep 2019 19:45)  HR: 112 (02 Sep 2019 07:45) (84 - 120)  BP: 91/52 (02 Sep 2019 07:30) (58/39 - 107/68)  BP(mean): 65 (02 Sep 2019 07:30) (49 - 86)  ABP: --  ABP(mean): --  RR: 24 (02 Sep 2019 07:45) (17 - 29)  SpO2: 96% (02 Sep 2019 07:45) (91% - 99%)      General: In NAD.    HEENT:  PANFILO              Lymphatic system: No cervical LN   Lungs: Bilateral BS.  Dec BS right base   Cardiovascular: Irregular  Gastrointestinal: Soft, Positive BS  Musculoskeletal: No clubbing.  Moves all extremities.  Full range of motion   Skin: Warm.    Neurological: No motor or sensory deficit       19 @ 07:01  -  19 @ 07:00  --------------------------------------------------------  IN:    IV PiggyBack: 600 mL    norepinephrine Infusion: 757 mL    Sodium Chloride 0.9% IV Bolus: 1000 mL  Total IN: 2357 mL    OUT:    Indwelling Catheter - Urethral: 870 mL  Total OUT: 870 mL    Total NET: 1487 mL          LABS:                          14.6   9.50  )-----------( 75       ( 02 Sep 2019 04:40 )             41.9                                               09-02    128<L>  |  93<L>  |  37<H>  ----------------------------<  92  4.8   |  18  |  2.5<H>        Ca    9.1      02 Sep 2019 04:40  Phos  3.5       Mg     1.7         TPro  6.5  /  Alb  3.1<L>  /  TBili  6.1<H>  /  DBili  x   /  AST  39  /  ALT  24  /  AlkPhos  172<H>        PT/INR - ( 02 Sep 2019 04:40 )   PT: 34.00 sec;   INR: 2.99 ratio         PTT - ( 02 Sep 2019 04:40 )  PTT:55.4 sec                                       Urinalysis Basic - ( 01 Sep 2019 17:10 )    Color: Yellow / Appearance: Clear / S.025 / pH: x  Gluc: x / Ketone: Negative  / Bili: Negative / Urobili: <2 mg/dL   Blood: x / Protein: Trace / Nitrite: Negative   Leuk Esterase: Negative / RBC: x / WBC x   Sq Epi: x / Non Sq Epi: x / Bacteria: x        CARDIAC MARKERS ( 02 Sep 2019 04:40 )  x     / 0.03 ng/mL / x     / x     / x      CARDIAC MARKERS ( 01 Sep 2019 23:30 )  x     / 0.03 ng/mL / x     / x     / x      CARDIAC MARKERS ( 01 Sep 2019 16:03 )  x     / 0.03 ng/mL / x     / x     / x                                                LIVER FUNCTIONS - ( 02 Sep 2019 04:40 )  Alb: 3.1 g/dL / Pro: 6.5 g/dL / ALK PHOS: 172 U/L / ALT: 24 U/L / AST: 39 U/L / GGT: x                                                                                                                                   ABG - ( 01 Sep 2019 15:35 )  pH, Arterial: 7.45  pH, Blood: x     /  pCO2: 37    /  pO2: 50    / HCO3: 25    / Base Excess: 1.5   /  SaO2: 82                  X-Rays               Right pleural effusion.  Enlarged heart                                                                      ECHO    MEDICATIONS  (STANDING):  allopurinol 100 milliGRAM(s) Oral daily  cefepime   IVPB 2000 milliGRAM(s) IV Intermittent every 12 hours  cefepime   IVPB      chlorhexidine 4% Liquid 1 Application(s) Topical <User Schedule>  lactulose Syrup 10 Gram(s) Oral three times a day  metroNIDAZOLE    Tablet 500 milliGRAM(s) Oral every 8 hours  norepinephrine Infusion 0.05 MICROgram(s)/kG/Min (4.453 mL/Hr) IV Continuous <Continuous>  pantoprazole    Tablet 40 milliGRAM(s) Oral before breakfast    MEDICATIONS  (PRN):

## 2019-09-02 NOTE — CONSULT NOTE ADULT - SUBJECTIVE AND OBJECTIVE BOX
Gastroenterology Consultation:    69y  year old Male with a chief complaint of : Patient is a 69y old  Male who presents with a chief complaint of Altered mental status      HPI:  68 y/o M with PMH of A-fib on Coumadin, HFrEF (LVEF 17% 12/2018) s/p AICD, HTN, gout, CKD III, BPH, Liver cirrhosis secondary to CHF p/w hypotension.     As per son at bedside, this AM pt was found to be slightly more confused than normal, not responding appropriately to questions. He checked his blood pressure and was found to have SBP in the 70s. AS per son, pt is hypotensive at baseline with SBP 80-90's. Son reports pt having a fall the night prior preceded by lightheadedness and dizziness. HE denies any loss of consciousness, head trauma, bowel or urinary incontinence or confusion after the episode. Pt denies any chest pain, palpitations, headaches, or lower extremity edema.     in the ED, VS: BP 66/39 HR 84 RR 17 T 98.3. labs no inc WBC count, INR 2.26, Sodium 129, creatinine 2.4, Bili 5, , AST 40, ALT 21, lactate 2.7, alb 2.8, PlT 60, ammonia 130. UA negative, CT chest and abdomen with IV contrast showed b/l pleural effusions, mediastinal lymphadenopathy, cholelithiasis, US abdomen showed cholelithiasis and wall thickening and edema up to 1.1cm. Pt received Cefepime and vancomycin, Hydrocortisone 100mg, and 1 L NS, and was started on levophed through R IJ. (01 Sep 2019 19:34)        Review of Systems:   Constitutional:  No Fever, No Chills  ENT/Mouth:  No Hearing Changes,   No Swallowing Difficulty   Eyes:  No Eye Pain, No Vision Changes  Cardiovascular:  No Chest Pain, No Palpitations  Respiratory:  No Cough, No Dyspnea  Gastrointestinal:  As described in HPI  Musculoskeletal:  No Joint Swelling, No Back Pain  Skin:  No Skin Lesions, No Jaundice  Neuro:  No Syncope, No Dizziness  Heme/Lymph:  No Bruising, No Bleeding.    PMHX  PAST MEDICAL & SURGICAL HISTORY:  BPH (benign prostatic hyperplasia)  CKD (chronic kidney disease)  Osler-Weber-Rendu disease  Atrial fibrillation  HTN (hypertension)  Hepatic cirrhosis, unspecified hepatic cirrhosis type, unspecified whether ascites present  Gout, unspecified cause, unspecified chronicity, unspecified site  Anemia, unspecified type  Congestive heart failure, unspecified HF chronicity, unspecified heart failure type  S/P implantation of automatic cardioverter/defibrillator (AICD)      FAMILY HISTORY:  FH: hypertension      Social History:  Tobacco:  Alcohol:  Drugs:    Home Medications:  allopurinol 100 mg oral tablet: 1 tab(s) orally once a day (01 Sep 2019 20:06)  Colcrys 0.6 mg oral tablet: 1 tab(s) orally once a day (01 Sep 2019 20:06)  Coumadin 1 mg oral tablet: 1 tab(s) orally once a day (01 Sep 2019 20:06)  finasteride 5 mg oral tablet: 1 tab(s) orally once a day (01 Sep 2019 20:06)  furosemide 40 mg oral tablet: 1 tab(s) orally 2 times a day (01 Sep 2019 20:06)  lactulose 10 g/15 mL oral solution: 30 milliliter(s) orally 2 times a day (01 Sep 2019 20:06)  Metoprolol Tartrate 25 mg oral tablet: 1.5 tab(s) orally 2 times a day (01 Sep 2019 20:06)  pantoprazole 40 mg oral delayed release tablet: 1 tab(s) orally once a day (01 Sep 2019 20:06)  sotalol 80 mg oral tablet: 0.5 tab(s) orally 2 times a day (01 Sep 2019 20:06)  spironolactone 25 mg oral tablet: 2 tab(s) orally once a day (01 Sep 2019 20:06)  tamsulosin 0.4 mg oral capsule: 1 cap(s) orally once a day (at bedtime) (01 Sep 2019 20:06)    MEDICATIONS  (STANDING):  allopurinol 100 milliGRAM(s) Oral daily  cefepime   IVPB      cefepime   IVPB 2000 milliGRAM(s) IV Intermittent every 12 hours  chlorhexidine 4% Liquid 1 Application(s) Topical <User Schedule>  lactulose Syrup 10 Gram(s) Oral three times a day  metroNIDAZOLE    Tablet 500 milliGRAM(s) Oral every 8 hours  pantoprazole    Tablet 40 milliGRAM(s) Oral before breakfast  vasopressin Infusion 0.04 Unit(s)/Min (2.4 mL/Hr) IV Continuous <Continuous>    MEDICATIONS  (PRN):      No Known Allergies        Physical Examination:  T(C): 35.3 (09-02-19 @ 08:00), Max: 37.6 (09-01-19 @ 19:45)  HR: 118 (09-02-19 @ 09:15) (84 - 120)  BP: 90/61 (09-02-19 @ 09:15) (58/39 - 107/68)  RR: 22 (09-02-19 @ 09:15) (17 - 29)  SpO2: 95% (09-02-19 @ 09:15) (91% - 99%)    Constitutional: No acute distress.  Eyes:. Conjunctivae are clear, Sclera is non-icteric.  Ears Nose and Throat: The external ears are normal appearing,  Oral mucosa is pink and moist.  Neck: The neck is supple. Trachea is midline.  Respiratory:  No signs of respiratory distress. Lung sounds are clear bilaterally.  Cardiovascular:  S1 S2, Regular rate and rhythm.  Abdominal: Abdomen is soft, symmetric, and non-tender without distention. There are no visible lesions or scars. Bowel sounds are present and normoactive in all four quadrants. No masses, hepatomegaly, or splenomegaly are noted.   Skin: No rashes, No Jaundice.  Head: normocephalic, atraumatic.        Data:                        14.6   9.50  )-----------( 75       ( 02 Sep 2019 04:40 )             41.9     09-02    128<L>  |  93<L>  |  37<H>  ----------------------------<  92  4.8   |  18  |  2.5<H>    Ca    9.1      02 Sep 2019 04:40  Phos  3.5     09-02  Mg     1.7     09-02    TPro  6.5  /  Alb  3.1<L>  /  TBili  6.1<H>  /  DBili  x   /  AST  39  /  ALT  24  /  AlkPhos  172<H>  09-02    LIVER FUNCTIONS - ( 02 Sep 2019 04:40 )  Alb: 3.1 g/dL / Pro: 6.5 g/dL / ALK PHOS: 172 U/L / ALT: 24 U/L / AST: 39 U/L / GGT: x           PT/INR - ( 02 Sep 2019 04:40 )   PT: 34.00 sec;   INR: 2.99 ratio         PTT - ( 02 Sep 2019 04:40 )  PTT:55.4 sec      Radiology:    < from: CT Abdomen and Pelvis w/ IV Cont (09.01.19 @ 15:39) >  FINDINGS:    CHEST:    LUNGS/PLEURA/AIRWAYS: Patent central tracheobronchial tree. Bilateral   small pleural effusions greater on the right surrounding atelectatic   lung. No pneumothorax.    MEDIASTINUM/THORACIC NODES: Prominent mediastinal lymph nodes measuring   up to 1.3 cm (6/89). Thyroid gland is unremarkable.    HEART/GREAT VESSELS: Cardiomegaly. Coronary artery and thoracic aorta   calcifications. Heart leads seen terminating in the right and left   ventricles. Main pulmonary artery measures 4 cm in diameter consistent   with pulmonary arterial hypertension (6/107). No pericardial effusion.      ABDOMEN/PELVIS:    HEPATOBILIARY: Cholelithiasis.    SPLEEN: Unremarkable.    PANCREAS: Unremarkable.    ADRENAL GLANDS: Unremarkable.    KIDNEYS: Bilateral renal cysts. No hydronephrosis.    ABDOMINOPELVIC NODES: Unremarkable.    PELVIC ORGANS: Minimal fat stranding along the anterior wall of the   urinary bladder may be secondary to under distention versus cystitis.    PERITONEUM/MESENTERY/BOWEL: No evidence of bowel obstruction, free air or   ascites.    BONES/SOFT TISSUES: Multilevel degenerative changes of the spine.    OTHER: Atherosclerotic disease of aorta and its branches.      IMPRESSION:     Bilateral small pleural effusions greater on the right.    Prominent mediastinal lymph nodes measuring up to 1.3 cm, may be   reactive. Recommend follow-up to resolution.    Minimal fat stranding along the anterior wall of the urinary bladder may   be secondary to under distention versus cystitis. Correlate with   urinalysis.    Main pulmonary artery measures 4 cm in diameter consistent with pulmonary   arterial hypertension.    < end of copied text >    < from: US Abdomen Limited (09.01.19 @ 19:08) >  FINDINGS:    LIVER:  Normal in contour and echogenicity measuring 16 cm in length. No   focal mass.    GALLBLADDER/BILIARY TREE:  Cholelithiasis. Wall thickening and edema   measuring 1.1 cm.  Negative sonographic Pryor's sign. No intrahepatic   biliary ductal dilatation. The common bile duct measures 9 mm, which is   dilated.     PANCREAS: Not visualized secondary overlying bowel gas.    KIDNEY:  Right kidney measures 11 cm in length. No hydronephrosis,   calculi or solid mass.    AORTA/IVC:  Visualized proximal portions unremarkable.    Fluid:  Right pleural effusion.    IMPRESSION:    Findings consistent with acute cholecystitis. Dilated common bile duct   suggests the possibility of common bile duct stone and MRCPmay be of   help for further evaluation.    Right pleural effusion.    < end of copied text > Gastroenterology Consultation:    69y  year old Male with a chief complaint of : Patient is a 69y old  Male who presents with a chief complaint of Altered mental status      HPI:  68 y/o M with PMH of A-fib on Coumadin, HFrEF (LVEF 17% 12/2018) s/p AICD, HTN, gout, CKD III, BPH, Liver cirrhosis secondary to CHF p/w hypotension.     As per family yesterday AM pt was found to be slightly more confused than normal, not responding appropriately to questions. He checked his blood pressure and was found to have SBP in the 70s.  pt is hypotensive at baseline with SBP 80-90's. Son reports pt having a fall the night prior preceded by lightheadedness and dizziness. HE denies any loss of consciousness, head trauma, bowel or urinary incontinence or confusion after the episode. Pt denies any chest pain, palpitations, headaches, or lower extremity edema, hematemesis, melena, hematochezia, nausea, vomiting, abdominal pain. GI was consulted for concern for cholecystitis.    in the ED, VS: BP 66/39 HR 84 RR 17 T 98.3. labs no inc WBC count, INR 2.26, Sodium 129, creatinine 2.4, Bili 5, , AST 40, ALT 21, lactate 2.7, alb 2.8, PlT 60, ammonia 130. UA negative, CT chest and abdomen with IV contrast showed b/l pleural effusions, mediastinal lymphadenopathy, cholelithiasis, US abdomen showed cholelithiasis and wall thickening and edema up to 1.1cm. Pt received Cefepime and vancomycin, Hydrocortisone 100mg, and 1 L NS, and was started on levophed through R IJ. (01 Sep 2019 19:34)        Review of Systems:   Constitutional:  No Fever, No Chills  ENT/Mouth:  No Hearing Changes,   No Swallowing Difficulty   Eyes:  No Eye Pain, No Vision Changes  Cardiovascular:  No Chest Pain, No Palpitations  Respiratory:  No Cough, No Dyspnea  Gastrointestinal:  As described in HPI  Musculoskeletal:  No Joint Swelling, No Back Pain  Skin:  No Skin Lesions, No Jaundice  Neuro:  No Syncope, No Dizziness  Heme/Lymph:  No Bruising, No Bleeding.    PMHX  PAST MEDICAL & SURGICAL HISTORY:  BPH (benign prostatic hyperplasia)  CKD (chronic kidney disease)  Osler-Weber-Rendu disease  Atrial fibrillation  HTN (hypertension)  Hepatic cirrhosis, unspecified hepatic cirrhosis type, unspecified whether ascites present  Gout, unspecified cause, unspecified chronicity, unspecified site  Anemia, unspecified type  Congestive heart failure, unspecified HF chronicity, unspecified heart failure type  S/P implantation of automatic cardioverter/defibrillator (AICD)      FAMILY HISTORY:  FH: hypertension      Social History:  Tobacco: No  Alcohol: No  Drugs: No    Home Medications:  allopurinol 100 mg oral tablet: 1 tab(s) orally once a day (01 Sep 2019 20:06)  Colcrys 0.6 mg oral tablet: 1 tab(s) orally once a day (01 Sep 2019 20:06)  Coumadin 1 mg oral tablet: 1 tab(s) orally once a day (01 Sep 2019 20:06)  finasteride 5 mg oral tablet: 1 tab(s) orally once a day (01 Sep 2019 20:06)  furosemide 40 mg oral tablet: 1 tab(s) orally 2 times a day (01 Sep 2019 20:06)  lactulose 10 g/15 mL oral solution: 30 milliliter(s) orally 2 times a day (01 Sep 2019 20:06)  Metoprolol Tartrate 25 mg oral tablet: 1.5 tab(s) orally 2 times a day (01 Sep 2019 20:06)  pantoprazole 40 mg oral delayed release tablet: 1 tab(s) orally once a day (01 Sep 2019 20:06)  sotalol 80 mg oral tablet: 0.5 tab(s) orally 2 times a day (01 Sep 2019 20:06)  spironolactone 25 mg oral tablet: 2 tab(s) orally once a day (01 Sep 2019 20:06)  tamsulosin 0.4 mg oral capsule: 1 cap(s) orally once a day (at bedtime) (01 Sep 2019 20:06)    MEDICATIONS  (STANDING):  allopurinol 100 milliGRAM(s) Oral daily  cefepime   IVPB      cefepime   IVPB 2000 milliGRAM(s) IV Intermittent every 12 hours  chlorhexidine 4% Liquid 1 Application(s) Topical <User Schedule>  lactulose Syrup 10 Gram(s) Oral three times a day  metroNIDAZOLE    Tablet 500 milliGRAM(s) Oral every 8 hours  pantoprazole    Tablet 40 milliGRAM(s) Oral before breakfast  vasopressin Infusion 0.04 Unit(s)/Min (2.4 mL/Hr) IV Continuous <Continuous>    MEDICATIONS  (PRN):      No Known Allergies        Physical Examination:  T(C): 35.3 (09-02-19 @ 08:00), Max: 37.6 (09-01-19 @ 19:45)  HR: 118 (09-02-19 @ 09:15) (84 - 120)  BP: 90/61 (09-02-19 @ 09:15) (58/39 - 107/68)  RR: 22 (09-02-19 @ 09:15) (17 - 29)  SpO2: 95% (09-02-19 @ 09:15) (91% - 99%)    Constitutional: No acute distress.  Eyes:. Conjunctivae are clear, Sclera is non-icteric.  Ears Nose and Throat: The external ears are normal appearing,  Oral mucosa is pink and moist.  Neck: The neck is supple. Trachea is midline.  Respiratory:  Dec breath sound son right base  Cardiovascular: Irregular  Abdominal: Abdomen is soft, symmetric, mild tenderness in right left upper quadrant, without distention. There are no visible lesions or scars. Bowel sounds are present and normoactive in all four quadrants. No masses, hepatomegaly, or splenomegaly are noted.   Skin: No rashes, No Jaundice.  Head: normocephalic, atraumatic.        Data:                        14.6   9.50  )-----------( 75       ( 02 Sep 2019 04:40 )             41.9     09-02    128<L>  |  93<L>  |  37<H>  ----------------------------<  92  4.8   |  18  |  2.5<H>    Ca    9.1      02 Sep 2019 04:40  Phos  3.5     09-02  Mg     1.7     09-02    TPro  6.5  /  Alb  3.1<L>  /  TBili  6.1<H>  /  DBili  x   /  AST  39  /  ALT  24  /  AlkPhos  172<H>  09-02    LIVER FUNCTIONS - ( 02 Sep 2019 04:40 )  Alb: 3.1 g/dL / Pro: 6.5 g/dL / ALK PHOS: 172 U/L / ALT: 24 U/L / AST: 39 U/L / GGT: x           PT/INR - ( 02 Sep 2019 04:40 )   PT: 34.00 sec;   INR: 2.99 ratio         PTT - ( 02 Sep 2019 04:40 )  PTT:55.4 sec      Radiology:    < from: CT Abdomen and Pelvis w/ IV Cont (09.01.19 @ 15:39) >  FINDINGS:    CHEST:    LUNGS/PLEURA/AIRWAYS: Patent central tracheobronchial tree. Bilateral   small pleural effusions greater on the right surrounding atelectatic   lung. No pneumothorax.    MEDIASTINUM/THORACIC NODES: Prominent mediastinal lymph nodes measuring   up to 1.3 cm (6/89). Thyroid gland is unremarkable.    HEART/GREAT VESSELS: Cardiomegaly. Coronary artery and thoracic aorta   calcifications. Heart leads seen terminating in the right and left   ventricles. Main pulmonary artery measures 4 cm in diameter consistent   with pulmonary arterial hypertension (6/107). No pericardial effusion.      ABDOMEN/PELVIS:    HEPATOBILIARY: Cholelithiasis.    SPLEEN: Unremarkable.    PANCREAS: Unremarkable.    ADRENAL GLANDS: Unremarkable.    KIDNEYS: Bilateral renal cysts. No hydronephrosis.    ABDOMINOPELVIC NODES: Unremarkable.    PELVIC ORGANS: Minimal fat stranding along the anterior wall of the   urinary bladder may be secondary to under distention versus cystitis.    PERITONEUM/MESENTERY/BOWEL: No evidence of bowel obstruction, free air or   ascites.    BONES/SOFT TISSUES: Multilevel degenerative changes of the spine.    OTHER: Atherosclerotic disease of aorta and its branches.      IMPRESSION:     Bilateral small pleural effusions greater on the right.    Prominent mediastinal lymph nodes measuring up to 1.3 cm, may be   reactive. Recommend follow-up to resolution.    Minimal fat stranding along the anterior wall of the urinary bladder may   be secondary to under distention versus cystitis. Correlate with   urinalysis.    Main pulmonary artery measures 4 cm in diameter consistent with pulmonary   arterial hypertension.    < end of copied text >    < from: US Abdomen Limited (09.01.19 @ 19:08) >  FINDINGS:    LIVER:  Normal in contour and echogenicity measuring 16 cm in length. No   focal mass.    GALLBLADDER/BILIARY TREE:  Cholelithiasis. Wall thickening and edema   measuring 1.1 cm.  Negative sonographic Pryor's sign. No intrahepatic   biliary ductal dilatation. The common bile duct measures 9 mm, which is   dilated.     PANCREAS: Not visualized secondary overlying bowel gas.    KIDNEY:  Right kidney measures 11 cm in length. No hydronephrosis,   calculi or solid mass.    AORTA/IVC:  Visualized proximal portions unremarkable.    Fluid:  Right pleural effusion.    IMPRESSION:    Findings consistent with acute cholecystitis. Dilated common bile duct   suggests the possibility of common bile duct stone and MRCPmay be of   help for further evaluation.    Right pleural effusion.    < end of copied text >

## 2019-09-03 LAB
ALBUMIN SERPL ELPH-MCNC: 2.9 G/DL — LOW (ref 3.5–5.2)
ALBUMIN SERPL ELPH-MCNC: 3 G/DL — LOW (ref 3.5–5.2)
ALP SERPL-CCNC: 148 U/L — HIGH (ref 30–115)
ALP SERPL-CCNC: 153 U/L — HIGH (ref 30–115)
ALP SERPL-CCNC: 153 U/L — HIGH (ref 30–115)
ALP SERPL-CCNC: 155 U/L — HIGH (ref 30–115)
ALT FLD-CCNC: 42 U/L — HIGH (ref 0–41)
ALT FLD-CCNC: 42 U/L — HIGH (ref 0–41)
ALT FLD-CCNC: 52 U/L — HIGH (ref 0–41)
ALT FLD-CCNC: 62 U/L — HIGH (ref 0–41)
ANION GAP SERPL CALC-SCNC: 15 MMOL/L — HIGH (ref 7–14)
ANION GAP SERPL CALC-SCNC: 17 MMOL/L — HIGH (ref 7–14)
ANION GAP SERPL CALC-SCNC: 19 MMOL/L — HIGH (ref 7–14)
ANION GAP SERPL CALC-SCNC: 21 MMOL/L — HIGH (ref 7–14)
APTT BLD: 63 SEC — HIGH (ref 27–39.2)
APTT BLD: 92.1 SEC — CRITICAL HIGH (ref 27–39.2)
AST SERPL-CCNC: 107 U/L — HIGH (ref 0–41)
AST SERPL-CCNC: 90 U/L — HIGH (ref 0–41)
AST SERPL-CCNC: 91 U/L — HIGH (ref 0–41)
AST SERPL-CCNC: 97 U/L — HIGH (ref 0–41)
BASE EXCESS BLDA CALC-SCNC: -9.1 MMOL/L — LOW (ref -2–2)
BASE EXCESS BLDA CALC-SCNC: -9.7 MMOL/L — LOW (ref -2–2)
BASOPHILS # BLD AUTO: 0.01 K/UL — SIGNIFICANT CHANGE UP (ref 0–0.2)
BASOPHILS NFR BLD AUTO: 0.1 % — SIGNIFICANT CHANGE UP (ref 0–1)
BILIRUB DIRECT SERPL-MCNC: 5.9 MG/DL — HIGH (ref 0–0.2)
BILIRUB DIRECT SERPL-MCNC: 6.2 MG/DL — HIGH (ref 0–0.2)
BILIRUB DIRECT SERPL-MCNC: 6.4 MG/DL — HIGH (ref 0–0.2)
BILIRUB INDIRECT FLD-MCNC: 2.2 MG/DL — HIGH (ref 0.2–1.2)
BILIRUB INDIRECT FLD-MCNC: 2.2 MG/DL — HIGH (ref 0.2–1.2)
BILIRUB INDIRECT FLD-MCNC: 2.6 MG/DL — HIGH (ref 0.2–1.2)
BILIRUB SERPL-MCNC: 8.1 MG/DL — HIGH (ref 0.2–1.2)
BILIRUB SERPL-MCNC: 8.2 MG/DL — HIGH (ref 0.2–1.2)
BILIRUB SERPL-MCNC: 8.4 MG/DL — HIGH (ref 0.2–1.2)
BILIRUB SERPL-MCNC: 9 MG/DL — HIGH (ref 0.2–1.2)
BUN SERPL-MCNC: 51 MG/DL — HIGH (ref 10–20)
BUN SERPL-MCNC: 51 MG/DL — HIGH (ref 10–20)
BUN SERPL-MCNC: 56 MG/DL — HIGH (ref 10–20)
BUN SERPL-MCNC: 58 MG/DL — HIGH (ref 10–20)
CALCIUM SERPL-MCNC: 8.9 MG/DL — SIGNIFICANT CHANGE UP (ref 8.5–10.1)
CALCIUM SERPL-MCNC: 9.2 MG/DL — SIGNIFICANT CHANGE UP (ref 8.5–10.1)
CALCIUM SERPL-MCNC: 9.2 MG/DL — SIGNIFICANT CHANGE UP (ref 8.5–10.1)
CALCIUM SERPL-MCNC: 9.7 MG/DL — SIGNIFICANT CHANGE UP (ref 8.5–10.1)
CHLORIDE SERPL-SCNC: 94 MMOL/L — LOW (ref 98–110)
CHLORIDE SERPL-SCNC: 94 MMOL/L — LOW (ref 98–110)
CHLORIDE SERPL-SCNC: 95 MMOL/L — LOW (ref 98–110)
CHLORIDE SERPL-SCNC: 98 MMOL/L — SIGNIFICANT CHANGE UP (ref 98–110)
CO2 SERPL-SCNC: 17 MMOL/L — SIGNIFICANT CHANGE UP (ref 17–32)
CO2 SERPL-SCNC: 18 MMOL/L — SIGNIFICANT CHANGE UP (ref 17–32)
CO2 SERPL-SCNC: 19 MMOL/L — SIGNIFICANT CHANGE UP (ref 17–32)
CO2 SERPL-SCNC: 19 MMOL/L — SIGNIFICANT CHANGE UP (ref 17–32)
CORTIS AM PEAK SERPL-MCNC: 96.7 UG/DL — HIGH (ref 6–18.4)
CREAT SERPL-MCNC: 2.9 MG/DL — HIGH (ref 0.7–1.5)
CREAT SERPL-MCNC: 3 MG/DL — HIGH (ref 0.7–1.5)
CREAT SERPL-MCNC: 3.2 MG/DL — HIGH (ref 0.7–1.5)
CREAT SERPL-MCNC: 3.2 MG/DL — HIGH (ref 0.7–1.5)
CULTURE RESULTS: NO GROWTH — SIGNIFICANT CHANGE UP
EOSINOPHIL # BLD AUTO: 0 K/UL — SIGNIFICANT CHANGE UP (ref 0–0.7)
EOSINOPHIL NFR BLD AUTO: 0 % — SIGNIFICANT CHANGE UP (ref 0–8)
GAS PNL BLDA: SIGNIFICANT CHANGE UP
GAS PNL BLDA: SIGNIFICANT CHANGE UP
GLUCOSE BLDC GLUCOMTR-MCNC: 111 MG/DL — HIGH (ref 70–99)
GLUCOSE BLDC GLUCOMTR-MCNC: 116 MG/DL — HIGH (ref 70–99)
GLUCOSE BLDC GLUCOMTR-MCNC: 116 MG/DL — HIGH (ref 70–99)
GLUCOSE SERPL-MCNC: 101 MG/DL — HIGH (ref 70–99)
GLUCOSE SERPL-MCNC: 104 MG/DL — HIGH (ref 70–99)
GLUCOSE SERPL-MCNC: 129 MG/DL — HIGH (ref 70–99)
GLUCOSE SERPL-MCNC: 135 MG/DL — HIGH (ref 70–99)
HCO3 BLDA-SCNC: 18 MMOL/L — LOW (ref 23–27)
HCO3 BLDA-SCNC: 18 MMOL/L — LOW (ref 23–27)
HCT VFR BLD CALC: 43.6 % — SIGNIFICANT CHANGE UP (ref 42–52)
HCT VFR BLD CALC: 44 % — SIGNIFICANT CHANGE UP (ref 42–52)
HCV AB S/CO SERPL IA: 0.69 S/CO — SIGNIFICANT CHANGE UP (ref 0–0.99)
HCV AB SERPL-IMP: SIGNIFICANT CHANGE UP
HGB BLD-MCNC: 14.8 G/DL — SIGNIFICANT CHANGE UP (ref 14–18)
HGB BLD-MCNC: 15.1 G/DL — SIGNIFICANT CHANGE UP (ref 14–18)
HOROWITZ INDEX BLDA+IHG-RTO: 100 — SIGNIFICANT CHANGE UP
HOROWITZ INDEX BLDA+IHG-RTO: 45 — SIGNIFICANT CHANGE UP
IMM GRANULOCYTES NFR BLD AUTO: 0.9 % — HIGH (ref 0.1–0.3)
INR BLD: 4.59 RATIO — HIGH (ref 0.65–1.3)
INR BLD: 4.88 RATIO — HIGH (ref 0.65–1.3)
IRON SATN MFR SERPL: 33 % — SIGNIFICANT CHANGE UP (ref 15–50)
IRON SATN MFR SERPL: 66 UG/DL — SIGNIFICANT CHANGE UP (ref 35–150)
LACTATE SERPL-SCNC: 5.9 MMOL/L — CRITICAL HIGH (ref 0.5–2.2)
LYMPHOCYTES # BLD AUTO: 0.38 K/UL — LOW (ref 1.2–3.4)
LYMPHOCYTES # BLD AUTO: 3.9 % — LOW (ref 20.5–51.1)
MAGNESIUM SERPL-MCNC: 3.1 MG/DL — CRITICAL HIGH (ref 1.8–2.4)
MAGNESIUM SERPL-MCNC: 3.2 MG/DL — CRITICAL HIGH (ref 1.8–2.4)
MAGNESIUM SERPL-MCNC: 3.4 MG/DL — CRITICAL HIGH (ref 1.8–2.4)
MAGNESIUM SERPL-MCNC: 3.4 MG/DL — CRITICAL HIGH (ref 1.8–2.4)
MCHC RBC-ENTMCNC: 32.5 PG — HIGH (ref 27–31)
MCHC RBC-ENTMCNC: 32.6 PG — HIGH (ref 27–31)
MCHC RBC-ENTMCNC: 33.9 G/DL — SIGNIFICANT CHANGE UP (ref 32–37)
MCHC RBC-ENTMCNC: 34.3 G/DL — SIGNIFICANT CHANGE UP (ref 32–37)
MCV RBC AUTO: 94.6 FL — HIGH (ref 80–94)
MCV RBC AUTO: 96 FL — HIGH (ref 80–94)
MONOCYTES # BLD AUTO: 0.53 K/UL — SIGNIFICANT CHANGE UP (ref 0.1–0.6)
MONOCYTES NFR BLD AUTO: 5.4 % — SIGNIFICANT CHANGE UP (ref 1.7–9.3)
NEUTROPHILS # BLD AUTO: 8.74 K/UL — HIGH (ref 1.4–6.5)
NEUTROPHILS NFR BLD AUTO: 89.7 % — HIGH (ref 42.2–75.2)
NRBC # BLD: 0 /100 WBCS — SIGNIFICANT CHANGE UP (ref 0–0)
NRBC # BLD: 0 /100 WBCS — SIGNIFICANT CHANGE UP (ref 0–0)
PCO2 BLDA: 40 MMHG — SIGNIFICANT CHANGE UP (ref 38–42)
PCO2 BLDA: 46 MMHG — HIGH (ref 38–42)
PH BLDA: 7.2 — LOW (ref 7.38–7.42)
PH BLDA: 7.25 — LOW (ref 7.38–7.42)
PHOSPHATE SERPL-MCNC: 6.2 MG/DL — HIGH (ref 2.1–4.9)
PHOSPHATE SERPL-MCNC: 6.3 MG/DL — HIGH (ref 2.1–4.9)
PHOSPHATE SERPL-MCNC: 6.4 MG/DL — HIGH (ref 2.1–4.9)
PLATELET # BLD AUTO: 103 K/UL — LOW (ref 130–400)
PLATELET # BLD AUTO: 63 K/UL — LOW (ref 130–400)
PO2 BLDA: 157 MMHG — HIGH (ref 78–95)
PO2 BLDA: 84 MMHG — SIGNIFICANT CHANGE UP (ref 78–95)
POTASSIUM SERPL-MCNC: 6 MMOL/L — CRITICAL HIGH (ref 3.5–5)
POTASSIUM SERPL-MCNC: 6.2 MMOL/L — CRITICAL HIGH (ref 3.5–5)
POTASSIUM SERPL-MCNC: 6.3 MMOL/L — CRITICAL HIGH (ref 3.5–5)
POTASSIUM SERPL-MCNC: 6.4 MMOL/L — CRITICAL HIGH (ref 3.5–5)
POTASSIUM SERPL-MCNC: 6.5 MMOL/L — CRITICAL HIGH (ref 3.5–5)
POTASSIUM SERPL-SCNC: 6 MMOL/L — CRITICAL HIGH (ref 3.5–5)
POTASSIUM SERPL-SCNC: 6.2 MMOL/L — CRITICAL HIGH (ref 3.5–5)
POTASSIUM SERPL-SCNC: 6.3 MMOL/L — CRITICAL HIGH (ref 3.5–5)
POTASSIUM SERPL-SCNC: 6.4 MMOL/L — CRITICAL HIGH (ref 3.5–5)
POTASSIUM SERPL-SCNC: 6.5 MMOL/L — CRITICAL HIGH (ref 3.5–5)
PROT SERPL-MCNC: 6.3 G/DL — SIGNIFICANT CHANGE UP (ref 6–8)
PROT SERPL-MCNC: 6.3 G/DL — SIGNIFICANT CHANGE UP (ref 6–8)
PROT SERPL-MCNC: 6.4 G/DL — SIGNIFICANT CHANGE UP (ref 6–8)
PROT SERPL-MCNC: 6.4 G/DL — SIGNIFICANT CHANGE UP (ref 6–8)
PROTHROM AB SERPL-ACNC: >40 SEC — HIGH (ref 9.95–12.87)
PROTHROM AB SERPL-ACNC: >40 SEC — HIGH (ref 9.95–12.87)
RBC # BLD: 4.54 M/UL — LOW (ref 4.7–6.1)
RBC # BLD: 4.65 M/UL — LOW (ref 4.7–6.1)
RBC # FLD: 23.9 % — HIGH (ref 11.5–14.5)
RBC # FLD: 23.9 % — HIGH (ref 11.5–14.5)
SAO2 % BLDA: 93 % — LOW (ref 94–98)
SAO2 % BLDA: 99 % — HIGH (ref 94–98)
SODIUM SERPL-SCNC: 131 MMOL/L — LOW (ref 135–146)
SODIUM SERPL-SCNC: 131 MMOL/L — LOW (ref 135–146)
SODIUM SERPL-SCNC: 132 MMOL/L — LOW (ref 135–146)
SODIUM SERPL-SCNC: 132 MMOL/L — LOW (ref 135–146)
SPECIMEN SOURCE: SIGNIFICANT CHANGE UP
TIBC SERPL-MCNC: 199 UG/DL — LOW (ref 220–430)
TRANSFERRIN SERPL-MCNC: 186 MG/DL — LOW (ref 200–360)
TROPONIN T SERPL-MCNC: 0.04 NG/ML — CRITICAL HIGH
TSH SERPL-MCNC: 2.46 UIU/ML — SIGNIFICANT CHANGE UP (ref 0.27–4.2)
UIBC SERPL-MCNC: 133 UG/DL — SIGNIFICANT CHANGE UP (ref 110–370)
WBC # BLD: 8.8 K/UL — SIGNIFICANT CHANGE UP (ref 4.8–10.8)
WBC # BLD: 9.75 K/UL — SIGNIFICANT CHANGE UP (ref 4.8–10.8)
WBC # FLD AUTO: 8.8 K/UL — SIGNIFICANT CHANGE UP (ref 4.8–10.8)
WBC # FLD AUTO: 9.75 K/UL — SIGNIFICANT CHANGE UP (ref 4.8–10.8)

## 2019-09-03 PROCEDURE — 99221 1ST HOSP IP/OBS SF/LOW 40: CPT

## 2019-09-03 PROCEDURE — 71045 X-RAY EXAM CHEST 1 VIEW: CPT | Mod: 26

## 2019-09-03 PROCEDURE — 71045 X-RAY EXAM CHEST 1 VIEW: CPT | Mod: 26,77

## 2019-09-03 RX ORDER — MIDODRINE HYDROCHLORIDE 2.5 MG/1
10 TABLET ORAL EVERY 8 HOURS
Refills: 0 | Status: DISCONTINUED | OUTPATIENT
Start: 2019-09-03 | End: 2019-09-07

## 2019-09-03 RX ORDER — CHLORHEXIDINE GLUCONATE 213 G/1000ML
15 SOLUTION TOPICAL EVERY 12 HOURS
Refills: 0 | Status: DISCONTINUED | OUTPATIENT
Start: 2019-09-03 | End: 2019-09-07

## 2019-09-03 RX ORDER — FUROSEMIDE 40 MG
20 TABLET ORAL ONCE
Refills: 0 | Status: COMPLETED | OUTPATIENT
Start: 2019-09-03 | End: 2019-09-03

## 2019-09-03 RX ORDER — DEXTROSE 50 % IN WATER 50 %
50 SYRINGE (ML) INTRAVENOUS ONCE
Refills: 0 | Status: COMPLETED | OUTPATIENT
Start: 2019-09-03 | End: 2019-09-03

## 2019-09-03 RX ORDER — SODIUM CHLORIDE 9 MG/ML
500 INJECTION INTRAMUSCULAR; INTRAVENOUS; SUBCUTANEOUS ONCE
Refills: 0 | Status: COMPLETED | OUTPATIENT
Start: 2019-09-03 | End: 2019-09-03

## 2019-09-03 RX ORDER — SODIUM BICARBONATE 1 MEQ/ML
100 SYRINGE (ML) INTRAVENOUS ONCE
Refills: 0 | Status: COMPLETED | OUTPATIENT
Start: 2019-09-03 | End: 2019-09-03

## 2019-09-03 RX ORDER — SODIUM POLYSTYRENE SULFONATE 4.1 MEQ/G
30 POWDER, FOR SUSPENSION ORAL ONCE
Refills: 0 | Status: COMPLETED | OUTPATIENT
Start: 2019-09-03 | End: 2019-09-03

## 2019-09-03 RX ORDER — INSULIN HUMAN 100 [IU]/ML
10 INJECTION, SOLUTION SUBCUTANEOUS ONCE
Refills: 0 | Status: COMPLETED | OUTPATIENT
Start: 2019-09-03 | End: 2019-09-03

## 2019-09-03 RX ORDER — PROTHROMBIN COMPLEX CONCENTRATE (HUMAN) 25.5; 16.5; 24; 22; 22; 26 [IU]/ML; [IU]/ML; [IU]/ML; [IU]/ML; [IU]/ML; [IU]/ML
1500 POWDER, FOR SOLUTION INTRAVENOUS ONCE
Refills: 0 | Status: COMPLETED | OUTPATIENT
Start: 2019-09-03 | End: 2019-09-03

## 2019-09-03 RX ORDER — CALCIUM GLUCONATE 100 MG/ML
2 VIAL (ML) INTRAVENOUS ONCE
Refills: 0 | Status: COMPLETED | OUTPATIENT
Start: 2019-09-03 | End: 2019-09-03

## 2019-09-03 RX ORDER — FENTANYL CITRATE 50 UG/ML
0.5 INJECTION INTRAVENOUS
Qty: 2500 | Refills: 0 | Status: DISCONTINUED | OUTPATIENT
Start: 2019-09-03 | End: 2019-09-06

## 2019-09-03 RX ORDER — LACTULOSE 10 G/15ML
20 SOLUTION ORAL
Refills: 0 | Status: DISCONTINUED | OUTPATIENT
Start: 2019-09-03 | End: 2019-09-07

## 2019-09-03 RX ADMIN — LACTULOSE 20 GRAM(S): 10 SOLUTION ORAL at 12:03

## 2019-09-03 RX ADMIN — SODIUM CHLORIDE 500 MILLILITER(S): 9 INJECTION INTRAMUSCULAR; INTRAVENOUS; SUBCUTANEOUS at 12:00

## 2019-09-03 RX ADMIN — MIDODRINE HYDROCHLORIDE 10 MILLIGRAM(S): 2.5 TABLET ORAL at 13:08

## 2019-09-03 RX ADMIN — Medication 4.67 MICROGRAM(S)/KG/MIN: at 00:05

## 2019-09-03 RX ADMIN — Medication 500 MILLIGRAM(S): at 13:08

## 2019-09-03 RX ADMIN — Medication 50 MILLIEQUIVALENT(S): at 03:26

## 2019-09-03 RX ADMIN — Medication 4.67 MICROGRAM(S)/KG/MIN: at 08:00

## 2019-09-03 RX ADMIN — CHLORHEXIDINE GLUCONATE 15 MILLILITER(S): 213 SOLUTION TOPICAL at 21:45

## 2019-09-03 RX ADMIN — Medication 200 GRAM(S): at 01:03

## 2019-09-03 RX ADMIN — Medication 200 GRAM(S): at 08:11

## 2019-09-03 RX ADMIN — CEFEPIME 100 MILLIGRAM(S): 1 INJECTION, POWDER, FOR SOLUTION INTRAMUSCULAR; INTRAVENOUS at 05:13

## 2019-09-03 RX ADMIN — Medication 50 MILLIEQUIVALENT(S): at 01:39

## 2019-09-03 RX ADMIN — INSULIN HUMAN 10 UNIT(S): 100 INJECTION, SOLUTION SUBCUTANEOUS at 11:01

## 2019-09-03 RX ADMIN — Medication 4.67 MICROGRAM(S)/KG/MIN: at 17:30

## 2019-09-03 RX ADMIN — CEFEPIME 100 MILLIGRAM(S): 1 INJECTION, POWDER, FOR SOLUTION INTRAMUSCULAR; INTRAVENOUS at 17:28

## 2019-09-03 RX ADMIN — Medication 20 MILLIGRAM(S): at 05:58

## 2019-09-03 RX ADMIN — Medication 500 MILLIGRAM(S): at 21:45

## 2019-09-03 RX ADMIN — CHLORHEXIDINE GLUCONATE 1 APPLICATION(S): 213 SOLUTION TOPICAL at 05:21

## 2019-09-03 RX ADMIN — FENTANYL CITRATE 4.99 MICROGRAM(S)/KG/HR: 50 INJECTION INTRAVENOUS at 17:29

## 2019-09-03 RX ADMIN — LACTULOSE 20 GRAM(S): 10 SOLUTION ORAL at 17:28

## 2019-09-03 RX ADMIN — PROTHROMBIN COMPLEX CONCENTRATE (HUMAN) 400 INTERNATIONAL UNIT(S): 25.5; 16.5; 24; 22; 22; 26 POWDER, FOR SOLUTION INTRAVENOUS at 22:33

## 2019-09-03 RX ADMIN — VASOPRESSIN 2.4 UNIT(S)/MIN: 20 INJECTION INTRAVENOUS at 17:30

## 2019-09-03 RX ADMIN — Medication 4.67 MICROGRAM(S)/KG/MIN: at 05:24

## 2019-09-03 RX ADMIN — Medication 100 MILLIEQUIVALENT(S): at 01:19

## 2019-09-03 RX ADMIN — Medication 100 MILLIEQUIVALENT(S): at 02:38

## 2019-09-03 RX ADMIN — Medication 100 MILLIGRAM(S): at 12:03

## 2019-09-03 RX ADMIN — MIDODRINE HYDROCHLORIDE 10 MILLIGRAM(S): 2.5 TABLET ORAL at 21:45

## 2019-09-03 RX ADMIN — Medication 50 GRAM(S): at 02:47

## 2019-09-03 RX ADMIN — Medication 50 MILLILITER(S): at 11:01

## 2019-09-03 RX ADMIN — SODIUM POLYSTYRENE SULFONATE 30 GRAM(S): 4.1 POWDER, FOR SUSPENSION ORAL at 13:08

## 2019-09-03 RX ADMIN — Medication 50 MILLIEQUIVALENT(S): at 05:04

## 2019-09-03 RX ADMIN — VASOPRESSIN 2.4 UNIT(S)/MIN: 20 INJECTION INTRAVENOUS at 05:25

## 2019-09-03 RX ADMIN — VASOPRESSIN 2.4 UNIT(S)/MIN: 20 INJECTION INTRAVENOUS at 08:00

## 2019-09-03 NOTE — AIRWAY PLACEMENT NOTE ADULT - POST AIRWAY PLACEMENT ASSESSMENT:
breath sounds bilateral/CXR pending/breath sounds equal/positive end tidal CO2 noted/skin color improved/chest excursion noted

## 2019-09-03 NOTE — CONSULT NOTE ADULT - SUBJECTIVE AND OBJECTIVE BOX
ERNESTOMORAIMA  69y, Male  Allergy: No Known Allergies      HPI:  68 y/o M with PMH of A-fib on Coumadin, HFrEF (LVEF 17% 2018) s/p AICD, HTN, gout, CKD III, BPH, Liver cirrhosis secondary to CHF p/w hypotension.     As per son at bedside, this AM pt was found to be slightly more confused than normal, not responding appropriately to questions. He checked his blood pressure and was found to have SBP in the 70s. AS per son, pt is hypotensive at baseline with SBP 80-90's. Son reports pt having a fall the night prior preceded by lightheadedness and dizziness. HE denies any loss of consciousness, head trauma, bowel or urinary incontinence or confusion after the episode. Pt denies any chest pain, palpitations, headaches, or lower extremity edema.     in the ED, VS: BP 66/39 HR 84 RR 17 T 98.3. Pt received Cefepime and vancomycin, Hydrocortisone 100mg, and 1 L NS, and was started on levophed through R IJ. (01 Sep 2019 19:34)    FAMILY HISTORY:  FH: hypertension    PAST MEDICAL & SURGICAL HISTORY:  BPH (benign prostatic hyperplasia)  CKD (chronic kidney disease)  Osler-Weber-Rendu disease  Atrial fibrillation  HTN (hypertension)  Hepatic cirrhosis, unspecified hepatic cirrhosis type, unspecified whether ascites present  Gout, unspecified cause, unspecified chronicity, unspecified site  Anemia, unspecified type  Congestive heart failure, unspecified HF chronicity, unspecified heart failure type  S/P implantation of automatic cardioverter/defibrillator (AICD)        VITALS:  T(F): 97, Max: 99.3 (19 @ 16:15)  HR: 106  BP: 83/56  RR: 27Vital Signs Last 24 Hrs  T(C): 36.1 (03 Sep 2019 04:00), Max: 37.4 (02 Sep 2019 16:15)  T(F): 97 (03 Sep 2019 04:00), Max: 99.3 (02 Sep 2019 16:15)  HR: 106 (03 Sep 2019 05:45) (98 - 122)  BP: 83/56 (03 Sep 2019 05:30) (57/46 - 102/65)  BP(mean): 76 (03 Sep 2019 05:30) (50 - 83)  RR: 27 (03 Sep 2019 05:45) (21 - 41)  SpO2: 95% (03 Sep 2019 05:45) (85% - 98%)    TESTS & MEASUREMENTS:                        15.1   8.80  )-----------( 63       ( 03 Sep 2019 04:50 )             44.0         131<L>  |  94<L>  |  51<H>  ----------------------------<  101<H>  6.4<HH>   |  18  |  2.9<H>    Ca    9.2      03 Sep 2019 04:50  Phos  6.3       Mg     3.4         TPro  6.4  /  Alb  2.9<L>  /  TBili  8.1<H>  /  DBili  5.9<H>  /  AST  91<H>  /  ALT  42<H>  /  AlkPhos  153<H>      LIVER FUNCTIONS - ( 03 Sep 2019 04:50 )  Alb: 2.9 g/dL / Pro: 6.4 g/dL / ALK PHOS: 153 U/L / ALT: 42 U/L / AST: 91 U/L / GGT: x             Culture - Blood (collected 19 @ 15:00)  Source: .Blood Blood-Peripheral  Preliminary Report (19 @ 20:01):    No growth to date.    Culture - Blood (collected 19 @ 15:00)  Source: .Blood Blood-Peripheral  Preliminary Report (19 @ 20:01):    No growth to date.      Urinalysis Basic - ( 01 Sep 2019 17:10 )    Color: Yellow / Appearance: Clear / S.025 / pH: x  Gluc: x / Ketone: Negative  / Bili: Negative / Urobili: <2 mg/dL   Blood: x / Protein: Trace / Nitrite: Negative   Leuk Esterase: Negative / RBC: x / WBC x   Sq Epi: x / Non Sq Epi: x / Bacteria: x          RADIOLOGY & ADDITIONAL TESTS:    ANTIBIOTICS:  cefepime   IVPB      cefepime   IVPB 2000 milliGRAM(s) IV Intermittent every 12 hours  metroNIDAZOLE    Tablet 500 milliGRAM(s) Oral every 8 hours

## 2019-09-03 NOTE — CONSULT NOTE ADULT - COMMENTS
Pt is non communicative, does not follow commands, does respond to pain. On pressors.   R IJ catheter with no erythema.  no cough. no diarrhea. Duong in place

## 2019-09-03 NOTE — CONSULT NOTE ADULT - SUBJECTIVE AND OBJECTIVE BOX
MORAIMA OROZCO [id number]  69y Male    HPI:  70 y/o M with PMH of A-fib on Coumadin, HFrEF (LVEF 17% 2018) s/p AICD, HTN, gout, CKD III, BPH, Liver cirrhosis secondary to CHF p/w hypotension.     As per son at bedside, this AM pt was found to be slightly more confused than normal, not responding appropriately to questions. He checked his blood pressure and was found to have SBP in the 70s. AS per son, pt is hypotensive at baseline with SBP 80-90's. Son reports pt having a fall the night prior preceded by lightheadedness and dizziness. HE denies any loss of consciousness, head trauma, bowel or urinary incontinence or confusion after the episode. Pt denies any chest pain, palpitations, headaches, or lower extremity edema.     in the ED, VS: BP 66/39 HR 84 RR 17 T 98.3. Pt received Cefepime and vancomycin, Hydrocortisone 100mg, and 1 L NS, and was started on levophed through R IJ. (01 Sep 2019 19:34)      PAST MEDICAL & SURGICAL HISTORY:  BPH (benign prostatic hyperplasia)  CKD (chronic kidney disease)  Osler-Weber-Rendu disease  Atrial fibrillation  HTN (hypertension)  Hepatic cirrhosis, unspecified hepatic cirrhosis type, unspecified whether ascites present  Gout, unspecified cause, unspecified chronicity, unspecified site  Anemia, unspecified type  Congestive heart failure, unspecified HF chronicity, unspecified heart failure type  S/P implantation of automatic cardioverter/defibrillator (AICD)    MEDICATIONS  (STANDING):  allopurinol 100 milliGRAM(s) Oral daily  cefepime   IVPB      cefepime   IVPB 2000 milliGRAM(s) IV Intermittent every 12 hours  chlorhexidine 4% Liquid 1 Application(s) Topical <User Schedule>  lactulose Syrup 20 Gram(s) Enteral Tube four times a day  metroNIDAZOLE    Tablet 500 milliGRAM(s) Oral every 8 hours  midodrine 10 milliGRAM(s) Oral every 8 hours  norepinephrine Infusion 0.05 MICROgram(s)/kG/Min (4.673 mL/Hr) IV Continuous <Continuous>  pantoprazole    Tablet 40 milliGRAM(s) Oral before breakfast  rifaximin 550 milliGRAM(s) Oral two times a day  vasopressin Infusion 0.04 Unit(s)/Min (2.4 mL/Hr) IV Continuous <Continuous>    MEDICATIONS  (PRN):      Allergies: No Known Allergies    REVIEW OF SYSTEMS    [ ] A ten-point review of systems was otherwise negative except as noted.  [x] Due to altered mental status/intubation, subjective information were not able to be obtained from the patient. History was obtained, to the extent possible, from review of the chart and collateral sources of information.      Vital Signs Last 24 Hrs  T(C): 36.4 (03 Sep 2019 12:00), Max: 37.4 (02 Sep 2019 16:15)  T(F): 97.6 (03 Sep 2019 12:00), Max: 99.3 (02 Sep 2019 16:15)  HR: 118 (03 Sep 2019 14:00) (102 - 122)  BP: 89/54 (03 Sep 2019 13:45) (64/54 - 102/65)  BP(mean): 69 (03 Sep 2019 13:45) (49 - 83)  RR: 28 (03 Sep 2019 14:00) (22 - 41)  SpO2: 93% (03 Sep 2019 14:00) (85% - 98%)    PHYSICAL EXAM:  GENERAL: sedated on pressors,   CHEST/LUNG: bilateral breathes sounds are diminished   HEART: Regular rate and rhythm  ABDOMEN: Soft, Nontender, Nondistended;       LABS:                     15.1   8.80  )-----------( 63       ( 03 Sep 2019 04:50 )             44.0          131<L>  |  95<L>  |  56<H>  ----------------------------<  129<H>  6.2<HH>   |  19  |  3.2<H>      Calcium, Total Serum: 9.7 mg/dL (19 @ 10:50)    LFTs:             6.3  | 9.0  | 97       ------------------[153     ( 03 Sep 2019 10:50 )  3.0  | 6.4  | 52          Blood Gas Arterial, Lactate: 5.1 mmoL/L (19 @ 11:50)  Lactate, Blood: 5.9 mmol/L (19 @ 06:00)  Blood Gas Arterial, Lactate: 5.8 mmoL/L (19 @ 00:55)  Lactate, Blood: 6.4 mmol/L (19 @ 23:00)  Lactate, Blood: 4.9 mmol/L (19 @ 04:40)  Blood Gas Arterial, Lactate: 2.6 mmoL/L (19 @ 15:35)  Lactate, Blood: 2.7 mmol/L (19 @ 14:45)    ABG - ( 03 Sep 2019 11:50 )  pH: 7.20  /  pCO2: 46    /  pO2: 84    / HCO3: 18    / Base Excess: -9.7  /  SaO2: 93        ABG - ( 03 Sep 2019 00:55 )  pH: 7.18  /  pCO2: 44    /  pO2: 86    / HCO3: 16    / Base Excess: -11.9 /  SaO2: 94        ABG - ( 01 Sep 2019 15:35 )  pH: 7.45  /  pCO2: 37    /  pO2: 50    / HCO3: 25    / Base Excess: 1.5   /  SaO2: 82          Coags:     >40.00  ----< 4.59    ( 03 Sep 2019 04:50 )     92.1      CARDIAC MARKERS ( 03 Sep 2019 06:00 )  x     / 0.04 ng/mL / x     / x     / x      CARDIAC MARKERS ( 02 Sep 2019 04:40 )  x     / 0.03 ng/mL / x     / x     / x      CARDIAC MARKERS ( 01 Sep 2019 23:30 )  x     / 0.03 ng/mL / x     / x     / x      CARDIAC MARKERS ( 01 Sep 2019 16:03 )  x     / 0.03 ng/mL / x     / x     / x          Urinalysis Basic - ( 01 Sep 2019 17:10 )    Color: Yellow / Appearance: Clear / S.025 / pH: x  Gluc: x / Ketone: Negative  / Bili: Negative / Urobili: <2 mg/dL   Blood: x / Protein: Trace / Nitrite: Negative   Leuk Esterase: Negative / RBC: x / WBC x   Sq Epi: x / Non Sq Epi: x / Bacteria: x    Culture - Blood (collected 02 Sep 2019 04:23)  Source: .Blood None  Preliminary Report (03 Sep 2019 11:14):    No growth to date.    Culture - Urine (collected 01 Sep 2019 17:10)  Source: .Urine Clean Catch (Midstream)  Final Report (03 Sep 2019 11:14):    No growth    Culture - Blood (collected 01 Sep 2019 15:00)  Source: .Blood Blood-Peripheral  Preliminary Report (02 Sep 2019 20:01):    No growth to date.    Culture - Blood (collected 01 Sep 2019 15:00)  Source: .Blood Blood-Peripheral  Preliminary Report (02 Sep 2019 20:01):    No growth to date.    RADIOLOGY & ADDITIONAL STUDIES:  < from: NM Hepatobiliary Imaging (19 @ 15:56) >  IMPRESSION:   Definite visualization of the gallbladder at 90 minutes post injection   demonstrating patency of the cystic duct.    Nonspecific finding of no visualization of intestinal activity through 2   hours postinjection. Examination discontinued due to patient condition.   The differential diagnosis is described above.    Raphael Larios MD discussed the above preliminary findings with Dr. Kevin Husain with readback.    < end of copied text >    < from: US Abdomen Limited (19 @ 19:08) >  IMPRESSION:    Findings consistent with acute cholecystitis. Dilated common bile duct   suggests the possibility of common bile duct stone and MRCPmay be of   help for further evaluation.    Right pleural effusion.    < end of copied text >    < from: CT Abdomen and Pelvis w/ IV Cont (19 @ 15:39) >  IMPRESSION:     Bilateral small pleural effusions greater on the right.    Prominent mediastinal lymph nodes measuring up to 1.3 cm, may be   reactive. Recommend follow-up to resolution.    Minimal fat stranding along the anterior wall of the urinary bladder may   be secondary to under distention versus cystitis. Correlate with   urinalysis.    Main pulmonary artery measures 4 cm in diameter consistent with pulmonary   arterial hypertension.      < end of copied text >

## 2019-09-03 NOTE — PROGRESS NOTE ADULT - ASSESSMENT
68 y/o M with PMH of A-fib on Coumadin, HFrEF (EF< 20%)  s/p AICD, HTN, gout, CKD III, BPH, Liver cirrhosis 2/2 congestive hepatopathy p/w hypotension.     # hypotension (cardiogenic vs septic shock)  - avoid IV hydration, even though patient has lactic acidosis  - discontinue vasopressin  - keep levophed and start dobutamine for cardiogenic failure  - AIM to a MAP 55   - c/w hydrocortisone 100 mg every 8 hrs for now  - keep cefepime + flagyl for now pending Pancx result  - accurate input & output + daily body weight  - daily CXR  - hold lasix and spironolactone for now  - keep I<O  - oliguric 20 cc/h in the setting of cardiorenal sd   - CS nephrology and correct lytes as needed    # Afib   - daily PT /INR   - keep 2<INR<3  - rate control if needed with metoprolol    # cholecystitis??  - less likely. pericholecystic fluid could be seen in the setting of heart failure and volume overload  - NO intervention by GI team / do work up for CLD  - HIDA scan negative for acute cholecystitis  - CS general surgery, most probably acute intervention will not be done     # liver cirrhosis:  - c/w lactulose with target BM > 3 times a day  - trend bilirubin  - start rifaximin 550 mg bid    # confusion + somnolence  - most likely 2/2 liver encephalopathy  - c/w lactulose + rifaximin  - daily ammonia levels  - diagnostic paracentesis to r/o SBP if worsening clinical status    DVT prophylaxis: coumadin  PPI prophylaxis  FULL CODE  dispo : active  pending GS assessment + nephrology CS

## 2019-09-03 NOTE — CONSULT NOTE ADULT - ASSESSMENT
70 y/o M with PMH of A-fib on Coumadin, HFrEF (LVEF 17% 12/2018) s/p AICD, HTN, gout, CKD III, BPH, Liver cirrhosis secondary to CHF p/w hypotension and confusion more than baseline currently requiring  pressors. No fevers.    IMPRESSION:  No evidence of septic shock. No fevers, WBC 8.2  No acute cholecystitis/ cholangitis  No PNA  No meningitis  Liver cirrhosis induced thrombocytopenia, coagulopathy, transaminitis  HIDA delayed at 2h which is more consistent with liver cirrhosis ( with a poor MELD score).  Metabolic coagulopathy     RECOMMENDATIONS:  ABx at this point of no benefit ( besides Rifaximin )

## 2019-09-03 NOTE — CONSULT NOTE ADULT - ASSESSMENT
This patient is 68 y/o M with PMH of A-fib on Coumadin, HFrEF (LVEF 17%) s/p AICD, HTN, gout, CKD III, BPH, Liver cirrhosis secondary to CHF p/w hypotension. As per family patients BP usually is on the lower side around systolic of 80. In ED patients BP was 66/39 patient received Cefepime and vancomycin, Hydrocortisone 100mg, and 1 L NS, and was started on levophed. Nephrology has been consulted for Acute Kidney Injury and refractory hyperkalemia.     Acute Renal Failure with Oliguria   Most likely secondary to Cardio-renal syndrome   No Signs of sepsis   Mixed metabolic and respiratory acidosis   On Levophed and Vasopressin maintaining the MAP > 60   Patients baseline Cr is 2.1. Today Cr is 3.2  Urine output 15CC/Hr  Refractory hyperkalemia- Potassium 6.2 down from 6.5 despite receiving D50, insulin and Kayexalate   Hyponatremia most likely secondary to renal failure and Cirrhosis   Hypermagnesemia secondary to renal failure This patient is 70 y/o M with PMH of A-fib on Coumadin, HFrEF (LVEF 17%) s/p AICD, HTN, gout, CKD III, BPH, Liver cirrhosis secondary to CHF p/w hypotension. As per family patients BP usually is on the lower side around systolic of 80. In ED patients BP was 66/39 patient received Cefepime and vancomycin, Hydrocortisone 100mg, and 1 L NS, and was started on levophed. Nephrology has been consulted for Acute Kidney Injury and refractory hyperkalemia.     Acute Renal Failure with Oliguria   Most likely secondary to Cardio-renal syndrome   No Signs of sepsis   Mixed metabolic and respiratory acidosis   On Levophed and Vasopressin maintaining the MAP > 60   Clinically volume overloaded   Patients baseline Cr is 2.1. Today Cr is 3.2  Urine output 15CC/Hr  Refractory hyperkalemia- Potassium 6.2 down from 6.5 despite receiving D50, insulin and Kayexalate   Hyponatremia most likely secondary to renal failure and Cirrhosis   Hypermagnesemia secondary to renal failure     Avoid Nephrotoxins   Would need Dialysis This patient is 70 y/o M with PMH of A-fib on Coumadin, HFrEF (LVEF 17%) s/p AICD, HTN, gout, CKD III, BPH, Liver cirrhosis secondary to CHF p/w hypotension. As per family patients BP usually is on the lower side around systolic of 80. In ED patients BP was 66/39 patient received Cefepime and vancomycin, Hydrocortisone 100mg, and 1 L NS, and was started on levophed. Nephrology has been consulted for Acute Kidney Injury and refractory hyperkalemia.     Assessment   Acute Renal Failure with Oliguria   Most likely secondary to Cardio-renal syndrome- Patients baseline Cr is 2.1. Today Cr is 3.2  Doubt Hepatorenal syndrome   BUN: CR < 20  UA -alonso for casts or proteinuria   Urine output 15CC/Hr  Refractory Hyperkalemia  Clinically volume overloaded- On Levophed and Vasopressin maintaining the MAP > 60  Mixed metabolic and respiratory acidosis     Hyponatremia most likely secondary to renal failure and Cirrhosis   Hypermagnesemia secondary to renal failure     Plan  Would recommend CVVH if family agrees. Patient would not be able to tolerate HD  Avoid Nephrotoxins

## 2019-09-03 NOTE — CONSULT NOTE ADULT - ATTENDING COMMENTS
68yo male with multiple medical problems including atrial fibrillation on Coumadin, hypertension, CKD, heart failure with EF 17% s/p AICD, liver cirrhosis admitted to ICU with cardiogenic shock on pressors. Surgery consulted to evaluate for cholecystectomy. There is no indication for surgical intervention at this time. This patient is extremely high risk for any intervention, let alone surgery. If he develops an indication for surgery, then a less invasive method ought to be considered first. This was discussed with the son and spouse. All questions answered.
Pt seen and examined above note reviwed    PT w/ Multi organ failure in the setting of Cardiogenic shock  There does not seem to be any course of treatment available for his underlying condition  As such there is no role for renal replacement therapy.  He would not tolerate HD, and even if he would tolerate CVVH, with no underlying treatable condition this would only serve to  delay the inevitable .  This was conveyed to the pt's son who expressed understanding and agreement.     Final Cardiology recs still pending, Should any heart replacement therapy be deemed viable, can reconsider CRRT as in that situation may provide some meaningful benefit (though further complicating that scenario  is INR of 4 in the setting of liver failure)  .    for now can  treat hyperK medicaly  discussed w/ primary team and pt's son

## 2019-09-03 NOTE — PROGRESS NOTE ADULT - SUBJECTIVE AND OBJECTIVE BOX
Patient is a 69y old  Male who presents with a chief complaint of Hypotension (03 Sep 2019 06:29)    Over Night Events: No events    ROS:  See HPI    PHYSICAL EXAM    ICU Vital Signs Last 24 Hrs  T(C): 36.1 (03 Sep 2019 04:00), Max: 37.4 (02 Sep 2019 16:15)  T(F): 97 (03 Sep 2019 04:00), Max: 99.3 (02 Sep 2019 16:15)  HR: 114 (03 Sep 2019 07:00) (98 - 122)  BP: 80/53 (03 Sep 2019 07:00) (57/46 - 102/65)  BP(mean): 68 (03 Sep 2019 07:00) (49 - 83)  RR: 26 (03 Sep 2019 07:00) (22 - 41)  SpO2: 95% (03 Sep 2019 07:00) (85% - 98%)    General: In NAD  HEENT: PANFILO  Lymphatic system: No cervical LN   Lungs: Bilateral BS  Cardiovascular: Regular  Gastrointestinal: Soft, Positive BS  Extremities: No clubbing.  Moves extremities.  Full Range of motion   Skin: Warm, intact  Neurological: Lethargic    19 @ 07:01  -  19 @ 07:00  --------------------------------------------------------  IN:    IV PiggyBack: 700 mL    norepinephrine Infusion: 201.1 mL    norepinephrine Infusion: 1246.4 mL    vasopressin Infusion: 52.8 mL  Total IN: 2200.3 mL    OUT:    Indwelling Catheter - Urethral: 385 mL  Total OUT: 385 mL    Total NET: 1815.3 mL    LABS:                 15.1   8.80  )-----------( 63       ( 03 Sep 2019 04:50 )             44.0                                               09-03    x   |  x   |  x   ----------------------------<  x   6.5<HH>   |  x   |  x     Ca    9.2      03 Sep 2019 04:50  Phos  6.2     09-  Mg     3.2     -    TPro  6.4  /  Alb  2.9<L>  /  TBili  8.1<H>  /  DBili  5.9<H>  /  AST  91<H>  /  ALT  42<H>  /  AlkPhos  153<H>  09-03      PT/INR - ( 03 Sep 2019 04:50 )   PT: >40.00 sec;   INR: 4.59 ratio         PTT - ( 03 Sep 2019 04:50 )  PTT:92.1 sec                                       Urinalysis Basic - ( 01 Sep 2019 17:10 )    Color: Yellow / Appearance: Clear / S.025 / pH: x  Gluc: x / Ketone: Negative  / Bili: Negative / Urobili: <2 mg/dL   Blood: x / Protein: Trace / Nitrite: Negative   Leuk Esterase: Negative / RBC: x / WBC x   Sq Epi: x / Non Sq Epi: x / Bacteria: x        CARDIAC MARKERS ( 03 Sep 2019 06:00 )  x     / 0.04 ng/mL / x     / x     / x      CARDIAC MARKERS ( 02 Sep 2019 04:40 )  x     / 0.03 ng/mL / x     / x     / x      CARDIAC MARKERS ( 01 Sep 2019 23:30 )  x     / 0.03 ng/mL / x     / x     / x      CARDIAC MARKERS ( 01 Sep 2019 16:03 )  x     / 0.03 ng/mL / x     / x     / x                                                LIVER FUNCTIONS - ( 03 Sep 2019 04:50 )  Alb: 2.9 g/dL / Pro: 6.4 g/dL / ALK PHOS: 153 U/L / ALT: 42 U/L / AST: 91 U/L / GGT: x                                                  Culture - Blood (collected 01 Sep 2019 15:00)  Source: .Blood Blood-Peripheral  Preliminary Report (02 Sep 2019 20:01):    No growth to date.    Culture - Blood (collected 01 Sep 2019 15:00)  Source: .Blood Blood-Peripheral  Preliminary Report (02 Sep 2019 20:01):    No growth to date.                                          ABG - ( 03 Sep 2019 00:55 )  pH, Arterial: 7.18  pH, Blood: x     /  pCO2: 44    /  pO2: 86    / HCO3: 16    / Base Excess: -11.9 /  SaO2: 94        MEDICATIONS  (STANDING):  allopurinol 100 milliGRAM(s) Oral daily  cefepime   IVPB      cefepime   IVPB 2000 milliGRAM(s) IV Intermittent every 12 hours  chlorhexidine 4% Liquid 1 Application(s) Topical <User Schedule>  lactulose Syrup 10 Gram(s) Oral three times a day  metroNIDAZOLE    Tablet 500 milliGRAM(s) Oral every 8 hours  norepinephrine Infusion 0.05 MICROgram(s)/kG/Min (4.673 mL/Hr) IV Continuous <Continuous>  pantoprazole    Tablet 40 milliGRAM(s) Oral before breakfast  vasopressin Infusion 0.04 Unit(s)/Min (2.4 mL/Hr) IV Continuous <Continuous> Patient is a 69y old  Male who presents with a chief complaint of Hypotension (03 Sep 2019 06:29)    Over Night Events: No events, worsening status, increase LA, s/p HIDA scan    ROS:  See HPI    PHYSICAL EXAM    ICU Vital Signs Last 24 Hrs  T(C): 36.1 (03 Sep 2019 04:00), Max: 37.4 (02 Sep 2019 16:15)  T(F): 97 (03 Sep 2019 04:00), Max: 99.3 (02 Sep 2019 16:15)  HR: 114 (03 Sep 2019 07:00) (98 - 122)  BP: 80/53 (03 Sep 2019 07:00) (57/46 - 102/65)  BP(mean): 68 (03 Sep 2019 07:00) (49 - 83)  RR: 26 (03 Sep 2019 07:00) (22 - 41)  SpO2: 95% (03 Sep 2019 07:00) (85% - 98%)    General: ILL looking  HEENT: PANFILO  Lymphatic system: No cervical LN   Lungs: Bilateral BS, dec both abses  Cardiovascular: LAM 3/6  Gastrointestinal: Soft, Positive BS, mild tenderness  Extremities: No clubbing.  Moves extremities.  Full Range of motion   Skin: Mottled extremities  Neurological: Lethargic    19 @ 07:01  -  19 @ 07:00  --------------------------------------------------------  IN:    IV PiggyBack: 700 mL    norepinephrine Infusion: 201.1 mL    norepinephrine Infusion: 1246.4 mL    vasopressin Infusion: 52.8 mL  Total IN: 2200.3 mL    OUT:    Indwelling Catheter - Urethral: 385 mL  Total OUT: 385 mL    Total NET: 1815.3 mL    LABS:                 15.1   8.80  )-----------( 63       ( 03 Sep 2019 04:50 )             44.0                                                   x   |  x   |  x   ----------------------------<  x   6.5<HH>   |  x   |  x     Ca    9.2      03 Sep 2019 04:50  Phos  6.2       Mg     3.2         TPro  6.4  /  Alb  2.9<L>  /  TBili  8.1<H>  /  DBili  5.9<H>  /  AST  91<H>  /  ALT  42<H>  /  AlkPhos  153<H>        PT/INR - ( 03 Sep 2019 04:50 )   PT: >40.00 sec;   INR: 4.59 ratio         PTT - ( 03 Sep 2019 04:50 )  PTT:92.1 sec                                       Urinalysis Basic - ( 01 Sep 2019 17:10 )    Color: Yellow / Appearance: Clear / S.025 / pH: x  Gluc: x / Ketone: Negative  / Bili: Negative / Urobili: <2 mg/dL   Blood: x / Protein: Trace / Nitrite: Negative   Leuk Esterase: Negative / RBC: x / WBC x   Sq Epi: x / Non Sq Epi: x / Bacteria: x        CARDIAC MARKERS ( 03 Sep 2019 06:00 )  x     / 0.04 ng/mL / x     / x     / x      CARDIAC MARKERS ( 02 Sep 2019 04:40 )  x     / 0.03 ng/mL / x     / x     / x      CARDIAC MARKERS ( 01 Sep 2019 23:30 )  x     / 0.03 ng/mL / x     / x     / x      CARDIAC MARKERS ( 01 Sep 2019 16:03 )  x     / 0.03 ng/mL / x     / x     / x                                                LIVER FUNCTIONS - ( 03 Sep 2019 04:50 )  Alb: 2.9 g/dL / Pro: 6.4 g/dL / ALK PHOS: 153 U/L / ALT: 42 U/L / AST: 91 U/L / GGT: x                                                  Culture - Blood (collected 01 Sep 2019 15:00)  Source: .Blood Blood-Peripheral  Preliminary Report (02 Sep 2019 20:01):    No growth to date.    Culture - Blood (collected 01 Sep 2019 15:00)  Source: .Blood Blood-Peripheral  Preliminary Report (02 Sep 2019 20:01):    No growth to date.                                          ABG - ( 03 Sep 2019 00:55 )  pH, Arterial: 7.18  pH, Blood: x     /  pCO2: 44    /  pO2: 86    / HCO3: 16    / Base Excess: -11.9 /  SaO2: 94        MEDICATIONS  (STANDING):  allopurinol 100 milliGRAM(s) Oral daily  cefepime   IVPB      cefepime   IVPB 2000 milliGRAM(s) IV Intermittent every 12 hours  chlorhexidine 4% Liquid 1 Application(s) Topical <User Schedule>  lactulose Syrup 10 Gram(s) Oral three times a day  metroNIDAZOLE    Tablet 500 milliGRAM(s) Oral every 8 hours  norepinephrine Infusion 0.05 MICROgram(s)/kG/Min (4.673 mL/Hr) IV Continuous <Continuous>  pantoprazole    Tablet 40 milliGRAM(s) Oral before breakfast  vasopressin Infusion 0.04 Unit(s)/Min (2.4 mL/Hr) IV Continuous <Continuous>

## 2019-09-03 NOTE — CONSULT NOTE ADULT - SUBJECTIVE AND OBJECTIVE BOX
Patient is a 69y old  Male who presents with a chief complaint of Hypotension (03 Sep 2019 14:14)      HPI:  This patient is 70 y/o M with PMH of A-fib on Coumadin, HFrEF (LVEF 17%) s/p AICD, HTN, gout, CKD III, BPH, Liver cirrhosis secondary to CHF p/w hypotension. As per family patients BP usually is on the lower side around systolic of 80. In ED patients BP was 66/39 patient received Cefepime and vancomycin, Hydrocortisone 100mg, and 1 L NS, and was started on levophed. Nephrology has been consulted for Acute Kidney Injury and refractory hyperkalemia.   Patients baseline Cr is 2.1. Today Cr is 3.2  Output is down to 15CC/Hr   Potassium 6.2 down from 6.5 despite receiving D50, insulin and Kayexalate   Hyponatremia most likely secondary to renal failure and Cirrhosis   On Levophed and Vasopressin maintaining the MAP > 60   No signs of Sepsis. Possible Cardiogenic shock   Metabolic Acidosis plus respiratory acidosis   Hypermagnesemia         PAST MEDICAL & SURGICAL HISTORY:  BPH (benign prostatic hyperplasia)  CKD (chronic kidney disease)  Osler-Weber-Rendu disease  Atrial fibrillation  HTN (hypertension)  Hepatic cirrhosis, unspecified hepatic cirrhosis type, unspecified whether ascites present  Gout, unspecified cause, unspecified chronicity, unspecified site  Anemia, unspecified type  Congestive heart failure, unspecified HF chronicity, unspecified heart failure type  S/P implantation of automatic cardioverter/defibrillator (AICD)      Home Medications:  allopurinol 100 mg oral tablet: 1 tab(s) orally once a day (01 Sep 2019 20:06)  Colcrys 0.6 mg oral tablet: 1 tab(s) orally once a day (01 Sep 2019 20:06)  Coumadin 1 mg oral tablet: 1 tab(s) orally once a day (01 Sep 2019 20:06)  finasteride 5 mg oral tablet: 1 tab(s) orally once a day (01 Sep 2019 20:06)  furosemide 40 mg oral tablet: 1 tab(s) orally 2 times a day (01 Sep 2019 20:06)  lactulose 10 g/15 mL oral solution: 30 milliliter(s) orally 2 times a day (01 Sep 2019 20:06)  Metoprolol Tartrate 25 mg oral tablet: 1.5 tab(s) orally 2 times a day (01 Sep 2019 20:06)  pantoprazole 40 mg oral delayed release tablet: 1 tab(s) orally once a day (01 Sep 2019 20:06)  sotalol 80 mg oral tablet: 0.5 tab(s) orally 2 times a day (01 Sep 2019 20:06)  spironolactone 25 mg oral tablet: 2 tab(s) orally once a day (01 Sep 2019 20:06)  tamsulosin 0.4 mg oral capsule: 1 cap(s) orally once a day (at bedtime) (01 Sep 2019 20:06)      No Known Allergies      FAMILY HISTORY  FH: hypertension    Hospital Medications   allopurinol 100 milliGRAM(s) Oral daily  cefepime   IVPB      cefepime   IVPB 2000 milliGRAM(s) IV Intermittent every 12 hours  chlorhexidine 4% Liquid 1 Application(s) Topical <User Schedule>  lactulose Syrup 20 Gram(s) Enteral Tube four times a day  metroNIDAZOLE    Tablet 500 milliGRAM(s) Oral every 8 hours  midodrine 10 milliGRAM(s) Oral every 8 hours  norepinephrine Infusion 0.05 MICROgram(s)/kG/Min IV Continuous <Continuous>  pantoprazole    Tablet 40 milliGRAM(s) Oral before breakfast  rifaximin 550 milliGRAM(s) Oral two times a day  vasopressin Infusion 0.04 Unit(s)/Min IV Continuous <Continuous>      Vital Signs Last 24 Hrs  T(C): 36.4 (03 Sep 2019 12:00), Max: 37.4 (02 Sep 2019 16:15)  T(F): 97.6 (03 Sep 2019 12:00), Max: 99.3 (02 Sep 2019 16:15)  HR: 116 (03 Sep 2019 15:00) (102 - 122)  BP: 76/52 (03 Sep 2019 15:00) (64/54 - 102/65)  BP(mean): 58 (03 Sep 2019 15:00) (49 - 83)  RR: 29 (03 Sep 2019 15:00) (22 - 41)  SpO2: 92% (03 Sep 2019 15:00) (85% - 98%)    I&O's Summary    02 Sep 2019 07:01  -  03 Sep 2019 07:00  --------------------------------------------------------  IN: 2282.1 mL / OUT: 400 mL / NET: 1882.1 mL    03 Sep 2019 07:01  -  03 Sep 2019 15:06  --------------------------------------------------------  IN: 992.7 mL / OUT: 85 mL / NET: 907.7 mL                              15.1   8.80  )-----------( 63       ( 03 Sep 2019 04:50 )             44.0       09-03    131<L>  |  95<L>  |  56<H>  ----------------------------<  129<H>  6.2<HH>   |  19  |  3.2<H>    Ca    9.7      03 Sep 2019 10:50  Phos  6.2     09-03  Mg     3.1     09-03    TPro  6.3  /  Alb  3.0<L>  /  TBili  9.0<H>  /  DBili  6.4<H>  /  AST  97<H>  /  ALT  52<H>  /  AlkPhos  153<H>  09-03      CARDIAC MARKERS ( 03 Sep 2019 06:00 )  x     / 0.04 ng/mL / x     / x     / x      CARDIAC MARKERS ( 02 Sep 2019 04:40 )  x     / 0.03 ng/mL / x     / x     / x      CARDIAC MARKERS ( 01 Sep 2019 23:30 )  x     / 0.03 ng/mL / x     / x     / x      CARDIAC MARKERS ( 01 Sep 2019 16:03 )  x     / 0.03 ng/mL / x     / x     / x              Culture - Blood (collected 02 Sep 2019 04:23)  Source: .Blood None  Preliminary Report (03 Sep 2019 11:14):    No growth to date.    Culture - Urine (collected 01 Sep 2019 17:10)  Source: .Urine Clean Catch (Midstream)  Final Report (03 Sep 2019 11:14):    No growth    Culture - Blood (collected 01 Sep 2019 15:00)  Source: .Blood Blood-Peripheral  Preliminary Report (02 Sep 2019 20:01):    No growth to date.    Culture - Blood (collected 01 Sep 2019 15:00)  Source: .Blood Blood-Peripheral  Preliminary Report (02 Sep 2019 20:01):    No growth to date.      PHYSICAL EXAM:  GENERAL: NAD, lying in bed comfortably  HEAD:  Atraumatic, Normocephalic  EYES: EOMI, PERRLA, conjunctiva and sclera clear  ENT: Moist mucous membranes  NECK: Supple, No JVD  CHEST/LUNG: Clear to auscultation bilaterally; No rales, rhonchi, wheezing, or rubs. Unlabored respirations  HEART: Regular rate and rhythm; No murmurs, rubs, or gallops  ABDOMEN: Bowel sounds present; Soft, Nontender, Nondistended. No hepatomegally  EXTREMITIES:  2+ Peripheral Pulses, brisk capillary refill. No clubbing, cyanosis, or edema  NERVOUS SYSTEM:  Alert & Oriented X3, speech clear. No deficits   MSK: FROM all 4 extremities, full and equal strength  SKIN: No rashes or lesions Patient is a 69y old  Male who presents with a chief complaint of Hypotension (03 Sep 2019 14:14)      HPI:  This patient is 68 y/o M with PMH of A-fib on Coumadin, HFrEF (LVEF 17%) s/p AICD, HTN, gout, CKD III, BPH, Liver cirrhosis secondary to CHF p/w hypotension. As per family patients BP usually is on the lower side around systolic of 80. In ED patients BP was 66/39 patient received Cefepime and vancomycin, Hydrocortisone 100mg, and 1 L NS, and was started on levophed. Nephrology has been consulted for Acute Kidney Injury and refractory hyperkalemia.   Patients baseline Cr is 2.1. Today Cr is 3.2  Output is down to 15CC/Hr   Potassium 6.2 down from 6.5 despite receiving D50, insulin and Kayexalate   Hyponatremia most likely secondary to renal failure and Cirrhosis   On Levophed and Vasopressin maintaining the MAP > 60   No signs of Sepsis. Possible Cardiogenic shock   Metabolic Acidosis plus respiratory acidosis   Hypermagnesemia         PAST MEDICAL & SURGICAL HISTORY:  BPH (benign prostatic hyperplasia)  CKD (chronic kidney disease)  Osler-Weber-Rendu disease  Atrial fibrillation  HTN (hypertension)  Hepatic cirrhosis, unspecified hepatic cirrhosis type, unspecified whether ascites present  Gout, unspecified cause, unspecified chronicity, unspecified site  Anemia, unspecified type  Congestive heart failure, unspecified HF chronicity, unspecified heart failure type  S/P implantation of automatic cardioverter/defibrillator (AICD)      Home Medications:  allopurinol 100 mg oral tablet: 1 tab(s) orally once a day (01 Sep 2019 20:06)  Colcrys 0.6 mg oral tablet: 1 tab(s) orally once a day (01 Sep 2019 20:06)  Coumadin 1 mg oral tablet: 1 tab(s) orally once a day (01 Sep 2019 20:06)  finasteride 5 mg oral tablet: 1 tab(s) orally once a day (01 Sep 2019 20:06)  furosemide 40 mg oral tablet: 1 tab(s) orally 2 times a day (01 Sep 2019 20:06)  lactulose 10 g/15 mL oral solution: 30 milliliter(s) orally 2 times a day (01 Sep 2019 20:06)  Metoprolol Tartrate 25 mg oral tablet: 1.5 tab(s) orally 2 times a day (01 Sep 2019 20:06)  pantoprazole 40 mg oral delayed release tablet: 1 tab(s) orally once a day (01 Sep 2019 20:06)  sotalol 80 mg oral tablet: 0.5 tab(s) orally 2 times a day (01 Sep 2019 20:06)  spironolactone 25 mg oral tablet: 2 tab(s) orally once a day (01 Sep 2019 20:06)  tamsulosin 0.4 mg oral capsule: 1 cap(s) orally once a day (at bedtime) (01 Sep 2019 20:06)      No Known Allergies      FAMILY HISTORY  FH: hypertension    Hospital Medications   allopurinol 100 milliGRAM(s) Oral daily  cefepime   IVPB      cefepime   IVPB 2000 milliGRAM(s) IV Intermittent every 12 hours  chlorhexidine 4% Liquid 1 Application(s) Topical <User Schedule>  lactulose Syrup 20 Gram(s) Enteral Tube four times a day  metroNIDAZOLE    Tablet 500 milliGRAM(s) Oral every 8 hours  midodrine 10 milliGRAM(s) Oral every 8 hours  norepinephrine Infusion 0.05 MICROgram(s)/kG/Min IV Continuous <Continuous>  pantoprazole    Tablet 40 milliGRAM(s) Oral before breakfast  rifaximin 550 milliGRAM(s) Oral two times a day  vasopressin Infusion 0.04 Unit(s)/Min IV Continuous <Continuous>      Vital Signs Last 24 Hrs  T(C): 36.4 (03 Sep 2019 12:00), Max: 37.4 (02 Sep 2019 16:15)  T(F): 97.6 (03 Sep 2019 12:00), Max: 99.3 (02 Sep 2019 16:15)  HR: 116 (03 Sep 2019 15:00) (102 - 122)  BP: 76/52 (03 Sep 2019 15:00) (64/54 - 102/65)  BP(mean): 58 (03 Sep 2019 15:00) (49 - 83)  RR: 29 (03 Sep 2019 15:00) (22 - 41)  SpO2: 92% (03 Sep 2019 15:00) (85% - 98%)    I&O's Summary    02 Sep 2019 07:01  -  03 Sep 2019 07:00  --------------------------------------------------------  IN: 2282.1 mL / OUT: 400 mL / NET: 1882.1 mL    03 Sep 2019 07:01  -  03 Sep 2019 15:06  --------------------------------------------------------  IN: 992.7 mL / OUT: 85 mL / NET: 907.7 mL                              15.1   8.80  )-----------( 63       ( 03 Sep 2019 04:50 )             44.0       09-03    131<L>  |  95<L>  |  56<H>  ----------------------------<  129<H>  6.2<HH>   |  19  |  3.2<H>    Ca    9.7      03 Sep 2019 10:50  Phos  6.2     09-03  Mg     3.1     09-03    TPro  6.3  /  Alb  3.0<L>  /  TBili  9.0<H>  /  DBili  6.4<H>  /  AST  97<H>  /  ALT  52<H>  /  AlkPhos  153<H>  09-03      CARDIAC MARKERS ( 03 Sep 2019 06:00 )  x     / 0.04 ng/mL / x     / x     / x      CARDIAC MARKERS ( 02 Sep 2019 04:40 )  x     / 0.03 ng/mL / x     / x     / x      CARDIAC MARKERS ( 01 Sep 2019 23:30 )  x     / 0.03 ng/mL / x     / x     / x      CARDIAC MARKERS ( 01 Sep 2019 16:03 )  x     / 0.03 ng/mL / x     / x     / x              Culture - Blood (collected 02 Sep 2019 04:23)  Source: .Blood None  Preliminary Report (03 Sep 2019 11:14):    No growth to date.    Culture - Urine (collected 01 Sep 2019 17:10)  Source: .Urine Clean Catch (Midstream)  Final Report (03 Sep 2019 11:14):    No growth    Culture - Blood (collected 01 Sep 2019 15:00)  Source: .Blood Blood-Peripheral  Preliminary Report (02 Sep 2019 20:01):    No growth to date.    Culture - Blood (collected 01 Sep 2019 15:00)  Source: .Blood Blood-Peripheral  Preliminary Report (02 Sep 2019 20:01):    No growth to date.          General: ILL looking  HEENT: PANFILO  Lymphatic system: No cervical LN   Lungs: Bilateral BS, dec both abses  Cardiovascular: LAM 3/6  Gastrointestinal: Soft, Positive BS, mild tenderness  Extremities: No clubbing.  Moves extremities.  Full Range of motion   Skin: Mottled extremities  Neurological: Lethargic

## 2019-09-03 NOTE — PROGRESS NOTE ADULT - ASSESSMENT
IMPRESSION:    Shock likely cardiogenic  HF REF  Cholecystitis?  HO Afib on Coumadin     PLAN:    CNS: No depressants    HEENT: Oral care    PULMONARY:  HOB @ 45 degrees.  Wean O2 as tolerated     CARDIOVASCULAR:  On levo/vaso.  Cardiology Follow up Dr Molina    GI: GI prophylaxis.  NPO.  GI evaluation and surgery    RENAL:  Follow up lytes.  Correct as needed. Renal eval    INFECTIOUS DISEASE: Follow up cultures.  Continue ABX until cultures     HEMATOLOGICAL:  DVT prophylaxis.  RLE venous / Arterial duplex.      ENDOCRINE:  Follow up FS.  Insulin protocol if needed.    MUSCULOSKELETAL:  Bed chair position     Advance directives    Overall prognosis poor IMPRESSION:    Shock likely cardiogenic  HF REF  Cholecystitis r/o neg HIDA  HO Afib on Coumadin   thrombocytopenia  renal failure    PLAN:    CNS: No depressants    HEENT: Oral care    PULMONARY:  HOB @ 45 degrees.  Wean O2 as tolerated , repeat ABG stat, might need intuabtion    CARDIOVASCULAR:  On levo/vaso.  Cardiology Follow up Dr Molina. R heart cath    GI: GI prophylaxis.  NPO.  GI evaluation and surgery    RENAL:  Follow up lytes.  Correct as needed. Renal eval    INFECTIOUS DISEASE: Follow up cultures.  Continue ABX until cultures     HEMATOLOGICAL:  DVT prophylaxis.  RLE venous / Arterial duplex.      ENDOCRINE:  Follow up FS.  Insulin protocol if needed.    MUSCULOSKELETAL:  Bed chair position     Advance directives    Overall prognosis poor

## 2019-09-04 LAB
AFP-TM SERPL-MCNC: 1.8 NG/ML — SIGNIFICANT CHANGE UP
ALBUMIN SERPL ELPH-MCNC: 2.5 G/DL — LOW (ref 3.5–5.2)
ALBUMIN SERPL ELPH-MCNC: 2.6 G/DL — LOW (ref 3.5–5.2)
ALP SERPL-CCNC: 118 U/L — HIGH (ref 30–115)
ALP SERPL-CCNC: 141 U/L — HIGH (ref 30–115)
ALT FLD-CCNC: 116 U/L — HIGH (ref 0–41)
ALT FLD-CCNC: 87 U/L — HIGH (ref 0–41)
AMMONIA BLD-MCNC: 75 UMOL/L — HIGH (ref 11–55)
ANA PAT FLD IF-IMP: ABNORMAL
ANA TITR SER: ABNORMAL
ANION GAP SERPL CALC-SCNC: 14 MMOL/L — SIGNIFICANT CHANGE UP (ref 7–14)
ANION GAP SERPL CALC-SCNC: 15 MMOL/L — HIGH (ref 7–14)
ANION GAP SERPL CALC-SCNC: 15 MMOL/L — HIGH (ref 7–14)
APPEARANCE UR: ABNORMAL
APTT BLD: 56.4 SEC — HIGH (ref 27–39.2)
AST SERPL-CCNC: 142 U/L — HIGH (ref 0–41)
AST SERPL-CCNC: 252 U/L — HIGH (ref 0–41)
BASOPHILS # BLD AUTO: 0.01 K/UL — SIGNIFICANT CHANGE UP (ref 0–0.2)
BASOPHILS NFR BLD AUTO: 0.1 % — SIGNIFICANT CHANGE UP (ref 0–1)
BILIRUB DIRECT SERPL-MCNC: 6.1 MG/DL — HIGH (ref 0–0.2)
BILIRUB INDIRECT FLD-MCNC: 2.1 MG/DL — HIGH (ref 0.2–1.2)
BILIRUB SERPL-MCNC: 7.7 MG/DL — HIGH (ref 0.2–1.2)
BILIRUB SERPL-MCNC: 8.2 MG/DL — HIGH (ref 0.2–1.2)
BILIRUB UR-MCNC: ABNORMAL
BUN SERPL-MCNC: 63 MG/DL — CRITICAL HIGH (ref 10–20)
BUN SERPL-MCNC: 63 MG/DL — CRITICAL HIGH (ref 10–20)
BUN SERPL-MCNC: 64 MG/DL — CRITICAL HIGH (ref 10–20)
CALCIUM SERPL-MCNC: 8.5 MG/DL — SIGNIFICANT CHANGE UP (ref 8.5–10.1)
CALCIUM SERPL-MCNC: 8.8 MG/DL — SIGNIFICANT CHANGE UP (ref 8.5–10.1)
CALCIUM SERPL-MCNC: 9.1 MG/DL — SIGNIFICANT CHANGE UP (ref 8.5–10.1)
CANCER AG19-9 SERPL-ACNC: 44 U/ML — HIGH
CEA SERPL-MCNC: 9.2 NG/ML — HIGH (ref 0–3.8)
CERULOPLASMIN SERPL-MCNC: 38 MG/DL — HIGH (ref 15–30)
CHLORIDE SERPL-SCNC: 98 MMOL/L — SIGNIFICANT CHANGE UP (ref 98–110)
CHLORIDE SERPL-SCNC: 99 MMOL/L — SIGNIFICANT CHANGE UP (ref 98–110)
CHLORIDE SERPL-SCNC: 99 MMOL/L — SIGNIFICANT CHANGE UP (ref 98–110)
CMV IGG FLD QL: >10 U/ML — HIGH
CMV IGG SERPL-IMP: POSITIVE
CMV IGM FLD-ACNC: <8 AU/ML — SIGNIFICANT CHANGE UP
CMV IGM SERPL QL: NEGATIVE — SIGNIFICANT CHANGE UP
CO2 SERPL-SCNC: 15 MMOL/L — LOW (ref 17–32)
CO2 SERPL-SCNC: 17 MMOL/L — SIGNIFICANT CHANGE UP (ref 17–32)
CO2 SERPL-SCNC: 20 MMOL/L — SIGNIFICANT CHANGE UP (ref 17–32)
COLOR SPEC: ABNORMAL
CREAT SERPL-MCNC: 3.4 MG/DL — HIGH (ref 0.7–1.5)
CREAT SERPL-MCNC: 3.6 MG/DL — HIGH (ref 0.7–1.5)
CREAT SERPL-MCNC: 3.9 MG/DL — HIGH (ref 0.7–1.5)
DIFF PNL FLD: ABNORMAL
EBV EA AB SER IA-ACNC: 26.9 U/ML — HIGH
EBV EA AB TITR SER IF: POSITIVE
EBV EA IGG SER-ACNC: POSITIVE
EBV NA IGG SER IA-ACNC: 116 U/ML — HIGH
EBV PATRN SPEC IB-IMP: SIGNIFICANT CHANGE UP
EBV VCA IGG AVIDITY SER QL IA: POSITIVE
EBV VCA IGM SER IA-ACNC: 168 U/ML — HIGH
EBV VCA IGM SER IA-ACNC: <10 U/ML — SIGNIFICANT CHANGE UP
EBV VCA IGM TITR FLD: NEGATIVE — SIGNIFICANT CHANGE UP
EOSINOPHIL # BLD AUTO: 0 K/UL — SIGNIFICANT CHANGE UP (ref 0–0.7)
EOSINOPHIL NFR BLD AUTO: 0 % — SIGNIFICANT CHANGE UP (ref 0–8)
FERRITIN SERPL-MCNC: 315 NG/ML — SIGNIFICANT CHANGE UP (ref 30–400)
GAS PNL BLDA: SIGNIFICANT CHANGE UP
GAS PNL BLDA: SIGNIFICANT CHANGE UP
GLUCOSE BLDC GLUCOMTR-MCNC: 108 MG/DL — HIGH (ref 70–99)
GLUCOSE BLDC GLUCOMTR-MCNC: 137 MG/DL — HIGH (ref 70–99)
GLUCOSE BLDC GLUCOMTR-MCNC: 69 MG/DL — LOW (ref 70–99)
GLUCOSE BLDC GLUCOMTR-MCNC: 95 MG/DL — SIGNIFICANT CHANGE UP (ref 70–99)
GLUCOSE SERPL-MCNC: 133 MG/DL — HIGH (ref 70–99)
GLUCOSE SERPL-MCNC: 182 MG/DL — HIGH (ref 70–99)
GLUCOSE SERPL-MCNC: 229 MG/DL — HIGH (ref 70–99)
GLUCOSE UR QL: NEGATIVE — SIGNIFICANT CHANGE UP
HCT VFR BLD CALC: 44.3 % — SIGNIFICANT CHANGE UP (ref 42–52)
HCT VFR BLD CALC: 44.8 % — SIGNIFICANT CHANGE UP (ref 42–52)
HGB BLD-MCNC: 15 G/DL — SIGNIFICANT CHANGE UP (ref 14–18)
HGB BLD-MCNC: 15.1 G/DL — SIGNIFICANT CHANGE UP (ref 14–18)
HIV 1+2 AB+HIV1 P24 AG SERPL QL IA: SIGNIFICANT CHANGE UP
IMM GRANULOCYTES NFR BLD AUTO: 0.9 % — HIGH (ref 0.1–0.3)
INR BLD: 2.35 RATIO — HIGH (ref 0.65–1.3)
INR BLD: 2.56 RATIO — HIGH (ref 0.65–1.3)
KETONES UR-MCNC: SIGNIFICANT CHANGE UP
LACTATE SERPL-SCNC: 4.3 MMOL/L — CRITICAL HIGH (ref 0.5–2.2)
LEUKOCYTE ESTERASE UR-ACNC: ABNORMAL
LYMPHOCYTES # BLD AUTO: 0.21 K/UL — LOW (ref 1.2–3.4)
LYMPHOCYTES # BLD AUTO: 2.1 % — LOW (ref 20.5–51.1)
MAGNESIUM SERPL-MCNC: 2.8 MG/DL — HIGH (ref 1.8–2.4)
MCHC RBC-ENTMCNC: 32.5 PG — HIGH (ref 27–31)
MCHC RBC-ENTMCNC: 32.5 PG — HIGH (ref 27–31)
MCHC RBC-ENTMCNC: 33.7 G/DL — SIGNIFICANT CHANGE UP (ref 32–37)
MCHC RBC-ENTMCNC: 33.9 G/DL — SIGNIFICANT CHANGE UP (ref 32–37)
MCV RBC AUTO: 95.9 FL — HIGH (ref 80–94)
MCV RBC AUTO: 96.6 FL — HIGH (ref 80–94)
MITOCHONDRIA AB SER-ACNC: SIGNIFICANT CHANGE UP
MONOCYTES # BLD AUTO: 0.47 K/UL — SIGNIFICANT CHANGE UP (ref 0.1–0.6)
MONOCYTES NFR BLD AUTO: 4.7 % — SIGNIFICANT CHANGE UP (ref 1.7–9.3)
NEUTROPHILS # BLD AUTO: 9.17 K/UL — HIGH (ref 1.4–6.5)
NEUTROPHILS NFR BLD AUTO: 92.2 % — HIGH (ref 42.2–75.2)
NITRITE UR-MCNC: NEGATIVE — SIGNIFICANT CHANGE UP
NRBC # BLD: 0 /100 WBCS — SIGNIFICANT CHANGE UP (ref 0–0)
NRBC # BLD: 0 /100 WBCS — SIGNIFICANT CHANGE UP (ref 0–0)
PH UR: 5.5 — SIGNIFICANT CHANGE UP (ref 5–8)
PHOSPHATE SERPL-MCNC: 6.3 MG/DL — HIGH (ref 2.1–4.9)
PLATELET # BLD AUTO: 81 K/UL — LOW (ref 130–400)
PLATELET # BLD AUTO: 86 K/UL — LOW (ref 130–400)
POTASSIUM SERPL-MCNC: 5.8 MMOL/L — HIGH (ref 3.5–5)
POTASSIUM SERPL-MCNC: 6.2 MMOL/L — CRITICAL HIGH (ref 3.5–5)
POTASSIUM SERPL-MCNC: 7.9 MMOL/L — CRITICAL HIGH (ref 3.5–5)
POTASSIUM SERPL-SCNC: 5.8 MMOL/L — HIGH (ref 3.5–5)
POTASSIUM SERPL-SCNC: 6.2 MMOL/L — CRITICAL HIGH (ref 3.5–5)
POTASSIUM SERPL-SCNC: 7.9 MMOL/L — CRITICAL HIGH (ref 3.5–5)
PROT SERPL-MCNC: 5.9 G/DL — LOW (ref 6–8)
PROT SERPL-MCNC: 6 G/DL — SIGNIFICANT CHANGE UP (ref 6–8)
PROT UR-MCNC: ABNORMAL
PROTHROM AB SERPL-ACNC: 26.8 SEC — HIGH (ref 9.95–12.87)
PROTHROM AB SERPL-ACNC: 29.2 SEC — HIGH (ref 9.95–12.87)
RBC # BLD: 4.62 M/UL — LOW (ref 4.7–6.1)
RBC # BLD: 4.64 M/UL — LOW (ref 4.7–6.1)
RBC # FLD: 23.8 % — HIGH (ref 11.5–14.5)
RBC # FLD: 23.8 % — HIGH (ref 11.5–14.5)
SMOOTH MUSCLE AB SER-ACNC: ABNORMAL
SODIUM SERPL-SCNC: 128 MMOL/L — LOW (ref 135–146)
SODIUM SERPL-SCNC: 131 MMOL/L — LOW (ref 135–146)
SODIUM SERPL-SCNC: 133 MMOL/L — LOW (ref 135–146)
SP GR SPEC: 1.02 — SIGNIFICANT CHANGE UP (ref 1.01–1.02)
TROPONIN T SERPL-MCNC: 0.05 NG/ML — CRITICAL HIGH
UROBILINOGEN FLD QL: SIGNIFICANT CHANGE UP
WBC # BLD: 10 K/UL — SIGNIFICANT CHANGE UP (ref 4.8–10.8)
WBC # BLD: 9.95 K/UL — SIGNIFICANT CHANGE UP (ref 4.8–10.8)
WBC # FLD AUTO: 10 K/UL — SIGNIFICANT CHANGE UP (ref 4.8–10.8)
WBC # FLD AUTO: 9.95 K/UL — SIGNIFICANT CHANGE UP (ref 4.8–10.8)

## 2019-09-04 PROCEDURE — 99232 SBSQ HOSP IP/OBS MODERATE 35: CPT

## 2019-09-04 PROCEDURE — 99223 1ST HOSP IP/OBS HIGH 75: CPT

## 2019-09-04 PROCEDURE — 93284 PRGRMG EVAL IMPLANTABLE DFB: CPT | Mod: 26

## 2019-09-04 PROCEDURE — 71045 X-RAY EXAM CHEST 1 VIEW: CPT | Mod: 26

## 2019-09-04 RX ORDER — DEXTROSE 50 % IN WATER 50 %
50 SYRINGE (ML) INTRAVENOUS ONCE
Refills: 0 | Status: COMPLETED | OUTPATIENT
Start: 2019-09-04 | End: 2019-09-04

## 2019-09-04 RX ORDER — CALCIUM GLUCONATE 100 MG/ML
1 VIAL (ML) INTRAVENOUS ONCE
Refills: 0 | Status: DISCONTINUED | OUTPATIENT
Start: 2019-09-04 | End: 2019-09-04

## 2019-09-04 RX ORDER — INSULIN HUMAN 100 [IU]/ML
10 INJECTION, SOLUTION SUBCUTANEOUS ONCE
Refills: 0 | Status: COMPLETED | OUTPATIENT
Start: 2019-09-04 | End: 2019-09-04

## 2019-09-04 RX ORDER — DEXTROSE 50 % IN WATER 50 %
50 SYRINGE (ML) INTRAVENOUS ONCE
Refills: 0 | Status: DISCONTINUED | OUTPATIENT
Start: 2019-09-04 | End: 2019-09-04

## 2019-09-04 RX ORDER — CALCIUM GLUCONATE 100 MG/ML
1 VIAL (ML) INTRAVENOUS ONCE
Refills: 0 | Status: COMPLETED | OUTPATIENT
Start: 2019-09-04 | End: 2019-09-04

## 2019-09-04 RX ORDER — MEROPENEM 1 G/30ML
500 INJECTION INTRAVENOUS EVERY 24 HOURS
Refills: 0 | Status: DISCONTINUED | OUTPATIENT
Start: 2019-09-04 | End: 2019-09-04

## 2019-09-04 RX ORDER — SODIUM BICARBONATE 1 MEQ/ML
50 SYRINGE (ML) INTRAVENOUS ONCE
Refills: 0 | Status: COMPLETED | OUTPATIENT
Start: 2019-09-04 | End: 2019-09-04

## 2019-09-04 RX ORDER — CEFEPIME 1 G/1
1000 INJECTION, POWDER, FOR SOLUTION INTRAMUSCULAR; INTRAVENOUS EVERY 12 HOURS
Refills: 0 | Status: DISCONTINUED | OUTPATIENT
Start: 2019-09-04 | End: 2019-09-04

## 2019-09-04 RX ORDER — SODIUM BICARBONATE 1 MEQ/ML
50 SYRINGE (ML) INTRAVENOUS ONCE
Refills: 0 | Status: DISCONTINUED | OUTPATIENT
Start: 2019-09-04 | End: 2019-09-04

## 2019-09-04 RX ORDER — FENTANYL CITRATE 50 UG/ML
50 INJECTION INTRAVENOUS
Refills: 0 | Status: DISCONTINUED | OUTPATIENT
Start: 2019-09-04 | End: 2019-09-04

## 2019-09-04 RX ORDER — ACETAMINOPHEN 500 MG
650 TABLET ORAL ONCE
Refills: 0 | Status: COMPLETED | OUTPATIENT
Start: 2019-09-04 | End: 2019-09-04

## 2019-09-04 RX ORDER — CALCIUM GLUCONATE 100 MG/ML
2 VIAL (ML) INTRAVENOUS ONCE
Refills: 0 | Status: COMPLETED | OUTPATIENT
Start: 2019-09-04 | End: 2019-09-04

## 2019-09-04 RX ORDER — AMIODARONE HYDROCHLORIDE 400 MG/1
0.03 TABLET ORAL
Qty: 900 | Refills: 0 | Status: DISCONTINUED | OUTPATIENT
Start: 2019-09-04 | End: 2019-09-04

## 2019-09-04 RX ORDER — FENTANYL CITRATE 50 UG/ML
50 INJECTION INTRAVENOUS ONCE
Refills: 0 | Status: DISCONTINUED | OUTPATIENT
Start: 2019-09-04 | End: 2019-09-04

## 2019-09-04 RX ORDER — DEXTROSE 50 % IN WATER 50 %
25 SYRINGE (ML) INTRAVENOUS ONCE
Refills: 0 | Status: COMPLETED | OUTPATIENT
Start: 2019-09-04 | End: 2019-09-04

## 2019-09-04 RX ORDER — AMIODARONE HYDROCHLORIDE 400 MG/1
1 TABLET ORAL
Qty: 900 | Refills: 0 | Status: DISCONTINUED | OUTPATIENT
Start: 2019-09-04 | End: 2019-09-04

## 2019-09-04 RX ORDER — NOREPINEPHRINE BITARTRATE/D5W 8 MG/250ML
0.05 PLASTIC BAG, INJECTION (ML) INTRAVENOUS
Qty: 32 | Refills: 0 | Status: DISCONTINUED | OUTPATIENT
Start: 2019-09-04 | End: 2019-09-07

## 2019-09-04 RX ORDER — LIDOCAINE HCL 20 MG/ML
100 VIAL (ML) INJECTION ONCE
Refills: 0 | Status: COMPLETED | OUTPATIENT
Start: 2019-09-04 | End: 2019-09-04

## 2019-09-04 RX ORDER — AMIODARONE HYDROCHLORIDE 400 MG/1
150 TABLET ORAL ONCE
Refills: 0 | Status: COMPLETED | OUTPATIENT
Start: 2019-09-04 | End: 2019-09-04

## 2019-09-04 RX ORDER — AMIODARONE HYDROCHLORIDE 400 MG/1
0.5 TABLET ORAL
Qty: 900 | Refills: 0 | Status: DISCONTINUED | OUTPATIENT
Start: 2019-09-04 | End: 2019-09-07

## 2019-09-04 RX ORDER — VANCOMYCIN HCL 1 G
750 VIAL (EA) INTRAVENOUS ONCE
Refills: 0 | Status: COMPLETED | OUTPATIENT
Start: 2019-09-04 | End: 2019-09-04

## 2019-09-04 RX ORDER — SODIUM POLYSTYRENE SULFONATE 4.1 MEQ/G
30 POWDER, FOR SUSPENSION ORAL ONCE
Refills: 0 | Status: COMPLETED | OUTPATIENT
Start: 2019-09-04 | End: 2019-09-04

## 2019-09-04 RX ORDER — MEROPENEM 1 G/30ML
500 INJECTION INTRAVENOUS EVERY 12 HOURS
Refills: 0 | Status: DISCONTINUED | OUTPATIENT
Start: 2019-09-04 | End: 2019-09-06

## 2019-09-04 RX ADMIN — CHLORHEXIDINE GLUCONATE 15 MILLILITER(S): 213 SOLUTION TOPICAL at 05:13

## 2019-09-04 RX ADMIN — Medication 50 MILLIEQUIVALENT(S): at 09:50

## 2019-09-04 RX ADMIN — Medication 200 GRAM(S): at 09:44

## 2019-09-04 RX ADMIN — CEFEPIME 100 MILLIGRAM(S): 1 INJECTION, POWDER, FOR SOLUTION INTRAMUSCULAR; INTRAVENOUS at 05:15

## 2019-09-04 RX ADMIN — Medication 4.67 MICROGRAM(S)/KG/MIN: at 09:50

## 2019-09-04 RX ADMIN — Medication 50 GRAM(S): at 20:53

## 2019-09-04 RX ADMIN — LACTULOSE 20 GRAM(S): 10 SOLUTION ORAL at 12:15

## 2019-09-04 RX ADMIN — Medication 650 MILLIGRAM(S): at 17:44

## 2019-09-04 RX ADMIN — Medication 650 MILLIGRAM(S): at 14:42

## 2019-09-04 RX ADMIN — CHLORHEXIDINE GLUCONATE 1 APPLICATION(S): 213 SOLUTION TOPICAL at 05:12

## 2019-09-04 RX ADMIN — PANTOPRAZOLE SODIUM 40 MILLIGRAM(S): 20 TABLET, DELAYED RELEASE ORAL at 06:15

## 2019-09-04 RX ADMIN — LACTULOSE 20 GRAM(S): 10 SOLUTION ORAL at 05:12

## 2019-09-04 RX ADMIN — Medication 25 GRAM(S): at 14:59

## 2019-09-04 RX ADMIN — Medication 650 MILLIGRAM(S): at 15:12

## 2019-09-04 RX ADMIN — LACTULOSE 20 GRAM(S): 10 SOLUTION ORAL at 02:57

## 2019-09-04 RX ADMIN — MEROPENEM 100 MILLIGRAM(S): 1 INJECTION INTRAVENOUS at 18:04

## 2019-09-04 RX ADMIN — Medication 500 MILLIGRAM(S): at 05:13

## 2019-09-04 RX ADMIN — Medication 4.67 MICROGRAM(S)/KG/MIN: at 03:19

## 2019-09-04 RX ADMIN — Medication 100 MILLIGRAM(S): at 10:04

## 2019-09-04 RX ADMIN — Medication 650 MILLIGRAM(S): at 18:14

## 2019-09-04 RX ADMIN — AMIODARONE HYDROCHLORIDE 16.67 MG/MIN: 400 TABLET ORAL at 11:33

## 2019-09-04 RX ADMIN — Medication 500 MILLIGRAM(S): at 13:49

## 2019-09-04 RX ADMIN — Medication 650 MILLIGRAM(S): at 22:17

## 2019-09-04 RX ADMIN — MEROPENEM 100 MILLIGRAM(S): 1 INJECTION INTRAVENOUS at 23:03

## 2019-09-04 RX ADMIN — Medication 200 GRAM(S): at 02:00

## 2019-09-04 RX ADMIN — Medication 100 MILLIGRAM(S): at 12:15

## 2019-09-04 RX ADMIN — Medication 50 MILLIEQUIVALENT(S): at 09:45

## 2019-09-04 RX ADMIN — SODIUM POLYSTYRENE SULFONATE 30 GRAM(S): 4.1 POWDER, FOR SUSPENSION ORAL at 20:53

## 2019-09-04 RX ADMIN — INSULIN HUMAN 10 UNIT(S): 100 INJECTION, SOLUTION SUBCUTANEOUS at 20:53

## 2019-09-04 RX ADMIN — Medication 250 MILLIGRAM(S): at 16:15

## 2019-09-04 RX ADMIN — CHLORHEXIDINE GLUCONATE 15 MILLILITER(S): 213 SOLUTION TOPICAL at 18:03

## 2019-09-04 RX ADMIN — AMIODARONE HYDROCHLORIDE 1 MG/MIN: 400 TABLET ORAL at 02:25

## 2019-09-04 RX ADMIN — Medication 50 MILLIEQUIVALENT(S): at 09:34

## 2019-09-04 RX ADMIN — Medication 4.67 MICROGRAM(S)/KG/MIN: at 23:44

## 2019-09-04 RX ADMIN — Medication 650 MILLIGRAM(S): at 22:47

## 2019-09-04 RX ADMIN — VASOPRESSIN 2.4 UNIT(S)/MIN: 20 INJECTION INTRAVENOUS at 07:00

## 2019-09-04 RX ADMIN — Medication 50 MILLILITER(S): at 03:33

## 2019-09-04 RX ADMIN — Medication 200 GRAM(S): at 09:45

## 2019-09-04 RX ADMIN — Medication 200 GRAM(S): at 09:35

## 2019-09-04 RX ADMIN — MIDODRINE HYDROCHLORIDE 10 MILLIGRAM(S): 2.5 TABLET ORAL at 21:14

## 2019-09-04 RX ADMIN — LACTULOSE 20 GRAM(S): 10 SOLUTION ORAL at 23:44

## 2019-09-04 RX ADMIN — MIDODRINE HYDROCHLORIDE 10 MILLIGRAM(S): 2.5 TABLET ORAL at 13:49

## 2019-09-04 RX ADMIN — LACTULOSE 20 GRAM(S): 10 SOLUTION ORAL at 18:02

## 2019-09-04 RX ADMIN — AMIODARONE HYDROCHLORIDE 618 MILLIGRAM(S): 400 TABLET ORAL at 02:20

## 2019-09-04 RX ADMIN — Medication 200 GRAM(S): at 22:19

## 2019-09-04 RX ADMIN — INSULIN HUMAN 10 UNIT(S): 100 INJECTION, SOLUTION SUBCUTANEOUS at 03:34

## 2019-09-04 NOTE — CONSULT NOTE ADULT - SUBJECTIVE AND OBJECTIVE BOX
Patient is a 69y old  Male who presents with a chief complaint of Hypotension (03 Sep 2019 15:05)    HPI:  68 y/o M with PMH of A-fib on Coumadin, HFrEF (LVEF 17% 12/2018) s/p AICD, HTN, gout, CKD III, BPH, Liver cirrhosis secondary to CHF p/w hypotension.     As per son at bedside, this AM pt was found to be slightly more confused than normal, not responding appropriately to questions. He checked his blood pressure and was found to have SBP in the 70s. AS per son, pt is hypotensive at baseline with SBP 80-90's. Son reports pt having a fall the night prior preceded by lightheadedness and dizziness. HE denies any loss of consciousness, head trauma, bowel or urinary incontinence or confusion after the episode. Pt denies any chest pain, palpitations, headaches, or lower extremity edema.     in the ED, VS: BP 66/39 HR 84 RR 17 T 98.3. Pt received Cefepime and vancomycin, Hydrocortisone 100mg, and 1 L NS, and was started on levophed through R IJ. (01 Sep 2019 19:34)    cardiology fellow addendum:      ROS:  All other systems reviewed and are negative    PAST MEDICAL & SURGICAL HISTORY  BPH (benign prostatic hyperplasia)  CKD (chronic kidney disease)  Osler-Weber-Rendu disease  Atrial fibrillation  HTN (hypertension)  Hepatic cirrhosis, unspecified hepatic cirrhosis type, unspecified whether ascites present  Gout, unspecified cause, unspecified chronicity, unspecified site  Anemia, unspecified type  Congestive heart failure, unspecified HF chronicity, unspecified heart failure type  S/P implantation of automatic cardioverter/defibrillator (AICD)      FAMILY HISTORY:  FAMILY HISTORY:  FH: hypertension      SOCIAL HISTORY:  unable to obtain.   []smoker  []Alcohol  []Drug    ALLERGIES:  No Known Allergies      MEDICATIONS:  MEDICATIONS  (STANDING):  allopurinol 100 milliGRAM(s) Oral daily  amiodarone Infusion 1 mG/Min (33.333 mL/Hr) IV Continuous <Continuous>  cefepime   IVPB      cefepime   IVPB 2000 milliGRAM(s) IV Intermittent every 12 hours  chlorhexidine 0.12% Liquid 15 milliLiter(s) Oral Mucosa every 12 hours  chlorhexidine 4% Liquid 1 Application(s) Topical <User Schedule>  fentaNYL   Infusion. 0.5 MICROgram(s)/kG/Hr (4.985 mL/Hr) IV Continuous <Continuous>  lactulose Syrup 20 Gram(s) Enteral Tube four times a day  metroNIDAZOLE    Tablet 500 milliGRAM(s) Oral every 8 hours  midodrine 10 milliGRAM(s) Oral every 8 hours  norepinephrine Infusion 0.05 MICROgram(s)/kG/Min (4.673 mL/Hr) IV Continuous <Continuous>  pantoprazole    Tablet 40 milliGRAM(s) Oral before breakfast  rifaximin 550 milliGRAM(s) Oral two times a day  vasopressin Infusion 0.04 Unit(s)/Min (2.4 mL/Hr) IV Continuous <Continuous>    MEDICATIONS  (PRN):      HOME MEDICATIONS:  Home Medications:  allopurinol 100 mg oral tablet: 1 tab(s) orally once a day (01 Sep 2019 20:06)  Colcrys 0.6 mg oral tablet: 1 tab(s) orally once a day (01 Sep 2019 20:06)  Coumadin 1 mg oral tablet: 1 tab(s) orally once a day (01 Sep 2019 20:06)  finasteride 5 mg oral tablet: 1 tab(s) orally once a day (01 Sep 2019 20:06)  furosemide 40 mg oral tablet: 1 tab(s) orally 2 times a day (01 Sep 2019 20:06)  lactulose 10 g/15 mL oral solution: 30 milliliter(s) orally 2 times a day (01 Sep 2019 20:06)  Metoprolol Tartrate 25 mg oral tablet: 1.5 tab(s) orally 2 times a day (01 Sep 2019 20:06)  pantoprazole 40 mg oral delayed release tablet: 1 tab(s) orally once a day (01 Sep 2019 20:06)  sotalol 80 mg oral tablet: 0.5 tab(s) orally 2 times a day (01 Sep 2019 20:06)  spironolactone 25 mg oral tablet: 2 tab(s) orally once a day (01 Sep 2019 20:06)  tamsulosin 0.4 mg oral capsule: 1 cap(s) orally once a day (at bedtime) (01 Sep 2019 20:06)      VITALS:   T(F): 100 (09-04 @ 00:00), Max: 100.1 (09-03 @ 20:00)  HR: 144 (09-04 @ 05:15) (84 - 164)  BP: 89/61 (09-04 @ 05:00) (51/41 - 107/68)  BP(mean): 76 (09-04 @ 05:00) (46 - 86)  RR: 22 (09-04 @ 05:15) (17 - 41)  SpO2: 98% (09-04 @ 05:15) (84% - 99%)    I&O's Summary    02 Sep 2019 07:01  -  03 Sep 2019 07:00  --------------------------------------------------------  IN: 2282.1 mL / OUT: 400 mL / NET: 1882.1 mL    03 Sep 2019 07:01  -  04 Sep 2019 05:20  --------------------------------------------------------  IN: 1813.7 mL / OUT: 173 mL / NET: 1640.7 mL        PHYSICAL EXAM:  GEN: intubated   HEENT: no pallor  NECK: Supple  LUNGS: + b/l air entry  CARDIOVASCULAR: S1/S2 irregular, tachy no murmurs or rubs  ABD: Soft, BS+  EXT: b/l LE pitting edema  NEURO: intubated, sedated     LABS:                        15.1   9.95  )-----------( x        ( 04 Sep 2019 04:30 )             44.8     09-04    133<L>  |  99  |  63<HH>  ----------------------------<  133<H>  6.2<HH>   |  20  |  3.6<H>    Ca    9.1      04 Sep 2019 01:15  Phos  6.2     09-03  Mg     3.1     09-03    TPro  6.3  /  Alb  2.9<L>  /  TBili  8.4<H>  /  DBili  6.2<H>  /  AST  107<H>  /  ALT  62<H>  /  AlkPhos  148<H>  09-03    PT/INR - ( 04 Sep 2019 04:30 )   PT: 29.20 sec;   INR: 2.56 ratio         PTT - ( 04 Sep 2019 01:15 )  PTT:56.4 sec  Troponin T, Serum: 0.05 ng/mL <HH> (09-04-19 @ 01:15)  Lactate, Blood: 5.9 mmol/L <HH> (09-03-19 @ 06:00)  Troponin T, Serum: 0.04 ng/mL <HH> (09-03-19 @ 06:00)    CARDIAC MARKERS ( 04 Sep 2019 01:15 )  x     / 0.05 ng/mL / x     / x     / x      CARDIAC MARKERS ( 03 Sep 2019 06:00 )  x     / 0.04 ng/mL / x     / x     / x            Troponin trend:        Hemoglobin A1C   Thyroid      RADIOLOGY:  -CXR:  < from: Xray Chest 1 View-PORTABLE IMMEDIATE (09.03.19 @ 12:01) >  Impression:      Cardiomegaly with CHF.    < end of copied text >    < from: US Abdomen Limited (09.01.19 @ 19:08) >  IMPRESSION:    Findings consistent with acute cholecystitis. Dilated common bile duct   suggests the possibility of common bile duct stone and MRCPmay be of   help for further evaluation.    Right pleural effusion.    < end of copied text >    -TTE:  < from: Transthoracic Echocardiogram (09.02.19 @ 10:44) >       Summary:   1. Left ventricular ejection fraction, by visual estimation, is <20%.   2. Severely decreased global left ventricular systolic function.   3. Mildly increased left ventricular internal cavity size.   4. Mildly increased LV wall thickness.   5. Moderate mitral valve regurgitation.   6. Moderate thickening and calcification of the anterior and posterior   mitral valve leaflets.   7. Moderate tricuspid regurgitation.   8. Mild aortic regurgitation.   9. Sclerotic aortic valve with decreased opening.  10. Estimated pulmonary artery systolic pressure is 36.6 mmHg assuming a   right atrial pressure of 5 mmHg, which is consistent with borderline   pulmonary hypertension.  11. There is mild aortic root calcification.    < end of copied text >  < from: Transthoracic Echocardiogram (09.02.19 @ 10:44) >       Summary:   1. Left ventricular ejection fraction, by visual estimation, is <20%.   2. Severely decreased global left ventricular systolic function.   3. Mildly increased left ventricular internal cavity size.   4. Mildly increased LV wall thickness.   5. Moderate mitral valve regurgitation.   6. Moderate thickening and calcification of the anterior and posterior   mitral valve leaflets.   7. Moderate tricuspid regurgitation.   8. Mild aortic regurgitation.   9. Sclerotic aortic valve with decreased opening.  10. Estimated pulmonary artery systolic pressure is 36.6 mmHg assuming a   right atrial pressure of 5 mmHg, which is consistent with borderline   pulmonary hypertension.  11. There is mild aortic root calcification.    < end of copied text >      ECG:  < from: 12 Lead ECG (09.01.19 @ 16:00) >  Diagnosis Line Atrial fibrillation  Ventricular pacing  Abnormal ECG    < end of copied text >    TELEMETRY EVENTS:  in Afib currently   episodes of sustained monomorphic VT Patient is a 69y old  Male who presents with a chief complaint of Hypotension (03 Sep 2019 15:05)    HPI:  Initial HPI: 70 y/o M with PMH of A-fib on Coumadin, HFrEF (LVEF 17% 12/2018) s/p AICD, HTN, gout, CKD III, BPH, Liver cirrhosis secondary to CHF p/w hypotension.     As per son at bedside, this AM pt was found to be slightly more confused than normal, not responding appropriately to questions. He checked his blood pressure and was found to have SBP in the 70s. AS per son, pt is hypotensive at baseline with SBP 80-90's. Son reports pt having a fall the night prior preceded by lightheadedness and dizziness. HE denies any loss of consciousness, head trauma, bowel or urinary incontinence or confusion after the episode. Pt denies any chest pain, palpitations, headaches, or lower extremity edema.     in the ED, VS: BP 66/39 HR 84 RR 17 T 98.3. Pt received Cefepime and vancomycin, Hydrocortisone 100mg, and 1 L NS, and was started on levophed through R IJ. (01 Sep 2019 19:34)    cardiology fellow addendum: Pt was admitted to ICU with diagnosis of possible cardiogenic shock. pt was evaluated by ID and surgery for suspected cholecystitis. pt also had VERO on CKD 3 and was evaluated by nephrology. pt was intubated in ICU yesterday. Last night pt had rapid Afib with intermittent episodes of sustained VT which were terminated with ICD shocks and sync cardioversion.       ROS:  All other systems reviewed and are negative    PAST MEDICAL & SURGICAL HISTORY  BPH (benign prostatic hyperplasia)  CKD (chronic kidney disease)  Osler-Weber-Rendu disease  Atrial fibrillation  HTN (hypertension)  Hepatic cirrhosis, unspecified hepatic cirrhosis type, unspecified whether ascites present  Gout, unspecified cause, unspecified chronicity, unspecified site  Anemia, unspecified type  Congestive heart failure, unspecified HF chronicity, unspecified heart failure type  S/P implantation of automatic cardioverter/defibrillator (AICD)      FAMILY HISTORY:  FAMILY HISTORY:  FH: hypertension      SOCIAL HISTORY:  unable to obtain.   []smoker  []Alcohol  []Drug    ALLERGIES:  No Known Allergies      MEDICATIONS:  MEDICATIONS  (STANDING):  allopurinol 100 milliGRAM(s) Oral daily  amiodarone Infusion 1 mG/Min (33.333 mL/Hr) IV Continuous <Continuous>  cefepime   IVPB      cefepime   IVPB 2000 milliGRAM(s) IV Intermittent every 12 hours  chlorhexidine 0.12% Liquid 15 milliLiter(s) Oral Mucosa every 12 hours  chlorhexidine 4% Liquid 1 Application(s) Topical <User Schedule>  fentaNYL   Infusion. 0.5 MICROgram(s)/kG/Hr (4.985 mL/Hr) IV Continuous <Continuous>  lactulose Syrup 20 Gram(s) Enteral Tube four times a day  metroNIDAZOLE    Tablet 500 milliGRAM(s) Oral every 8 hours  midodrine 10 milliGRAM(s) Oral every 8 hours  norepinephrine Infusion 0.05 MICROgram(s)/kG/Min (4.673 mL/Hr) IV Continuous <Continuous>  pantoprazole    Tablet 40 milliGRAM(s) Oral before breakfast  rifaximin 550 milliGRAM(s) Oral two times a day  vasopressin Infusion 0.04 Unit(s)/Min (2.4 mL/Hr) IV Continuous <Continuous>    MEDICATIONS  (PRN):      HOME MEDICATIONS:  Home Medications:  allopurinol 100 mg oral tablet: 1 tab(s) orally once a day (01 Sep 2019 20:06)  Colcrys 0.6 mg oral tablet: 1 tab(s) orally once a day (01 Sep 2019 20:06)  Coumadin 1 mg oral tablet: 1 tab(s) orally once a day (01 Sep 2019 20:06)  finasteride 5 mg oral tablet: 1 tab(s) orally once a day (01 Sep 2019 20:06)  furosemide 40 mg oral tablet: 1 tab(s) orally 2 times a day (01 Sep 2019 20:06)  lactulose 10 g/15 mL oral solution: 30 milliliter(s) orally 2 times a day (01 Sep 2019 20:06)  Metoprolol Tartrate 25 mg oral tablet: 1.5 tab(s) orally 2 times a day (01 Sep 2019 20:06)  pantoprazole 40 mg oral delayed release tablet: 1 tab(s) orally once a day (01 Sep 2019 20:06)  sotalol 80 mg oral tablet: 0.5 tab(s) orally 2 times a day (01 Sep 2019 20:06)  spironolactone 25 mg oral tablet: 2 tab(s) orally once a day (01 Sep 2019 20:06)  tamsulosin 0.4 mg oral capsule: 1 cap(s) orally once a day (at bedtime) (01 Sep 2019 20:06)      VITALS:   T(F): 100 (09-04 @ 00:00), Max: 100.1 (09-03 @ 20:00)  HR: 144 (09-04 @ 05:15) (84 - 164)  BP: 89/61 (09-04 @ 05:00) (51/41 - 107/68)  BP(mean): 76 (09-04 @ 05:00) (46 - 86)  RR: 22 (09-04 @ 05:15) (17 - 41)  SpO2: 98% (09-04 @ 05:15) (84% - 99%)    I&O's Summary    02 Sep 2019 07:01  -  03 Sep 2019 07:00  --------------------------------------------------------  IN: 2282.1 mL / OUT: 400 mL / NET: 1882.1 mL    03 Sep 2019 07:01  -  04 Sep 2019 05:20  --------------------------------------------------------  IN: 1813.7 mL / OUT: 173 mL / NET: 1640.7 mL        PHYSICAL EXAM:  GEN: intubated   HEENT: no pallor  NECK: Supple  LUNGS: + b/l air entry  CARDIOVASCULAR: S1/S2 irregular, tachy no murmurs or rubs  ABD: Soft, BS+  EXT: b/l LE pitting edema  NEURO: intubated, sedated     LABS:                        15.1   9.95  )-----------( x        ( 04 Sep 2019 04:30 )             44.8     09-04    133<L>  |  99  |  63<HH>  ----------------------------<  133<H>  6.2<HH>   |  20  |  3.6<H>    Ca    9.1      04 Sep 2019 01:15  Phos  6.2     09-03  Mg     3.1     09-03    TPro  6.3  /  Alb  2.9<L>  /  TBili  8.4<H>  /  DBili  6.2<H>  /  AST  107<H>  /  ALT  62<H>  /  AlkPhos  148<H>  09-03    PT/INR - ( 04 Sep 2019 04:30 )   PT: 29.20 sec;   INR: 2.56 ratio         PTT - ( 04 Sep 2019 01:15 )  PTT:56.4 sec  Troponin T, Serum: 0.05 ng/mL <HH> (09-04-19 @ 01:15)  Lactate, Blood: 5.9 mmol/L <HH> (09-03-19 @ 06:00)  Troponin T, Serum: 0.04 ng/mL <HH> (09-03-19 @ 06:00)    CARDIAC MARKERS ( 04 Sep 2019 01:15 )  x     / 0.05 ng/mL / x     / x     / x      CARDIAC MARKERS ( 03 Sep 2019 06:00 )  x     / 0.04 ng/mL / x     / x     / x            Troponin trend:        Hemoglobin A1C   Thyroid      RADIOLOGY:  -CXR:  < from: Xray Chest 1 View-PORTABLE IMMEDIATE (09.03.19 @ 12:01) >  Impression:      Cardiomegaly with CHF.    < end of copied text >    < from: US Abdomen Limited (09.01.19 @ 19:08) >  IMPRESSION:    Findings consistent with acute cholecystitis. Dilated common bile duct   suggests the possibility of common bile duct stone and MRCPmay be of   help for further evaluation.    Right pleural effusion.    < end of copied text >    < from: NM Hepatobiliary Imaging (09.02.19 @ 15:56) >  IMPRESSION:   Definite visualization of the gallbladder at 90 minutes post injection   demonstrating patency of the cystic duct.    Nonspecific finding of no visualization of intestinal activity through 2   hours postinjection. Examination discontinued due to patient condition.   The differential diagnosis is described above.    < end of copied text >    -TTE:  < from: Transthoracic Echocardiogram (09.02.19 @ 10:44) >       Summary:   1. Left ventricular ejection fraction, by visual estimation, is <20%.   2. Severely decreased global left ventricular systolic function.   3. Mildly increased left ventricular internal cavity size.   4. Mildly increased LV wall thickness.   5. Moderate mitral valve regurgitation.   6. Moderate thickening and calcification of the anterior and posterior   mitral valve leaflets.   7. Moderate tricuspid regurgitation.   8. Mild aortic regurgitation.   9. Sclerotic aortic valve with decreased opening.  10. Estimated pulmonary artery systolic pressure is 36.6 mmHg assuming a   right atrial pressure of 5 mmHg, which is consistent with borderline   pulmonary hypertension.  11. There is mild aortic root calcification.    < end of copied text >    < from: Transthoracic Echocardiogram (12.12.18 @ 10:15) >  Summary:   1. Severely decreased global left ventricular systolic function.   2. Left atrial enlargement.   3. LV Ejection Fraction by Mason's Method with a biplane EF of 17 %.   4. Mildly increased left ventricular internal cavity size.   5. Mildly increased LV wall thickness.   6. Moderate mitral valve regurgitation.   7. Moderate-severe tricuspid regurgitation.   8. Mild to moderate aortic regurgitation.   9. Estimated pulmonary artery systolic pressure is 42.4 mmHg assuming a   right atrial pressure of 15 mmHg, which is consistent with mild pulmonary   hypertension.    < end of copied text >      ECG:  < from: 12 Lead ECG (09.01.19 @ 16:00) >  Diagnosis Line Atrial fibrillation  Ventricular pacing  Abnormal ECG    < end of copied text >    TELEMETRY EVENTS:  in Afib currently   episodes of sustained monomorphic VT

## 2019-09-04 NOTE — PROGRESS NOTE ADULT - ATTENDING COMMENTS
68yo male with multiple medical problems including atrial fibrillation on Coumadin, hypertension, CKD, heart failure with EF 17% s/p AICD, liver cirrhosis admitted to ICU with cardiogenic shock on pressors. Surgery consulted to evaluate for cholecystectomy. There is no indication for surgical intervention at this time. This patient is extremely high risk for any intervention, let alone surgery. If he develops an indication for surgery, then a less invasive method ought to be considered first.

## 2019-09-04 NOTE — PROGRESS NOTE ADULT - SUBJECTIVE AND OBJECTIVE BOX
GENERAL SURGERY PROGRESS NOTE     MORAIMA OROZCO  69y  Male  Hospital day :3d  POD:  Procedure:   OVERNIGHT EVENTS:    T(F): 100 (09-04-19 @ 00:00), Max: 100.1 (09-03-19 @ 20:00)  HR: 144 (09-04-19 @ 05:15) (102 - 164)  BP: 89/61 (09-04-19 @ 05:00) (51/41 - 101/58)  ABP: --  ABP(mean): --  RR: 22 (09-04-19 @ 05:15) (17 - 32)  SpO2: 98% (09-04-19 @ 05:15) (84% - 99%)    DIET/FLUIDS:   NG:                                                                                DRAINS:     BM:     EMESIS:     URINE:   09-02-19 @ 07:01  -  09-03-19 @ 07:00  --------------------------------------------------------  OUT: 400 mL     Indwelling Urethral Catheter:     Connect To:  Straight Drainage/Gravity    Indication:  Urine Output Monitoring in Critically Ill (09-03-19 @ 11:39)    GI proph:  pantoprazole    Tablet 40 milliGRAM(s) Oral before breakfast    AC/ proph:   ABx: cefepime   IVPB      cefepime   IVPB 2000 milliGRAM(s) IV Intermittent every 12 hours  metroNIDAZOLE    Tablet 500 milliGRAM(s) Oral every 8 hours  rifaximin 550 milliGRAM(s) Oral two times a day      PHYSICAL EXAM:  GENERAL: NAD, well-appearing  CHEST/LUNG: Clear to auscultation bilaterally  HEART: Regular rate and rhythm  ABDOMEN: Soft, Nontender, Nondistended;   EXTREMITIES:  No clubbing, cyanosis, or edema      LABS  Labs:  CAPILLARY BLOOD GLUCOSE      POCT Blood Glucose.: 108 mg/dL (04 Sep 2019 03:31)  POCT Blood Glucose.: 116 mg/dL (03 Sep 2019 22:13)  POCT Blood Glucose.: 116 mg/dL (03 Sep 2019 19:12)  POCT Blood Glucose.: 111 mg/dL (03 Sep 2019 14:31)                          15.1   9.95  )-----------( x        ( 04 Sep 2019 04:30 )             44.8       Auto Neutrophil %: 92.2 % (09-04-19 @ 04:30)  Auto Immature Granulocyte %: 0.9 % (09-04-19 @ 04:30)  Auto Neutrophil %: 89.7 % (09-03-19 @ 20:10)  Auto Immature Granulocyte %: 0.9 % (09-03-19 @ 20:10)    09-04    133<L>  |  99  |  63<HH>  ----------------------------<  133<H>  6.2<HH>   |  20  |  3.6<H>      Calcium, Total Serum: 9.1 mg/dL (09-04-19 @ 01:15)      LFTs:             6.3  | 8.4  | 107      ------------------[148     ( 03 Sep 2019 20:10 )  2.9  | 6.2  | 62          Lipase:x      Amylase:x         Blood Gas Arterial, Lactate: 3.2 mmoL/L (09-04-19 @ 02:13)  Blood Gas Arterial, Lactate: 3.3 mmoL/L (09-03-19 @ 18:00)  Blood Gas Arterial, Lactate: 5.1 mmoL/L (09-03-19 @ 11:50)  Lactate, Blood: 5.9 mmol/L (09-03-19 @ 06:00)  Blood Gas Arterial, Lactate: 5.8 mmoL/L (09-03-19 @ 00:55)  Lactate, Blood: 6.4 mmol/L (09-02-19 @ 23:00)  Lactate, Blood: 4.9 mmol/L (09-02-19 @ 04:40)  Blood Gas Arterial, Lactate: 2.6 mmoL/L (09-01-19 @ 15:35)  Lactate, Blood: 2.7 mmol/L (09-01-19 @ 14:45)    ABG - ( 04 Sep 2019 02:13 )  pH: 7.24  /  pCO2: 40    /  pO2: 140   / HCO3: 17    / Base Excess: -9.8  /  SaO2: 99              ABG - ( 03 Sep 2019 18:00 )  pH: 7.25  /  pCO2: 40    /  pO2: 157   / HCO3: 18    / Base Excess: -9.1  /  SaO2: 99              ABG - ( 03 Sep 2019 11:50 )  pH: 7.20  /  pCO2: 46    /  pO2: 84    / HCO3: 18    / Base Excess: -9.7  /  SaO2: 93                Coags:     29.20  ----< 2.56    ( 04 Sep 2019 04:30 )     x           CARDIAC MARKERS ( 04 Sep 2019 01:15 )  x     / 0.05 ng/mL / x     / x     / x      CARDIAC MARKERS ( 03 Sep 2019 06:00 )  x     / 0.04 ng/mL / x     / x     / x                  Culture - Blood (collected 02 Sep 2019 04:23)  Source: .Blood None  Preliminary Report (03 Sep 2019 11:14):    No growth to date.    Culture - Urine (collected 01 Sep 2019 17:10)  Source: .Urine Clean Catch (Midstream)  Final Report (03 Sep 2019 11:14):    No growth    Culture - Blood (collected 01 Sep 2019 15:00)  Source: .Blood Blood-Peripheral  Preliminary Report (02 Sep 2019 20:01):    No growth to date.    Culture - Blood (collected 01 Sep 2019 15:00)  Source: .Blood Blood-Peripheral  Preliminary Report (02 Sep 2019 20:01):    No growth to date.          RADIOLOGY & ADDITIONAL TESTS:      A/P

## 2019-09-04 NOTE — PROGRESS NOTE ADULT - ASSESSMENT
IMPRESSION:    Shock cardiogenic/ multiorgan failure  HF REF  Cholecystitis r/o neg HIDA  HO Afib on Coumadin   thrombocytopenia  renal failure    PLAN:    CNS: No depressants    HEENT: Oral care    PULMONARY:  HOB @ 45 degrees.  Wean O2 as tolerated , decrease FO2    CARDIOVASCULAR:  On levo/vaso.  Cardiology Follow up , TAPER PRSSORS    GI: GI prophylaxis.  NPO.  GI evaluation    RENAL:  Follow up lytes.  Correct as needed. Renal eval ? RRT    INFECTIOUS DISEASE: Follow up cultures.  Continue ABX until cultures PER id    HEMATOLOGICAL:  DVT prophylaxis.    ENDOCRINE:  Follow up FS.  Insulin protocol if needed.    PALLIATIVE CARE EVAL    Advance directives    Overall prognosis poor

## 2019-09-04 NOTE — PROGRESS NOTE ADULT - ASSESSMENT
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     # hypotension (cardiogenic vs septic shock)  - adequate IV hydration with Sodium bicarbonate.  patient is intubated  - on dual pressors with max dose ( MAP 44)  - AIM to a MAP 55   - c/w hydrocortisone 100 mg every 8 hrs for now  - accurate input & output + daily body weight  - daily CXR  - hold lasix and spironolactone for now  - oliguric 10 cc/h in the setting of cardiorenal sd   - nephrology: need for HD, but it is not feasible being on maximum dose of levophed and vasopressin    # fever  - D/C cefepime + flagyl  - start meropenem and give 1 shot of vancomycin  - UA + urine cx   - blood cx  - DTA gram stain and cx    # sustained Vtach:  - still on amiodarone + lidocaine  - received 2 shocks today w/o any response  - EP on board  - AICD pacing but not defibrillating    # Afib   - daily PT /INR   - keep 2<INR<3    # cholecystitis??  - less likely. pericholecystic fluid could be seen in the setting of heart failure and volume overload  - NO intervention by GI team / do work up for [facility]D  - HIDA scan negative for acute cholecystitis  - no acute intervention by neurosurgery     # liver cirrhosis:  - c/w lactulose with target BM > 3 times a day  - trend bilirubin  - start rifaximin 550 mg bid    # confusion + somnolence  - most likely 2/2 liver encephalopathy  - c/w lactulose + rifaximin  - daily ammonia levels  - diagnostic paracentesis to r/o SBP if worsening clinical status    DVT prophylaxis: coumadin  PPI prophylaxis  code status: DNR  dispo : active  pending GS assessment + nephrology CS

## 2019-09-04 NOTE — PROGRESS NOTE ADULT - ASSESSMENT
68 y/o M with PMH of A-fib on Coumadin, HFrEF (LVEF 17%) s/p AICD, HTN, gout, CKD III, BPH, Liver cirrhosis secondary to CHF p/w hypotension    Now in cardiogenic shock w/ MODS including  VERO w/ hyperK  persistent  vtach     Assessment       Seen by Cardiology this AM There does not seem to be any course of treatment available for his underlying condition  As mentioned yesterday  given that there is no hope for treatment of underlying condition,  there is no role for renal replacement therapy.  He would not tolerate HD, and even if he would tolerate CVVH, with no underlying treatable condition this would only serve to  delay the inevitable .  This was again conveyed to the pt's family at bedside who expressed understanding and agreement are awaiting  his Cardiologist's recommendations .       Prognosis is grim  would beenfit from palliative care eval to help clarify goals of care   discussed w/ primary team

## 2019-09-04 NOTE — PROGRESS NOTE ADULT - SUBJECTIVE AND OBJECTIVE BOX
Nephrology progress note    Patient was seen and examined, events over the last 24 h noted .  persistent V tach    Allergies:  No Known Allergies    Hospital Medications:   MEDICATIONS  (STANDING):  allopurinol 100 milliGRAM(s) Oral daily  amiodarone Infusion 1 mG/Min (33.333 mL/Hr) IV Continuous <Continuous>  cefepime   IVPB 1000 milliGRAM(s) IV Intermittent every 12 hours  chlorhexidine 0.12% Liquid 15 milliLiter(s) Oral Mucosa every 12 hours  chlorhexidine 4% Liquid 1 Application(s) Topical <User Schedule>  fentaNYL   Infusion. 0.5 MICROgram(s)/kG/Hr (4.985 mL/Hr) IV Continuous <Continuous>  lactulose Syrup 20 Gram(s) Enteral Tube four times a day  metroNIDAZOLE    Tablet 500 milliGRAM(s) Oral every 8 hours  midodrine 10 milliGRAM(s) Oral every 8 hours  norepinephrine Infusion 0.05 MICROgram(s)/kG/Min (4.673 mL/Hr) IV Continuous <Continuous>  pantoprazole    Tablet 40 milliGRAM(s) Oral before breakfast  rifaximin 550 milliGRAM(s) Oral two times a day  vasopressin Infusion 0.04 Unit(s)/Min (2.4 mL/Hr) IV Continuous <Continuous>        VITALS:  T(F): 98.2 (19 @ 04:00), Max: 100.1 (19 @ 20:00)  HR: 150 (19 @ 07:00)  BP: 70/63 (19 @ 07:00)  RR: 20 (19 @ 07:00)  SpO2: 97% (19 @ 07:00)  Wt(kg): --     @ :  -   @ 07:00  --------------------------------------------------------  IN: 2282.1 mL / OUT: 400 mL / NET: 1882.1 mL     @ 07:01  -   @ 07:00  --------------------------------------------------------  IN: 3619.1 mL / OUT: 253 mL / NET: 3366.1 mL          PHYSICAL EXAM:  General:  intubated   HEENT: PANFILO  Lymphatic system: No cervical LN   Lungs: Bilateral BS, dec both abses  Cardiovascular: LAM 3/6  Gastrointestinal: Soft, Positive BS, mild tenderness  Extremities: No clubbing.  Moves extremities.  Full Range of motion   Skin: Mottled extremities  Neurological: unresponsive     LABS:      131<L>  |  99  |  63<HH>  ----------------------------<  229<H>  5.8<H>   |  17  |  3.4<H>    Ca    8.8      04 Sep 2019 04:30  Phos  6.3       Mg     2.8         TPro  6.0  /  Alb  2.6<L>  /  TBili  8.2<H>  /  DBili  6.1<H>  /  AST  142<H>  /  ALT  87<H>  /  AlkPhos  141<H>                            15.1   9.95  )-----------( 81       ( 04 Sep 2019 04:30 )             44.8       Urine Studies:  Urinalysis Basic - ( 01 Sep 2019 17:10 )    Color: Yellow / Appearance: Clear / S.025 / pH:   Gluc:  / Ketone: Negative  / Bili: Negative / Urobili: <2 mg/dL   Blood:  / Protein: Trace / Nitrite: Negative   Leuk Esterase: Negative / RBC:  / WBC    Sq Epi:  / Non Sq Epi:  / Bacteria:         RADIOLOGY & ADDITIONAL STUDIES:

## 2019-09-04 NOTE — CONSULT NOTE ADULT - SUBJECTIVE AND OBJECTIVE BOX
Patient is a 69y old  Male who presents with a chief complaint of Hypotension (04 Sep 2019 09:09)        HPI:  68 y/o M with PMH of A-fib on Coumadin, HFrEF (LVEF 17% 2018) s/p AICD, HTN, gout, CKD III, BPH, Liver cirrhosis secondary to CHF p/w hypotension.     As per son at bedside, this AM pt was found to be slightly more confused than normal, not responding appropriately to questions. He checked his blood pressure and was found to have SBP in the 70s. AS per son, pt is hypotensive at baseline with SBP 80-90's. Son reports pt having a fall the night prior preceded by lightheadedness and dizziness. HE denies any loss of consciousness, head trauma, bowel or urinary incontinence or confusion after the episode. Pt denies any chest pain, palpitations, headaches, or lower extremity edema.     in the ED, VS: BP 66/39 HR 84 RR 17 T 98.3. Pt received Cefepime and vancomycin, Hydrocortisone 100mg, and 1 L NS, and was started on levophed through R IJ. (01 Sep 2019 19:34)      Electrophysiology:  69y Male, with extensive cardiac history, CAD, CMP EF17%, AF on Coumadin, liver cirrhosis, CKD originally presented with hypotension requiring pressors support. Sepsis workup was revealed possible cholecystitis on sono, but CT scan and HIDA scan were negative. Patient multiorgan failure progressed liver failure, VERO on CKD now anuric., lactate elevated, acidotic pH 7.2  Pt was tachycardic since admission, since 1:50 am in VT vs. AF RVR with abberancy, ATP and shock therapy failed.  In am device interrogated, multiple runs of tachycardia, ATP and shocks overnight. Attempted ATP unsuccessfully and shocked uncesseffuly.  CPR was initiated, at that time family opted for DNR status.  Paced pt at 140-145bpm with BP temporary improvement of sBP 90s->80s  All therapies were turned off at that time    REVIEW OF SYSTEMS    [x] Due to altered mental status/intubation, subjective information were not able to be obtained from the patient. History was obtained, to the extent possible, from review of the chart and collateral sources of information.      PAST MEDICAL & SURGICAL HISTORY:  BPH (benign prostatic hyperplasia)  CKD (chronic kidney disease)  Osler-Weber-Rendu disease  Atrial fibrillation  HTN (hypertension)  Hepatic cirrhosis, unspecified hepatic cirrhosis type, unspecified whether ascites present  Gout, unspecified cause, unspecified chronicity, unspecified site  Anemia, unspecified type  Congestive heart failure, unspecified HF chronicity, unspecified heart failure type  S/P implantation of automatic cardioverter/defibrillator (AICD)      Home Medications:  allopurinol 100 mg oral tablet: 1 tab(s) orally once a day (01 Sep 2019 20:06)  Colcrys 0.6 mg oral tablet: 1 tab(s) orally once a day (01 Sep 2019 20:06)  Coumadin 1 mg oral tablet: 1 tab(s) orally once a day (01 Sep 2019 20:06)  finasteride 5 mg oral tablet: 1 tab(s) orally once a day (01 Sep 2019 20:)  furosemide 40 mg oral tablet: 1 tab(s) orally 2 times a day (01 Sep 2019 20:06)  lactulose 10 g/15 mL oral solution: 30 milliliter(s) orally 2 times a day (01 Sep 2019 20:06)  Metoprolol Tartrate 25 mg oral tablet: 1.5 tab(s) orally 2 times a day (01 Sep 2019 20:06)  pantoprazole 40 mg oral delayed release tablet: 1 tab(s) orally once a day (01 Sep 2019 20:06)  sotalol 80 mg oral tablet: 0.5 tab(s) orally 2 times a day (01 Sep 2019 20:06)  spironolactone 25 mg oral tablet: 2 tab(s) orally once a day (01 Sep 2019 20:06)  tamsulosin 0.4 mg oral capsule: 1 cap(s) orally once a day (at bedtime) (01 Sep 2019 20:06)      Allergies:    No Known Allergies      PREVIOUS DIAGNOSTIC TESTING:      ECHO  FINDINGS:    < from: Transthoracic Echocardiogram (19 @ 10:44) >    Summary:   1. Left ventricular ejection fraction, by visual estimation, is <20%.   2. Severely decreased global left ventricular systolic function.   3. Mildly increased left ventricular internal cavity size.   4. Mildly increased LV wall thickness.   5. Moderate mitral valve regurgitation.   6. Moderate thickening and calcification of the anterior and posterior   mitral valve leaflets.   7. Moderate tricuspid regurgitation.   8. Mild aortic regurgitation.   9. Sclerotic aortic valve with decreased opening.  10. Estimated pulmonary artery systolic pressure is 36.6 mmHg assuming a   right atrial pressure of 5 mmHg, which is consistent with borderline   pulmonary hypertension.  11. There is mild aortic root calcification.    < end of copied text >  STRESS  FINDINGS:    CATHETERIZATION  FINDINGS:    ELECTROPHYSIOLOGY STUDY  FINDINGS:    CAROTID ULTRASOUND:  FINDINGS    VENOUS DUPLEX SCAN:  FINDINGS:    CHEST CT PULMONARY ANGIO with IV Contrast:  FINDINGS:    FAMILY HISTORY:  FH: hypertension      SOCIAL HISTORY: unable to obtain,// denies tobacco / ETOH / illicit drug use per chart documentation    CIGARETTES:  ALCOHOL:      MEDICATIONS  (STANDING):  allopurinol 100 milliGRAM(s) Oral daily  amiodarone Infusion 1 mG/Min (33.333 mL/Hr) IV Continuous <Continuous>  calcium gluconate IVPB 1 Gram(s) IV Intermittent once  calcium gluconate IVPB 1 Gram(s) IV Intermittent once  calcium gluconate IVPB 1 Gram(s) IV Intermittent once  cefepime   IVPB 1000 milliGRAM(s) IV Intermittent every 12 hours  chlorhexidine 0.12% Liquid 15 milliLiter(s) Oral Mucosa every 12 hours  chlorhexidine 4% Liquid 1 Application(s) Topical <User Schedule>  fentaNYL   Infusion. 0.5 MICROgram(s)/kG/Hr (4.985 mL/Hr) IV Continuous <Continuous>  lactulose Syrup 20 Gram(s) Enteral Tube four times a day  metroNIDAZOLE    Tablet 500 milliGRAM(s) Oral every 8 hours  midodrine 10 milliGRAM(s) Oral every 8 hours  norepinephrine Infusion 0.05 MICROgram(s)/kG/Min (4.673 mL/Hr) IV Continuous <Continuous>  pantoprazole    Tablet 40 milliGRAM(s) Oral before breakfast  rifaximin 550 milliGRAM(s) Oral two times a day  sodium bicarbonate  Injectable 50 milliEquivalent(s) IV Push once  sodium bicarbonate  Injectable 50 milliEquivalent(s) IV Push once  sodium bicarbonate  Injectable 50 milliEquivalent(s) IV Push once  vasopressin Infusion 0.04 Unit(s)/Min (2.4 mL/Hr) IV Continuous <Continuous>    MEDICATIONS  (PRN):      Vital Signs Last 24 Hrs  T(C): 37.3 (04 Sep 2019 08:00), Max: 37.8 (03 Sep 2019 20:00)  T(F): 99.1 (04 Sep 2019 08:00), Max: 100.1 (03 Sep 2019 20:00)  HR: 146 (04 Sep 2019 10:15) (108 - 164)  BP: 83/59 (04 Sep 2019 10:15) (51/41 - 101/58)  BP(mean): 65 (04 Sep 2019 10:15) (39 - 81)  RR: 20 (04 Sep 2019 10:15) (15 - 32)  SpO2: 98% (04 Sep 2019 10:15) (84% - 99%)    PHYSICAL EXAM:    GENERAL: In no apparent distress, well nourished, and hydrated.  HEAD:  Atraumatic, Normocephalic  EYES: EOMI, PERRLA, conjunctiva and sclera clear  NECK: Supple and normal thyroid.  No JVD or carotid bruit.  Carotid pulse is 2+ bilaterally.  HEART: Regular rate and rhythm; No murmurs, rubs, or gallops.  PULMONARY: Clear to auscultation and perfusion.  No rales, wheezing, or rhonchi bilaterally.  ABDOMEN: Soft, Nontender, Nondistended; Bowel sounds present  EXTREMITIES:  2+ Peripheral Pulses, No clubbing, cyanosis, or edema  NEUROLOGICAL: Grossly nonfocal  PHYSICAL EXAM:    General/Neuro  RASS:    -4           GCS:  6T  = E1   / V 1  / M  4    Deficits:   Pupils: none reactive    Lungs:      diffuse rales/rhonchi, mecanical beath sounds    Cardiovascular : tachycardic S1, S2.    Cardiac Rhythm: tach 140s LBBB morphology    GI: Abdomen mildly distended, Non-tender, Non-distended.    Nasogastric tube in place.       Extremities: Extremities cool, pale, .  _2____Peripheral edema   Pulses: faint    :       Duong catheter in place.        INTERPRETATION OF TELEMETRY:    ECG:    I&O's Detail    03 Sep 2019 07:01  -  04 Sep 2019 07:00  --------------------------------------------------------  IN:    amiodarone Infusion: 165 mL    fentaNYL Infusion.: 70 mL    IV PiggyBack: 250 mL    norepinephrine Infusion: 2581.3 mL    Sodium Chloride 0.9% IV Bolus: 500 mL    vasopressin Infusion: 52.8 mL  Total IN: 3619.1 mL    OUT:    Indwelling Catheter - Urethral: 253 mL  Total OUT: 253 mL    Total NET: 3366.1 mL      04 Sep 2019 07:01  -  04 Sep 2019 10:26  --------------------------------------------------------  IN:    amiodarone Infusion: 50.1 mL  Total IN: 50.1 mL    OUT:  Total OUT: 0 mL    Total NET: 50.1 mL          LABS:                        15.1   9.95  )-----------( 81       ( 04 Sep 2019 04:30 )             44.8     04    131<L>  |  99  |  63<HH>  ----------------------------<  229<H>  5.8<H>   |  17  |  3.4<H>    Ca    8.8      04 Sep 2019 04:30  Phos  6.3       Mg     2.8         TPro  6.0  /  Alb  2.6<L>  /  TBili  8.2<H>  /  DBili  6.1<H>  /  AST  142<H>  /  ALT  87<H>  /  AlkPhos  141<H>  09-04    CARDIAC MARKERS ( 04 Sep 2019 01:15 )  x     / 0.05 ng/mL / x     / x     / x      CARDIAC MARKERS ( 03 Sep 2019 06:00 )  x     / 0.04 ng/mL / x     / x     / x        ABG - ( 04 Sep 2019 04:08 )  pH, Arterial: 7.21  pH, Blood: x     /  pCO2: 38    /  pO2: 151   / HCO3: 16    / Base Excess: -11.6 /  SaO2: 99            Lactate, Blood: 4.3: (19 @ 04:40)  Blood Gas Arterial, Lactate: 3.7 mmoL/L (19 @ 04:08)  Lactate, Blood: 6.4: (19 @ 23:00) peak      PT/INR - ( 04 Sep 2019 04:30 )   PT: 29.20 sec;   INR: 2.56 ratio         PTT - ( 04 Sep 2019 01:15 )  PTT:56.4 sec    BNP      I&O's Detail    03 Sep 2019 07:01  -  04 Sep 2019 07:00  --------------------------------------------------------  IN:    amiodarone Infusion: 165 mL    fentaNYL Infusion.: 70 mL    IV PiggyBack: 250 mL    norepinephrine Infusion: 2581.3 mL    Sodium Chloride 0.9% IV Bolus: 500 mL    vasopressin Infusion: 52.8 mL  Total IN: 3619.1 mL    OUT:    Indwelling Catheter - Urethral: 253 mL  Total OUT: 253 mL    Total NET: 3366.1 mL      04 Sep 2019 07:01  -  04 Sep 2019 10:26  --------------------------------------------------------  IN:    amiodarone Infusion: 50.1 mL  Total IN: 50.1 mL    OUT:  Total OUT: 0 mL    Total NET: 50.1 mL        Daily     Daily Weight in k.3 (04 Sep 2019 04:00)    RADIOLOGY & ADDITIONAL STUDIES:

## 2019-09-04 NOTE — PROGRESS NOTE ADULT - SUBJECTIVE AND OBJECTIVE BOX
MORAIMA OROZCO  69y, Male      OVERNIGHT EVENTS:    low grade fevers, on pressors, intubated 9/3  Fio2 70%  sedated, non responsive  R IJ catheter with no erythema    VITALS:  T(F): 100, Max: 100.1 (09-03-19 @ 20:00)  HR: 144  BP: 89/61  RR: 22Vital Signs Last 24 Hrs  T(C): 37.8 (04 Sep 2019 00:00), Max: 37.8 (03 Sep 2019 20:00)  T(F): 100 (04 Sep 2019 00:00), Max: 100.1 (03 Sep 2019 20:00)  HR: 144 (04 Sep 2019 05:15) (104 - 164)  BP: 89/61 (04 Sep 2019 05:00) (51/41 - 101/58)  BP(mean): 76 (04 Sep 2019 05:00) (46 - 83)  RR: 22 (04 Sep 2019 05:15) (17 - 32)  SpO2: 98% (04 Sep 2019 05:15) (84% - 99%)    TESTS & MEASUREMENTS:                        15.1   9.95  )-----------( 81       ( 04 Sep 2019 04:30 )             44.8     09-04    131<L>  |  99  |  63<HH>  ----------------------------<  229<H>  5.8<H>   |  17  |  3.4<H>    Ca    8.8      04 Sep 2019 04:30  Phos  6.3     09-04  Mg     2.8     09-04    TPro  6.0  /  Alb  2.6<L>  /  TBili  8.2<H>  /  DBili  x   /  AST  142<H>  /  ALT  87<H>  /  AlkPhos  141<H>  09-04    LIVER FUNCTIONS - ( 04 Sep 2019 04:30 )  Alb: 2.6 g/dL / Pro: 6.0 g/dL / ALK PHOS: 141 U/L / ALT: 87 U/L / AST: 142 U/L / GGT: x             Culture - Blood (collected 09-02-19 @ 04:23)  Source: .Blood None  Preliminary Report (09-03-19 @ 11:14):    No growth to date.    Culture - Urine (collected 09-01-19 @ 17:10)  Source: .Urine Clean Catch (Midstream)  Final Report (09-03-19 @ 11:14):    No growth    Culture - Blood (collected 09-01-19 @ 15:00)  Source: .Blood Blood-Peripheral  Preliminary Report (09-02-19 @ 20:01):    No growth to date.    Culture - Blood (collected 09-01-19 @ 15:00)  Source: .Blood Blood-Peripheral  Preliminary Report (09-02-19 @ 20:01):    No growth to date.            RADIOLOGY & ADDITIONAL TESTS:    ANTIBIOTICS:  cefepime   IVPB      cefepime   IVPB 2000 milliGRAM(s) IV Intermittent every 12 hours  metroNIDAZOLE    Tablet 500 milliGRAM(s) Oral every 8 hours  rifaximin 550 milliGRAM(s) Oral two times a day

## 2019-09-04 NOTE — CONSULT NOTE ADULT - ASSESSMENT
70 y/o M with PMH of A-fib on Coumadin, HFrEF (LVEF 17% 12/2018) s/p AICD, HTN, gout, CKD III, BPH, Liver cirrhosis secondary to CHF p/w hypotension.    Impression:  Shock- likely cardiogenic   HFrEF- NICM   VERO on CKD3-oliguric- likley CRS type 1  Hyperkalemia  Rapid Afib   Sustained VT episodes s/p ICD shocks and sync cardioversion  Cirrhosis/ elevated LFTs/ thrombocytopenia  h/o NSVT  h/o BiV ICD  h/o Osler-weber-rendu syndrome      Plan:  s/p ICD shocks and sync CV for sustained VT last night  cont Amio infusion for now for VT and rapid Afib  cont pressor support with Levo and Vasopressin to maintain MAP > 60  monitor strict Is & Os  correct hyperkalemia  Nephrology f/u  ICD interrogation   Overall poor prognosis  consider palliative care eval

## 2019-09-04 NOTE — PROGRESS NOTE ADULT - SUBJECTIVE AND OBJECTIVE BOX
68 y/o M with PMH of A-fib on Coumadin, HFrEF (LVEF 17% 12/2018) s/p AICD, HTN, gout, CKD III, BPH, Liver cirrhosis secondary to CHF p/w hypotension. no fever, or chills, no recent URTI. to note that the patient blood pressure is 70 to 80 at baseline due to his advanced heart failure  admitted to the ICU and started on levophed and vasopressin as a treatment for cardiogenic shock  he got started empirically on IV antibiotics w/o any evidence of infectious process    OVERNIGHT EVENTS: was more lethargic overnight, more somnolent. got intubated last night for respiratory failure and lactic acidosis    SUBJECTIVE / INTERVAL HPI: Patient seen and examined at bedside. not arousable, intubated, not responsive to painful stimuli. OFF sedation    VITAL SIGNS:  Vital Signs Last 24 Hrs  T(C): 38.5 (04 Sep 2019 14:00), Max: 38.5 (04 Sep 2019 14:00)  T(F): 101.3 (04 Sep 2019 14:00), Max: 101.3 (04 Sep 2019 14:00)  HR: 146 (04 Sep 2019 15:00) (108 - 164)  BP: 85/46 (04 Sep 2019 14:30) (51/41 - 101/58)  BP(mean): 67 (04 Sep 2019 15:00) (39 - 81)  RR: 23 (04 Sep 2019 15:00) (7 - 30)  SpO2: 99% (04 Sep 2019 15:00) (84% - 99%)    PHYSICAL EXAM:    General: WDWN  HEENT: NC/AT; PERRL, clear conjunctiva  Neck: supple  Cardiovascular: +S1/S2; RRR  Respiratory: decrease breath sounds on the right side  Gastrointestinal: soft, NT/ND; +BSx4  Extremities: WWP; 2+ peripheral pulses; no edema   Neurological: AAOx3; no focal deficits    MEDICATIONS:  MEDICATIONS  (STANDING):  allopurinol 100 milliGRAM(s) Oral daily  amiodarone Infusion 0.5 mG/Min (16.667 mL/Hr) IV Continuous <Continuous>  chlorhexidine 0.12% Liquid 15 milliLiter(s) Oral Mucosa every 12 hours  chlorhexidine 4% Liquid 1 Application(s) Topical <User Schedule>  fentaNYL   Infusion. 0.5 MICROgram(s)/kG/Hr (4.985 mL/Hr) IV Continuous <Continuous>  lactulose Syrup 20 Gram(s) Enteral Tube four times a day  meropenem  IVPB 500 milliGRAM(s) IV Intermittent every 24 hours  midodrine 10 milliGRAM(s) Oral every 8 hours  norepinephrine Infusion 0.05 MICROgram(s)/kG/Min (4.673 mL/Hr) IV Continuous <Continuous>  pantoprazole    Tablet 40 milliGRAM(s) Oral before breakfast  rifaximin 550 milliGRAM(s) Oral two times a day  vasopressin Infusion 0.04 Unit(s)/Min (2.4 mL/Hr) IV Continuous <Continuous>    MEDICATIONS  (PRN):      ALLERGIES:  Allergies    No Known Allergies    Intolerances        LABS:                        15.1   9.95  )-----------( 81       ( 04 Sep 2019 04:30 )             44.8     09-04    131<L>  |  99  |  63<HH>  ----------------------------<  229<H>  5.8<H>   |  17  |  3.4<H>    Ca    8.8      04 Sep 2019 04:30  Phos  6.3     09-04  Mg     2.8     09-04    TPro  6.0  /  Alb  2.6<L>  /  TBili  8.2<H>  /  DBili  6.1<H>  /  AST  142<H>  /  ALT  87<H>  /  AlkPhos  141<H>  09-04    PT/INR - ( 04 Sep 2019 04:30 )   PT: 29.20 sec;   INR: 2.56 ratio         PTT - ( 04 Sep 2019 01:15 )  PTT:56.4 sec    CAPILLARY BLOOD GLUCOSE      POCT Blood Glucose.: 137 mg/dL (04 Sep 2019 15:50)

## 2019-09-04 NOTE — PROGRESS NOTE ADULT - ASSESSMENT
68yo male with extensive medical history including atrial fibrillation on Coumadin, hypertension, CKD, heart failure with EF 17% s/p AICD, liver cirrhosis admitted to ICU with cardiogenic shock requiring pressors. Surgery consulted to for cholecystectomy. Patient is very high risk for surgery. HIDA scan demonstrated definite visualization of gallbladder.     Plan:   - no indication for surgery   - continue management per MICU

## 2019-09-04 NOTE — PROGRESS NOTE ADULT - ASSESSMENT
· Assessment		  70 y/o M with PMH of A-fib on Coumadin, HFrEF (LVEF 17% 12/2018) s/p AICD, HTN, gout, CKD III, BPH, Liver cirrhosis secondary to CHF p/w hypotension and confusion more than baseline currently requiring  pressors.    IMPRESSION:  Shock cardiogenic vs septic ( possibly translocation given severity of disease despite negative BCx )  Intubated 9/3. Evolving ARDS with FiO2 70%. No PNA  No acute cholecystitis/ cholangitis  No meningitis  Liver cirrhosis induced thrombocytopenia, coagulopathy, transaminitis  HIDA delayed at 2h which is more consistent with liver cirrhosis ( with a poor MELD score).  Metabolic coagulopathy     RECOMMENDATIONS:  ABx at this point of no benefit ( besides Rifaximin ) but would be agreeable with Cefepime 1 gm iv q24h and iv Flagyl 500 mg iv q12h given his underlying problems  Prognosis is grim and family is aware of it.

## 2019-09-04 NOTE — PROGRESS NOTE ADULT - SUBJECTIVE AND OBJECTIVE BOX
Over Night Events: still intubated , ventilated, on levophed 5 on vaso 0.4, on amiod, SVT s/p cardioversion, decrease UO, no sedation        ROS:  See HPI    PHYSICAL EXAM    ICU Vital Signs Last 24 Hrs  T(C): 36.8 (04 Sep 2019 04:00), Max: 37.8 (03 Sep 2019 20:00)  T(F): 98.2 (04 Sep 2019 04:00), Max: 100.1 (03 Sep 2019 20:00)  HR: 150 (04 Sep 2019 07:00) (108 - 164)  BP: 70/63 (04 Sep 2019 07:00) (51/41 - 101/58)  BP(mean): 68 (04 Sep 2019 07:00) (46 - 83)  RR: 20 (04 Sep 2019 07:00) (17 - 32)  SpO2: 97% (04 Sep 2019 07:00) (84% - 99%)      General: withdraw to painful stimuli  HEENT: PANFILO             Lymph Nodes: No cervical LN   Lungs: Bilateral BS, dec bs both bases  Cardiovascular: Regular   Abdomen: Soft, Positive BS  Extremities: No clubbing   Skin: Warm  Neurological: withdraw to painful stimuli      09-03-19 @ 07:01  -  09-04-19 @ 07:00  --------------------------------------------------------  IN:    amiodarone Infusion: 165 mL    fentaNYL Infusion.: 70 mL    IV PiggyBack: 250 mL    norepinephrine Infusion: 2581.3 mL    Sodium Chloride 0.9% IV Bolus: 500 mL    vasopressin Infusion: 52.8 mL  Total IN: 3619.1 mL    OUT:    Indwelling Catheter - Urethral: 253 mL  Total OUT: 253 mL    Total NET: 3366.1 mL          LABS:                          15.1   9.95  )-----------( 81       ( 04 Sep 2019 04:30 )             44.8                                               09-04    131<L>  |  99  |  63<HH>  ----------------------------<  229<H>  5.8<H>   |  17  |  3.4<H>    Ca    8.8      04 Sep 2019 04:30  Phos  6.3     09-04  Mg     2.8     09-04    TPro  6.0  /  Alb  2.6<L>  /  TBili  8.2<H>  /  DBili  6.1<H>  /  AST  142<H>  /  ALT  87<H>  /  AlkPhos  141<H>  09-04      PT/INR - ( 04 Sep 2019 04:30 )   PT: 29.20 sec;   INR: 2.56 ratio         PTT - ( 04 Sep 2019 01:15 )  PTT:56.4 sec                                           CARDIAC MARKERS ( 04 Sep 2019 01:15 )  x     / 0.05 ng/mL / x     / x     / x      CARDIAC MARKERS ( 03 Sep 2019 06:00 )  x     / 0.04 ng/mL / x     / x     / x                                                LIVER FUNCTIONS - ( 04 Sep 2019 04:30 )  Alb: 2.6 g/dL / Pro: 6.0 g/dL / ALK PHOS: 141 U/L / ALT: 87 U/L / AST: 142 U/L / GGT: x                                                  Culture - Blood (collected 02 Sep 2019 04:23)  Source: .Blood None  Preliminary Report (03 Sep 2019 11:14):    No growth to date.    Culture - Urine (collected 01 Sep 2019 17:10)  Source: .Urine Clean Catch (Midstream)  Final Report (03 Sep 2019 11:14):    No growth    Culture - Blood (collected 01 Sep 2019 15:00)  Source: .Blood Blood-Peripheral  Preliminary Report (02 Sep 2019 20:01):    No growth to date.    Culture - Blood (collected 01 Sep 2019 15:00)  Source: .Blood Blood-Peripheral  Preliminary Report (02 Sep 2019 20:01):    No growth to date.                                                   Mode: AC/ CMV (Assist Control/ Continuous Mandatory Ventilation)  RR (machine): 20  TV (machine): 450  FiO2: 70  PEEP: 8  ITime: 1  MAP: 13  PIP: 25                                      ABG - ( 04 Sep 2019 04:08 )  pH, Arterial: 7.25pH, Blood: x     /  pCO2: 40  /  pO2: 151   / HCO3: 16    / Base Excess: -11.6 /  SaO2: 99                  MEDICATIONS  (STANDING):  allopurinol 100 milliGRAM(s) Oral daily  amiodarone Infusion 1 mG/Min (33.333 mL/Hr) IV Continuous <Continuous>  cefepime   IVPB 1000 milliGRAM(s) IV Intermittent every 12 hours  chlorhexidine 0.12% Liquid 15 milliLiter(s) Oral Mucosa every 12 hours  chlorhexidine 4% Liquid 1 Application(s) Topical <User Schedule>  fentaNYL   Infusion. 0.5 MICROgram(s)/kG/Hr (4.985 mL/Hr) IV Continuous <Continuous>  lactulose Syrup 20 Gram(s) Enteral Tube four times a day  metroNIDAZOLE    Tablet 500 milliGRAM(s) Oral every 8 hours  midodrine 10 milliGRAM(s) Oral every 8 hours  norepinephrine Infusion 0.05 MICROgram(s)/kG/Min (4.673 mL/Hr) IV Continuous <Continuous>  pantoprazole    Tablet 40 milliGRAM(s) Oral before breakfast  rifaximin 550 milliGRAM(s) Oral two times a day  vasopressin Infusion 0.04 Unit(s)/Min (2.4 mL/Hr) IV Continuous <Continuous>    MEDICATIONS  (PRN):      Xrays:  reviewed

## 2019-09-04 NOTE — CONSULT NOTE ADULT - ASSESSMENT
1y Male, with extensive cardiac history, CAD, CMP EF17%, AF on Coumadin, liver cirrhosis, CKD   Admitted with hypotension/suspected sepsis, progressed multiorgan failure  liver failure INR raising  VERO on CKD anuric, not candidate for CVVH at this time  Cardiogenic shock, EF 17%, on multiple pressors  in AF RVR with aberrancy, on Amio  DNR, AICD therapies are off  Poor prognosis    D/w Dr Garcia

## 2019-09-05 ENCOUNTER — APPOINTMENT (OUTPATIENT)
Dept: VASCULAR SURGERY | Facility: CLINIC | Age: 69
End: 2019-09-05

## 2019-09-05 LAB
ALBUMIN SERPL ELPH-MCNC: 2.3 G/DL — LOW (ref 3.5–5.2)
ALP SERPL-CCNC: 117 U/L — HIGH (ref 30–115)
ALT FLD-CCNC: 129 U/L — HIGH (ref 0–41)
ANION GAP SERPL CALC-SCNC: 13 MMOL/L — SIGNIFICANT CHANGE UP (ref 7–14)
ANION GAP SERPL CALC-SCNC: 16 MMOL/L — HIGH (ref 7–14)
ANION GAP SERPL CALC-SCNC: 16 MMOL/L — HIGH (ref 7–14)
APTT BLD: 78.4 SEC — CRITICAL HIGH (ref 27–39.2)
AST SERPL-CCNC: 172 U/L — HIGH (ref 0–41)
BASOPHILS # BLD AUTO: 0 K/UL — SIGNIFICANT CHANGE UP (ref 0–0.2)
BASOPHILS # BLD AUTO: 0.01 K/UL — SIGNIFICANT CHANGE UP (ref 0–0.2)
BASOPHILS NFR BLD AUTO: 0 % — SIGNIFICANT CHANGE UP (ref 0–1)
BASOPHILS NFR BLD AUTO: 0.1 % — SIGNIFICANT CHANGE UP (ref 0–1)
BILIRUB DIRECT SERPL-MCNC: 6.3 MG/DL — HIGH (ref 0–0.2)
BILIRUB INDIRECT FLD-MCNC: 2.1 MG/DL — HIGH (ref 0.2–1.2)
BILIRUB SERPL-MCNC: 8.4 MG/DL — HIGH (ref 0.2–1.2)
BUN SERPL-MCNC: 68 MG/DL — CRITICAL HIGH (ref 10–20)
BUN SERPL-MCNC: 68 MG/DL — CRITICAL HIGH (ref 10–20)
BUN SERPL-MCNC: 72 MG/DL — CRITICAL HIGH (ref 10–20)
CALCIUM SERPL-MCNC: 8.5 MG/DL — SIGNIFICANT CHANGE UP (ref 8.5–10.1)
CALCIUM SERPL-MCNC: 8.5 MG/DL — SIGNIFICANT CHANGE UP (ref 8.5–10.1)
CALCIUM SERPL-MCNC: 8.7 MG/DL — SIGNIFICANT CHANGE UP (ref 8.5–10.1)
CHLORIDE SERPL-SCNC: 101 MMOL/L — SIGNIFICANT CHANGE UP (ref 98–110)
CHLORIDE SERPL-SCNC: 101 MMOL/L — SIGNIFICANT CHANGE UP (ref 98–110)
CHLORIDE SERPL-SCNC: 102 MMOL/L — SIGNIFICANT CHANGE UP (ref 98–110)
CO2 SERPL-SCNC: 17 MMOL/L — SIGNIFICANT CHANGE UP (ref 17–32)
CREAT SERPL-MCNC: 4.1 MG/DL — CRITICAL HIGH (ref 0.7–1.5)
CREAT SERPL-MCNC: 4.1 MG/DL — CRITICAL HIGH (ref 0.7–1.5)
CREAT SERPL-MCNC: 4.4 MG/DL — CRITICAL HIGH (ref 0.7–1.5)
EOSINOPHIL # BLD AUTO: 0 K/UL — SIGNIFICANT CHANGE UP (ref 0–0.7)
EOSINOPHIL # BLD AUTO: 0 K/UL — SIGNIFICANT CHANGE UP (ref 0–0.7)
EOSINOPHIL NFR BLD AUTO: 0 % — SIGNIFICANT CHANGE UP (ref 0–8)
EOSINOPHIL NFR BLD AUTO: 0 % — SIGNIFICANT CHANGE UP (ref 0–8)
FIBRINOGEN PPP-MCNC: 164 MG/DL — LOW (ref 204.4–570.6)
GAS PNL BLDA: SIGNIFICANT CHANGE UP
GLUCOSE BLDC GLUCOMTR-MCNC: 104 MG/DL — HIGH (ref 70–99)
GLUCOSE BLDC GLUCOMTR-MCNC: 109 MG/DL — HIGH (ref 70–99)
GLUCOSE BLDC GLUCOMTR-MCNC: 91 MG/DL — SIGNIFICANT CHANGE UP (ref 70–99)
GLUCOSE SERPL-MCNC: 123 MG/DL — HIGH (ref 70–99)
GLUCOSE SERPL-MCNC: 133 MG/DL — HIGH (ref 70–99)
GLUCOSE SERPL-MCNC: 138 MG/DL — HIGH (ref 70–99)
HCT VFR BLD CALC: 41.6 % — LOW (ref 42–52)
HCT VFR BLD CALC: 42.2 % — SIGNIFICANT CHANGE UP (ref 42–52)
HGB BLD-MCNC: 14.2 G/DL — SIGNIFICANT CHANGE UP (ref 14–18)
HGB BLD-MCNC: 14.3 G/DL — SIGNIFICANT CHANGE UP (ref 14–18)
IGG SERPL-MCNC: 1650 MG/DL — HIGH (ref 700–1600)
IGG1 SER-MCNC: 770 MG/DL — SIGNIFICANT CHANGE UP (ref 248–810)
IGG2 SER-MCNC: 726 MG/DL — HIGH (ref 130–555)
IGG3 SER-MCNC: 124 MG/DL — HIGH (ref 15–102)
IGG4 SER-MCNC: 50 MG/DL — SIGNIFICANT CHANGE UP (ref 2–96)
IMM GRANULOCYTES NFR BLD AUTO: 0.4 % — HIGH (ref 0.1–0.3)
INR BLD: 5.18 RATIO — CRITICAL HIGH (ref 0.65–1.3)
LDH SERPL L TO P-CCNC: 356 U/L — HIGH (ref 50–242)
LKM AB SER-ACNC: <20.1 UNITS — SIGNIFICANT CHANGE UP (ref 0–20)
LYMPHOCYTES # BLD AUTO: 0.18 K/UL — LOW (ref 1.2–3.4)
LYMPHOCYTES # BLD AUTO: 0.33 K/UL — LOW (ref 1.2–3.4)
LYMPHOCYTES # BLD AUTO: 2.6 % — LOW (ref 20.5–51.1)
LYMPHOCYTES # BLD AUTO: 4.5 % — LOW (ref 20.5–51.1)
MAGNESIUM SERPL-MCNC: 2.6 MG/DL — HIGH (ref 1.8–2.4)
MCHC RBC-ENTMCNC: 31.9 PG — HIGH (ref 27–31)
MCHC RBC-ENTMCNC: 32.2 PG — HIGH (ref 27–31)
MCHC RBC-ENTMCNC: 33.9 G/DL — SIGNIFICANT CHANGE UP (ref 32–37)
MCHC RBC-ENTMCNC: 34.1 G/DL — SIGNIFICANT CHANGE UP (ref 32–37)
MCV RBC AUTO: 94.2 FL — HIGH (ref 80–94)
MCV RBC AUTO: 94.3 FL — HIGH (ref 80–94)
MONOCYTES # BLD AUTO: 0.06 K/UL — LOW (ref 0.1–0.6)
MONOCYTES # BLD AUTO: 0.52 K/UL — SIGNIFICANT CHANGE UP (ref 0.1–0.6)
MONOCYTES NFR BLD AUTO: 0.9 % — LOW (ref 1.7–9.3)
MONOCYTES NFR BLD AUTO: 7.1 % — SIGNIFICANT CHANGE UP (ref 1.7–9.3)
NEUTROPHILS # BLD AUTO: 6.48 K/UL — SIGNIFICANT CHANGE UP (ref 1.4–6.5)
NEUTROPHILS # BLD AUTO: 6.67 K/UL — HIGH (ref 1.4–6.5)
NEUTROPHILS NFR BLD AUTO: 87.9 % — HIGH (ref 42.2–75.2)
NEUTROPHILS NFR BLD AUTO: 96.5 % — HIGH (ref 42.2–75.2)
NRBC # BLD: 1 /100 WBCS — HIGH (ref 0–0)
PHOSPHATE SERPL-MCNC: 4.9 MG/DL — SIGNIFICANT CHANGE UP (ref 2.1–4.9)
PLAT MORPH BLD: ABNORMAL
PLATELET # BLD AUTO: 22 K/UL — LOW (ref 130–400)
PLATELET # BLD AUTO: 23 K/UL — LOW (ref 130–400)
POTASSIUM SERPL-MCNC: 5.3 MMOL/L — HIGH (ref 3.5–5)
POTASSIUM SERPL-MCNC: 5.5 MMOL/L — HIGH (ref 3.5–5)
POTASSIUM SERPL-MCNC: 5.6 MMOL/L — HIGH (ref 3.5–5)
POTASSIUM SERPL-SCNC: 5.3 MMOL/L — HIGH (ref 3.5–5)
POTASSIUM SERPL-SCNC: 5.5 MMOL/L — HIGH (ref 3.5–5)
POTASSIUM SERPL-SCNC: 5.6 MMOL/L — HIGH (ref 3.5–5)
PROT SERPL-MCNC: 5.3 G/DL — LOW (ref 6–8)
PROTHROM AB SERPL-ACNC: >40 SEC — HIGH (ref 9.95–12.87)
RBC # BLD: 4.41 M/UL — LOW (ref 4.7–6.1)
RBC # BLD: 4.48 M/UL — LOW (ref 4.7–6.1)
RBC # FLD: 23.7 % — HIGH (ref 11.5–14.5)
RBC # FLD: 23.7 % — HIGH (ref 11.5–14.5)
RBC BLD AUTO: ABNORMAL
SODIUM SERPL-SCNC: 132 MMOL/L — LOW (ref 135–146)
SODIUM SERPL-SCNC: 134 MMOL/L — LOW (ref 135–146)
SODIUM SERPL-SCNC: 134 MMOL/L — LOW (ref 135–146)
WBC # BLD: 6.91 K/UL — SIGNIFICANT CHANGE UP (ref 4.8–10.8)
WBC # BLD: 7.37 K/UL — SIGNIFICANT CHANGE UP (ref 4.8–10.8)
WBC # FLD AUTO: 6.91 K/UL — SIGNIFICANT CHANGE UP (ref 4.8–10.8)
WBC # FLD AUTO: 7.37 K/UL — SIGNIFICANT CHANGE UP (ref 4.8–10.8)

## 2019-09-05 PROCEDURE — 71045 X-RAY EXAM CHEST 1 VIEW: CPT | Mod: 26

## 2019-09-05 RX ORDER — SODIUM POLYSTYRENE SULFONATE 4.1 MEQ/G
15 POWDER, FOR SUSPENSION ORAL ONCE
Refills: 0 | Status: COMPLETED | OUTPATIENT
Start: 2019-09-05 | End: 2019-09-05

## 2019-09-05 RX ORDER — SODIUM POLYSTYRENE SULFONATE 4.1 MEQ/G
30 POWDER, FOR SUSPENSION ORAL ONCE
Refills: 0 | Status: COMPLETED | OUTPATIENT
Start: 2019-09-05 | End: 2019-09-05

## 2019-09-05 RX ORDER — INSULIN HUMAN 100 [IU]/ML
10 INJECTION, SOLUTION SUBCUTANEOUS ONCE
Refills: 0 | Status: COMPLETED | OUTPATIENT
Start: 2019-09-05 | End: 2019-09-05

## 2019-09-05 RX ORDER — MICAFUNGIN SODIUM 100 MG/1
100 INJECTION, POWDER, LYOPHILIZED, FOR SOLUTION INTRAVENOUS EVERY 24 HOURS
Refills: 0 | Status: DISCONTINUED | OUTPATIENT
Start: 2019-09-06 | End: 2019-09-07

## 2019-09-05 RX ORDER — DEXTROSE 50 % IN WATER 50 %
50 SYRINGE (ML) INTRAVENOUS ONCE
Refills: 0 | Status: COMPLETED | OUTPATIENT
Start: 2019-09-05 | End: 2019-09-05

## 2019-09-05 RX ORDER — MICAFUNGIN SODIUM 100 MG/1
100 INJECTION, POWDER, LYOPHILIZED, FOR SOLUTION INTRAVENOUS ONCE
Refills: 0 | Status: COMPLETED | OUTPATIENT
Start: 2019-09-05 | End: 2019-09-05

## 2019-09-05 RX ORDER — INSULIN HUMAN 100 [IU]/ML
10 INJECTION, SOLUTION SUBCUTANEOUS ONCE
Refills: 0 | Status: DISCONTINUED | OUTPATIENT
Start: 2019-09-05 | End: 2019-09-05

## 2019-09-05 RX ORDER — MICAFUNGIN SODIUM 100 MG/1
INJECTION, POWDER, LYOPHILIZED, FOR SOLUTION INTRAVENOUS
Refills: 0 | Status: DISCONTINUED | OUTPATIENT
Start: 2019-09-05 | End: 2019-09-07

## 2019-09-05 RX ORDER — IBUPROFEN 200 MG
400 TABLET ORAL ONCE
Refills: 0 | Status: COMPLETED | OUTPATIENT
Start: 2019-09-05 | End: 2019-09-05

## 2019-09-05 RX ADMIN — MICAFUNGIN SODIUM 105 MILLIGRAM(S): 100 INJECTION, POWDER, LYOPHILIZED, FOR SOLUTION INTRAVENOUS at 09:37

## 2019-09-05 RX ADMIN — CHLORHEXIDINE GLUCONATE 15 MILLILITER(S): 213 SOLUTION TOPICAL at 05:05

## 2019-09-05 RX ADMIN — Medication 4.67 MICROGRAM(S)/KG/MIN: at 23:49

## 2019-09-05 RX ADMIN — MEROPENEM 100 MILLIGRAM(S): 1 INJECTION INTRAVENOUS at 18:39

## 2019-09-05 RX ADMIN — SODIUM POLYSTYRENE SULFONATE 15 GRAM(S): 4.1 POWDER, FOR SUSPENSION ORAL at 20:13

## 2019-09-05 RX ADMIN — AMIODARONE HYDROCHLORIDE 16.67 MG/MIN: 400 TABLET ORAL at 18:36

## 2019-09-05 RX ADMIN — Medication 400 MILLIGRAM(S): at 02:00

## 2019-09-05 RX ADMIN — AMIODARONE HYDROCHLORIDE 16.67 MG/MIN: 400 TABLET ORAL at 08:00

## 2019-09-05 RX ADMIN — CHLORHEXIDINE GLUCONATE 15 MILLILITER(S): 213 SOLUTION TOPICAL at 18:39

## 2019-09-05 RX ADMIN — VASOPRESSIN 2.4 UNIT(S)/MIN: 20 INJECTION INTRAVENOUS at 08:00

## 2019-09-05 RX ADMIN — INSULIN HUMAN 10 UNIT(S): 100 INJECTION, SOLUTION SUBCUTANEOUS at 20:12

## 2019-09-05 RX ADMIN — AMIODARONE HYDROCHLORIDE 16.67 MG/MIN: 400 TABLET ORAL at 01:47

## 2019-09-05 RX ADMIN — SODIUM POLYSTYRENE SULFONATE 30 GRAM(S): 4.1 POWDER, FOR SUSPENSION ORAL at 09:04

## 2019-09-05 RX ADMIN — VASOPRESSIN 2.4 UNIT(S)/MIN: 20 INJECTION INTRAVENOUS at 18:37

## 2019-09-05 RX ADMIN — CHLORHEXIDINE GLUCONATE 1 APPLICATION(S): 213 SOLUTION TOPICAL at 05:04

## 2019-09-05 RX ADMIN — LACTULOSE 20 GRAM(S): 10 SOLUTION ORAL at 05:04

## 2019-09-05 RX ADMIN — Medication 50 MILLILITER(S): at 20:12

## 2019-09-05 RX ADMIN — Medication 4.67 MICROGRAM(S)/KG/MIN: at 05:38

## 2019-09-05 RX ADMIN — LACTULOSE 20 GRAM(S): 10 SOLUTION ORAL at 13:10

## 2019-09-05 RX ADMIN — MIDODRINE HYDROCHLORIDE 10 MILLIGRAM(S): 2.5 TABLET ORAL at 22:16

## 2019-09-05 RX ADMIN — LACTULOSE 20 GRAM(S): 10 SOLUTION ORAL at 23:49

## 2019-09-05 RX ADMIN — MEROPENEM 100 MILLIGRAM(S): 1 INJECTION INTRAVENOUS at 05:04

## 2019-09-05 RX ADMIN — LACTULOSE 20 GRAM(S): 10 SOLUTION ORAL at 18:39

## 2019-09-05 RX ADMIN — Medication 4.67 MICROGRAM(S)/KG/MIN: at 08:00

## 2019-09-05 RX ADMIN — Medication 400 MILLIGRAM(S): at 01:30

## 2019-09-05 RX ADMIN — MIDODRINE HYDROCHLORIDE 10 MILLIGRAM(S): 2.5 TABLET ORAL at 05:04

## 2019-09-05 RX ADMIN — PANTOPRAZOLE SODIUM 40 MILLIGRAM(S): 20 TABLET, DELAYED RELEASE ORAL at 06:26

## 2019-09-05 RX ADMIN — Medication 100 MILLIGRAM(S): at 13:10

## 2019-09-05 RX ADMIN — MIDODRINE HYDROCHLORIDE 10 MILLIGRAM(S): 2.5 TABLET ORAL at 13:10

## 2019-09-05 NOTE — PROGRESS NOTE ADULT - SUBJECTIVE AND OBJECTIVE BOX
seen and examined  intubated/ventilated  on pressors       Standing Inpatient Medications  allopurinol 100 milliGRAM(s) Oral daily  amiodarone Infusion 0.5 mG/Min IV Continuous <Continuous>  chlorhexidine 0.12% Liquid 15 milliLiter(s) Oral Mucosa every 12 hours  chlorhexidine 4% Liquid 1 Application(s) Topical <User Schedule>  fentaNYL   Infusion. 0.5 MICROgram(s)/kG/Hr IV Continuous <Continuous>  lactulose Syrup 20 Gram(s) Enteral Tube four times a day  meropenem  IVPB 500 milliGRAM(s) IV Intermittent every 12 hours  micafungin IVPB      micafungin IVPB 100 milliGRAM(s) IV Intermittent once  midodrine 10 milliGRAM(s) Oral every 8 hours  norepinephrine Infusion 0.05 MICROgram(s)/kG/Min IV Continuous <Continuous>  pantoprazole    Tablet 40 milliGRAM(s) Oral before breakfast  rifaximin 550 milliGRAM(s) Oral two times a day  sodium polystyrene sulfonate Suspension 30 Gram(s) Oral once  vasopressin Infusion 0.04 Unit(s)/Min IV Continuous <Continuous>        VITALS/PHYSICAL EXAM  --------------------------------------------------------------------------------  T(C): 37.2 (09-05-19 @ 04:00), Max: 39.8 (09-04-19 @ 20:00)  HR: 108 (09-05-19 @ 07:00) (106 - 156)  BP: 100/59 (09-05-19 @ 07:00) (56/34 - 129/52)  RR: 22 (09-05-19 @ 07:00) (7 - 52)  SpO2: 98% (09-05-19 @ 07:00) (84% - 99%)  Wt(kg): --        09-04-19 @ 07:01  -  09-05-19 @ 07:00  --------------------------------------------------------  IN: 5470.2 mL / OUT: 150 mL / NET: 5320.2 mL      Physical Exam:  	Gen: intubated/ventilated  	Pulm: B/L mekhi   	CV: S1S2; no rub  	Abd: +distended  	: genaro   	LE:  edema      LABS/STUDIES  --------------------------------------------------------------------------------              14.2   6.91  >-----------<  23       [09-05-19 @ 04:20]              41.6     134  |  101  |  68  ----------------------------<  123      [09-05-19 @ 04:20]  5.6   |  17  |  4.1        Ca     8.5     [09-05-19 @ 04:20]      Mg     2.6     [09-05-19 @ 04:20]      Phos  4.9     [09-05-19 @ 04:20]    TPro  5.3  /  Alb  2.3  /  TBili  8.4  /  DBili  6.3  /  AST  172  /  ALT  129  /  AlkPhos  117  [09-05-19 @ 04:20]    PT/INR: PT >40.00, INR 5.18       [09-05-19 @ 04:20]  PTT: 78.4       [09-05-19 @ 04:20]    Troponin 0.05      [09-04-19 @ 01:15]    Creatinine Trend:  SCr 4.1 [09-05 @ 04:20]  SCr 4.1 [09-04 @ 23:50]  SCr 3.9 [09-04 @ 17:30]  SCr 3.4 [09-04 @ 04:30]  SCr 3.6 [09-04 @ 01:15]    Urinalysis - [09-04-19 @ 18:40]      Color Bobbi / Appearance Slightly Turbid / SG 1.025 / pH 5.5      Gluc Negative / Ketone Trace  / Bili Small / Urobili <2 mg/dL       Blood Large / Protein 30 mg/dL / Leuk Est Moderate / Nitrite Negative      RBC 36 / WBC 12 / Hyaline 6 / Gran  / Sq Epi  / Non Sq Epi 1 / Bacteria Negative      Iron 66, TIBC 199, %sat 33      [09-03-19 @ 10:50]  Ferritin 315      [09-03-19 @ 10:50]  TSH 2.46      [09-02-19 @ 04:40]    HCV 0.69, Nonreact      [09-02-19 @ 04:40]  HIV Nonreact      [09-03-19 @ 11:00]    CANDI: titer 1:160, pattern Speckled      [09-03-19 @ 10:50]

## 2019-09-05 NOTE — PROGRESS NOTE ADULT - ASSESSMENT
Assessment:    68 y/o M with PMH of A-fib on Coumadin, HFrEF (EF< 20%)  s/p AICD, HTN, gout, CKD III, BPH, Liver cirrhosis 2/2 congestive hepatopathy p/w hypotension.     # Septic shock on top of cardiac failure  - patient is intubated for hypercapneic acute respiratory failure  - on dual pressors being able to decrease levophed gradually today with LA trending down  - AIM to a MAP 55   - c/w hydrocortisone 100 mg every 8 hrs for now  - accurate input & output + daily body weight  - daily CXR  - hold lasix and spironolactone for now  - oligoanuric 10 cc/h in the setting of cardiorenal sd   - nephrology: no possibility for hemodialysis    # fever 103 F  - D/C cefepime + flagyl  - start meropenem + micafungin  - got 1 shot of vancomycin 9/4  - UA negative  - f/u blood cx  - DTA gram stain and cx    # sustained Vtach:  - still on amiodarone drip  - EP on board  - AICD function stopped by EP    # DIC??  - F/U fibrinogen, blood smear, ldh, haptoglobin  - prepare FFP and platelets and transfuse as needed    # Afib   - daily PT /INR   - keep 2<INR<3    # cholecystitis??  - less likely. pericholecystic fluid could be seen in the setting of heart failure and volume overload  - NO intervention by GI team  - HIDA scan negative for acute cholecystitis  - no acute intervention by GS    # liver cirrhosis:  - c/w lactulose with target BM > 3 times a day  - trend bilirubin  - start rifaximin 550 mg bid    # confusion + somnolence  - most likely 2/2 liver encephalopathy  - c/w lactulose + rifaximin    DVT prophylaxis: OFF   PPI prophylaxis: pantoprazole  code status: DNR  dispo : very poor prognosis  family aware of prognosis

## 2019-09-05 NOTE — DIETITIAN INITIAL EVALUATION ADULT. - REASON INDICATOR FOR ASSESSMENT
Intubated - on ventilator, hemodynamically unstable at this time on IV pressors x2, Amiodarone. Ve 9.65, BP 95/55, MAP 76. Spoken with SON at bedside, FAMILY WANTS ALL MEASURES. RN spoken with. Currently pt with 3+ generalized edema, 4+ to scrotum/penis, fissure and stage 1 to sacrum. LBM not documented. NPO

## 2019-09-05 NOTE — PROGRESS NOTE ADULT - SUBJECTIVE AND OBJECTIVE BOX
CORBINMORAIMA THOMAS  69y, Male      OVERNIGHT EVENTS:    fevers, multiple pressors, off sedation, non responsive to all stimuli  has corneal/gag reflex  FiO2 100%  no BM  R IJ catheter with no erythema  non purulent ET secretions    VITALS:  T(F): 103.4, Max: 103.7 (19 @ 20:00)  HR: 108  BP: 129/52  RR: 28Vital Signs Last 24 Hrs  T(C): 39.7 (05 Sep 2019 00:00), Max: 39.8 (04 Sep 2019 20:00)  T(F): 103.4 (05 Sep 2019 00:00), Max: 103.7 (04 Sep 2019 20:00)  HR: 108 (05 Sep 2019 05:30) (108 - 156)  BP: 129/52 (05 Sep 2019 05:30) (56/34 - 129/52)  BP(mean): 66 (05 Sep 2019 05:30) (39 - 88)  RR: 28 (05 Sep 2019 05:30) (7 - 52)  SpO2: 98% (05 Sep 2019 05:30) (84% - 99%)    TESTS & MEASUREMENTS:                        14.2   6.91  )-----------( 23       ( 05 Sep 2019 04:20 )             41.6         134<L>  |  101  |  68<HH>  ----------------------------<  123<H>  5.6<H>   |  17  |  4.1<HH>    Ca    8.5      05 Sep 2019 04:20  Phos  4.9       Mg     2.6         TPro  5.3<L>  /  Alb  2.3<L>  /  TBili  8.4<H>  /  DBili  6.3<H>  /  AST  172<H>  /  ALT  129<H>  /  AlkPhos  117<H>      LIVER FUNCTIONS - ( 05 Sep 2019 04:20 )  Alb: 2.3 g/dL / Pro: 5.3 g/dL / ALK PHOS: 117 U/L / ALT: 129 U/L / AST: 172 U/L / GGT: x             Culture - Blood (collected 19 @ 04:23)  Source: .Blood None  Preliminary Report (19 @ 11:14):    No growth to date.    Culture - Urine (collected 19 @ 17:10)  Source: .Urine Clean Catch (Midstream)  Final Report (19 @ 11:14):    No growth    Culture - Blood (collected 19 @ 15:00)  Source: .Blood Blood-Peripheral  Preliminary Report (19 @ 20:01):    No growth to date.    Culture - Blood (collected 19 @ 15:00)  Source: .Blood Blood-Peripheral  Preliminary Report (19 @ 20:01):    No growth to date.      Urinalysis Basic - ( 04 Sep 2019 18:40 )    Color: Bobbi / Appearance: Slightly Turbid / S.025 / pH: x  Gluc: x / Ketone: Trace  / Bili: Small / Urobili: <2 mg/dL   Blood: x / Protein: 30 mg/dL / Nitrite: Negative   Leuk Esterase: Moderate / RBC: 36 /HPF / WBC 12 /HPF   Sq Epi: x / Non Sq Epi: 1 /HPF / Bacteria: Negative          RADIOLOGY & ADDITIONAL TESTS:    ANTIBIOTICS:  meropenem  IVPB 500 milliGRAM(s) IV Intermittent every 12 hours  rifaximin 550 milliGRAM(s) Oral two times a day

## 2019-09-05 NOTE — DIETITIAN INITIAL EVALUATION ADULT. - ENERGY NEEDS
Rec: [id number] kcal/day --> 6793-3502 kcal/day (60-70% of [id number] of 1938) v.s. 5982-0408 kcal/day (22-25 kcal/kg of IBW) v.s. 0321-5005 kcal/day (11-14 kcal/kg of ABW)   kcal/day (1.2-1.5 g/kg of IBW) - BMI of 31.5, Liver disease, Gout, CKD3 poor renal status at this time, likely aim lower, adjust PRN.   per ICU team

## 2019-09-05 NOTE — PROGRESS NOTE ADULT - ASSESSMENT
IMPRESSION:    Shock cardiogenic/ multiorgan failure  HF REF  Cholecystitis r/o neg MAXIME NIEVES Afib on Coumadin   thrombocytopenia  renal failure    PLAN:    CNS: No depressants    HEENT: Oral care    PULMONARY:  HOB @ 45 degrees.  Wean O2 as tolerated    CARDIOVASCULAR:  On levo/vaso.  Cardiology Follow up , TAPER PRESSORS    GI: GI prophylaxis.  NPO.    RENAL:  Follow up lytes.  Correct as needed. Patient at need for RRT but these interventions are futile at this time with no treatment of underlying condition    INFECTIOUS DISEASE: Follow up cultures.  Continue ABX until cultures PER id    HEMATOLOGICAL:  DVT prophylaxis with SCDs. F/U CBC. transfuse 1u PLT    ENDOCRINE:  Follow up FS.  Insulin protocol if needed.    PALLIATIVE CARE EVAL    Advance directives    Overall prognosis poor IMPRESSION:    Shock cardiogenic/ multiorgan failure  HF REF  Cholecystitis r/o neg HIDA  HO Afib on Coumadin   thrombocytopenia/ DIC  renal failure    PLAN:    CNS: No depressants, as needed sedation    HEENT: Oral care    PULMONARY:  HOB @ 45 degrees.  Wean O2 as tolerated, dec FIO2    CARDIOVASCULAR:  On levo/vaso.  Cardiology Follow up , TAPER PRESSORS, cardio f/up     GI: GI prophylaxis.  NPO.    RENAL:  Follow up lytes.  Correct as needed. Patient at need for RRT but these interventions are futile at this time with no treatment of underlying condition    INFECTIOUS DISEASE: Follow up cultures.  Continue ABX until cultures PER id    HEMATOLOGICAL:  DVT prophylaxis with SCDs. F/U CBC. transfuse 1u PLT, DIC panel    ENDOCRINE:  Follow up FS.  Insulin protocol if needed.    PALLIATIVE CARE EVAL    Advance directives    Overall prognosis poor

## 2019-09-05 NOTE — DIETITIAN INITIAL EVALUATION ADULT. - NS FNS WEIGHT USED FOR CALC
ABW is 99.7kg (Admitted), likely weight wt gain during hospital stay due to extensive edema. IBW is 75.4kg/current

## 2019-09-05 NOTE — DIETITIAN INITIAL EVALUATION ADULT. - OTHER INFO
p/w hypotension and confusion. renal consulted - now pt is intubated to vent on pressors. cardiogenic shock w/ MODS including VERO and persistent vtach. Oligoanuric. Pt not likely able to tolerate RRT. ID consulted. s/p SLP 9/3 NPO.

## 2019-09-05 NOTE — PROGRESS NOTE ADULT - ASSESSMENT
· Assessment		  68 y/o M with PMH of A-fib on Coumadin, HFrEF (LVEF 17% 12/2018) s/p AICD, HTN, gout, CKD III, BPH, Liver cirrhosis secondary to CHF p/w hypotension and confusion more than baseline currently requiring  pressors.    IMPRESSION:   Severe refractory shock.  Cardiogenic vs septic ( possibly translocation given severity of disease despite negative BCx )  Intubated 9/3.  MSOF  ARDS ( FiO2 100 % ), sock liver with worsening transaminitis  Anoxic encephalopathy ( non responsive to all stimuli off sedation)  ARF ( HD could not be performed as pt is hypotensive on max dosis of pressors )  No acute cholecystitis/ cholangitis  No meningitis  Liver cirrhosis induced thrombocytopenia, coagulopathy, transaminitis  HIDA delayed at 2h which is more consistent with liver cirrhosis ( with a poor MELD score).  Occult fungemia a possibility through translocation  Bcx 9/1,2 NG  UCx 9/1 NG    RECOMMENDATIONS:  BCx   Meropenem 500 mg iv q24h  micafungin 100 mg iv q24h  Medical care is futile

## 2019-09-05 NOTE — PROGRESS NOTE ADULT - SUBJECTIVE AND OBJECTIVE BOX
70 y/o M with PMH of A-fib on Coumadin, HFrEF (LVEF 17% 2018) s/p AICD, HTN, gout, CKD III, BPH, Liver cirrhosis secondary to CHF p/w hypotension. no fever, or chills, no recent URTI. to note that the patient blood pressure is 70 to 80 at baseline due to his advanced heart failure  he was admitted to the ICU and started on levophed and vasopressin as a treatment for septic shock?? on top of cardiogenic shock  he got started empirically on IV antibiotics     OVERNIGHT EVENTS: fever 103 f overnight. cefepime and flagyl stopped and switched to meropenem and micafungin  not arousable, still intubated, OFF sedation    SUBJECTIVE / INTERVAL HPI: Patient seen and examined at bedside. not arousable, intubated, not responsive to painful stimuli. OFF sedation    VITAL SIGNS:  Vital Signs Last 24 Hrs  T(C): 37.2 (05 Sep 2019 08:00), Max: 39.8 (04 Sep 2019 20:00)  T(F): 98.9 (05 Sep 2019 08:00), Max: 103.7 (04 Sep 2019 20:00)  HR: 114 (05 Sep 2019 10:30) (102 - 148)  BP: 98/64 (05 Sep 2019 10:15) (64/42 - 129/52)  ON LEVOPHED 0.75 AND VASOPRESSIN 0.04  BP(mean): 77 (05 Sep 2019 10:15) (41 - 88)  RR: 18 (05 Sep 2019 10:30) (7 - 52)  SpO2: 99% (05 Sep 2019 10:30) (97% - 100%)    PHYSICAL EXAM:    General: WDWN  HEENT: NC/AT; PERRL, clear conjunctiva  Neck: supple  Cardiovascular: +S1/S2; RRR  Respiratory: CTA b/l; no W/R/R  Gastrointestinal: soft, NT/ND; +BSx4  Extremities: WWP; 2+ peripheral pulses; no edema   Neurological: AAOx3; no focal deficits    MEDICATIONS:  MEDICATIONS  (STANDING):  allopurinol 100 milliGRAM(s) Oral daily  amiodarone Infusion 0.5 mG/Min (16.667 mL/Hr) IV Continuous <Continuous>  chlorhexidine 0.12% Liquid 15 milliLiter(s) Oral Mucosa every 12 hours  chlorhexidine 4% Liquid 1 Application(s) Topical <User Schedule>  fentaNYL   Infusion. 0.5 MICROgram(s)/kG/Hr (4.985 mL/Hr) IV Continuous <Continuous>  lactulose Syrup 20 Gram(s) Enteral Tube four times a day  meropenem  IVPB 500 milliGRAM(s) IV Intermittent every 12 hours  micafungin IVPB      midodrine 10 milliGRAM(s) Oral every 8 hours  norepinephrine Infusion 0.05 MICROgram(s)/kG/Min (4.673 mL/Hr) IV Continuous <Continuous>  pantoprazole    Tablet 40 milliGRAM(s) Oral before breakfast  rifaximin 550 milliGRAM(s) Oral two times a day  vasopressin Infusion 0.04 Unit(s)/Min (2.4 mL/Hr) IV Continuous <Continuous>    MEDICATIONS  (PRN):      ALLERGIES:  Allergies    No Known Allergies    Intolerances        LABS:                        14.2   6.91  )-----------( 23       ( 05 Sep 2019 04:20 )             41.6     -    134<L>  |  101  |  68<HH>  ----------------------------<  123<H>  5.6<H>   |  17  |  4.1<HH>    Ca    8.5      05 Sep 2019 04:20  Phos  4.9       Mg     2.6         TPro  5.3<L>  /  Alb  2.3<L>  /  TBili  8.4<H>  /  DBili  6.3<H>  /  AST  172<H>  /  ALT  129<H>  /  AlkPhos  117<H>      PT/INR - ( 05 Sep 2019 04:20 )   PT: >40.00 sec;   INR: 5.18 ratio         PTT - ( 05 Sep 2019 04:20 )  PTT:78.4 sec  Urinalysis Basic - ( 04 Sep 2019 18:40 )    Color: Bobbi / Appearance: Slightly Turbid / S.025 / pH: x  Gluc: x / Ketone: Trace  / Bili: Small / Urobili: <2 mg/dL   Blood: x / Protein: 30 mg/dL / Nitrite: Negative   Leuk Esterase: Moderate / RBC: 36 /HPF / WBC 12 /HPF   Sq Epi: x / Non Sq Epi: 1 /HPF / Bacteria: Negative      CAPILLARY BLOOD GLUCOSE      POCT Blood Glucose.: 91 mg/dL (05 Sep 2019 07:43)

## 2019-09-05 NOTE — PROGRESS NOTE ADULT - SUBJECTIVE AND OBJECTIVE BOX
Patient is a 69y old  Male who presents with a chief complaint of Hypotension (05 Sep 2019 08:21)        Over Night Events:        ROS:  See HPI    PHYSICAL EXAM    ICU Vital Signs Last 24 Hrs  T(C): 37.2 (05 Sep 2019 08:00), Max: 39.8 (04 Sep 2019 20:00)  T(F): 98.9 (05 Sep 2019 08:00), Max: 103.7 (04 Sep 2019 20:00)  HR: 112 (05 Sep 2019 09:45) (102 - 148)  BP: 95/55 (05 Sep 2019 09:45) (64/42 - 129/52)  BP(mean): 76 (05 Sep 2019 09:45) (41 - 88)  ABP: --  ABP(mean): --  RR: 19 (05 Sep 2019 09:45) (7 - 52)  SpO2: 99% (05 Sep 2019 09:45) (97% - 99%)      General: ill appearing. Intubated  HEENT: PANFILO  Lymphatic system: No cervical LN   Lungs: Bilateral BS, dec both abses  Cardiovascular: LAM 3/6  Gastrointestinal: Soft, Positive BS  Extremities: No clubbing.   Skin: Mottled extremities  Neurological: JUN        19 @ 07:  -  19 @ 07:00  --------------------------------------------------------  IN:    amiodarone Infusion: 83.5 mL    amiodarone Infusion: 317.3 mL    IV PiggyBack: 500 mL    norepinephrine Infusion: 4044.5 mL    norepinephrine Infusion: 467.3 mL    vasopressin Infusion: 57.6 mL  Total IN: 5470.2 mL    OUT:    Indwelling Catheter - Urethral: 150 mL  Total OUT: 150 mL    Total NET: 5320.2 mL      19 @ 07:  -  19 @ 10:11  --------------------------------------------------------  IN:    amiodarone Infusion: 50.1 mL    norepinephrine Infusion: 140.2 mL    vasopressin Infusion: 7.2 mL  Total IN: 197.5 mL    OUT:    Indwelling Catheter - Urethral: 10 mL  Total OUT: 10 mL    Total NET: 187.5 mL      LABS:                            14.2   6.91  )-----------( 23       ( 05 Sep 2019 04:20 )             41.6                                               09-05    134<L>  |  101  |  68<HH>  ----------------------------<  123<H>  5.6<H>   |  17  |  4.1<HH>    Ca    8.5      05 Sep 2019 04:20  Phos  4.9       Mg     2.6         TPro  5.3<L>  /  Alb  2.3<L>  /  TBili  8.4<H>  /  DBili  6.3<H>  /  AST  172<H>  /  ALT  129<H>  /  AlkPhos  117<H>        PT/INR - ( 05 Sep 2019 04:20 )   PT: >40.00 sec;   INR: 5.18 ratio         PTT - ( 05 Sep 2019 04:20 )  PTT:78.4 sec                                       Urinalysis Basic - ( 04 Sep 2019 18:40 )    Color: Bobbi / Appearance: Slightly Turbid / S.025 / pH: x  Gluc: x / Ketone: Trace  / Bili: Small / Urobili: <2 mg/dL   Blood: x / Protein: 30 mg/dL / Nitrite: Negative   Leuk Esterase: Moderate / RBC: 36 /HPF / WBC 12 /HPF   Sq Epi: x / Non Sq Epi: 1 /HPF / Bacteria: Negative        CARDIAC MARKERS ( 04 Sep 2019 01:15 )  x     / 0.05 ng/mL / x     / x     / x                                                LIVER FUNCTIONS - ( 05 Sep 2019 04:20 )  Alb: 2.3 g/dL / Pro: 5.3 g/dL / ALK PHOS: 117 U/L / ALT: 129 U/L / AST: 172 U/L / GGT: x                       Mode: AC/ CMV (Assist Control/ Continuous Mandatory Ventilation)  RR (machine): 20  TV (machine): 450  FiO2: 100  PEEP: 8  ITime: 1  MAP: 13  PIP: 23                                      ABG - ( 05 Sep 2019 04:32 )  pH, Arterial: 7.27  pH, Blood: x     /  pCO2: 33    /  pO2: 149   / HCO3: 15    / Base Excess: -10.5 /  SaO2: 99        MEDICATIONS  (STANDING):  allopurinol 100 milliGRAM(s) Oral daily  amiodarone Infusion 0.5 mG/Min (16.667 mL/Hr) IV Continuous <Continuous>  chlorhexidine 0.12% Liquid 15 milliLiter(s) Oral Mucosa every 12 hours  chlorhexidine 4% Liquid 1 Application(s) Topical <User Schedule>  fentaNYL   Infusion. 0.5 MICROgram(s)/kG/Hr (4.985 mL/Hr) IV Continuous <Continuous>  lactulose Syrup 20 Gram(s) Enteral Tube four times a day  meropenem  IVPB 500 milliGRAM(s) IV Intermittent every 12 hours  micafungin IVPB      midodrine 10 milliGRAM(s) Oral every 8 hours  norepinephrine Infusion 0.05 MICROgram(s)/kG/Min (4.673 mL/Hr) IV Continuous <Continuous>  pantoprazole    Tablet 40 milliGRAM(s) Oral before breakfast  rifaximin 550 milliGRAM(s) Oral two times a day  vasopressin Infusion 0.04 Unit(s)/Min (2.4 mL/Hr) IV Continuous <Continuous> Patient is a 69y old  Male who presents with a chief complaint of Hypotension (05 Sep 2019 08:21)      Over Night Events: Sustained VT. On Amiodarone    ROS:  See HPI    PHYSICAL EXAM    ICU Vital Signs Last 24 Hrs  T(C): 37.2 (05 Sep 2019 08:00), Max: 39.8 (04 Sep 2019 20:00)  T(F): 98.9 (05 Sep 2019 08:00), Max: 103.7 (04 Sep 2019 20:00)  HR: 112 (05 Sep 2019 09:45) (102 - 148)  BP: 95/55 (05 Sep 2019 09:45) (64/42 - 129/52)  BP(mean): 76 (05 Sep 2019 09:45) (41 - 88)  RR: 19 (05 Sep 2019 09:45) (7 - 52)  SpO2: 99% (05 Sep 2019 09:45) (97% - 99%)      General: ill appearing. Intubated  HEENT: PANFILO  Lymphatic system: No cervical LN   Lungs: Bilateral BS, dec both abses  Cardiovascular: LAM 3/6  Gastrointestinal: Soft, Positive BS  Extremities: No clubbing.   Skin: Mottled extremities  Neurological: JUN        19 @ 07:  -  19 @ 07:00  --------------------------------------------------------  IN:    amiodarone Infusion: 83.5 mL    amiodarone Infusion: 317.3 mL    IV PiggyBack: 500 mL    norepinephrine Infusion: 4044.5 mL    norepinephrine Infusion: 467.3 mL    vasopressin Infusion: 57.6 mL  Total IN: 5470.2 mL    OUT:    Indwelling Catheter - Urethral: 150 mL  Total OUT: 150 mL    Total NET: 5320.2 mL      19 @ 07:  -  19 @ 10:11  --------------------------------------------------------  IN:    amiodarone Infusion: 50.1 mL    norepinephrine Infusion: 140.2 mL    vasopressin Infusion: 7.2 mL  Total IN: 197.5 mL    OUT:    Indwelling Catheter - Urethral: 10 mL  Total OUT: 10 mL    Total NET: 187.5 mL    LABS:                        14.2   6.91  )-----------( 23       ( 05 Sep 2019 04:20 )             41.6                                               -05    134<L>  |  101  |  68<HH>  ----------------------------<  123<H>  5.6<H>   |  17  |  4.1<HH>    Ca    8.5      05 Sep 2019 04:20  Phos  4.9       Mg     2.6         TPro  5.3<L>  /  Alb  2.3<L>  /  TBili  8.4<H>  /  DBili  6.3<H>  /  AST  172<H>  /  ALT  129<H>  /  AlkPhos  117<H>        PT/INR - ( 05 Sep 2019 04:20 )   PT: >40.00 sec;   INR: 5.18 ratio         PTT - ( 05 Sep 2019 04:20 )  PTT:78.4 sec                                       Urinalysis Basic - ( 04 Sep 2019 18:40 )    Color: Bobbi / Appearance: Slightly Turbid / S.025 / pH: x  Gluc: x / Ketone: Trace  / Bili: Small / Urobili: <2 mg/dL   Blood: x / Protein: 30 mg/dL / Nitrite: Negative   Leuk Esterase: Moderate / RBC: 36 /HPF / WBC 12 /HPF   Sq Epi: x / Non Sq Epi: 1 /HPF / Bacteria: Negative        CARDIAC MARKERS ( 04 Sep 2019 01:15 )  x     / 0.05 ng/mL / x     / x     / x                                                LIVER FUNCTIONS - ( 05 Sep 2019 04:20 )  Alb: 2.3 g/dL / Pro: 5.3 g/dL / ALK PHOS: 117 U/L / ALT: 129 U/L / AST: 172 U/L / GGT: x                       Mode: AC/ CMV (Assist Control/ Continuous Mandatory Ventilation)  RR (machine): 20  TV (machine): 450  FiO2: 100  PEEP: 8  ITime: 1  MAP: 13  PIP: 23                                      ABG - ( 05 Sep 2019 04:32 )  pH, Arterial: 7.27  pH, Blood: x     /  pCO2: 33    /  pO2: 149   / HCO3: 15    / Base Excess: -10.5 /  SaO2: 99        MEDICATIONS  (STANDING):  allopurinol 100 milliGRAM(s) Oral daily  amiodarone Infusion 0.5 mG/Min (16.667 mL/Hr) IV Continuous <Continuous>  chlorhexidine 0.12% Liquid 15 milliLiter(s) Oral Mucosa every 12 hours  chlorhexidine 4% Liquid 1 Application(s) Topical <User Schedule>  fentaNYL   Infusion. 0.5 MICROgram(s)/kG/Hr (4.985 mL/Hr) IV Continuous <Continuous>  lactulose Syrup 20 Gram(s) Enteral Tube four times a day  meropenem  IVPB 500 milliGRAM(s) IV Intermittent every 12 hours  micafungin IVPB      midodrine 10 milliGRAM(s) Oral every 8 hours  norepinephrine Infusion 0.05 MICROgram(s)/kG/Min (4.673 mL/Hr) IV Continuous <Continuous>  pantoprazole    Tablet 40 milliGRAM(s) Oral before breakfast  rifaximin 550 milliGRAM(s) Oral two times a day  vasopressin Infusion 0.04 Unit(s)/Min (2.4 mL/Hr) IV Continuous <Continuous> Patient is a 69y old  Male who presents with a chief complaint of Hypotension (05 Sep 2019 08:21)      Over Night Events: Sustained VT. On Amiodarone, levophed, and vasopressin, no sedation, still intubated, ventilated    ROS:  See HPI    PHYSICAL EXAM    ICU Vital Signs Last 24 Hrs  T(C): 37.2 (05 Sep 2019 08:00), Max: 39.8 (04 Sep 2019 20:00)  T(F): 98.9 (05 Sep 2019 08:00), Max: 103.7 (04 Sep 2019 20:00)  HR: 112 (05 Sep 2019 09:45) (102 - 148)  BP: 95/55 (05 Sep 2019 09:45) (64/42 - 129/52)  BP(mean): 76 (05 Sep 2019 09:45) (41 - 88)  RR: 19 (05 Sep 2019 09:45) (7 - 52)  SpO2: 99% (05 Sep 2019 09:45) (97% - 99%)      General: ill appearing. Intubated  HEENT: PANFILO  Lymphatic system: No cervical LN   Lungs: dec both bases  Cardiovascular: LAM 3/6  Gastrointestinal: Soft, Positive BS  Extremities: No clubbing.   Skin: Mottled extremities  Neurological: JUN        19 @ 07:  -  19 @ 07:00  --------------------------------------------------------  IN:    amiodarone Infusion: 83.5 mL    amiodarone Infusion: 317.3 mL    IV PiggyBack: 500 mL    norepinephrine Infusion: 4044.5 mL    norepinephrine Infusion: 467.3 mL    vasopressin Infusion: 57.6 mL  Total IN: 5470.2 mL    OUT:    Indwelling Catheter - Urethral: 150 mL  Total OUT: 150 mL    Total NET: 5320.2 mL      19 @ 07:  -  19 @ 10:11  --------------------------------------------------------  IN:    amiodarone Infusion: 50.1 mL    norepinephrine Infusion: 140.2 mL    vasopressin Infusion: 7.2 mL  Total IN: 197.5 mL    OUT:    Indwelling Catheter - Urethral: 10 mL  Total OUT: 10 mL    Total NET: 187.5 mL    LABS:                        14.2   6.91  )-----------( 23       ( 05 Sep 2019 04:20 )             41.6                                               -    134<L>  |  101  |  68<HH>  ----------------------------<  123<H>  5.6<H>   |  17  |  4.1<HH>    Ca    8.5      05 Sep 2019 04:20  Phos  4.9       Mg     2.6         TPro  5.3<L>  /  Alb  2.3<L>  /  TBili  8.4<H>  /  DBili  6.3<H>  /  AST  172<H>  /  ALT  129<H>  /  AlkPhos  117<H>        PT/INR - ( 05 Sep 2019 04:20 )   PT: >40.00 sec;   INR: 5.18 ratio         PTT - ( 05 Sep 2019 04:20 )  PTT:78.4 sec                                       Urinalysis Basic - ( 04 Sep 2019 18:40 )    Color: Bobbi / Appearance: Slightly Turbid / S.025 / pH: x  Gluc: x / Ketone: Trace  / Bili: Small / Urobili: <2 mg/dL   Blood: x / Protein: 30 mg/dL / Nitrite: Negative   Leuk Esterase: Moderate / RBC: 36 /HPF / WBC 12 /HPF   Sq Epi: x / Non Sq Epi: 1 /HPF / Bacteria: Negative        CARDIAC MARKERS ( 04 Sep 2019 01:15 )  x     / 0.05 ng/mL / x     / x     / x                                                LIVER FUNCTIONS - ( 05 Sep 2019 04:20 )  Alb: 2.3 g/dL / Pro: 5.3 g/dL / ALK PHOS: 117 U/L / ALT: 129 U/L / AST: 172 U/L / GGT: x                       Mode: AC/ CMV (Assist Control/ Continuous Mandatory Ventilation)  RR (machine): 20  TV (machine): 450  FiO2: 100  PEEP: 8  ITime: 1  MAP: 13  PIP: 23                                      ABG - ( 05 Sep 2019 04:32 )  pH, Arterial: 7.27  pH, Blood: x     /  pCO2: 33    /  pO2: 149   / HCO3: 15    / Base Excess: -10.5 /  SaO2: 99        MEDICATIONS  (STANDING):  allopurinol 100 milliGRAM(s) Oral daily  amiodarone Infusion 0.5 mG/Min (16.667 mL/Hr) IV Continuous <Continuous>  chlorhexidine 0.12% Liquid 15 milliLiter(s) Oral Mucosa every 12 hours  chlorhexidine 4% Liquid 1 Application(s) Topical <User Schedule>  fentaNYL   Infusion. 0.5 MICROgram(s)/kG/Hr (4.985 mL/Hr) IV Continuous <Continuous>  lactulose Syrup 20 Gram(s) Enteral Tube four times a day  meropenem  IVPB 500 milliGRAM(s) IV Intermittent every 12 hours  micafungin IVPB      midodrine 10 milliGRAM(s) Oral every 8 hours  norepinephrine Infusion 0.05 MICROgram(s)/kG/Min (4.673 mL/Hr) IV Continuous <Continuous>  pantoprazole    Tablet 40 milliGRAM(s) Oral before breakfast  rifaximin 550 milliGRAM(s) Oral two times a day  vasopressin Infusion 0.04 Unit(s)/Min (2.4 mL/Hr) IV Continuous <Continuous>

## 2019-09-05 NOTE — PROGRESS NOTE ADULT - ASSESSMENT
70 y/o M with PMH of A-fib on Coumadin, HFrEF (LVEF 17%) s/p AICD, HTN, gout, CKD III, BPH, Liver cirrhosis secondary to CHF p/w hypotension  Now in cardiogenic shock w/ MODS including  VERO w/ hyperK, persistent  vtach   # creatinine stable  # oligoanuric  #remains on pressors  # EP notes appreciated  # please refer to nephrology notes in reference to RRT   # ID notes appreciated  # overall prognosis poor  # will sign off recall as needed

## 2019-09-05 NOTE — DIETITIAN INITIAL EVALUATION ADULT. - DIET TYPE
PER SON, Pt was a good eater at home, limiting protein due to ammonia build up 2/2 liver disease and requires full dentures to chew. Takes no vitamin/supplement. Wife cooks. NKFA. UBW been stable PTA, but suspecting weight gain during admission.

## 2019-09-05 NOTE — DIETITIAN INITIAL EVALUATION ADULT. - PERTINENT LABORATORY DATA
9/5:  RBC 4.41, hematocrit 41.6, INR elevated, K 5.6, BUN 68, Cr 41, Glucose 123, Elevated LFT, GFR 14, Mag 2.6

## 2019-09-05 NOTE — DIETITIAN INITIAL EVALUATION ADULT. - CONTINUE CURRENT NUTRITION CARE PLAN
pt to meet and tolerate >85% of esitmated kcal/protein via TF upon f/u if patient to start TF upon f/u in 4 days. Enteral nutrition. RD to monitor diet order, energy intake, NFPF (Renal profile, electrolytes, EN tolerance, edema,)

## 2019-09-05 NOTE — DIETITIAN INITIAL EVALUATION ADULT. - ADD RECOMMEND
If patient is hemodynamically stable and ready for tube feeding, may start slow at Osmolite 1.5 at 10cc/hr, increase by 5cc q6hr as tolerated to GOAL of 30cc/hr with No-CArb ProSource TF packet x5/day. This regimen gives a total of 1225 kcal/ 89g protein/ 525mL free water, additional flushes per ICU/Renal. Monitoring renal electrolytes and MAP.

## 2019-09-06 LAB
ALBUMIN SERPL ELPH-MCNC: 2.1 G/DL — LOW (ref 3.5–5.2)
ALBUMIN SERPL ELPH-MCNC: 2.9 G/DL — LOW (ref 3.5–5.2)
ALP SERPL-CCNC: 106 U/L — SIGNIFICANT CHANGE UP (ref 30–115)
ALP SERPL-CCNC: 116 U/L — HIGH (ref 30–115)
ALT FLD-CCNC: 113 U/L — HIGH (ref 0–41)
ALT FLD-CCNC: 80 U/L — HIGH (ref 0–41)
ANION GAP SERPL CALC-SCNC: 16 MMOL/L — HIGH (ref 7–14)
ANION GAP SERPL CALC-SCNC: 20 MMOL/L — HIGH (ref 7–14)
ANION GAP SERPL CALC-SCNC: 21 MMOL/L — HIGH (ref 7–14)
APTT BLD: 47.1 SEC — HIGH (ref 27–39.2)
AST SERPL-CCNC: 59 U/L — HIGH (ref 0–41)
AST SERPL-CCNC: 83 U/L — HIGH (ref 0–41)
BASE EXCESS BLDA CALC-SCNC: -13.2 MMOL/L — LOW (ref -2–2)
BASOPHILS # BLD AUTO: 0 K/UL — SIGNIFICANT CHANGE UP (ref 0–0.2)
BASOPHILS # BLD AUTO: 0.01 K/UL — SIGNIFICANT CHANGE UP (ref 0–0.2)
BASOPHILS NFR BLD AUTO: 0 % — SIGNIFICANT CHANGE UP (ref 0–1)
BASOPHILS NFR BLD AUTO: 0.1 % — SIGNIFICANT CHANGE UP (ref 0–1)
BILIRUB SERPL-MCNC: 12.3 MG/DL — HIGH (ref 0.2–1.2)
BILIRUB SERPL-MCNC: 13.6 MG/DL — HIGH (ref 0.2–1.2)
BLD GP AB SCN SERPL QL: SIGNIFICANT CHANGE UP
BUN SERPL-MCNC: 76 MG/DL — CRITICAL HIGH (ref 10–20)
BUN SERPL-MCNC: 79 MG/DL — CRITICAL HIGH (ref 10–20)
BUN SERPL-MCNC: 79 MG/DL — CRITICAL HIGH (ref 10–20)
CALCIUM SERPL-MCNC: 8.4 MG/DL — LOW (ref 8.5–10.1)
CALCIUM SERPL-MCNC: 8.6 MG/DL — SIGNIFICANT CHANGE UP (ref 8.5–10.1)
CALCIUM SERPL-MCNC: 9 MG/DL — SIGNIFICANT CHANGE UP (ref 8.5–10.1)
CHLORIDE SERPL-SCNC: 101 MMOL/L — SIGNIFICANT CHANGE UP (ref 98–110)
CHLORIDE SERPL-SCNC: 101 MMOL/L — SIGNIFICANT CHANGE UP (ref 98–110)
CHLORIDE SERPL-SCNC: 102 MMOL/L — SIGNIFICANT CHANGE UP (ref 98–110)
CO2 SERPL-SCNC: 13 MMOL/L — LOW (ref 17–32)
CO2 SERPL-SCNC: 14 MMOL/L — LOW (ref 17–32)
CO2 SERPL-SCNC: 17 MMOL/L — SIGNIFICANT CHANGE UP (ref 17–32)
CREAT SERPL-MCNC: 4.8 MG/DL — CRITICAL HIGH (ref 0.7–1.5)
CULTURE RESULTS: SIGNIFICANT CHANGE UP
CULTURE RESULTS: SIGNIFICANT CHANGE UP
EOSINOPHIL # BLD AUTO: 0 K/UL — SIGNIFICANT CHANGE UP (ref 0–0.7)
EOSINOPHIL # BLD AUTO: 0 K/UL — SIGNIFICANT CHANGE UP (ref 0–0.7)
EOSINOPHIL NFR BLD AUTO: 0 % — SIGNIFICANT CHANGE UP (ref 0–8)
EOSINOPHIL NFR BLD AUTO: 0 % — SIGNIFICANT CHANGE UP (ref 0–8)
GAS PNL BLDA: SIGNIFICANT CHANGE UP
GLUCOSE BLDC GLUCOMTR-MCNC: 106 MG/DL — HIGH (ref 70–99)
GLUCOSE BLDC GLUCOMTR-MCNC: 96 MG/DL — SIGNIFICANT CHANGE UP (ref 70–99)
GLUCOSE SERPL-MCNC: 123 MG/DL — HIGH (ref 70–99)
GLUCOSE SERPL-MCNC: 131 MG/DL — HIGH (ref 70–99)
GLUCOSE SERPL-MCNC: 153 MG/DL — HIGH (ref 70–99)
HAPTOGLOB SERPL-MCNC: <20 MG/DL — LOW (ref 34–200)
HCO3 BLDA-SCNC: 13 MMOL/L — LOW (ref 23–27)
HCT VFR BLD CALC: 37.5 % — LOW (ref 42–52)
HCT VFR BLD CALC: 42.9 % — SIGNIFICANT CHANGE UP (ref 42–52)
HGB BLD-MCNC: 13.2 G/DL — LOW (ref 14–18)
HGB BLD-MCNC: 14.9 G/DL — SIGNIFICANT CHANGE UP (ref 14–18)
HOROWITZ INDEX BLDA+IHG-RTO: 40 — SIGNIFICANT CHANGE UP
IMM GRANULOCYTES NFR BLD AUTO: 0.7 % — HIGH (ref 0.1–0.3)
IMM GRANULOCYTES NFR BLD AUTO: 1.5 % — HIGH (ref 0.1–0.3)
INR BLD: 2.44 RATIO — HIGH (ref 0.65–1.3)
LACTATE SERPL-SCNC: 4.1 MMOL/L — CRITICAL HIGH (ref 0.5–2.2)
LYMPHOCYTES # BLD AUTO: 0.18 K/UL — LOW (ref 1.2–3.4)
LYMPHOCYTES # BLD AUTO: 0.34 K/UL — LOW (ref 1.2–3.4)
LYMPHOCYTES # BLD AUTO: 2.6 % — LOW (ref 20.5–51.1)
LYMPHOCYTES # BLD AUTO: 4.5 % — LOW (ref 20.5–51.1)
MCHC RBC-ENTMCNC: 32.4 PG — HIGH (ref 27–31)
MCHC RBC-ENTMCNC: 32.8 PG — HIGH (ref 27–31)
MCHC RBC-ENTMCNC: 34.7 G/DL — SIGNIFICANT CHANGE UP (ref 32–37)
MCHC RBC-ENTMCNC: 35.2 G/DL — SIGNIFICANT CHANGE UP (ref 32–37)
MCV RBC AUTO: 93.3 FL — SIGNIFICANT CHANGE UP (ref 80–94)
MCV RBC AUTO: 93.3 FL — SIGNIFICANT CHANGE UP (ref 80–94)
MONOCYTES # BLD AUTO: 0.43 K/UL — SIGNIFICANT CHANGE UP (ref 0.1–0.6)
MONOCYTES # BLD AUTO: 0.52 K/UL — SIGNIFICANT CHANGE UP (ref 0.1–0.6)
MONOCYTES NFR BLD AUTO: 6.3 % — SIGNIFICANT CHANGE UP (ref 1.7–9.3)
MONOCYTES NFR BLD AUTO: 6.9 % — SIGNIFICANT CHANGE UP (ref 1.7–9.3)
NEUTROPHILS # BLD AUTO: 6.1 K/UL — SIGNIFICANT CHANGE UP (ref 1.4–6.5)
NEUTROPHILS # BLD AUTO: 6.57 K/UL — HIGH (ref 1.4–6.5)
NEUTROPHILS NFR BLD AUTO: 87.8 % — HIGH (ref 42.2–75.2)
NEUTROPHILS NFR BLD AUTO: 89.6 % — HIGH (ref 42.2–75.2)
NRBC # BLD: 2 /100 WBCS — HIGH (ref 0–0)
NRBC # BLD: 3 /100 WBCS — HIGH (ref 0–0)
PCO2 BLDA: 29 MMHG — LOW (ref 38–42)
PH BLDA: 7.24 — LOW (ref 7.38–7.42)
PLATELET # BLD AUTO: 33 K/UL — LOW (ref 130–400)
PLATELET # BLD AUTO: 67 K/UL — LOW (ref 130–400)
PO2 BLDA: 123 MMHG — HIGH (ref 78–95)
POTASSIUM SERPL-MCNC: 5.2 MMOL/L — HIGH (ref 3.5–5)
POTASSIUM SERPL-MCNC: 5.3 MMOL/L — HIGH (ref 3.5–5)
POTASSIUM SERPL-MCNC: 5.5 MMOL/L — HIGH (ref 3.5–5)
POTASSIUM SERPL-SCNC: 5.2 MMOL/L — HIGH (ref 3.5–5)
POTASSIUM SERPL-SCNC: 5.3 MMOL/L — HIGH (ref 3.5–5)
POTASSIUM SERPL-SCNC: 5.5 MMOL/L — HIGH (ref 3.5–5)
PROT SERPL-MCNC: 5.2 G/DL — LOW (ref 6–8)
PROT SERPL-MCNC: 5.7 G/DL — LOW (ref 6–8)
PROTHROM AB SERPL-ACNC: 27.8 SEC — HIGH (ref 9.95–12.87)
RBC # BLD: 4.02 M/UL — LOW (ref 4.7–6.1)
RBC # BLD: 4.6 M/UL — LOW (ref 4.7–6.1)
RBC # FLD: 23.8 % — HIGH (ref 11.5–14.5)
RBC # FLD: 24 % — HIGH (ref 11.5–14.5)
SAO2 % BLDA: 98 % — SIGNIFICANT CHANGE UP (ref 94–98)
SODIUM SERPL-SCNC: 134 MMOL/L — LOW (ref 135–146)
SODIUM SERPL-SCNC: 134 MMOL/L — LOW (ref 135–146)
SODIUM SERPL-SCNC: 137 MMOL/L — SIGNIFICANT CHANGE UP (ref 135–146)
SOLUBLE LIVER IGG SER IA-ACNC: < 20.1 UNITS — SIGNIFICANT CHANGE UP
SPECIMEN SOURCE: SIGNIFICANT CHANGE UP
SPECIMEN SOURCE: SIGNIFICANT CHANGE UP
WBC # BLD: 6.81 K/UL — SIGNIFICANT CHANGE UP (ref 4.8–10.8)
WBC # BLD: 7.49 K/UL — SIGNIFICANT CHANGE UP (ref 4.8–10.8)
WBC # FLD AUTO: 6.81 K/UL — SIGNIFICANT CHANGE UP (ref 4.8–10.8)
WBC # FLD AUTO: 7.49 K/UL — SIGNIFICANT CHANGE UP (ref 4.8–10.8)

## 2019-09-06 PROCEDURE — 99232 SBSQ HOSP IP/OBS MODERATE 35: CPT

## 2019-09-06 PROCEDURE — 71045 X-RAY EXAM CHEST 1 VIEW: CPT | Mod: 26

## 2019-09-06 RX ORDER — SODIUM BICARBONATE 1 MEQ/ML
650 SYRINGE (ML) INTRAVENOUS THREE TIMES A DAY
Refills: 0 | Status: DISCONTINUED | OUTPATIENT
Start: 2019-09-06 | End: 2019-09-07

## 2019-09-06 RX ORDER — IBUPROFEN 200 MG
400 TABLET ORAL ONCE
Refills: 0 | Status: COMPLETED | OUTPATIENT
Start: 2019-09-06 | End: 2019-09-06

## 2019-09-06 RX ORDER — MEROPENEM 1 G/30ML
500 INJECTION INTRAVENOUS EVERY 24 HOURS
Refills: 0 | Status: DISCONTINUED | OUTPATIENT
Start: 2019-09-06 | End: 2019-09-07

## 2019-09-06 RX ORDER — HYDROCORTISONE 20 MG
100 TABLET ORAL EVERY 8 HOURS
Refills: 0 | Status: DISCONTINUED | OUTPATIENT
Start: 2019-09-06 | End: 2019-09-07

## 2019-09-06 RX ORDER — PHYTONADIONE (VIT K1) 5 MG
5 TABLET ORAL ONCE
Refills: 0 | Status: COMPLETED | OUTPATIENT
Start: 2019-09-06 | End: 2019-09-06

## 2019-09-06 RX ORDER — MEROPENEM 1 G/30ML
500 INJECTION INTRAVENOUS EVERY 24 HOURS
Refills: 0 | Status: DISCONTINUED | OUTPATIENT
Start: 2019-09-06 | End: 2019-09-06

## 2019-09-06 RX ORDER — SODIUM BICARBONATE 1 MEQ/ML
325 SYRINGE (ML) INTRAVENOUS THREE TIMES A DAY
Refills: 0 | Status: DISCONTINUED | OUTPATIENT
Start: 2019-09-06 | End: 2019-09-06

## 2019-09-06 RX ADMIN — LACTULOSE 20 GRAM(S): 10 SOLUTION ORAL at 05:12

## 2019-09-06 RX ADMIN — Medication 100 MILLIGRAM(S): at 12:42

## 2019-09-06 RX ADMIN — Medication 650 MILLIGRAM(S): at 21:16

## 2019-09-06 RX ADMIN — CHLORHEXIDINE GLUCONATE 15 MILLILITER(S): 213 SOLUTION TOPICAL at 05:11

## 2019-09-06 RX ADMIN — Medication 400 MILLIGRAM(S): at 23:09

## 2019-09-06 RX ADMIN — Medication 4.67 MICROGRAM(S)/KG/MIN: at 06:29

## 2019-09-06 RX ADMIN — CHLORHEXIDINE GLUCONATE 15 MILLILITER(S): 213 SOLUTION TOPICAL at 18:57

## 2019-09-06 RX ADMIN — MIDODRINE HYDROCHLORIDE 10 MILLIGRAM(S): 2.5 TABLET ORAL at 05:11

## 2019-09-06 RX ADMIN — MIDODRINE HYDROCHLORIDE 10 MILLIGRAM(S): 2.5 TABLET ORAL at 21:16

## 2019-09-06 RX ADMIN — MEROPENEM 100 MILLIGRAM(S): 1 INJECTION INTRAVENOUS at 05:13

## 2019-09-06 RX ADMIN — MEROPENEM 100 MILLIGRAM(S): 1 INJECTION INTRAVENOUS at 12:42

## 2019-09-06 RX ADMIN — LACTULOSE 20 GRAM(S): 10 SOLUTION ORAL at 18:57

## 2019-09-06 RX ADMIN — LACTULOSE 20 GRAM(S): 10 SOLUTION ORAL at 23:10

## 2019-09-06 RX ADMIN — CHLORHEXIDINE GLUCONATE 1 APPLICATION(S): 213 SOLUTION TOPICAL at 05:12

## 2019-09-06 RX ADMIN — LACTULOSE 20 GRAM(S): 10 SOLUTION ORAL at 12:42

## 2019-09-06 RX ADMIN — PANTOPRAZOLE SODIUM 40 MILLIGRAM(S): 20 TABLET, DELAYED RELEASE ORAL at 06:01

## 2019-09-06 RX ADMIN — MIDODRINE HYDROCHLORIDE 10 MILLIGRAM(S): 2.5 TABLET ORAL at 14:59

## 2019-09-06 RX ADMIN — Medication 100 MILLIGRAM(S): at 21:17

## 2019-09-06 RX ADMIN — AMIODARONE HYDROCHLORIDE 16.67 MG/MIN: 400 TABLET ORAL at 19:00

## 2019-09-06 RX ADMIN — VASOPRESSIN 2.4 UNIT(S)/MIN: 20 INJECTION INTRAVENOUS at 18:59

## 2019-09-06 RX ADMIN — MICAFUNGIN SODIUM 105 MILLIGRAM(S): 100 INJECTION, POWDER, LYOPHILIZED, FOR SOLUTION INTRAVENOUS at 09:17

## 2019-09-06 RX ADMIN — Medication 650 MILLIGRAM(S): at 14:34

## 2019-09-06 RX ADMIN — Medication 100 MILLIGRAM(S): at 14:33

## 2019-09-06 RX ADMIN — Medication 4.67 MICROGRAM(S)/KG/MIN: at 18:59

## 2019-09-06 RX ADMIN — Medication 101 MILLIGRAM(S): at 14:57

## 2019-09-06 NOTE — PROGRESS NOTE ADULT - SUBJECTIVE AND OBJECTIVE BOX
Nephrology progress note    Patient was seen and examined, events over the last 24 h noted .  recalled by cardiology for CVVH  "HD/CVVHD may be unsuccessful due to shock but offers the only chance for survival, although meaningful recovery is still unlikely. Would ask renal to reevaluate due to improved BP today "    Allergies:"  No Known Allergies    Hospital Medications:   MEDICATIONS  (STANDING):  allopurinol 100 milliGRAM(s) Oral daily  amiodarone Infusion 0.5 mG/Min (16.667 mL/Hr) IV Continuous <Continuous>  chlorhexidine 0.12% Liquid 15 milliLiter(s) Oral Mucosa every 12 hours  chlorhexidine 4% Liquid 1 Application(s) Topical <User Schedule>  CRRT Treatment    <Continuous>  hydrocortisone sodium succinate Injectable 100 milliGRAM(s) IV Push every 8 hours  lactulose Syrup 20 Gram(s) Enteral Tube four times a day  meropenem  IVPB 500 milliGRAM(s) IV Intermittent every 24 hours  micafungin IVPB      micafungin IVPB 100 milliGRAM(s) IV Intermittent every 24 hours  midodrine 10 milliGRAM(s) Oral every 8 hours  norepinephrine Infusion 0.05 MICROgram(s)/kG/Min (4.673 mL/Hr) IV Continuous <Continuous>  pantoprazole    Tablet 40 milliGRAM(s) Oral before breakfast  PrismaSATE Dialysate BGK 4 / 2.5 5000 milliLiter(s) (2000 mL/Hr) CRRT <Continuous>  rifaximin 550 milliGRAM(s) Oral two times a day  sodium bicarbonate 650 milliGRAM(s) Oral three times a day  vasopressin Infusion 0.04 Unit(s)/Min (2.4 mL/Hr) IV Continuous <Continuous>        VITALS:  T(F): 98 (19 @ 08:00), Max: 98.3 (19 @ 16:00)  HR: 130 (19 @ 10:30)  BP: 93/50 (19 @ 10:30)  RR: 19 (19 @ 10:30)  SpO2: 96% (19 @ 10:30)  Wt(kg): --     @ 07:01  -   @ 07:00  --------------------------------------------------------  IN: 5470.2 mL / OUT: 150 mL / NET: 5320.2 mL     @ 07: @ 07:00  --------------------------------------------------------  IN: 2141.3 mL / OUT: 97 mL / NET: 2044.2 mL     @ 07: @ 15:34  --------------------------------------------------------  IN: 367.6 mL / OUT: 10 mL / NET: 357.6 mL          PHYSICAL EXAM:  	Gen: intubated/ventilated  	Pulm: B/L mekhi   	CV: S1S2; no rub  	Abd: +distended  	: genaro   	LE:  edema      LABS:      134<L>  |  101  |  79<HH>  ----------------------------<  123<H>  5.3<H>   |  13<L>  |  4.8<HH>    Ca    8.6      06 Sep 2019 13:00  Phos  4.9       Mg     2.6         TPro  5.2<L>  /  Alb  2.1<L>  /  TBili  12.3<H>  /  DBili      /  AST  83<H>  /  ALT  113<H>  /  AlkPhos  116<H>                            14.9   7.49  )-----------( 33       ( 06 Sep 2019 04:00 )             42.9       Urine Studies:  Urinalysis Basic - ( 04 Sep 2019 18:40 )    Color: Bobbi / Appearance: Slightly Turbid / S.025 / pH:   Gluc:  / Ketone: Trace  / Bili: Small / Urobili: <2 mg/dL   Blood:  / Protein: 30 mg/dL / Nitrite: Negative   Leuk Esterase: Moderate / RBC: 36 /HPF / WBC 12 /HPF   Sq Epi:  / Non Sq Epi: 1 /HPF / Bacteria: Negative        RADIOLOGY & ADDITIONAL STUDIES:

## 2019-09-06 NOTE — PROGRESS NOTE ADULT - ASSESSMENT
IMPRESSION:    Shock cardiogenic/ multiorgan failure  HF REF  Cholecystitis r/o neg HIDA  HO Afib on Coumadin   thrombocytopenia/ DIC  renal failure  thrombicytopenia    PLAN:    CNS: No depressants, as needed sedation    HEENT: Oral care    PULMONARY:  HOB @ 45 degrees.  Wean O2 as tolerated, dec FIO2    CARDIOVASCULAR:  On levo/vaso.  Cardiology Follow up , TAPER PRESSORS, hydrocortisone    GI: GI prophylaxis.  start feeding    RENAL:  Follow up lytes.  Correct as needed. Patient at need for RRT but these interventions are futile at this time with no treatment of underlying condition    INFECTIOUS DISEASE: Follow up cultures.  Continue ABX until cultures PER id    HEMATOLOGICAL:  DVT prophylaxis with SCDs. F/U CBC. t    ENDOCRINE:  Follow up FS.  Insulin protocol if needed.    PALLIATIVE CARE EVAL    Advance directives    Overall prognosis poor

## 2019-09-06 NOTE — CONSULT NOTE ADULT - SUBJECTIVE AND OBJECTIVE BOX
MORAIMA OROZCO [id number]  69y Male  5d    HPI:  68 y/o M with PMH of A-fib on Coumadin, HFrEF (LVEF 17% 2018) s/p AICD, HTN, gout, CKD III, BPH, Liver cirrhosis secondary to CHF p/w hypotension.     As per son at bedside, this AM pt was found to be slightly more confused than normal, not responding appropriately to questions. He checked his blood pressure and was found to have SBP in the 70s. AS per son, pt is hypotensive at baseline with SBP 80-90's. Son reports pt having a fall the night prior preceded by lightheadedness and dizziness. HE denies any loss of consciousness, head trauma, bowel or urinary incontinence or confusion after the episode. Pt denies any chest pain, palpitations, headaches, or lower extremity edema.     in the ED, VS: BP 66/39 HR 84 RR 17 T 98.3. Pt received Cefepime and vancomycin, Hydrocortisone 100mg, and 1 L NS, and was started on levophed through R IJ. (01 Sep 2019 19:34)      PAST MEDICAL & SURGICAL HISTORY:  BPH (benign prostatic hyperplasia)  CKD (chronic kidney disease)  Osler-Weber-Rendu disease  Atrial fibrillation  HTN (hypertension)  Hepatic cirrhosis, unspecified hepatic cirrhosis type, unspecified whether ascites present  Gout, unspecified cause, unspecified chronicity, unspecified site  Anemia, unspecified type  Congestive heart failure, unspecified HF chronicity, unspecified heart failure type  S/P implantation of automatic cardioverter/defibrillator (AICD)        MEDICATIONS  (STANDING):  allopurinol 100 milliGRAM(s) Oral daily  amiodarone Infusion 0.5 mG/Min (16.667 mL/Hr) IV Continuous <Continuous>  chlorhexidine 0.12% Liquid 15 milliLiter(s) Oral Mucosa every 12 hours  chlorhexidine 4% Liquid 1 Application(s) Topical <User Schedule>  CRRT Treatment    <Continuous>  hydrocortisone sodium succinate Injectable 100 milliGRAM(s) IV Push every 8 hours  lactulose Syrup 20 Gram(s) Enteral Tube four times a day  meropenem  IVPB 500 milliGRAM(s) IV Intermittent every 24 hours  micafungin IVPB      micafungin IVPB 100 milliGRAM(s) IV Intermittent every 24 hours  midodrine 10 milliGRAM(s) Oral every 8 hours  norepinephrine Infusion 0.05 MICROgram(s)/kG/Min (4.673 mL/Hr) IV Continuous <Continuous>  pantoprazole    Tablet 40 milliGRAM(s) Oral before breakfast  PrismaSATE Dialysate BGK 4 / 2.5 5000 milliLiter(s) (2000 mL/Hr) CRRT <Continuous>  rifaximin 550 milliGRAM(s) Oral two times a day  sodium bicarbonate 650 milliGRAM(s) Oral three times a day  vasopressin Infusion 0.04 Unit(s)/Min (2.4 mL/Hr) IV Continuous <Continuous>    MEDICATIONS  (PRN):      Allergies    No Known Allergies    Intolerances      REVIEW OF SYSTEMS    [x] A ten-point review of systems was otherwise negative except as noted.  [ ] Due to altered mental status/intubation, subjective information were not able to be obtained from the patient. History was obtained, to the extent possible, from review of the chart and collateral sources of information.      Vital Signs Last 24 Hrs  T(C): 36.8 (06 Sep 2019 16:00), Max: 36.8 (06 Sep 2019 04:00)  T(F): 98.2 (06 Sep 2019 16:00), Max: 98.3 (06 Sep 2019 04:00)  HR: 128 (06 Sep 2019 17:00) (112 - 138)  BP: 102/54 (06 Sep 2019 17:00) (67/44 - 105/56)  BP(mean): 71 (06 Sep 2019 17:00) (51 - 77)  RR: 17 (06 Sep 2019 17:00) (7 - 35)  SpO2: 97% (06 Sep 2019 17:00) (95% - 100%)    PHYSICAL EXAM:  General: Intubated  HEENT: PANFILO  Lymphatic system: No cervical LN   Lungs: b/l breath sounds  Cardiovascular: s1 s2 present  Gastrointestinal: Soft, Positive BS  Extremities: No clubbing.     LABS:  Labs:  CAPILLARY BLOOD GLUCOSE      POCT Blood Glucose.: 106 mg/dL (06 Sep 2019 06:46)  POCT Blood Glucose.: 96 mg/dL (06 Sep 2019 00:26)  POCT Blood Glucose.: 109 mg/dL (05 Sep 2019 18:59)                          14.9   7.49  )-----------( 33       ( 06 Sep 2019 04:00 )             42.9       Auto Neutrophil %: 87.8 % (19 @ 04:00)  Auto Immature Granulocyte %: 0.7 % (19 @ 04:00)        134<L>  |  101  |  79<HH>  ----------------------------<  123<H>  5.3<H>   |  13<L>  |  4.8<HH>      eGFR if Non African American: 11 mL/min/1.73M2 (19 @ 13:00)      LFTs:             5.2  | 12.3 | 83       ------------------[116     ( 06 Sep 2019 04:00 )  2.1  | x    | 113         Lipase:x      Amylase:x         Blood Gas Arterial, Lactate: 4.8 mmoL/L (19 @ 14:00)  Blood Gas Arterial, Lactate: 3.9 mmoL/L (19 @ 04:26)  Lactate, Blood: 4.1 mmol/L (19 @ 04:00)  Blood Gas Arterial, Lactate: 3.8 mmoL/L (19 @ 14:12)  Blood Gas Arterial, Lactate: 2.9 mmoL/L (19 @ 04:32)  Lactate, Blood: 4.3 mmol/L (19 @ 04:40)  Blood Gas Arterial, Lactate: 3.7 mmoL/L (19 @ 04:08)  Blood Gas Arterial, Lactate: 3.2 mmoL/L (19 @ 02:13)  Blood Gas Arterial, Lactate: 3.3 mmoL/L (19 @ 18:00)    ABG - ( 06 Sep 2019 14:00 )  pH: 7.24  /  pCO2: 29    /  pO2: 123   / HCO3: 13    / Base Excess: -13.2 /  SaO2: 98          ABG - ( 06 Sep 2019 04:26 )  pH: 7.24  /  pCO2: 35    /  pO2: 272   / HCO3: 15    / Base Excess: -11.4 /  SaO2: 100         ABG - ( 05 Sep 2019 14:12 )  pH: 7.29  /  pCO2: 26    /  pO2: 202   / HCO3: 13    / Base Excess: -11.9 /  SaO2: 100         Coags:     >40.00  ----< 5.18    ( 05 Sep 2019 04:20 )     78.4       Urinalysis Basic - ( 04 Sep 2019 18:40 )    Color: Bobbi / Appearance: Slightly Turbid / S.025 / pH: x  Gluc: x / Ketone: Trace  / Bili: Small / Urobili: <2 mg/dL   Blood: x / Protein: 30 mg/dL / Nitrite: Negative   Leuk Esterase: Moderate / RBC: 36 /HPF / WBC 12 /HPF   Sq Epi: x / Non Sq Epi: 1 /HPF / Bacteria: Negative        Culture - Blood (collected 04 Sep 2019 16:48)  Source: .Blood None  Preliminary Report (06 Sep 2019 02:01):    No growth to date.          RADIOLOGY & ADDITIONAL STUDIES:

## 2019-09-06 NOTE — PROGRESS NOTE ADULT - SUBJECTIVE AND OBJECTIVE BOX
70 y/o M with PMH of A-fib on Coumadin, HFrEF (LVEF 17% 2018) s/p AICD, HTN, gout, CKD III, BPH, Liver cirrhosis secondary to CHF p/w hypotension. no fever, or chills, no recent URTI. to note that the patient blood pressure is 70 to 80 at baseline due to his advanced heart failure  he was admitted to the ICU and started on levophed and vasopressin as a treatment for septic shock?? on top of cardiogenic shock  he got started empirically on IV antibiotics     OVERNIGHT EVENTS:  no major events overnight    SUBJECTIVE / INTERVAL HPI: Patient seen and examined at bedside. not arousable, intubated, not responsive to painful stimuli. OFF sedation    VITAL SIGNS:  Vital Signs Last 24 Hrs  T(C): 36.8 (06 Sep 2019 04:00), Max: 36.8 (05 Sep 2019 12:00)  T(F): 98.3 (06 Sep 2019 04:00), Max: 98.3 (05 Sep 2019 12:00)  HR: 126 (06 Sep 2019 07:00) (108 - 130)  BP: 89/48 (06 Sep 2019 07:00) (56/41 - 104/64) STILL ON LEVOPHED AND AMIODARONE  BP(mean): 62 (06 Sep 2019 07:00) (46 - 84)  RR: 17 (06 Sep 2019 07:00) (7 - 35)  SpO2: 98% (06 Sep 2019 07:00) (97% - 100%)    PHYSICAL EXAM:    General: WDWN  HEENT: NC/AT; PERRL, clear conjunctiva  Neck: supple  Cardiovascular: +S1/S2; RRR  Respiratory: decrease breath sounds bilaterally  Gastrointestinal: soft, NT/ND; +BSx4  Extremities: WWP; 2+ peripheral pulses; no edema   Neurological: AAOx3; no focal deficits    MEDICATIONS:  MEDICATIONS  (STANDING):  allopurinol 100 milliGRAM(s) Oral daily  amiodarone Infusion 0.5 mG/Min (16.667 mL/Hr) IV Continuous <Continuous>  chlorhexidine 0.12% Liquid 15 milliLiter(s) Oral Mucosa every 12 hours  chlorhexidine 4% Liquid 1 Application(s) Topical <User Schedule>  hydrocortisone sodium succinate Injectable 100 milliGRAM(s) IV Push every 8 hours  lactulose Syrup 20 Gram(s) Enteral Tube four times a day  meropenem  IVPB 500 milliGRAM(s) IV Intermittent every 24 hours  micafungin IVPB      micafungin IVPB 100 milliGRAM(s) IV Intermittent every 24 hours  midodrine 10 milliGRAM(s) Oral every 8 hours  norepinephrine Infusion 0.05 MICROgram(s)/kG/Min (4.673 mL/Hr) IV Continuous <Continuous>  pantoprazole    Tablet 40 milliGRAM(s) Oral before breakfast  rifaximin 550 milliGRAM(s) Oral two times a day  sodium bicarbonate 650 milliGRAM(s) Oral three times a day  vasopressin Infusion 0.04 Unit(s)/Min (2.4 mL/Hr) IV Continuous <Continuous>    MEDICATIONS  (PRN):      ALLERGIES:  Allergies    No Known Allergies    Intolerances        LABS:                        14.9   7.49  )-----------( 33       ( 06 Sep 2019 04:00 )             42.9     09-06    134<L>  |  101  |  76<HH>  ----------------------------<  131<H>  5.5<H>   |  17  |  4.8<HH>    Ca    8.4<L>      06 Sep 2019 04:00  Phos  4.9     09-  Mg     2.6     09-    TPro  5.2<L>  /  Alb  2.1<L>  /  TBili  12.3<H>  /  DBili  x   /  AST  83<H>  /  ALT  113<H>  /  AlkPhos  116<H>  09-    PT/INR - ( 05 Sep 2019 04:20 )   PT: >40.00 sec;   INR: 5.18 ratio         PTT - ( 05 Sep 2019 04:20 )  PTT:78.4 sec  Urinalysis Basic - ( 04 Sep 2019 18:40 )    Color: Bobbi / Appearance: Slightly Turbid / S.025 / pH: x  Gluc: x / Ketone: Trace  / Bili: Small / Urobili: <2 mg/dL   Blood: x / Protein: 30 mg/dL / Nitrite: Negative   Leuk Esterase: Moderate / RBC: 36 /HPF / WBC 12 /HPF   Sq Epi: x / Non Sq Epi: 1 /HPF / Bacteria: Negative      CAPILLARY BLOOD GLUCOSE      POCT Blood Glucose.: 106 mg/dL (06 Sep 2019 06:46)

## 2019-09-06 NOTE — PROGRESS NOTE ADULT - ASSESSMENT
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     # Septic shock on top of cardiac failure  - patient is intubated for hypercapnic acute respiratory failure  - on dual pressors being able to decrease levophed gradually today with lactic acidosis dropping down  - AIM to a MAP 55   - c/w hydrocortisone 100 mg every 8 hrs for now  - accurate input & output + daily body weight  - daily CXR  - hold lasix and spironolactone for now  -anuric in the setting of cardiorenal sd   - nephrology: no possibility for hemodialysis    # sepsis  - D/C cefepime + flagyl  - start meropenem + micafungin  - got 1 shot of vancomycin 9/4  - UA negative  - f/u blood cx  - DTA gram stain and cx    # sustained Vtach:  - still on amiodarone drip  - EP on board  - AICD function stopped by EP    # Afib   - daily PT /INR   - keep 2<INR<3    # cholecystitis??  - less likely. pericholecystic fluid could be seen in the setting of heart failure and volume overload  - NO intervention by GI team  - HIDA scan negative for acute cholecystitis  - no acute intervention by GS    # liver cirrhosis:  - c/w lactulose with target BM > 3 times a day  - trend bilirubin  - start rifaximin 550 mg bid    # confusion + somnolence  - most likely 2/2 liver encephalopathy  - c/w lactulose + rifaximin    DVT prophylaxis: OFF   PPI prophylaxis: pantoprazole  FEEDING: enteral via NG tube  code status: DNR  dispo : very poor prognosis  family aware of prognosis

## 2019-09-06 NOTE — PROGRESS NOTE ADULT - SUBJECTIVE AND OBJECTIVE BOX
OVERNIGHT EVENTS: still intubated, ventilated, on levophed 0.7, vaso 0.04,  off amiodarone now, no sedation    Vital Signs Last 24 Hrs  T(C): 36.8 (06 Sep 2019 04:00), Max: 37.2 (05 Sep 2019 08:00)  T(F): 98.3 (06 Sep 2019 04:00), Max: 98.9 (05 Sep 2019 08:00)  HR: 126 (06 Sep 2019 07:00) (108 - 130)  BP: 89/48 (06 Sep 2019 07:00) (56/41 - 104/64)  BP(mean): 62 (06 Sep 2019 07:00) (46 - 84)  RR: 17 (06 Sep 2019 07:00) (7 - 35)  SpO2: 98% (06 Sep 2019 07:) (97% - 100%)    PHYSICAL EXAMINATION:    GENERAL: icteric sclera    HEENT: Head is normocephalic and atraumatic. Extraocular muscles are intact. Mucous membranes are moist.    NECK: Supple.    LUNGS: dec bs both bases    HEART: Regular rate and rhythm without murmur.    ABDOMEN: Soft, nontender, and nondistended.      EXTREMITIES: Without any cyanosis, clubbing, rash, lesions or edema.    NEUROLOGIC: withdraw to painful stimuli    SKIN: No ulceration or induration present.      LABS:                        14.9   7.49  )-----------( 33       ( 06 Sep 2019 04:00 )             42.9     09-06    134<L>  |  101  |  76<HH>  ----------------------------<  131<H>  5.5<H>   |  17  |  4.8<HH>    Ca    8.4<L>      06 Sep 2019 04:00  Phos  4.9     09-05  Mg     2.6     09-05    TPro  5.2<L>  /  Alb  2.1<L>  /  TBili  12.3<H>  /  DBili  x   /  AST  83<H>  /  ALT  113<H>  /  AlkPhos  116<H>  09-    PT/INR - ( 05 Sep 2019 04:20 )   PT: >40.00 sec;   INR: 5.18 ratio         PTT - ( 05 Sep 2019 04:20 )  PTT:78.4 sec  Urinalysis Basic - ( 04 Sep 2019 18:40 )    Color: Bobbi / Appearance: Slightly Turbid / S.025 / pH: x  Gluc: x / Ketone: Trace  / Bili: Small / Urobili: <2 mg/dL   Blood: x / Protein: 30 mg/dL / Nitrite: Negative   Leuk Esterase: Moderate / RBC: 36 /HPF / WBC 12 /HPF   Sq Epi: x / Non Sq Epi: 1 /HPF / Bacteria: Negative      ABG - ( 06 Sep 2019 04:26 )  pH, Arterial: 7.24  pH, Blood: x     /  pCO2: 35    /  pO2: 272   / HCO3: 15    / Base Excess: -11.4 /  SaO2: 100         450/20/60/8              Lactate, Blood: 4.1 mmol/L (19 @ 04:00)          19 @ 07:01  -  19 @ 07:00  --------------------------------------------------------  IN: 2141.3 mL / OUT: 97 mL / NET: 2044.2 mL        MICROBIOLOGY:  Culture Results:   No growth to date. ( @ 16:48)      MEDICATIONS  (STANDING):  allopurinol 100 milliGRAM(s) Oral daily  amiodarone Infusion 0.5 mG/Min (16.667 mL/Hr) IV Continuous <Continuous>  chlorhexidine 0.12% Liquid 15 milliLiter(s) Oral Mucosa every 12 hours  chlorhexidine 4% Liquid 1 Application(s) Topical <User Schedule>  lactulose Syrup 20 Gram(s) Enteral Tube four times a day  meropenem  IVPB 500 milliGRAM(s) IV Intermittent every 24 hours  micafungin IVPB      micafungin IVPB 100 milliGRAM(s) IV Intermittent every 24 hours  midodrine 10 milliGRAM(s) Oral every 8 hours  norepinephrine Infusion 0.05 MICROgram(s)/kG/Min (4.673 mL/Hr) IV Continuous <Continuous>  pantoprazole    Tablet 40 milliGRAM(s) Oral before breakfast  rifaximin 550 milliGRAM(s) Oral two times a day  vasopressin Infusion 0.04 Unit(s)/Min (2.4 mL/Hr) IV Continuous <Continuous>    MEDICATIONS  (PRN):      RADIOLOGY & ADDITIONAL STUDIES: no loss of consciousness, no gait abnormality, no headache, no sensory deficits, and no weakness.

## 2019-09-06 NOTE — PROGRESS NOTE ADULT - ASSESSMENT
· Assessment		  70 y/o M with PMH of A-fib on Coumadin, HFrEF (LVEF 17% 12/2018) s/p AICD, HTN, gout, CKD III, BPH, Liver cirrhosis secondary to CHF p/w hypotension and confusion more than baseline currently requiring  pressors.    IMPRESSION:  Severe refractory shock.  Cardiogenic vs septic ( possibly translocation given severity of disease despite negative BCx )  Intubated 9/3.  MSOF  ARDS ( FiO2 60 % ), sock liver with worsening transaminitis  Anoxic encephalopathy ( non responsive to all stimuli off sedation)  ARF   No acute cholecystitis/ cholangitis  No meningitis  Liver cirrhosis induced thrombocytopenia, coagulopathy, transaminitis  HIDA delayed at 2h which is more consistent with liver cirrhosis ( with a poor MELD score).  Occult fungemia a possibility through translocation  Bcx 9/1,2,4 NG  UCx 9/1 NG    RECOMMENDATIONS:  Meropenem 500 mg iv q24h  micafungin 100 mg iv q24h  Medical care is futile

## 2019-09-06 NOTE — PROGRESS NOTE ADULT - ASSESSMENT
68 y/o M with PMH of A-fib on Coumadin, HFrEF (LVEF 17%) s/p AICD, HTN, gout, CKD III, BPH, Liver cirrhosis secondary to CHF p/w hypotension  Now in cardiogenic shock w/ MODS including  VERO w/ hyperK, persistent  vtach       # oligoanuric on pressors  recalled to for CVVH    CVVH is physically possible (certainly attempting it is) the question is what utility it will serve. There does not seem to be any remedy or treatment being considered for his underlying contition , ie HF  thus CVVH only serves to delay the inevitable  that said, can try CVVH, per cardiology's request  standard bath 200 ml blood flow, run net even  mointor K and phos on CVVH  will need vascular access, INR is 5 due to liver failure,   consent obtained from family (the above was explained, that CVVH kade neither treat  his kidneys nor his underlying condition, but could serve to provide clearance, the yunderstood "what do we have to lose")  , for CVVH and order is in

## 2019-09-06 NOTE — CONSULT NOTE ADULT - CONSULT REQUESTED DATE/TIME
02-Sep-2019 08:13
02-Sep-2019 09:37
03-Sep-2019 14:14
04-Sep-2019 05:20
04-Sep-2019 09:26
06-Sep-2019 17:37
03-Sep-2019 15:05
03-Sep-2019 06:30

## 2019-09-06 NOTE — PROGRESS NOTE ADULT - SUBJECTIVE AND OBJECTIVE BOX
MORAIMA OROZCO  69y, Male      OVERNIGHT EVENTS:    no fevers, on pressors, off sedation, non responsive to all stimuli  FiO2 60%  no new lines  no diarrhea    VITALS:  T(F): 98.3, Max: 98.9 (19 @ 08:00)  HR: 118  BP: 91/46  RR: 15Vital Signs Last 24 Hrs  T(C): 36.8 (06 Sep 2019 04:00), Max: 37.2 (05 Sep 2019 08:00)  T(F): 98.3 (06 Sep 2019 04:00), Max: 98.9 (05 Sep 2019 08:00)  HR: 118 (06 Sep 2019 04:45) (102 - 130)  BP: 91/46 (06 Sep 2019 04:45) (56/41 - 123/43)  BP(mean): 57 (06 Sep 2019 04:45) (46 - 87)  RR: 15 (06 Sep 2019 04:45) (12 - 35)  SpO2: 98% (06 Sep 2019 04:45) (98% - 100%)    TESTS & MEASUREMENTS:                        14.9   7.49  )-----------( 33       ( 06 Sep 2019 04:00 )             42.9         134<L>  |  101  |  76<HH>  ----------------------------<  131<H>  5.5<H>   |  17  |  4.8<HH>    Ca    8.4<L>      06 Sep 2019 04:00  Phos  4.9       Mg     2.6         TPro  5.2<L>  /  Alb  2.1<L>  /  TBili  12.3<H>  /  DBili  x   /  AST  83<H>  /  ALT  113<H>  /  AlkPhos  116<H>      LIVER FUNCTIONS - ( 06 Sep 2019 04:00 )  Alb: 2.1 g/dL / Pro: 5.2 g/dL / ALK PHOS: 116 U/L / ALT: 113 U/L / AST: 83 U/L / GGT: x             Culture - Blood (collected 19 @ 16:48)  Source: .Blood None  Preliminary Report (19 @ 02:01):    No growth to date.    Culture - Blood (collected 19 @ 04:23)  Source: .Blood None  Preliminary Report (19 @ 11:14):    No growth to date.    Culture - Urine (collected 19 @ 17:10)  Source: .Urine Clean Catch (Midstream)  Final Report (19 @ 11:14):    No growth    Culture - Blood (collected 19 @ 15:00)  Source: .Blood Blood-Peripheral  Preliminary Report (19 @ 20:01):    No growth to date.    Culture - Blood (collected 19 @ 15:00)  Source: .Blood Blood-Peripheral  Preliminary Report (19 @ 20:01):    No growth to date.      Urinalysis Basic - ( 04 Sep 2019 18:40 )    Color: Bobbi / Appearance: Slightly Turbid / S.025 / pH: x  Gluc: x / Ketone: Trace  / Bili: Small / Urobili: <2 mg/dL   Blood: x / Protein: 30 mg/dL / Nitrite: Negative   Leuk Esterase: Moderate / RBC: 36 /HPF / WBC 12 /HPF   Sq Epi: x / Non Sq Epi: 1 /HPF / Bacteria: Negative          RADIOLOGY & ADDITIONAL TESTS:    ANTIBIOTICS:  meropenem  IVPB 500 milliGRAM(s) IV Intermittent every 12 hours  micafungin IVPB      micafungin IVPB 100 milliGRAM(s) IV Intermittent every 24 hours  rifaximin 550 milliGRAM(s) Oral two times a day

## 2019-09-06 NOTE — CONSULT NOTE ADULT - PROVIDER SPECIALTY LIST ADULT
Cardiology
Critical Care
Electrophysiology
Gastroenterology
Surgery
Vascular Surgery
Nephrology
Infectious Disease

## 2019-09-06 NOTE — PROGRESS NOTE ADULT - SUBJECTIVE AND OBJECTIVE BOX
SUBJ:  69-yo male well known to me.  -dilated CMP, chronic systolic CHF  -liver cirrhosis, likely cardiogenic, hepatic encephalopathy  -s/p ICD  -Osler-Weber-Randu, recurrent nose bleed    Patient presented with cardiogenic shock, likely septic shock (? source), complicated by VERO, acidosis, hyperkalemia, hepatic failure. In A-fib with RVR in the last few days, periods of VT, in Amiodarone drip now. Since yesterday,  Levophed dose reduced, still on Vasopressin, FiO2 requirement 40% (from 100%), UO about 10 cc/hr.     MEDICATIONS  (STANDING):  allopurinol 100 milliGRAM(s) Oral daily  amiodarone Infusion 0.5 mG/Min (16.667 mL/Hr) IV Continuous <Continuous>  chlorhexidine 0.12% Liquid 15 milliLiter(s) Oral Mucosa every 12 hours  chlorhexidine 4% Liquid 1 Application(s) Topical <User Schedule>  hydrocortisone sodium succinate Injectable 100 milliGRAM(s) IV Push every 8 hours  lactulose Syrup 20 Gram(s) Enteral Tube four times a day  meropenem  IVPB 500 milliGRAM(s) IV Intermittent every 24 hours  micafungin IVPB      micafungin IVPB 100 milliGRAM(s) IV Intermittent every 24 hours  midodrine 10 milliGRAM(s) Oral every 8 hours  norepinephrine Infusion 0.05 MICROgram(s)/kG/Min (4.673 mL/Hr) IV Continuous <Continuous>  pantoprazole    Tablet 40 milliGRAM(s) Oral before breakfast  rifaximin 550 milliGRAM(s) Oral two times a day  sodium bicarbonate 650 milliGRAM(s) Oral three times a day  vasopressin Infusion 0.04 Unit(s)/Min (2.4 mL/Hr) IV Continuous <Continuous>    MEDICATIONS  (PRN):      Vital Signs Last 24 Hrs  T(C): 36.7 (06 Sep 2019 08:00), Max: 36.8 (05 Sep 2019 12:00)  T(F): 98 (06 Sep 2019 08:00), Max: 98.3 (05 Sep 2019 12:00)  HR: 130 (06 Sep 2019 10:30) (110 - 130)  BP: 93/50 (06 Sep 2019 10:30) (56/41 - 105/56)  BP(mean): 73 (06 Sep 2019 10:30) (46 - 83)  RR: 19 (06 Sep 2019 10:30) (7 - 35)  SpO2: 96% (06 Sep 2019 10:30) (96% - 100%)      PHYSICAL EXAM:  · unresponsive on the vent (no sedation)  · RESPIRATORY:   no rales but reduced BS at both bases  · CARDIOVASCULAR	irregular rhythm,  no gallop, soft SM at LSB  · EXTREMITIES: bilateral edema    TTE:  EF < 20%    LABS:                        14.9   7.49  )-----------( 33       ( 06 Sep 2019 04:00 )             42.9     09-06    134<L>  |  101  |  76<HH>  ----------------------------<  131<H>  5.5<H>   |  17  |  4.8<HH>    Ca    8.4<L>      06 Sep 2019 04:00  Phos  4.9     09-05  Mg     2.6     09-05    TPro  5.2<L>  /  Alb  2.1<L>  /  TBili  12.3<H>  /  DBili  x   /  AST  83<H>  /  ALT  113<H>  /  AlkPhos  116<H>  09-06      PT/INR - ( 05 Sep 2019 04:20 )   PT: >40.00 sec;   INR: 5.18 ratio         PTT - ( 05 Sep 2019 04:20 )  PTT: 78.4 sec    I&O's Summary    05 Sep 2019 07:01  -  06 Sep 2019 07:00  --------------------------------------------------------  IN: 2141.3 mL / OUT: 97 mL / NET: 2044.2 mL    06 Sep 2019 07:01  -  06 Sep 2019 11:33  --------------------------------------------------------  IN: 367.6 mL / OUT: 10 mL / NET: 357.6 mL    RADIOLOGY & ADDITIONAL STUDIES:    CXR: bilateral pleural effusion    IMPRESSION AND PLAN:  Combination of septic and cardiogenic shock, complicated by VERO and liver failure, metabolic acidosis.    A-fib.  Periods of VT.    Recommend:  Overall prognosis is still very poor but some improvement since yesterday, possibly response to antibiotics.  HD/CVVHD may be unsuccessful due to shock but offers the only chance for survival, although meaningful recovery is still unlikely. Would ask renal to reevaluate due to improved BP today and discuss options with family.   Continue amiodarone drip.  DNR signed by family, ICD deactivated.

## 2019-09-06 NOTE — CONSULT NOTE ADULT - ASSESSMENT
70 y/o M with PMH of A-fib on Coumadin, HFrEF (LVEF 17% 12/2018) s/p AICD, HTN, gout, CKD III, BPH, Liver cirrhosis secondary to CHF p/w hypotension. Patient got consulted for an UDALL placement. INR 5.18    Plan  -Need INR to be <1.6 before UDALL placement  -Please recall PRN

## 2019-09-07 VITALS — HEART RATE: 50 BPM | RESPIRATION RATE: 12 BRPM

## 2019-09-07 LAB
ALBUMIN SERPL ELPH-MCNC: 3 G/DL — LOW (ref 3.5–5.2)
ALP SERPL-CCNC: 113 U/L — SIGNIFICANT CHANGE UP (ref 30–115)
ALT FLD-CCNC: 70 U/L — HIGH (ref 0–41)
ANION GAP SERPL CALC-SCNC: 29 MMOL/L — HIGH (ref 7–14)
APTT BLD: 39.5 SEC — HIGH (ref 27–39.2)
APTT BLD: 41.9 SEC — HIGH (ref 27–39.2)
AST SERPL-CCNC: 50 U/L — HIGH (ref 0–41)
BASOPHILS # BLD AUTO: 0 K/UL — SIGNIFICANT CHANGE UP (ref 0–0.2)
BASOPHILS NFR BLD AUTO: 0 % — SIGNIFICANT CHANGE UP (ref 0–1)
BILIRUB SERPL-MCNC: 18.2 MG/DL — CRITICAL HIGH (ref 0.2–1.2)
BLD GP AB SCN SERPL QL: SIGNIFICANT CHANGE UP
BUN SERPL-MCNC: 85 MG/DL — CRITICAL HIGH (ref 10–20)
CALCIUM SERPL-MCNC: 9.1 MG/DL — SIGNIFICANT CHANGE UP (ref 8.5–10.1)
CHLORIDE SERPL-SCNC: 106 MMOL/L — SIGNIFICANT CHANGE UP (ref 98–110)
CO2 SERPL-SCNC: 15 MMOL/L — LOW (ref 17–32)
CREAT SERPL-MCNC: 5.3 MG/DL — CRITICAL HIGH (ref 0.7–1.5)
CULTURE RESULTS: SIGNIFICANT CHANGE UP
EOSINOPHIL # BLD AUTO: 0 K/UL — SIGNIFICANT CHANGE UP (ref 0–0.7)
EOSINOPHIL NFR BLD AUTO: 0 % — SIGNIFICANT CHANGE UP (ref 0–8)
GLUCOSE SERPL-MCNC: 174 MG/DL — HIGH (ref 70–99)
HCT VFR BLD CALC: 37 % — LOW (ref 42–52)
HGB BLD-MCNC: 12.7 G/DL — LOW (ref 14–18)
IMM GRANULOCYTES NFR BLD AUTO: 2.5 % — HIGH (ref 0.1–0.3)
INR BLD: 1.98 RATIO — HIGH (ref 0.65–1.3)
INR BLD: 2.01 RATIO — HIGH (ref 0.65–1.3)
LYMPHOCYTES # BLD AUTO: 0.16 K/UL — LOW (ref 1.2–3.4)
LYMPHOCYTES # BLD AUTO: 2.9 % — LOW (ref 20.5–51.1)
MAGNESIUM SERPL-MCNC: 2.6 MG/DL — HIGH (ref 1.8–2.4)
MCHC RBC-ENTMCNC: 32.7 PG — HIGH (ref 27–31)
MCHC RBC-ENTMCNC: 34.3 G/DL — SIGNIFICANT CHANGE UP (ref 32–37)
MCV RBC AUTO: 95.4 FL — HIGH (ref 80–94)
MONOCYTES # BLD AUTO: 0.41 K/UL — SIGNIFICANT CHANGE UP (ref 0.1–0.6)
MONOCYTES NFR BLD AUTO: 7.5 % — SIGNIFICANT CHANGE UP (ref 1.7–9.3)
NEUTROPHILS # BLD AUTO: 4.79 K/UL — SIGNIFICANT CHANGE UP (ref 1.4–6.5)
NEUTROPHILS NFR BLD AUTO: 87.1 % — HIGH (ref 42.2–75.2)
NRBC # BLD: 2 /100 WBCS — HIGH (ref 0–0)
PHOSPHATE SERPL-MCNC: 6.1 MG/DL — HIGH (ref 2.1–4.9)
PLATELET # BLD AUTO: 59 K/UL — LOW (ref 130–400)
POTASSIUM SERPL-MCNC: 4.9 MMOL/L — SIGNIFICANT CHANGE UP (ref 3.5–5)
POTASSIUM SERPL-SCNC: 4.9 MMOL/L — SIGNIFICANT CHANGE UP (ref 3.5–5)
PROT SERPL-MCNC: 5.8 G/DL — LOW (ref 6–8)
PROTHROM AB SERPL-ACNC: 22.6 SEC — HIGH (ref 9.95–12.87)
PROTHROM AB SERPL-ACNC: 23 SEC — HIGH (ref 9.95–12.87)
RBC # BLD: 3.88 M/UL — LOW (ref 4.7–6.1)
RBC # FLD: 23.9 % — HIGH (ref 11.5–14.5)
SODIUM SERPL-SCNC: 150 MMOL/L — HIGH (ref 135–146)
SPECIMEN SOURCE: SIGNIFICANT CHANGE UP
WBC # BLD: 5.5 K/UL — SIGNIFICANT CHANGE UP (ref 4.8–10.8)
WBC # FLD AUTO: 5.5 K/UL — SIGNIFICANT CHANGE UP (ref 4.8–10.8)

## 2019-09-07 PROCEDURE — 74019 RADEX ABDOMEN 2 VIEWS: CPT | Mod: 26

## 2019-09-07 PROCEDURE — 71045 X-RAY EXAM CHEST 1 VIEW: CPT | Mod: 26,76

## 2019-09-07 RX ORDER — PANTOPRAZOLE SODIUM 20 MG/1
40 TABLET, DELAYED RELEASE ORAL DAILY
Refills: 0 | Status: DISCONTINUED | OUTPATIENT
Start: 2019-09-07 | End: 2019-09-07

## 2019-09-07 RX ORDER — SODIUM CHLORIDE 9 MG/ML
1000 INJECTION, SOLUTION INTRAVENOUS
Refills: 0 | Status: DISCONTINUED | OUTPATIENT
Start: 2019-09-07 | End: 2019-09-07

## 2019-09-07 RX ADMIN — CHLORHEXIDINE GLUCONATE 15 MILLILITER(S): 213 SOLUTION TOPICAL at 05:01

## 2019-09-07 RX ADMIN — MICAFUNGIN SODIUM 105 MILLIGRAM(S): 100 INJECTION, POWDER, LYOPHILIZED, FOR SOLUTION INTRAVENOUS at 06:43

## 2019-09-07 RX ADMIN — Medication 100 MILLIGRAM(S): at 05:01

## 2019-09-07 RX ADMIN — MIDODRINE HYDROCHLORIDE 10 MILLIGRAM(S): 2.5 TABLET ORAL at 05:01

## 2019-09-07 RX ADMIN — PANTOPRAZOLE SODIUM 40 MILLIGRAM(S): 20 TABLET, DELAYED RELEASE ORAL at 11:10

## 2019-09-07 RX ADMIN — CHLORHEXIDINE GLUCONATE 1 APPLICATION(S): 213 SOLUTION TOPICAL at 05:02

## 2019-09-07 RX ADMIN — Medication 650 MILLIGRAM(S): at 05:02

## 2019-09-07 RX ADMIN — MEROPENEM 100 MILLIGRAM(S): 1 INJECTION INTRAVENOUS at 12:01

## 2019-09-07 RX ADMIN — Medication 100 MILLIGRAM(S): at 13:29

## 2019-09-07 RX ADMIN — SODIUM CHLORIDE 75 MILLILITER(S): 9 INJECTION, SOLUTION INTRAVENOUS at 12:01

## 2019-09-07 RX ADMIN — MIDODRINE HYDROCHLORIDE 10 MILLIGRAM(S): 2.5 TABLET ORAL at 13:30

## 2019-09-07 RX ADMIN — LACTULOSE 20 GRAM(S): 10 SOLUTION ORAL at 05:02

## 2019-09-07 RX ADMIN — Medication 100 MILLIGRAM(S): at 11:10

## 2019-09-07 NOTE — PROCEDURE NOTE - NSSITEPREP_SKIN_A_CORE
alcohol/Adherence to aseptic technique: hand hygiene prior to donning barriers (gown, gloves), don cap and mask, sterile drape over patient
chlorhexidine

## 2019-09-07 NOTE — DISCHARGE NOTE FOR THE EXPIRED PATIENT - HOSPITAL COURSE
70 y/o M with PMH of A-fib on Coumadin, HFrEF (LVEF 17% 12/2018) s/p AICD, HTN, gout, CKD III, BPH, Liver cirrhosis secondary to CHF p/w hypotension. no fever, or chills, no recent URTI. to note that the patient blood pressure is 70 to 80 at baseline due to his advanced heart failure  he was admitted to the ICU and started on levophed and vasopressin as a treatment for septic shock on top of cardiogenic shock. got intubated for worsening LOC and respiratory failure. he developed Multiple organ failure during his stay  further work up showed intra abdominal infection 2/2 cholecysytitis. ALTHOUGH  HIDA scan did not show any acute obstruction. he was started empirically on meropenem and micafungin with some improvement. patient became anuric over the last few hrs prior to his death. a decision has been made by the family for CVVH.  he received 5 FFP and 1 donor of platelets. UDALL was inserted by vascular for CVVH and dialysis started 9/7 at 9: 00 am  at 17:20, patient developed sustained Vtach. went into PEA. no pulse was detected on doppler. patient was DNR . no CPR was performed  patient pronounced dead at 17: 27  NO ME on the case

## 2019-09-07 NOTE — PROVIDER CONTACT NOTE (OTHER) - ACTION/TREATMENT ORDERED:
Md Santiago shocked pt. no response. Calcium gluconate given as ordered. Md Santiago reached out to cardiac fellow. Amiodarone gtt started at 1 mg at 0225am. Will continue to monitor.
Surgical Resident Hector ROTHMAN says he is now certified for U-Dall but he will ask his senior
2 plasma ordered this RN will administer when ready

## 2019-09-07 NOTE — PROGRESS NOTE ADULT - SUBJECTIVE AND OBJECTIVE BOX
Patient is a 69y old  Male who presents with a chief complaint of Hypotension (07 Sep 2019 07:45)        Over Night Events:  s/p [place] now on CVVH ' S/P A-LINE     ROS:  See HPI    PHYSICAL EXAM    ICU Vital Signs Last 24 Hrs  T(C): 38.2 (07 Sep 2019 08:00), Max: 38.8 (06 Sep 2019 20:00)  T(F): 100.8 (07 Sep 2019 08:00), Max: 101.8 (06 Sep 2019 20:00)  HR: 126 (07 Sep 2019 09:30) (122 - 138)  BP: 124/62 (07 Sep 2019 07:06) (84/52 - 126/64)  BP(mean): 90 (07 Sep 2019 07:06) (51 - 90)  ABP: 94/54 (07 Sep 2019 09:30) (90/56 - 106/58)  ABP(mean): 66 (07 Sep 2019 09:30) (66 - 72)  RR: 14 (07 Sep 2019 09:30) (14 - 34)  SpO2: 97% (07 Sep 2019 09:30) (93% - 98%)      General:NAD  HEENT: PANFILO             Lungs: Bilateral BS  Cardiovascular: irregular   Gastrointestinal: Soft, Positive BS  Extremities: No clubbing.  Moves extremities.  Full Range of motion   Skin: Warm, intact  Neurological: No motor or sensory deficit       09-06-19 @ 07:01  -  09-07-19 @ 07:00  --------------------------------------------------------  IN:    amiodarone Infusion: 400.8 mL    IV PiggyBack: 300 mL    norepinephrine Infusion: 1689.2 mL    Oral Fluid: 730 mL    Plasma: 1420 mL    Platelets - Single Donor: 236 mL    vasopressin Infusion: 57.6 mL  Total IN: 4833.6 mL    OUT:    Indwelling Catheter - Urethral: 65 mL  Total OUT: 65 mL    Total NET: 4768.6 mL      09-07-19 @ 07:01  -  09-07-19 @ 10:08  --------------------------------------------------------  IN:    amiodarone Infusion: 50.1 mL    norepinephrine Infusion: 225 mL    vasopressin Infusion: 7.2 mL  Total IN: 282.3 mL    OUT:  Total OUT: 0 mL    Total NET: 282.3 mL          LABS:                            12.7   5.50  )-----------( 59       ( 07 Sep 2019 04:30 )             37.0                                               09-07    150<H>  |  106  |  85<HH>  ----------------------------<  174<H>  4.9   |  15<L>  |  5.3<HH>    Ca    9.1      07 Sep 2019 04:30  Phos  6.1     09-07  Mg     2.6     09-07    TPro  5.8<L>  /  Alb  3.0<L>  /  TBili  18.2<HH>  /  DBili  x   /  AST  50<H>  /  ALT  70<H>  /  AlkPhos  113  09-07      PT/INR - ( 07 Sep 2019 04:30 )   PT: 23.00 sec;   INR: 2.01 ratio         PTT - ( 07 Sep 2019 04:30 )  PTT:39.5 sec                                                                                     LIVER FUNCTIONS - ( 07 Sep 2019 04:30 )  Alb: 3.0 g/dL / Pro: 5.8 g/dL / ALK PHOS: 113 U/L / ALT: 70 U/L / AST: 50 U/L / GGT: x                                                  Culture - Blood (collected 04 Sep 2019 16:48)  Source: .Blood None  Preliminary Report (06 Sep 2019 02:01):    No growth to date.                                                   Mode: AC/ CMV (Assist Control/ Continuous Mandatory Ventilation)  RR (machine): 20  TV (machine): 450  FiO2: 40  PEEP: 8  ITime: 1  MAP: 14  PIP: 27                                      ABG - ( 07 Sep 2019 04:00 )  pH, Arterial: 7.28  pH, Blood: x     /  pCO2: 29    /  pO2: 160   / HCO3: 13    / Base Excess: -12.0 /  SaO2: 99                  MEDICATIONS  (STANDING):  allopurinol 100 milliGRAM(s) Oral daily  amiodarone Infusion 0.5 mG/Min (16.667 mL/Hr) IV Continuous <Continuous>  chlorhexidine 0.12% Liquid 15 milliLiter(s) Oral Mucosa every 12 hours  chlorhexidine 4% Liquid 1 Application(s) Topical <User Schedule>  CRRT Treatment    <Continuous>  hydrocortisone sodium succinate Injectable 100 milliGRAM(s) IV Push every 8 hours  lactulose Syrup 20 Gram(s) Enteral Tube four times a day  meropenem  IVPB 500 milliGRAM(s) IV Intermittent every 24 hours  micafungin IVPB      micafungin IVPB 100 milliGRAM(s) IV Intermittent every 24 hours  midodrine 10 milliGRAM(s) Oral every 8 hours  norepinephrine Infusion 0.05 MICROgram(s)/kG/Min (4.673 mL/Hr) IV Continuous <Continuous>  pantoprazole   Suspension 40 milliGRAM(s) Oral daily  PrismaSATE Dialysate BGK 4 / 2.5 5000 milliLiter(s) (2000 mL/Hr) CRRT <Continuous>  rifaximin 550 milliGRAM(s) Oral two times a day  sodium bicarbonate 650 milliGRAM(s) Oral three times a day  vasopressin Infusion 0.04 Unit(s)/Min (2.4 mL/Hr) IV Continuous <Continuous>    MEDICATIONS  (PRN):      Xrays:                                                                                   < from: Transthoracic Echocardiogram (09.02.19 @ 10:44) >  . Left ventricular ejection fraction, by visual estimation, is <20%.   2. Severely decreased global left ventricular systolic function.   3. Mildly increased left ventricular internal cavity size.   4. Mildly increased LV wall thickness.   5. Moderate mitral valve regurgitation.   6. Moderate thickening and calcification of the anterior and posterior   mitral valve leaflets.   7. Moderate tricuspid regurgitation.   8. Mild aortic regurgitation.   9. Sclerotic aortic valve with decreased opening.  10. Estimated pulmonary artery systolic pressure is 36.6 mmHg assuming a   right atrial pressure of 5 mmHg, which is consistent with borderline   pulmonary hypertension.  11. There is mild aortic root calcification.    < end of copied text >

## 2019-09-07 NOTE — PROGRESS NOTE ADULT - ASSESSMENT
IMPRESSION:    Shock cardiogenic/ multiorgan failure  HF REF S/P aicd- Deactivated  Cholecystitis r/o neg MAXIME Hicksib was  on Coumadin   thrombocytopenia/ DIC s/p 5 FFP , 1 platelets ,  Renal failure on CVVH      PLAN:    CNS: No depressants, as needed sedation    HEENT: Oral care    PULMONARY:  HOB @ 45 degrees.  Wean O2 as tolerated, dec FIO2    CARDIOVASCULAR:  On levo/vaso/AMIO .  Cardiology Follow up , TAPER PRESSORS , Hydrocortisone     GI: GI prophylaxis.  feeding As tolerated     RENAL:  Follow up lytes.  Correct as needed. on CVVH     INFECTIOUS DISEASE: Follow up cultures.  Continue ABX until cultures PER id    HEMATOLOGICAL:  DVT prophylaxis with SCDs. F/U CBC. , transfuse to keep hb >7    ENDOCRINE:  Follow up FS.  Insulin protocol if needed.    PALLIATIVE CARE EVAL  Advance directives    Overall prognosis poor   MICU monitoring for now

## 2019-09-07 NOTE — PROGRESS NOTE ADULT - ASSESSMENT
70 y/o M with PMH of A-fib on Coumadin, HFrEF (LVEF 17%) s/p AICD, HTN, gout, CKD III, BPH, Liver cirrhosis secondary to CHF p/w hypotension  Now in cardiogenic shock w/ MODS including  VERO w/ hyperK, persistent  vtach   # creatinine trending up  # oligoanuric  # on pressors  # s/p udall, to start cvvhd, no loss, no gain  # potassium improved  # d5 with 3 amp of bicarbonate at 75 cc/h for 6 h, than d/c  # needs daily ph on cvvhd  # change conner dose ( on cvvhd check with ID/pharmacy)  # overall prognosis poor

## 2019-09-07 NOTE — PROGRESS NOTE ADULT - SUBJECTIVE AND OBJECTIVE BOX
seen and examined  intubated/ventilated  on pressors  Ralph placed       Standing Inpatient Medications  allopurinol 100 milliGRAM(s) Oral daily  amiodarone Infusion 0.5 mG/Min IV Continuous <Continuous>  chlorhexidine 0.12% Liquid 15 milliLiter(s) Oral Mucosa every 12 hours  chlorhexidine 4% Liquid 1 Application(s) Topical <User Schedule>  CRRT Treatment    <Continuous>  hydrocortisone sodium succinate Injectable 100 milliGRAM(s) IV Push every 8 hours  lactulose Syrup 20 Gram(s) Enteral Tube four times a day  meropenem  IVPB 500 milliGRAM(s) IV Intermittent every 24 hours  micafungin IVPB      micafungin IVPB 100 milliGRAM(s) IV Intermittent every 24 hours  midodrine 10 milliGRAM(s) Oral every 8 hours  norepinephrine Infusion 0.05 MICROgram(s)/kG/Min IV Continuous <Continuous>  pantoprazole   Suspension 40 milliGRAM(s) Oral daily  PrismaSATE Dialysate BGK 4 / 2.5 5000 milliLiter(s) CRRT <Continuous>  rifaximin 550 milliGRAM(s) Oral two times a day  sodium bicarbonate 650 milliGRAM(s) Oral three times a day  vasopressin Infusion 0.04 Unit(s)/Min IV Continuous <Continuous>        VITALS/PHYSICAL EXAM  --------------------------------------------------------------------------------  T(C): 38.5 (09-07-19 @ 04:06), Max: 38.8 (09-06-19 @ 20:00)  HR: 130 (09-07-19 @ 07:06) (120 - 138)  BP: 124/62 (09-07-19 @ 07:06) (84/52 - 126/64)  RR: 29 (09-07-19 @ 07:06) (14 - 34)  SpO2: 96% (09-07-19 @ 07:06) (93% - 98%)  Wt(kg): --        09-06-19 @ 07:01  -  09-07-19 @ 07:00  --------------------------------------------------------  IN: 4833.6 mL / OUT: 65 mL / NET: 4768.6 mL      Physical Exam:  	Gen: intubated/ventilated  	Pulm:  B/L mekhi   	CV:  S1S2; no rub  	Abd: +distended  	: genaro  	LE:  edema  	Vascular access: udall     LABS/STUDIES  --------------------------------------------------------------------------------              12.7   5.50  >-----------<  59       [09-07-19 @ 04:30]              37.0     150  |  106  |  85  ----------------------------<  174      [09-07-19 @ 04:30]  4.9   |  15  |  5.3        Ca     9.1     [09-07-19 @ 04:30]      Mg     2.6     [09-07-19 @ 04:30]      Phos  6.1     [09-07-19 @ 04:30]    TPro  5.8  /  Alb  3.0  /  TBili  18.2  /  DBili  x   /  AST  50  /  ALT  70  /  AlkPhos  113  [09-07-19 @ 04:30]    PT/INR: PT 23.00, INR 2.01       [09-07-19 @ 04:30]  PTT: 39.5       [09-07-19 @ 04:30]          [09-05-19 @ 13:10]    Creatinine Trend:  SCr 5.3 [09-07 @ 04:30]  SCr 4.8 [09-06 @ 19:00]  SCr 4.8 [09-06 @ 13:00]  SCr 4.8 [09-06 @ 04:00]  SCr 4.4 [09-05 @ 16:40]    Urinalysis - [09-04-19 @ 18:40]      Color Bobbi / Appearance Slightly Turbid / SG 1.025 / pH 5.5      Gluc Negative / Ketone Trace  / Bili Small / Urobili <2 mg/dL       Blood Large / Protein 30 mg/dL / Leuk Est Moderate / Nitrite Negative      RBC 36 / WBC 12 / Hyaline 6 / Gran  / Sq Epi  / Non Sq Epi 1 / Bacteria Negative      Iron 66, TIBC 199, %sat 33      [09-03-19 @ 10:50]  Ferritin 315      [09-03-19 @ 10:50]  TSH 2.46      [09-02-19 @ 04:40]    HCV 0.69, Nonreact      [09-02-19 @ 04:40]  HIV Nonreact      [09-03-19 @ 11:00]    CANDI: titer 1:160, pattern Speckled      [09-03-19 @ 10:50]

## 2019-09-07 NOTE — PROGRESS NOTE ADULT - SUBJECTIVE AND OBJECTIVE BOX
68 y/o M with PMH of A-fib on Coumadin, HFrEF (LVEF 17% 12/2018) s/p AICD, HTN, gout, CKD III, BPH, Liver cirrhosis secondary to CHF p/w hypotension. no fever, or chills, no recent URTI. to note that the patient blood pressure is 70 to 80 at baseline due to his advanced heart failure  he was admitted to the ICU and started on levophed and vasopressin as a treatment for septic shock?? on top of cardiogenic shock     OVERNIGHT EVENTS: he received 5 FFP and 1 donor of platelets. UDALL was inserted by vascular for CVVH and dialysis started 9/7 at 9: 00 am    SUBJECTIVE / INTERVAL HPI: Patient seen and examined at bedside. not arousable, intubated, not responsive to painful stimuli. OFF sedation    VITAL SIGNS:  Vital Signs Last 24 Hrs  T(C): 38.2 (07 Sep 2019 08:00), Max: 38.8 (06 Sep 2019 20:00)  T(F): 100.8 (07 Sep 2019 08:00), Max: 101.8 (06 Sep 2019 20:00)  HR: 114 (07 Sep 2019 11:00) (114 - 138)  BP: 124/62 (07 Sep 2019 07:06) (84/52 - 126/64)  BP(mean): 90 (07 Sep 2019 07:06) (51 - 90)  RR: 22 (07 Sep 2019 11:00) (12 - 34)  SpO2: 98% (07 Sep 2019 11:00) (93% - 98%)    PHYSICAL EXAM:    General: WDWN  HEENT: NC/AT; PERRL, clear conjunctiva  Neck: supple  Cardiovascular: +S1/S2; RRR  Respiratory: CTA b/l; no W/R/R  Gastrointestinal: soft, NT/ND; +BSx4  Extremities: WWP; 2+ peripheral pulses; no edema   Neurological: AAOx3; no focal deficits    MEDICATIONS:  MEDICATIONS  (STANDING):  allopurinol 100 milliGRAM(s) Oral daily  amiodarone Infusion 0.5 mG/Min (16.667 mL/Hr) IV Continuous <Continuous>  chlorhexidine 0.12% Liquid 15 milliLiter(s) Oral Mucosa every 12 hours  chlorhexidine 4% Liquid 1 Application(s) Topical <User Schedule>  CRRT Treatment    <Continuous>  dextrose 5% 1000 milliLiter(s) (75 mL/Hr) IV Continuous <Continuous>  hydrocortisone sodium succinate Injectable 100 milliGRAM(s) IV Push every 8 hours  lactulose Syrup 20 Gram(s) Enteral Tube four times a day  meropenem  IVPB 500 milliGRAM(s) IV Intermittent every 24 hours  micafungin IVPB      micafungin IVPB 100 milliGRAM(s) IV Intermittent every 24 hours  midodrine 10 milliGRAM(s) Oral every 8 hours  norepinephrine Infusion 0.05 MICROgram(s)/kG/Min (4.673 mL/Hr) IV Continuous <Continuous>  pantoprazole   Suspension 40 milliGRAM(s) Oral daily  PrismaSATE Dialysate BGK 4 / 2.5 5000 milliLiter(s) (2000 mL/Hr) CRRT <Continuous>  rifaximin 550 milliGRAM(s) Oral two times a day  vasopressin Infusion 0.04 Unit(s)/Min (2.4 mL/Hr) IV Continuous <Continuous>    MEDICATIONS  (PRN):      ALLERGIES:  Allergies    No Known Allergies    Intolerances        LABS:                        12.7   5.50  )-----------( 59       ( 07 Sep 2019 04:30 )             37.0     09-07    150<H>  |  106  |  85<HH>  ----------------------------<  174<H>  4.9   |  15<L>  |  5.3<HH>    Ca    9.1      07 Sep 2019 04:30  Phos  6.1     09-07  Mg     2.6     09-07    TPro  5.8<L>  /  Alb  3.0<L>  /  TBili  18.2<HH>  /  DBili  x   /  AST  50<H>  /  ALT  70<H>  /  AlkPhos  113  09-07    PT/INR - ( 07 Sep 2019 04:30 )   PT: 23.00 sec;   INR: 2.01 ratio         PTT - ( 07 Sep 2019 04:30 )  PTT:39.5 sec    CAPILLARY BLOOD GLUCOSE      POCT Blood Glucose.: 106 mg/dL (06 Sep 2019 06:46)

## 2019-09-07 NOTE — PROVIDER CONTACT NOTE (OTHER) - RECOMMENDATIONS
Md Hoffman and Md Santiago made aware.
Reconsult Surgery, or ask Carisa from ED to place udall
plasma to correct INR

## 2019-09-07 NOTE — PROCEDURE NOTE - NSPROCDETAILS_GEN_ALL_CORE
sterile technique, catheter placed/ultrasound guidance/lumen(s) aspirated and flushed/guidewire recovered/sterile dressing applied

## 2019-09-07 NOTE — PROVIDER CONTACT NOTE (OTHER) - ASSESSMENT
2 Plasma given PTT results show 1.9
Patient INR 2.4 down from 6, as per MD peña surgery refused to place udall, but Carisa from ED will place if she has time
HR in the 140-160s. Pt was shocked by his own aicd three times.

## 2019-09-07 NOTE — PROCEDURE NOTE - ADDITIONAL PROCEDURE DETAILS
RIJ UDALL catheter placed with no complications. STAT Chest XR ordered, pending confirmation before clearing for use. LIJ UDALL catheter placed with no complications. STAT Chest XR ordered, pending confirmation before clearing for use.

## 2019-09-07 NOTE — PROGRESS NOTE ADULT - REASON FOR ADMISSION
Hypotension

## 2019-09-07 NOTE — PROGRESS NOTE ADULT - ASSESSMENT
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     # Septic shock on top of cardiogenic shock  - patient is intubated for hypercapnic acute respiratory failure  - on dual pressors (levophed + vasopressin) being able to decrease levophed gradually today  - AIM to a MAP 55   - c/w hydrocortisone 100 mg every 8 hrs for now  - accurate input & output + daily body weight  - daily CXR  - hold lasix and spironolactone for now  - oligo anuric in the setting of cardiorenal sd   - STARTED ON CVVH today by the nephrology team  - BMP AND CBCD post dialysis    # sepsis  - c/w meropenem + micafungin, patient being improved on IV antibiotics  - got 1 shot of vancomycin 9/4  - take vanco post dialysis and redose accordingly  - no focus of infection     # sustained Vtach:  - still on amiodarone drip  - EP on board  - AICD function stopped by EP    # cholecystitis??  - less likely. pericholecystic fluid could be seen in the setting of heart failure and volume overload  - NO intervention by GI team  - HIDA scan negative for acute cholecystitis  - no acute intervention by GS    # liver cirrhosis:  - hold lactulose for now pending abdominal x ray result  - trend bilirubin  - c/w rifaximin 550 mg bid    # confusion + somnolence  - most likely 2/2 liver encephalopathy    DVT prophylaxis: OFF   PPI prophylaxis: pantoprazole  FEEDING: hold feeding and follow up abdominal x ray to r/o underlying obstruction  code status: DNR  dispo : very poor prognosis  family aware of prognosis

## 2019-09-07 NOTE — PROGRESS NOTE ADULT - PROVIDER SPECIALTY LIST ADULT
Cardiology
Critical Care
Infectious Disease
Internal Medicine
Nephrology
Surgery
Critical Care

## 2019-09-07 NOTE — PROVIDER CONTACT NOTE (OTHER) - SITUATION
Vented, NOREEN R IJ, patient needs CVVH-D no udall in place due to INR
Pt has been consistently tachycardic. Pt has Afib on Coumadin, hepatic cirrhosis. sepsis and hypotension. HR in the 140-160s.

## 2019-09-07 NOTE — PROCEDURE NOTE - NSINFORMCONSENT_GEN_A_CORE
SON/Benefits, risks, and possible complications of procedure explained to patient/caregiver who verbalized understanding and gave verbal consent.

## 2019-09-09 LAB — FSP PPP-MCNC: >=20 UG/ML

## 2019-09-10 LAB
CULTURE RESULTS: SIGNIFICANT CHANGE UP
SPECIMEN SOURCE: SIGNIFICANT CHANGE UP

## 2019-09-19 DIAGNOSIS — Z79.01 LONG TERM (CURRENT) USE OF ANTICOAGULANTS: ICD-10-CM

## 2019-09-19 DIAGNOSIS — D69.59 OTHER SECONDARY THROMBOCYTOPENIA: ICD-10-CM

## 2019-09-19 DIAGNOSIS — L97.919 NON-PRESSURE CHRONIC ULCER OF UNSPECIFIED PART OF RIGHT LOWER LEG WITH UNSPECIFIED SEVERITY: ICD-10-CM

## 2019-09-19 DIAGNOSIS — D65 DISSEMINATED INTRAVASCULAR COAGULATION [DEFIBRINATION SYNDROME]: ICD-10-CM

## 2019-09-19 DIAGNOSIS — N40.0 BENIGN PROSTATIC HYPERPLASIA WITHOUT LOWER URINARY TRACT SYMPTOMS: ICD-10-CM

## 2019-09-19 DIAGNOSIS — A41.9 SEPSIS, UNSPECIFIED ORGANISM: ICD-10-CM

## 2019-09-19 DIAGNOSIS — I95.9 HYPOTENSION, UNSPECIFIED: ICD-10-CM

## 2019-09-19 DIAGNOSIS — R57.0 CARDIOGENIC SHOCK: ICD-10-CM

## 2019-09-19 DIAGNOSIS — E87.1 HYPO-OSMOLALITY AND HYPONATREMIA: ICD-10-CM

## 2019-09-19 DIAGNOSIS — N18.3 CHRONIC KIDNEY DISEASE, STAGE 3 (MODERATE): ICD-10-CM

## 2019-09-19 DIAGNOSIS — B49 UNSPECIFIED MYCOSIS: ICD-10-CM

## 2019-09-19 DIAGNOSIS — J96.02 ACUTE RESPIRATORY FAILURE WITH HYPERCAPNIA: ICD-10-CM

## 2019-09-19 DIAGNOSIS — N17.9 ACUTE KIDNEY FAILURE, UNSPECIFIED: ICD-10-CM

## 2019-09-19 DIAGNOSIS — K72.00 ACUTE AND SUBACUTE HEPATIC FAILURE WITHOUT COMA: ICD-10-CM

## 2019-09-19 DIAGNOSIS — L97.929 NON-PRESSURE CHRONIC ULCER OF UNSPECIFIED PART OF LEFT LOWER LEG WITH UNSPECIFIED SEVERITY: ICD-10-CM

## 2019-09-19 DIAGNOSIS — J80 ACUTE RESPIRATORY DISTRESS SYNDROME: ICD-10-CM

## 2019-09-19 DIAGNOSIS — I48.91 UNSPECIFIED ATRIAL FIBRILLATION: ICD-10-CM

## 2019-09-19 DIAGNOSIS — I13.0 HYPERTENSIVE HEART AND CHRONIC KIDNEY DISEASE WITH HEART FAILURE AND STAGE 1 THROUGH STAGE 4 CHRONIC KIDNEY DISEASE, OR UNSPECIFIED CHRONIC KIDNEY DISEASE: ICD-10-CM

## 2019-09-19 DIAGNOSIS — R65.21 SEVERE SEPSIS WITH SEPTIC SHOCK: ICD-10-CM

## 2019-09-19 DIAGNOSIS — I50.22 CHRONIC SYSTOLIC (CONGESTIVE) HEART FAILURE: ICD-10-CM

## 2019-09-19 DIAGNOSIS — D68.4 ACQUIRED COAGULATION FACTOR DEFICIENCY: ICD-10-CM

## 2019-09-19 DIAGNOSIS — I42.0 DILATED CARDIOMYOPATHY: ICD-10-CM

## 2019-09-19 DIAGNOSIS — K74.60 UNSPECIFIED CIRRHOSIS OF LIVER: ICD-10-CM

## 2019-09-19 DIAGNOSIS — R41.82 ALTERED MENTAL STATUS, UNSPECIFIED: ICD-10-CM

## 2019-09-19 DIAGNOSIS — I78.0 HEREDITARY HEMORRHAGIC TELANGIECTASIA: ICD-10-CM

## 2019-09-19 DIAGNOSIS — Z95.810 PRESENCE OF AUTOMATIC (IMPLANTABLE) CARDIAC DEFIBRILLATOR: ICD-10-CM

## 2019-09-19 DIAGNOSIS — Z87.891 PERSONAL HISTORY OF NICOTINE DEPENDENCE: ICD-10-CM

## 2019-09-19 DIAGNOSIS — E87.4 MIXED DISORDER OF ACID-BASE BALANCE: ICD-10-CM

## 2019-09-19 DIAGNOSIS — E72.20 DISORDER OF UREA CYCLE METABOLISM, UNSPECIFIED: ICD-10-CM

## 2019-09-19 DIAGNOSIS — I47.2 VENTRICULAR TACHYCARDIA: ICD-10-CM

## 2019-09-19 DIAGNOSIS — G93.1 ANOXIC BRAIN DAMAGE, NOT ELSEWHERE CLASSIFIED: ICD-10-CM

## 2019-09-24 ENCOUNTER — APPOINTMENT (OUTPATIENT)
Dept: CARDIOLOGY | Facility: CLINIC | Age: 69
End: 2019-09-24

## 2019-11-21 ENCOUNTER — APPOINTMENT (OUTPATIENT)
Dept: CARDIOLOGY | Facility: CLINIC | Age: 69
End: 2019-11-21

## 2020-04-14 NOTE — ED ADULT NURSE NOTE - BREATHING, MLM
Patient has an appointment with Dr. Dunaway, due to the COVID-19 pandemic and the CDC recommendations we will not be seeing patients at this time. Dr. Dunaway will perform a telehealth visit to follow-up on how the patient is doing. I called to let the family know but there was no answer at this time, I left a voicemail with contact information provided.    I called and talked to mom and she agrees to a telehealth visit. She also has pictures of patients ear that she will be sending as well. Appointment switched to later in the day when mom is available.      Tachypnea

## 2021-08-09 NOTE — DIETITIAN INITIAL EVALUATION ADULT. - PERTINENT MEDS FT
Addended by: BRIDGETTE LAZAR on: 8/9/2021 02:40 PM     Modules accepted: Orders     abx, levophed, amiodarone, fentanyl, pressor, lactulose, protonix

## 2021-08-09 NOTE — PROCEDURAL SAFETY CHECKLIST WITH OR WITHOUT SEDATION - NSPTSPECIFCONCERNSD_GEN_ALL_CORE
Subjective:  Dash Vera is a 27 y.o. female and presents with Asthma and Seasonal Allergies for a Routine Medical Exam and Labs.     Asthma Review:  The patient is being seen for follow up of asthma,  Not currently in any distress, past episodes of wheezing has been reported, Wheezing when present is described as moderate and not easily relieved with rescue bronchodilator. She uses a maintenance inhaler as well for management of her symptoms. Per the patient she has been without her medications for months after losing her insurance and has been borrowing medications from her family members. The patient does report use of a steroid inhaler. Frequency of use of quick-relief meds: frequent   Regimen compliance: The patient reports adherence to this regimen.     Seasonal/Environmental Allergies Review  Patient presents for follow up evaluation of allergy symptoms. She reports having nasal congestion, rhinorrhea, sneezing, eye itching, watery eyes. Precipitants include pollen. Treatment in the past has included intranasal steroids. Treatment currently includes intranasal steroids and antihistamines which are effective in the patient's opinion.     Review of Systems  Constitutional: negative for fevers, chills, anorexia and weight loss  Eyes:               negative for visual disturbance and irritation  ENT:                negative for tinnitus,sore throat,nasal congestion,ear pains. hoarseness  Respiratory:     negative for cough, hemoptysis, reports previous episodes of dyspnea & wheezing  CV:                  negative for chest pain, palpitations, lower extremity edema  GI:                   negative for nausea, vomiting, diarrhea, abdominal pain,melena  Endo:               negative for polyuria,polydipsia,polyphagia,heat intolerance  Genitourinary: negative for frequency, dysuria and hematuria  Integument:     negative for rash and pruritus  Hematologic:   negative for easy bruising and gum/nose bleeding  Musculoskel:   negative for myalgias, arthralgias, back pain, muscle weakness, joint pain  Neurological:   negative for headaches, dizziness, vertigo, memory problems and gait   Behavl/Psych: negative for feelings of anxiety, depression, mood changes    Past Medical History:   Diagnosis Date    Asthma     Multiple environmental allergies      History reviewed. No pertinent surgical history. Social History     Socioeconomic History    Marital status: SINGLE     Spouse name: Not on file    Number of children: Not on file    Years of education: Not on file    Highest education level: Not on file   Tobacco Use    Smoking status: Never Smoker    Smokeless tobacco: Never Used   Vaping Use    Vaping Use: Never used   Substance and Sexual Activity    Alcohol use: Yes     Comment: occ    Drug use: No    Sexual activity: Yes     Social Determinants of Health     Financial Resource Strain:     Difficulty of Paying Living Expenses:    Food Insecurity:     Worried About Running Out of Food in the Last Year:     [address] in the Last Year:    Transportation Needs:     Lack of Transportation (Medical):  Lack of Transportation (Non-Medical):    Physical Activity:     Days of Exercise per Week:     Minutes of Exercise per Session:    Stress:     Feeling of Stress :    Social Connections:     Frequency of Communication with Friends and Family:     Frequency of Social Gatherings with Friends and Family:     Attends Taoism Services:     Active Member of Clubs or Organizations:     Attends Club or Organization Meetings:     Marital Status:      History reviewed. No pertinent family history. Current Outpatient Medications   Medication Sig Dispense Refill    albuterol (PROVENTIL VENTOLIN) 2.5 mg /3 mL (0.083 %) nebu 3 mL by Nebulization route every four (4) hours as needed for Wheezing.  1 Each 0    albuterol (PROVENTIL HFA, VENTOLIN HFA, PROAIR HFA) 90 mcg/actuation inhaler Take 2 Puffs by inhalation every four (4) hours as needed for Wheezing. 1 Inhaler 12    Nebulizer & Compressor machine Use every 4-6 hours as needed for wheezing. 1 Each 0    montelukast (Singulair) 10 mg tablet Take 1 Tab by mouth daily. 30 Tab 12    budesonide-formoteroL (SYMBICORT) 160-4.5 mcg/actuation HFAA Take 2 Puffs by inhalation two (2) times a day. 1 Inhaler 6    Inhalational Spacing Device (AEROCHAMBER MV) 1 Each by Does Not Apply route as needed. 1 Device 2    carbamide peroxide (DEBROX) 6.5 % otic solution Administer 5 Drops into each ear two (2) times a day. (Patient not taking: Reported on 8/6/2021) 30 mL 0    Cetirizine (ZYRTEC) 10 mg cap Take 1 tablet by mouth daily. (Patient not taking: Reported on 8/6/2021) 30 capsule 6    fluticasone (FLONASE) 50 mcg/actuation nasal spray 2 Sprays by Both Nostrils route daily. (Patient not taking: Reported on 8/6/2021) 1 Bottle 4    predniSONE (DELTASONE) 20 mg tablet Take  by mouth daily (with breakfast).    (Patient not taking: Reported on 8/6/2021)       No Known Allergies    Objective:  Visit Vitals  /60 (BP 1 Location: Left upper arm, BP Patient Position: Semi fowlers, BP Cuff Size: Adult)   Pulse 85   Temp 97.2 °F (36.2 °C) (Oral)   Resp 16   Ht 5' 4\" (1.626 m)   Wt 122 lb 1.6 oz (55.4 kg)   LMP 07/22/2021 (Exact Date)   SpO2 99%   BMI 20.96 kg/m²     Physical Exam:   General appearance - alert, well appearing, and in no distress  Mental status - alert, oriented to person, place, and time  EYE-NAMRATA, EOMI, corneas normal, no foreign bodies  ENT-ENT exam normal, no neck nodes or sinus tenderness  Nose - normal and patent, no erythema, discharge or polyps  Mouth - mucous membranes moist, pharynx normal without lesions  Neck - supple, no significant adenopathy   Chest - clear to auscultation, no wheezes, rales or rhonchi, symmetric air entry   Heart - normal rate, regular rhythm, normal S1, S2, no murmurs, rubs, clicks or gallops   Abdomen - soft, nontender, nondistended, no masses or organomegaly  Lymph- no adenopathy palpable  Ext-peripheral pulses normal, no pedal edema, no clubbing or cyanosis  Skin-Warm and dry. no hyperpigmentation, vitiligo, or suspicious lesions  Neuro -alert, oriented, normal speech, no focal findings or movement disorder noted  Musculoskeletal-normal C-spine, no tenderness, full ROM without pain  Feet-no nail deformities or callus formation with good pulses noted      Results for orders placed or performed in visit on 08/06/21   HEPATITIS C AB   Result Value Ref Range    Hep C virus Ab Interp. NONREACTIVE NONREACTIVE     LIPID PANEL   Result Value Ref Range    Cholesterol, total 172 <200 MG/DL    Triglyceride 41 <150 MG/DL    HDL Cholesterol 71 MG/DL    LDL, calculated 92.8 0 - 100 MG/DL    VLDL, calculated 8.2 MG/DL    CHOL/HDL Ratio 2.4 0.0 - 5.0     METABOLIC PANEL, COMPREHENSIVE   Result Value Ref Range    Sodium 138 136 - 145 mmol/L    Potassium 4.2 3.5 - 5.1 mmol/L    Chloride 107 97 - 108 mmol/L    CO2 29 21 - 32 mmol/L    Anion gap 2 (L) 5 - 15 mmol/L    Glucose 82 65 - 100 mg/dL    BUN 12 6 - 20 MG/DL    Creatinine 0.62 0.55 - 1.02 MG/DL    BUN/Creatinine ratio 19 12 - 20      GFR est AA >60 >60 ml/min/1.73m2    GFR est non-AA >60 >60 ml/min/1.73m2    Calcium 9.0 8.5 - 10.1 MG/DL    Bilirubin, total 0.4 0.2 - 1.0 MG/DL    ALT (SGPT) 18 12 - 78 U/L    AST (SGOT) 11 (L) 15 - 37 U/L    Alk.  phosphatase 27 (L) 45 - 117 U/L    Protein, total 7.8 6.4 - 8.2 g/dL    Albumin 4.1 3.5 - 5.0 g/dL    Globulin 3.7 2.0 - 4.0 g/dL    A-G Ratio 1.1 1.1 - 2.2     CBC W/O DIFF   Result Value Ref Range    WBC 6.2 3.6 - 11.0 K/uL    RBC 4.23 3.80 - 5.20 M/uL    HGB 12.8 11.5 - 16.0 g/dL    HCT 39.1 35.0 - 47.0 %    MCV 92.4 80.0 - 99.0 FL    MCH 30.3 26.0 - 34.0 PG    MCHC 32.7 30.0 - 36.5 g/dL    RDW 13.3 11.5 - 14.5 %    PLATELET 570 845 - 847 K/uL    MPV 11.2 8.9 - 12.9 FL    NRBC 0.0 0  WBC    ABSOLUTE NRBC 0.00 0.00 - 0.01 K/uL   AMB POC HEMOGLOBIN A1C   Result Value Ref Range    Hemoglobin A1c (POC) 5.0 %       Assessment/Plan:    ICD-10-CM ICD-9-CM    1. Encounter for routine adult medical examination  Z00.00 V70.0 AMB POC HEMOGLOBIN A1C      AMB POC URINALYSIS DIP STICK AUTO W/O MICRO      CBC W/O DIFF      METABOLIC PANEL, COMPREHENSIVE      LIPID PANEL      LIPID PANEL      METABOLIC PANEL, COMPREHENSIVE      CBC W/O DIFF   2. Mild intermittent asthma without complication  R41.41 001.78 albuterol (PROVENTIL VENTOLIN) 2.5 mg /3 mL (0.083 %) nebu      albuterol (PROVENTIL HFA, VENTOLIN HFA, PROAIR HFA) 90 mcg/actuation inhaler      Nebulizer & Compressor machine   3. Environmental and seasonal allergies  J30.89 477.8    4. Encounter for hepatitis C screening test for low risk patient  Z11.59 V73.89 HEPATITIS C AB      HEPATITIS C AB     Orders Placed This Encounter    CBC W/O DIFF     Standing Status:   Future     Number of Occurrences:   1     Standing Expiration Date:   6/7/3505    METABOLIC PANEL, COMPREHENSIVE     Standing Status:   Future     Number of Occurrences:   1     Standing Expiration Date:   8/6/2022    LIPID PANEL     Standing Status:   Future     Number of Occurrences:   1     Standing Expiration Date:   8/6/2022    HEPATITIS C AB     Standing Status:   Future     Number of Occurrences:   1     Standing Expiration Date:   8/6/2022    AMB POC HEMOGLOBIN A1C    AMB POC URINALYSIS DIP STICK AUTO W/O MICRO    albuterol (PROVENTIL VENTOLIN) 2.5 mg /3 mL (0.083 %) nebu     Sig: 3 mL by Nebulization route every four (4) hours as needed for Wheezing. Dispense:  1 Each     Refill:  0    albuterol (PROVENTIL HFA, VENTOLIN HFA, PROAIR HFA) 90 mcg/actuation inhaler     Sig: Take 2 Puffs by inhalation every four (4) hours as needed for Wheezing. Dispense:  1 Inhaler     Refill:  12    Nebulizer & Compressor machine     Sig: Use every 4-6 hours as needed for wheezing.      Dispense:  1 Each     Refill:  0     Diagnosis Code J45.20     Patient Instructions          Allergies: Care Instructions  Your Care Instructions     Allergies occur when your body's defense system (immune system) overreacts to certain substances. The immune system treats a harmless substance as if it were a harmful germ or virus. Many things can make this happen. These include pollens, medicine, food, dust, animal dander, and mold. Allergies can be mild or severe. Mild allergies can be managed with home treatment. But medicine may be needed to prevent problems. Managing your allergies is an important part of staying healthy. Your doctor may suggest that you have allergy testing to help find out what is causing your allergies. Severe allergies can cause reactions that affect your whole body (anaphylactic reactions). Your doctor may prescribe a shot of epinephrine to carry with you in case you have a severe reaction. Learn how to give yourself the shot and keep it with you at all times. Make sure it is not . Follow-up care is a key part of your treatment and safety. Be sure to make and go to all appointments, and call your doctor if you are having problems. It's also a good idea to know your test results and keep a list of the medicines you take. How can you care for yourself at home? · If you have been told by your doctor that dust or dust mites are causing your allergy, decrease the dust around your bed:  ? Wash sheets, pillowcases, and other bedding in hot water every week. ? Use dust-proof covers for pillows, duvets, and mattresses. Avoid plastic covers because they tear easily and do not \"breathe. \" Wash as instructed on the label. ? Do not use any blankets and pillows that you do not need. ? Use blankets that you can wash in your washing machine. ? Consider removing drapes and carpets, which attract and hold dust, from your bedroom. · If you are allergic to house dust and mites, do not use home humidifiers.  Your doctor can suggest ways you can control dust and mites. · Look for signs of cockroaches. Cockroaches cause allergic reactions. Use cockroach baits to get rid of them. Then, clean your home well. Cockroaches like areas where grocery bags, newspapers, empty bottles, or cardboard boxes are stored. Do not keep these inside your home, and keep trash and food containers sealed. Seal off any spots where cockroaches might enter your home. · If you are allergic to mold, get rid of furniture, rugs, and drapes that smell musty. Check for mold in the bathroom. · If you are allergic to outdoor pollen or mold spores, use air-conditioning. Change or clean all filters every month. Keep windows closed. · If you are allergic to pollen, stay inside when pollen counts are high. Use a vacuum  with a HEPA filter or a double-thickness filter at least two times each week. · Stay inside when air pollution is bad. Avoid paint fumes, perfumes, and other strong odors. · Avoid conditions that make your allergies worse. Stay away from smoke. Do not smoke or let anyone else smoke in your house. Do not use fireplaces or wood-burning stoves. · If you are allergic to your pets, change the air filter in your furnace every month. Use high-efficiency filters. · If you are allergic to pet dander, keep pets outside or out of your bedroom. Old carpet and cloth furniture can hold a lot of animal dander. You may need to replace them. When should you call for help? Give an epinephrine shot if:    · You think you are having a severe allergic reaction.     · You have symptoms in more than one body area, such as mild nausea and an itchy mouth. After giving an epinephrine shot call 911, even if you feel better. Call 911 if:    · You have symptoms of a severe allergic reaction. These may include:  ? Sudden raised, red areas (hives) all over your body. ? Swelling of the throat, mouth, lips, or tongue. ? Trouble breathing. ? Passing out (losing consciousness).  Or you may feel very lightheaded or suddenly feel weak, confused, or restless.     · You have been given an epinephrine shot, even if you feel better. Call your doctor now or seek immediate medical care if:    · You have symptoms of an allergic reaction, such as:  ? A rash or hives (raised, red areas on the skin). ? Itching. ? Swelling. ? Belly pain, nausea, or vomiting. Watch closely for changes in your health, and be sure to contact your doctor if:    · You do not get better as expected. Where can you learn more? Go to http://www.mares.com/  Enter W171 in the search box to learn more about \"Allergies: Care Instructions. \"  Current as of: November 6, 2020               Content Version: 12.8  © 2006-2021 [employer]. Care instructions adapted under license by [employer] (which disclaims liability or warranty for this information). If you have questions about a medical condition or this instruction, always ask your healthcare professional. [address] any warranty or liability for your use of this information. Asthma: Your Action Plan  Medicine List     Controller medicine action plan  Fill in the blank spaces and boxes that apply for all sections. · Name of your controller medicine:  ? ____________________________________________  · How much of this medicine do you take? ? ____________________________________________  · How often do you take this medicine? ? ____________________________________________  · Other instructions? ? ____________________________________________  Quick-relief medicine action plan  · Name of your quick-relief medicine:  ? ____________________________________________  · How much of this medicine do you take? ? ____________________________________________  · How often do you take this medicine? ? ____________________________________________  Asthma Zones  GREEN ZONE: This is where you want to be!    Aretta Ice zone symptoms   · You have no shortness of breath or chest tightness. You are not coughing or wheezing. · You can do all of your usual activities. · You sleep well at night. Green zone peak flow (if you use a peak flow meter)  · ______ or more (80% or more of your personal best)  Green zone actions (Check the boxes and fill in the blank spaces that apply.)  [ ] You take your controller medicine(s) every day. [ ] Lorena Nielson are staying away from your asthma triggers. [ ] You take quick-relief medicine (called _____________________) ______ minutes before exercise. YELLOW ZONE: Your asthma is getting worse. Yellow zone symptoms   · You are short of breath or have chest tightness. You are coughing or wheezing. · You have symptoms that keep you up at night. · You can do some, but not all, of your usual activities. Yellow zone peak flow (if you use a peak flow meter)  · ______ to ______ (50% to 79% of your personal best)  Yellow zone actions (Check the boxes and fill in the blank spaces that apply.)  [ ] Take _____ puff(s) of quick-relief medicine called ______________________. Repeat _____ times. [ ] If your symptoms don't get better or your peak flow has not returned to the green zone in 1 hour, then:  · [ ] Take _____ puff(s) of medicine called ______________________. Take it ____ times a day. · [ ] Begin or increase treatment with corticosteroid pills. Take ______ mg of medicine called ____________________________ every __________. · [ ] Call your doctor at this number: ____________________. RED ZONE: Danger! Red zone symptoms   · You are very short of breath. · You can't do your usual activities. · Quick-relief medicine doesn't help. Or your symptoms don't get better after 24 hours in the yellow zone.   Red zone peak flow (if you use a peak flow meter)  · Less than _______ (less than 50% of your personal best)  Red zone actions (Check the boxes and fill in the blank spaces that apply.)  [ ] Take _____ puff(s) of quick-relief medicine called ____________________________. Repeat ______ times. [ ] Begin or increase treatment with corticosteroid pills. Take ________ mg now. [ ] Call your doctor at this number: _________________. If you can't contact your doctor, go to the emergency department. Call 911 or ___________________. [ ] Other numbers you might call are: ___________________________________. When should you call for help? Call 911 anytime you think you may need emergency care. For example, call if:    · You have severe trouble breathing. Call your doctor now or seek immediate medical care if:    · You are in the red zone of your asthma action plan.     · You've used your quick-relief medicine but are still having trouble breathing.     · You cough up blood.     · You have new or worse trouble breathing.     · You cough up dark brown or bloody mucus (sputum). Watch closely for changes in your health, and be sure to contact your doctor if:    · You need to use quick-relief medicine more than 2 days each week within a month (unless it's just for exercise).     · Your coughing and wheezing get worse. Follow-up care is a key part of your treatment and safety. Be sure to make and go to all appointments, and call your doctor if you are having problems. It's also a good idea to know your test results and keep a list of the medicines you take. Where can you learn more? Go to http://www.gray.com/  Enter H178 in the search box to learn more about \"Asthma: Your Action Plan. \"  Current as of: October 26, 2020               Content Version: 12.8  © 0363-4532 [employer]. Care instructions adapted under license by [employer] (which disclaims liability or warranty for this information).  If you have questions about a medical condition or this instruction, always ask your healthcare professional. [place] [address] any warranty or liability for your use of this information. Learning About Nebulizers  What is a nebulizer? A nebulizer is a tool that delivers liquid medicine as a fine mist. You breathe in the medicine through a mouthpiece or face mask. This sends the medicine directly to your airways and lungs. You breathe in the medicine for a few minutes. What is it used for? A nebulizer may be used to treat respiratory problems. These include asthma and chronic obstructive pulmonary disease (COPD). If you have asthma, it can be used with daily controller medicines or with quick-relief medicine during an attack or flare-up. A nebulizer can make inhaling medicines easier. It may be very helpful if it is hard for you to breathe or use an inhaler. How do you use a nebulizer? 1. Put the medicine into the medicine container. Be sure to measure the right amount. 2. Make sure that the container is connected to the mouthpiece or face mask. 3. Turn on the air compressor. 4. Take deep, slow breaths through the mouthpiece or mask. Hold each breath for about 2 seconds. 5. Continue breathing until the medicine is gone from the container. When the medicine is gone, there will be no more mist coming out. This may take about 10 minutes. 6. Shake the container to make sure you get all the medicine. Where can you learn more? Go to http://www.gray.com/  Enter [id number] in the search box to learn more about \"Learning About Nebulizers. \"  Current as of: October 26, 2020               Content Version: 12.8  © 2006-2021 [employer]. Care instructions adapted under license by [employer] (which disclaims liability or warranty for this information). If you have questions about a medical condition or this instruction, always ask your healthcare professional. [place] [address] any warranty or liability for your use of this information.          Follow-up and Dispositions    · Return in about 2 weeks (around 8/20/2021), or if symptoms worsen or fail to improve, for Discuss Test Results. I have reviewed with the patient details of the assessment and plan and all questions were answered. Relevent patient education was performed. The most recent lab findings were reviewed with the patient. An After Visit Summary was printed and given to the patient. yes...

## 2021-12-02 NOTE — ED ADULT NURSE NOTE - NS ED NURSE LEVEL OF CONSCIOUSNESS ORIENTATION
Mom brings pt in over concerns of hand foot and mouth.  States pt has been sick with an ear infection, congestion and now developed a rash.     Oriented - self; Oriented - place; Oriented - time

## 2022-07-12 NOTE — ED ADULT NURSE NOTE - PAIN: PRESENCE, MLM
Patient with discharge orders today. Interim Home Health referral sent to [phone]. Patient's family to provide transportation. Patient has met all treatment goals and milestones. CM following until discharged. ASSESSMENT NOTE    Attending Physician: Unknown MD Luiza  Admit Problem: Chronic systolic heart failure ([facility] [I50.22]  Rhabdomyolysis [M62.82]  Elevated creatine kinase [R74.8]  Failure to thrive in adult [R62.7]  Fatigue, unspecified type [R53.83]  Date/Time of Admission: 7/10/2022  2:21 PM  Problem List:  Patient Active Problem List   Diagnosis    Hypotension    Cardiac arrest ([facility]    HTN (hypertension)    Acute renal failure ([facility]    Schizophrenia ([facility]    Microcytic anemia    Acute respiratory failure with hypoxia (HCC)    Pneumonia due to Streptococcus pneumoniae ([facility]    Systolic congestive heart failure (HCC)    Type II diabetes mellitus ([facility]    CHF (congestive heart failure) (HCC)    Rhabdomyolysis    Stage 3a chronic kidney disease ([facility]    Elevated LFTs       Service Assessment  Patient Orientation Alert and Oriented   Cognition Alert   History Provided By Child/Family   Primary Caregiver Family (Patient's sister Anusha Pierre)   Accompanied By/Relationship     Support Systems Family Members   Patient's Healthcare Decision Maker is: Legal Next of Rosalee 68   PCP Verified by CM Yes (Dr Cantu [place]  [phone])   Last Visit to PCP     Prior Functional Level     Current Functional Level     Can patient return to prior living arrangement     Ability to make needs known:     Family able to assist with home care needs:     Would you like for me to discuss the discharge plan with any other family members/significant others, and if so, who?      Financial Resources     Community Resources     CM/SW Referral       Social/Functional History  Lives With Family   Type of [address] One level   Home Access  (1 Step to enter)   Entrance Stairs - Number of Steps     Entrance Stairs - Rails     Bathroom Shower/Tub     Bathroom Toilet     Bathroom Equipment     [address] Help From Family   ADL Assistance     Bath     Dressing     Grooming     Feeding     Toileting     [address] Work     Driving     Shopping          Other (Comment)     Homemaking Responsibilities     Meal Prep Responsibility     [address] Paying/Finance Responsibility     Shopping Responsibility     Dependent Care Responsibility     Health Care Management     Other (Comment)     Ambuation Assistance     Transfer Assisstance     Active      Patient's  Info     Mode of Transportation     Education     Occupation On disability   Type of Occupation       Discharge Planning   Type of Residence [employer]   Living Arrangements Family Members   Support Systems Family Members   Current Services Prior To Admission None   Potential Assistance Needed N/A   DME     DME     DME Ordered? Cane   Potential Assistance Purchasing Medications No   Meds-to-Beds: Does the patient want to have any new prescriptions delivered to bedside prior to discharge? Type of Home Care Services None   Patient expects to be discharged to: House   Follow Up Appointment: Best Day/Time Monday AM   One/Two Story Residence: One story   # of Interior Steps     Height of Each Step (in)     [employer] Available     History of Falls?  Yes     Services At/After Discharge  Transition of Care Consult (CM Consult): [address]   Internal Home Health No   Internal Hospice     Reason Outside Agency Chosen Unable to staff case   Partner SNF     Reason Why Partner SNF Not Chosen     Internal Comfort Care     Reason Outside [address] Discharge Home Health ([address] [facility])   [facility] Information Provided? No   Mode of Transport at Discharge Other (see comment)   Hospital Transport Time of Discharge     Confirm Follow Up Transport Family     Condition of Participation: Discharge Planning  The plan for Transition of Care is related to the following treatment goals: return to baseline   The Patient and/or Patient Representative was provided with a Choice of Provider? Patient   Name of the Patient Representative who was provided with the Choice of Provider and agrees with the Discharge Plan? The Patient and/or Patient Representative Agree with the Discharge Plan? Yes   Freedom of Choice list was provided with basic dialogue that supports the individualized plan of care/goals, treatment preferences, and shares the quality data associated with the providers?  Yes     Documentation for Discharge Appeal  Discharge Appealed by     Date notified by QIO of appeal request:     Time notified by QIO of appeal request:     Detailed Notice of Discharge given to:     Date Notice of Discharge given:     Time Notice of Discharge given:     [facility] [address] [facility]     Time [facility] [address] [facility]     Date Notified of Outcome     Time Notified of Outcome     Outcome of appeal           Reanna Austin RN 07/12/22 1:57 PM complains of pain/discomfort

## 2022-12-05 NOTE — CONSULT NOTE ADULT - REASON FOR ADMISSION
Pneumonia
Pneumonia
  CHIEF COMPLAINT: Patient is a 68y old  Male who presents with a chief complaint of Foot Infection     HPI:  68 year old man with a history of hypertension, scoliosis, colon cancer, and recent R great toe infection who presented to the ER at the recommendation of his podiatrist because of worsening wound (gangrene) and need for amputation.; he is unable to identify any exacerbating/alleviating factors; denies associated fever. He underwent Amputation of right great toe on 11/30.  He was found to have Strep bacteremia and Morganella morganii, Staph aureus, Alpha hemolytic Strep, and bacteroides on wound culture.  An echocardiogram was suspicious for possible endocarditis.  Cardiology consult was called because of request for DONNIE to further assess for vegetation.  Mr. Stout denies past history of heart disease.  He blames a scoliosis-related abnormal gait for toe wound.  He describes a good overall baseline functional status.    12/5/'22: no complaints.    MEDICATIONS:  OUTPATIENT  Home Medications:  aspirin 81 mg oral tablet, chewable: 1 tab(s) orally once a day (29 Nov 2022 21:29)  atorvastatin 20 mg oral tablet: 1 tab(s) orally once a day (29 Nov 2022 21:29)  metoprolol succinate 25 mg oral tablet, extended release: 1 tab(s) orally once a day (29 Nov 2022 21:29)      INPATIENT  MEDICATIONS  (STANDING):  aspirin  chewable 81 milliGRAM(s) Oral daily  atorvastatin 20 milliGRAM(s) Oral at bedtime  metoprolol succinate ER 25 milliGRAM(s) Oral daily  piperacillin/tazobactam IVPB.. 3.375 Gram(s) IV Intermittent every 8 hours    MEDICATIONS  (PRN):  acetaminophen     Tablet .. 650 milliGRAM(s) Oral every 6 hours PRN Mild Pain (1 - 3)  aluminum hydroxide/magnesium hydroxide/simethicone Suspension 30 milliLiter(s) Oral every 4 hours PRN Dyspepsia  melatonin 3 milliGRAM(s) Oral at bedtime PRN Insomnia  ondansetron Injectable 4 milliGRAM(s) IV Push every 8 hours PRN Nausea and/or Vomiting  oxyCODONE    IR 5 milliGRAM(s) Oral every 4 hours PRN Moderate Pain (4 - 6)  oxyCODONE    IR 10 milliGRAM(s) Oral every 4 hours PRN Severe Pain (7 - 10)    Vital Signs Last 24 Hrs  T(C): 36.4 (05 Dec 2022 16:21), Max: 36.4 (04 Dec 2022 21:05)  T(F): 97.5 (05 Dec 2022 16:21), Max: 97.6 (05 Dec 2022 08:26)  HR: 80 (05 Dec 2022 16:21) (70 - 81)  BP: 115/67 (05 Dec 2022 16:21) (103/54 - 137/64)  BP(mean): --  RR: 16 (05 Dec 2022 16:21) (16 - 18)  SpO2: 96% (05 Dec 2022 16:21) (94% - 99%)    Parameters below as of 05 Dec 2022 16:21  Patient On (Oxygen Delivery Method): room air    Daily     Daily I&O's Summary    04 Dec 2022 07:01  -  05 Dec 2022 07:00  --------------------------------------------------------  IN: 0 mL / OUT: 1650 mL / NET: -1650 mL    05 Dec 2022 07:01  -  05 Dec 2022 19:53  --------------------------------------------------------  IN: 0 mL / OUT: 800 mL / NET: -800 mL        I&O's Detail    04 Dec 2022 07:01  -  05 Dec 2022 07:00  --------------------------------------------------------  IN:  Total IN: 0 mL    OUT:    Voided (mL): 1650 mL  Total OUT: 1650 mL    Total NET: -1650 mL      05 Dec 2022 07:01  -  05 Dec 2022 19:53  --------------------------------------------------------  IN:  Total IN: 0 mL    OUT:    Voided (mL): 800 mL  Total OUT: 800 mL    Total NET: -800 mL          I&O's Summary    04 Dec 2022 07:01  -  05 Dec 2022 07:00  --------------------------------------------------------  IN: 0 mL / OUT: 1650 mL / NET: -1650 mL    05 Dec 2022 07:01  -  05 Dec 2022 19:53  --------------------------------------------------------  IN: 0 mL / OUT: 800 mL / NET: -800 mL      PHYSICAL EXAM:  Constitutional: NAD, awake and alert, seated at edge of bed  HEENT: No oral cyanosis.  Pulmonary: Non-labored, breath sounds are clear bilaterally  Cardiovascular: S1 and S2, regular rate and rhythm, no Murmur  Gastrointestinal: Bowel Sounds present, soft, nontender.   Lymph: No peripheral edema. No cervical lymphadenopathy.  Neurological: Alert, no focal deficits  Skin: No rashes.  Psych:  Mood & affect appropriate    ===============================  ===============================  LABS:                         13.2   7.66  )-----------( 376      ( 04 Dec 2022 05:10 )             39.7     04 Dec 2022 05:10    140    |  109    |  10     ----------------------------<  99     4.0     |  27     |  0.69     Ca    8.4        04 Dec 2022 05:10    ===============================  ===============================    TTE Echo Complete w/o Contrast w/ Doppler (12.02.22 @ 13:59):   The left ventricle is normal in size, wall motion and contractility. Estimated left ventricular ejection fraction is 60%.   Normal appearing right ventricle structure and function.   There is focal calcification of the anterior mitral valve leaflet. The leaflet opening is normal. Very small,linear echodensity seen on the anterior mitral leaflet. Trace mitral regurgitation.   Aortic valve sclerosis. Very small, linear echodensity is seen on non-coronary cusp.   * Small echodensities described on the aortic and mitral valves could represent vegetations; consider DONINE for further evaluation if clinically indicated.    12 Lead ECG (11.29.22 @ 17:31):  Sinus tachycardia  Possible Left atrial enlargement  Right bundle branch block  Abnormal ECG    ===============================    Marv Yung M.D.  Cardiology, [facility]  Cell: [phone]  Offices:    ([facility] Office)  [phone] ([facility] Office)

## 2022-12-27 NOTE — ED ADULT NURSE NOTE - NS ED PATIENT SAFETY CONCERN
EGD ok, no upper GIB, highly unlikely to be LGIB. [id number] [address] for cardiac catheterization and DATP if indicated.     Please call if any issues    Lolly Skiff, MD No

## 2023-09-26 NOTE — ED ADULT NURSE NOTE - NSTOBACCONEVERSMOKERY/N_GEN_A
comfortable appearance/family/SO at bedside/smiling/interactive comfortable appearance/improvement verbalized/family/SO at bedside/smiling/interactive No

## 2024-09-30 NOTE — ED ADULT NURSE NOTE - CAS EDN DISCHARGE INTERVENTIONS
[de-identified] : c/o ? decreased hearing in left ear - - did see peds and told left ear ? closed.l   No other hx of ear problems  IV intact/Indwelling catheter secured and draining/Pressure dressing applied/Arm band on

## 2024-12-30 NOTE — ED PROCEDURE NOTE - CPROC ED INDICATIONS1
Will need to call [employer] determination [employer] ([phone]  REF # [id number]) for a peer to peer.    emergency venous access

## 2025-02-10 NOTE — ED ADULT TRIAGE NOTE - NS ED TRIAGE AVPU SCALE
Verbal - The patient responds to verbal stimuli by opening their eyes when someone speaks to them. The patient is not fully oriented to time, place, or person.
36.9

## 2025-03-20 NOTE — ED PROVIDER NOTE - PMH
Anemia, unspecified type    Congestive heart failure, unspecified HF chronicity, unspecified heart failure type    Gout, unspecified cause, unspecified chronicity, unspecified site    Hepatic cirrhosis, unspecified hepatic cirrhosis type, unspecified whether ascites present
Attending with

## 2025-04-28 NOTE — ED ADULT NURSE NOTE - NS TRANSFER PATIENT BELONGINGS
[Time Spent: ___ minutes] : I have spent [unfilled] minutes of time on the encounter which excludes teaching and separately reported services. Clothing